# Patient Record
Sex: MALE | Race: ASIAN | NOT HISPANIC OR LATINO
[De-identification: names, ages, dates, MRNs, and addresses within clinical notes are randomized per-mention and may not be internally consistent; named-entity substitution may affect disease eponyms.]

---

## 2017-02-23 ENCOUNTER — APPOINTMENT (OUTPATIENT)
Dept: CARDIOLOGY | Facility: CLINIC | Age: 67
End: 2017-02-23

## 2017-03-31 ENCOUNTER — LABORATORY RESULT (OUTPATIENT)
Age: 67
End: 2017-03-31

## 2017-04-03 ENCOUNTER — APPOINTMENT (OUTPATIENT)
Dept: CARDIOLOGY | Facility: CLINIC | Age: 67
End: 2017-04-03

## 2017-04-03 VITALS
DIASTOLIC BLOOD PRESSURE: 82 MMHG | HEART RATE: 94 BPM | WEIGHT: 179 LBS | HEIGHT: 66 IN | RESPIRATION RATE: 14 BRPM | OXYGEN SATURATION: 96 % | SYSTOLIC BLOOD PRESSURE: 123 MMHG | BODY MASS INDEX: 28.77 KG/M2

## 2017-04-03 DIAGNOSIS — E11.9 TYPE 2 DIABETES MELLITUS W/OUT COMPLICATIONS: ICD-10-CM

## 2017-04-03 LAB
ANION GAP SERPL CALC-SCNC: 17 MMOL/L
BUN SERPL-MCNC: 19 MG/DL
CALCIUM SERPL-MCNC: 9.3 MG/DL
CHLORIDE SERPL-SCNC: 98 MMOL/L
CO2 SERPL-SCNC: 24 MMOL/L
CREAT SERPL-MCNC: 1.12 MG/DL
GLUCOSE SERPL-MCNC: 150 MG/DL
MAGNESIUM SERPL-MCNC: 1.9 MG/DL
NT-PROBNP SERPL-MCNC: 2343 PG/ML
POTASSIUM SERPL-SCNC: 4.4 MMOL/L
SODIUM SERPL-SCNC: 139 MMOL/L

## 2017-04-03 RX ORDER — ATORVASTATIN CALCIUM 20 MG/1
20 TABLET, FILM COATED ORAL DAILY
Qty: 30 | Refills: 0 | Status: ACTIVE | COMMUNITY
Start: 2017-04-03

## 2017-04-11 ENCOUNTER — CHART COPY (OUTPATIENT)
Age: 67
End: 2017-04-11

## 2017-05-08 ENCOUNTER — APPOINTMENT (OUTPATIENT)
Dept: CARDIOLOGY | Facility: CLINIC | Age: 67
End: 2017-05-08

## 2017-05-08 ENCOUNTER — NON-APPOINTMENT (OUTPATIENT)
Age: 67
End: 2017-05-08

## 2017-05-08 VITALS
OXYGEN SATURATION: 95 % | HEART RATE: 92 BPM | DIASTOLIC BLOOD PRESSURE: 76 MMHG | HEIGHT: 66 IN | WEIGHT: 176 LBS | SYSTOLIC BLOOD PRESSURE: 118 MMHG | BODY MASS INDEX: 28.28 KG/M2

## 2017-05-08 DIAGNOSIS — I48.91 UNSPECIFIED ATRIAL FIBRILLATION: ICD-10-CM

## 2017-07-10 ENCOUNTER — LABORATORY RESULT (OUTPATIENT)
Age: 67
End: 2017-07-10

## 2017-07-10 ENCOUNTER — NON-APPOINTMENT (OUTPATIENT)
Age: 67
End: 2017-07-10

## 2017-07-10 ENCOUNTER — RESULT REVIEW (OUTPATIENT)
Age: 67
End: 2017-07-10

## 2017-07-10 ENCOUNTER — APPOINTMENT (OUTPATIENT)
Dept: CARDIOLOGY | Facility: CLINIC | Age: 67
End: 2017-07-10

## 2017-07-10 VITALS
SYSTOLIC BLOOD PRESSURE: 122 MMHG | HEART RATE: 75 BPM | DIASTOLIC BLOOD PRESSURE: 81 MMHG | OXYGEN SATURATION: 98 % | BODY MASS INDEX: 28.89 KG/M2 | WEIGHT: 179 LBS | RESPIRATION RATE: 18 BRPM

## 2017-07-10 DIAGNOSIS — I50.23 ACUTE ON CHRONIC SYSTOLIC (CONGESTIVE) HEART FAILURE: ICD-10-CM

## 2017-07-10 LAB
ALBUMIN SERPL ELPH-MCNC: 4.1 G/DL
ALP BLD-CCNC: 90 U/L
ALT SERPL-CCNC: 13 U/L
ANION GAP SERPL CALC-SCNC: 20 MMOL/L
AST SERPL-CCNC: 23 U/L
BILIRUB SERPL-MCNC: 1.3 MG/DL
BUN SERPL-MCNC: 46 MG/DL
CALCIUM SERPL-MCNC: 9.6 MG/DL
CHLORIDE SERPL-SCNC: 98 MMOL/L
CO2 SERPL-SCNC: 22 MMOL/L
CREAT SERPL-MCNC: 1.48 MG/DL
GLUCOSE SERPL-MCNC: 82 MG/DL
MAGNESIUM SERPL-MCNC: 1.9 MG/DL
NT-PROBNP SERPL-MCNC: 3986 PG/ML
POTASSIUM SERPL-SCNC: 4.3 MMOL/L
PROT SERPL-MCNC: 8 G/DL
SODIUM SERPL-SCNC: 140 MMOL/L

## 2017-07-10 RX ORDER — DIGOXIN 125 UG/1
125 TABLET ORAL
Qty: 30 | Refills: 6 | Status: ACTIVE | COMMUNITY
Start: 2017-07-10 | End: 1900-01-01

## 2017-07-10 RX ORDER — METOLAZONE 2.5 MG/1
2.5 TABLET ORAL
Qty: 24 | Refills: 0 | Status: ACTIVE | COMMUNITY
Start: 2017-07-10 | End: 1900-01-01

## 2017-07-10 RX ORDER — TORSEMIDE 20 MG/1
20 TABLET ORAL TWICE DAILY
Qty: 240 | Refills: 3 | Status: ACTIVE | COMMUNITY
Start: 2017-07-10 | End: 1900-01-01

## 2017-07-11 ENCOUNTER — RESULT REVIEW (OUTPATIENT)
Age: 67
End: 2017-07-11

## 2017-07-11 LAB
BASOPHILS # BLD AUTO: 0.01 K/UL
BASOPHILS NFR BLD AUTO: 0.2 %
EOSINOPHIL # BLD AUTO: 0.17 K/UL
EOSINOPHIL NFR BLD AUTO: 2.9 %
HCT VFR BLD CALC: 35.7 %
HGB BLD-MCNC: 11.5 G/DL
IMM GRANULOCYTES NFR BLD AUTO: 0.3 %
LYMPHOCYTES # BLD AUTO: 0.73 K/UL
LYMPHOCYTES NFR BLD AUTO: 12.7 %
MAN DIFF?: NORMAL
MCHC RBC-ENTMCNC: 26.1 PG
MCHC RBC-ENTMCNC: 32.2 GM/DL
MCV RBC AUTO: 81.1 FL
MONOCYTES # BLD AUTO: 0.57 K/UL
MONOCYTES NFR BLD AUTO: 9.9 %
NEUTROPHILS # BLD AUTO: 4.27 K/UL
NEUTROPHILS NFR BLD AUTO: 74 %
PLATELET # BLD AUTO: 96 K/UL
RBC # BLD: 4.4 M/UL
RBC # FLD: 17.1 %
WBC # FLD AUTO: 5.77 K/UL

## 2017-08-10 ENCOUNTER — APPOINTMENT (OUTPATIENT)
Dept: ELECTROPHYSIOLOGY | Facility: CLINIC | Age: 67
End: 2017-08-10
Payer: MEDICARE

## 2017-08-10 ENCOUNTER — OUTPATIENT (OUTPATIENT)
Dept: OUTPATIENT SERVICES | Facility: HOSPITAL | Age: 67
LOS: 1 days | End: 2017-08-10

## 2017-08-10 ENCOUNTER — APPOINTMENT (OUTPATIENT)
Dept: CV DIAGNOSITCS | Facility: HOSPITAL | Age: 67
End: 2017-08-10
Payer: MEDICARE

## 2017-08-10 DIAGNOSIS — E11.9 TYPE 2 DIABETES MELLITUS WITHOUT COMPLICATIONS: ICD-10-CM

## 2017-08-10 PROCEDURE — 93283 PRGRMG EVAL IMPLANTABLE DFB: CPT

## 2017-08-10 PROCEDURE — 93306 TTE W/DOPPLER COMPLETE: CPT | Mod: 26

## 2017-10-05 ENCOUNTER — NON-APPOINTMENT (OUTPATIENT)
Age: 67
End: 2017-10-05

## 2017-10-05 ENCOUNTER — APPOINTMENT (OUTPATIENT)
Dept: ELECTROPHYSIOLOGY | Facility: CLINIC | Age: 67
End: 2017-10-05
Payer: MEDICARE

## 2017-10-05 VITALS
WEIGHT: 172 LBS | BODY MASS INDEX: 26.07 KG/M2 | SYSTOLIC BLOOD PRESSURE: 134 MMHG | DIASTOLIC BLOOD PRESSURE: 76 MMHG | HEIGHT: 68 IN | HEART RATE: 63 BPM

## 2017-10-05 DIAGNOSIS — Z45.02 ENCOUNTER FOR ADJUSTMENT AND MANAGEMENT OF AUTOMATIC IMPLANTABLE CARDIAC DEFIBRILLATOR: ICD-10-CM

## 2017-10-05 LAB
ALBUMIN SERPL ELPH-MCNC: 4.8 G/DL
ALP BLD-CCNC: 59 U/L
ALT SERPL-CCNC: 17 U/L
ANION GAP SERPL CALC-SCNC: 17 MMOL/L
AST SERPL-CCNC: 23 U/L
BASOPHILS # BLD AUTO: 0.02 K/UL
BASOPHILS NFR BLD AUTO: 0.3 %
BILIRUB SERPL-MCNC: 1.6 MG/DL
BUN SERPL-MCNC: 40 MG/DL
CALCIUM SERPL-MCNC: 9.8 MG/DL
CHLORIDE SERPL-SCNC: 95 MMOL/L
CO2 SERPL-SCNC: 27 MMOL/L
CREAT SERPL-MCNC: 1.63 MG/DL
EOSINOPHIL # BLD AUTO: 0.23 K/UL
EOSINOPHIL NFR BLD AUTO: 3.7 %
HCT VFR BLD CALC: 36.2 %
HGB BLD-MCNC: 11.8 G/DL
IMM GRANULOCYTES NFR BLD AUTO: 0.3 %
INR PPP: 2.2 RATIO
LYMPHOCYTES # BLD AUTO: 1.18 K/UL
LYMPHOCYTES NFR BLD AUTO: 19.1 %
MAN DIFF?: NORMAL
MCHC RBC-ENTMCNC: 27.7 PG
MCHC RBC-ENTMCNC: 32.6 GM/DL
MCV RBC AUTO: 85 FL
MONOCYTES # BLD AUTO: 0.49 K/UL
MONOCYTES NFR BLD AUTO: 7.9 %
NEUTROPHILS # BLD AUTO: 4.23 K/UL
NEUTROPHILS NFR BLD AUTO: 68.7 %
PLATELET # BLD AUTO: 113 K/UL
POTASSIUM SERPL-SCNC: 4.6 MMOL/L
PROT SERPL-MCNC: 8.9 G/DL
PT BLD: 25.3 SEC
RBC # BLD: 4.26 M/UL
RBC # FLD: 17.3 %
SODIUM SERPL-SCNC: 139 MMOL/L
WBC # FLD AUTO: 6.17 K/UL

## 2017-10-05 PROCEDURE — 93284 PRGRMG EVAL IMPLANTABLE DFB: CPT

## 2017-10-05 PROCEDURE — 93000 ELECTROCARDIOGRAM COMPLETE: CPT | Mod: 59

## 2017-10-05 PROCEDURE — 99205 OFFICE O/P NEW HI 60 MIN: CPT

## 2017-10-05 PROCEDURE — 99205 OFFICE O/P NEW HI 60 MIN: CPT | Mod: 25

## 2017-10-10 ENCOUNTER — INPATIENT (INPATIENT)
Facility: HOSPITAL | Age: 67
LOS: 0 days | Discharge: HOME | End: 2017-10-11
Attending: STUDENT IN AN ORGANIZED HEALTH CARE EDUCATION/TRAINING PROGRAM | Admitting: STUDENT IN AN ORGANIZED HEALTH CARE EDUCATION/TRAINING PROGRAM
Payer: MEDICARE

## 2017-10-10 VITALS
OXYGEN SATURATION: 97 % | TEMPERATURE: 98 F | DIASTOLIC BLOOD PRESSURE: 82 MMHG | RESPIRATION RATE: 17 BRPM | HEART RATE: 76 BPM | SYSTOLIC BLOOD PRESSURE: 141 MMHG

## 2017-10-10 DIAGNOSIS — Z95.810 PRESENCE OF AUTOMATIC (IMPLANTABLE) CARDIAC DEFIBRILLATOR: ICD-10-CM

## 2017-10-10 LAB
GLUCOSE BLDC GLUCOMTR-MCNC: 268 MG/DL — HIGH (ref 70–99)
INR BLD: 1.86 — HIGH (ref 0.88–1.17)
PROTHROM AB SERPL-ACNC: 21.1 SEC — HIGH (ref 9.8–13.1)

## 2017-10-10 PROCEDURE — 93010 ELECTROCARDIOGRAM REPORT: CPT | Mod: 76

## 2017-10-10 PROCEDURE — 71010: CPT | Mod: 26

## 2017-10-10 RX ORDER — INSULIN LISPRO 100/ML
6 VIAL (ML) SUBCUTANEOUS ONCE
Qty: 0 | Refills: 0 | Status: COMPLETED | OUTPATIENT
Start: 2017-10-10 | End: 2017-10-10

## 2017-10-10 RX ORDER — CARVEDILOL PHOSPHATE 80 MG/1
25 CAPSULE, EXTENDED RELEASE ORAL EVERY 12 HOURS
Qty: 0 | Refills: 0 | Status: DISCONTINUED | OUTPATIENT
Start: 2017-10-10 | End: 2017-10-11

## 2017-10-10 RX ORDER — CEFAZOLIN SODIUM 1 G
1000 VIAL (EA) INJECTION ONCE
Qty: 0 | Refills: 0 | Status: DISCONTINUED | OUTPATIENT
Start: 2017-10-10 | End: 2017-10-10

## 2017-10-10 RX ORDER — TOBRAMYCIN 0.3 %
1 DROPS OPHTHALMIC (EYE) EVERY 4 HOURS
Qty: 0 | Refills: 0 | Status: DISCONTINUED | OUTPATIENT
Start: 2017-10-10 | End: 2017-10-11

## 2017-10-10 RX ORDER — SODIUM CHLORIDE 9 MG/ML
3 INJECTION INTRAMUSCULAR; INTRAVENOUS; SUBCUTANEOUS EVERY 8 HOURS
Qty: 0 | Refills: 0 | Status: DISCONTINUED | OUTPATIENT
Start: 2017-10-10 | End: 2017-10-11

## 2017-10-10 RX ORDER — DEXTROSE 50 % IN WATER 50 %
25 SYRINGE (ML) INTRAVENOUS ONCE
Qty: 0 | Refills: 0 | Status: DISCONTINUED | OUTPATIENT
Start: 2017-10-10 | End: 2017-10-11

## 2017-10-10 RX ORDER — INSULIN LISPRO 100/ML
VIAL (ML) SUBCUTANEOUS
Qty: 0 | Refills: 0 | Status: DISCONTINUED | OUTPATIENT
Start: 2017-10-10 | End: 2017-10-11

## 2017-10-10 RX ORDER — SODIUM CHLORIDE 9 MG/ML
1000 INJECTION, SOLUTION INTRAVENOUS
Qty: 0 | Refills: 0 | Status: DISCONTINUED | OUTPATIENT
Start: 2017-10-10 | End: 2017-10-11

## 2017-10-10 RX ORDER — DIGOXIN 250 MCG
0.12 TABLET ORAL DAILY
Qty: 0 | Refills: 0 | Status: DISCONTINUED | OUTPATIENT
Start: 2017-10-10 | End: 2017-10-11

## 2017-10-10 RX ORDER — CEFAZOLIN SODIUM 1 G
1000 VIAL (EA) INJECTION ONCE
Qty: 0 | Refills: 0 | Status: COMPLETED | OUTPATIENT
Start: 2017-10-10 | End: 2017-10-10

## 2017-10-10 RX ORDER — LOSARTAN POTASSIUM 100 MG/1
75 TABLET, FILM COATED ORAL DAILY
Qty: 0 | Refills: 0 | Status: DISCONTINUED | OUTPATIENT
Start: 2017-10-10 | End: 2017-10-11

## 2017-10-10 RX ORDER — DEXTROSE 50 % IN WATER 50 %
1 SYRINGE (ML) INTRAVENOUS ONCE
Qty: 0 | Refills: 0 | Status: DISCONTINUED | OUTPATIENT
Start: 2017-10-10 | End: 2017-10-11

## 2017-10-10 RX ORDER — WARFARIN SODIUM 2.5 MG/1
7.5 TABLET ORAL ONCE
Qty: 0 | Refills: 0 | Status: COMPLETED | OUTPATIENT
Start: 2017-10-10 | End: 2017-10-10

## 2017-10-10 RX ORDER — ATORVASTATIN CALCIUM 80 MG/1
20 TABLET, FILM COATED ORAL AT BEDTIME
Qty: 0 | Refills: 0 | Status: DISCONTINUED | OUTPATIENT
Start: 2017-10-10 | End: 2017-10-11

## 2017-10-10 RX ORDER — GLUCAGON INJECTION, SOLUTION 0.5 MG/.1ML
1 INJECTION, SOLUTION SUBCUTANEOUS ONCE
Qty: 0 | Refills: 0 | Status: DISCONTINUED | OUTPATIENT
Start: 2017-10-10 | End: 2017-10-11

## 2017-10-10 RX ORDER — DEXTROSE 50 % IN WATER 50 %
12.5 SYRINGE (ML) INTRAVENOUS ONCE
Qty: 0 | Refills: 0 | Status: DISCONTINUED | OUTPATIENT
Start: 2017-10-10 | End: 2017-10-11

## 2017-10-10 RX ADMIN — Medication 1 DROP(S): at 14:05

## 2017-10-10 RX ADMIN — WARFARIN SODIUM 7.5 MILLIGRAM(S): 2.5 TABLET ORAL at 17:32

## 2017-10-10 RX ADMIN — Medication 1 DROP(S): at 14:13

## 2017-10-10 RX ADMIN — Medication 100 MILLIGRAM(S): at 17:32

## 2017-10-10 RX ADMIN — Medication 0.12 MILLIGRAM(S): at 17:33

## 2017-10-10 RX ADMIN — SODIUM CHLORIDE 3 MILLILITER(S): 9 INJECTION INTRAMUSCULAR; INTRAVENOUS; SUBCUTANEOUS at 22:13

## 2017-10-10 RX ADMIN — Medication 3: at 15:41

## 2017-10-10 RX ADMIN — ATORVASTATIN CALCIUM 20 MILLIGRAM(S): 80 TABLET, FILM COATED ORAL at 22:12

## 2017-10-10 RX ADMIN — SODIUM CHLORIDE 3 MILLILITER(S): 9 INJECTION INTRAMUSCULAR; INTRAVENOUS; SUBCUTANEOUS at 14:41

## 2017-10-10 RX ADMIN — Medication 1 DROP(S): at 17:36

## 2017-10-10 RX ADMIN — Medication 6 UNIT(S): at 23:05

## 2017-10-10 RX ADMIN — LOSARTAN POTASSIUM 75 MILLIGRAM(S): 100 TABLET, FILM COATED ORAL at 17:33

## 2017-10-10 RX ADMIN — Medication 60 MILLIGRAM(S): at 22:12

## 2017-10-10 RX ADMIN — Medication 1 DROP(S): at 22:12

## 2017-10-10 RX ADMIN — CARVEDILOL PHOSPHATE 25 MILLIGRAM(S): 80 CAPSULE, EXTENDED RELEASE ORAL at 17:33

## 2017-10-10 NOTE — PATIENT PROFILE ADULT. - --DESCRIBE SURGICAL SITE
covered by pressure dressing patient s/p elective defibrillator exchange, unable to visualize incision site 2/2 pressure dressing

## 2017-10-10 NOTE — H&P CARDIOLOGY - HISTORY OF PRESENT ILLNESS
67 y.o. male presents today for elective BiVICD generator change.  see hard copy of H&P from [employer] in patient's chart.  The patient denies any new complaints since the last time he was seen by Dr. Stanford.

## 2017-10-10 NOTE — CHART NOTE - NSCHARTNOTEFT_GEN_A_CORE
Patient s/p AICD generator change with lead revision & in IRS pt c/o bilateral eye irritation/pain;  On physical exam there is bilateral eye redness with profuse tearing; 0.5% tetracaine & 0.3% tobramycin ordered from pharmacy & opthamology consulted.  Patients PE & symptoms discussed with Dr Albarran from opthamology who agrees with current management & will see patient if symptoms persist after use of meds.

## 2017-10-10 NOTE — CHART NOTE - NSCHARTNOTEFT_GEN_A_CORE
NFN CARLO 67y Male s/p BiVICD  gen. change with lead revision.    Dressing is clear/dry/intact.   Site is without hematoma or bleeding. Pulses palpable    Vital Signs Last 24 Hrs  T(C): 37.3 (10-10-17 @ 17:30), Max: 37.3 (10-10-17 @ 17:30)  T(F): 99.2 (10-10-17 @ 17:30), Max: 99.2 (10-10-17 @ 17:30)  HR: 60 (10-10-17 @ 21:27) (60 - 76)  BP: 108/63 (10-10-17 @ 21:27) (108/63 - 141/82)  BP(mean): 101 (10-10-17 @ 07:14) (101 - 101)  RR: 18 (10-10-17 @ 21:27) (17 - 18)  SpO2: 100% (10-10-17 @ 21:27) (97% - 100%)    Chest Xray shows Left chest wall biventricular ICD is above.  No pneumothorax.  Minimal linear atelectasis versus scar in the left lower lung.    Patient denies pain, numbness, tingling, CP, SOB.   Patient will receive 1 dose of Ancef. Will continue to monitor.

## 2017-10-11 ENCOUNTER — TRANSCRIPTION ENCOUNTER (OUTPATIENT)
Age: 67
End: 2017-10-11

## 2017-10-11 VITALS
SYSTOLIC BLOOD PRESSURE: 106 MMHG | RESPIRATION RATE: 18 BRPM | HEART RATE: 61 BPM | OXYGEN SATURATION: 100 % | TEMPERATURE: 98 F | DIASTOLIC BLOOD PRESSURE: 55 MMHG

## 2017-10-11 LAB
BUN SERPL-MCNC: 50 MG/DL — HIGH (ref 7–23)
CALCIUM SERPL-MCNC: 8.5 MG/DL — SIGNIFICANT CHANGE UP (ref 8.4–10.5)
CHLORIDE SERPL-SCNC: 90 MMOL/L — LOW (ref 98–107)
CO2 SERPL-SCNC: 21 MMOL/L — LOW (ref 22–31)
CREAT SERPL-MCNC: 2.23 MG/DL — HIGH (ref 0.5–1.3)
GLUCOSE BLDC GLUCOMTR-MCNC: 408 MG/DL — HIGH (ref 70–99)
GLUCOSE SERPL-MCNC: 391 MG/DL — HIGH (ref 70–99)
HCT VFR BLD CALC: 29.9 % — LOW (ref 39–50)
HGB BLD-MCNC: 9.9 G/DL — LOW (ref 13–17)
INR BLD: 1.73 — HIGH (ref 0.88–1.17)
LG PLATELETS BLD QL AUTO: SLIGHT — SIGNIFICANT CHANGE UP
MAGNESIUM SERPL-MCNC: 1.7 MG/DL — SIGNIFICANT CHANGE UP (ref 1.6–2.6)
MCHC RBC-ENTMCNC: 27.8 PG — SIGNIFICANT CHANGE UP (ref 27–34)
MCHC RBC-ENTMCNC: 33.1 % — SIGNIFICANT CHANGE UP (ref 32–36)
MCV RBC AUTO: 84 FL — SIGNIFICANT CHANGE UP (ref 80–100)
NRBC # FLD: 0 — SIGNIFICANT CHANGE UP
PLATELET # BLD AUTO: 76 K/UL — LOW (ref 150–400)
PLATELET COUNT - ESTIMATE: SIGNIFICANT CHANGE UP
PMV BLD: 10.4 FL — SIGNIFICANT CHANGE UP (ref 7–13)
POTASSIUM SERPL-MCNC: 3.9 MMOL/L — SIGNIFICANT CHANGE UP (ref 3.5–5.3)
POTASSIUM SERPL-SCNC: 3.9 MMOL/L — SIGNIFICANT CHANGE UP (ref 3.5–5.3)
PROTHROM AB SERPL-ACNC: 19.6 SEC — HIGH (ref 9.8–13.1)
RBC # BLD: 3.56 M/UL — LOW (ref 4.2–5.8)
RBC # FLD: 16.6 % — HIGH (ref 10.3–14.5)
SODIUM SERPL-SCNC: 130 MMOL/L — LOW (ref 135–145)
WBC # BLD: 9.76 K/UL — SIGNIFICANT CHANGE UP (ref 3.8–10.5)
WBC # FLD AUTO: 9.76 K/UL — SIGNIFICANT CHANGE UP (ref 3.8–10.5)

## 2017-10-11 RX ORDER — TOBRAMYCIN 0.3 %
1 DROPS OPHTHALMIC (EYE)
Qty: 1 | Refills: 0 | OUTPATIENT
Start: 2017-10-11 | End: 2017-10-14

## 2017-10-11 RX ORDER — WARFARIN SODIUM 2.5 MG/1
10 TABLET ORAL ONCE
Qty: 0 | Refills: 0 | Status: DISCONTINUED | OUTPATIENT
Start: 2017-10-11 | End: 2017-10-11

## 2017-10-11 RX ORDER — WARFARIN SODIUM 2.5 MG/1
10 TABLET ORAL ONCE
Qty: 0 | Refills: 0 | Status: COMPLETED | OUTPATIENT
Start: 2017-10-11 | End: 2017-10-11

## 2017-10-11 RX ADMIN — SODIUM CHLORIDE 3 MILLILITER(S): 9 INJECTION INTRAMUSCULAR; INTRAVENOUS; SUBCUTANEOUS at 06:11

## 2017-10-11 RX ADMIN — LOSARTAN POTASSIUM 75 MILLIGRAM(S): 100 TABLET, FILM COATED ORAL at 06:10

## 2017-10-11 RX ADMIN — Medication 1 DROP(S): at 06:10

## 2017-10-11 RX ADMIN — WARFARIN SODIUM 10 MILLIGRAM(S): 2.5 TABLET ORAL at 11:29

## 2017-10-11 RX ADMIN — Medication 60 MILLIGRAM(S): at 06:10

## 2017-10-11 RX ADMIN — CARVEDILOL PHOSPHATE 25 MILLIGRAM(S): 80 CAPSULE, EXTENDED RELEASE ORAL at 06:10

## 2017-10-11 RX ADMIN — Medication 1 DROP(S): at 01:36

## 2017-10-11 RX ADMIN — Medication 0.12 MILLIGRAM(S): at 06:10

## 2017-10-11 RX ADMIN — Medication 4: at 09:17

## 2017-10-11 RX ADMIN — Medication 1 DROP(S): at 11:26

## 2017-10-11 NOTE — DISCHARGE NOTE ADULT - INSTRUCTIONS
Low salt, low fat, low cholesterol, low carb, diabetic diet.   Continue with 1.5 liter fluid restriction given your history of heart failure.

## 2017-10-11 NOTE — DISCHARGE NOTE ADULT - PLAN OF CARE
Prevent progression of disease. Ensure compliance with medications. Follow up with cardiologist within one week of discharge. Call for appointment. Return to ED for any concerning symptoms. Continue medications as prescribed. Low salt, low fat, low cholesterol diet. Maintain HR between 60bpm-100bpm. Continue Coumadin for target INR 2.0-3.0 and follow up with doctor who checks your INR. Follow up with cardiologist within one week of discharge. Call for appointment. Continue medications as instructed. If you are on Coumadin, make sure you follow up with physician who monitors your INR. Maintain adequate control of your blood pressure. Goal BP < 130/80. Continue low sodium diet. Follow up with PCP and/or cardiologist for ongoing medical management of your hypertension. Continue medications as prescribed. Low salt diet. Maintain adequate control of your cholesterol levels. Goal LDL < 70. Follow up with PCP for ongoing medical management. Continue medications as prescribed. Low cholesterol diet. Maintain adequate glycemic control. Monitor your sugars. Goal HgA1C < 7.0%. Low carb diet. Follow up with PCP and/or endocrinologist for ongoing medical management of your diabetes. Continue your medications as prescribed. Low carb diet. You had a successful generator change and lead revision. Follow up with Dr. Masterson. Follow up with Dr. Masterson for appointment on 10/16/17 at 9AM to assess the hematoma and follow up at the device clinic on 10/25/17 at 8:45AM. Call to confirm appointments. Return to ER for any issues. Prevent fluid overload. Maintain euvolemia. Ensure compliance with medications. Follow up with cardiologist within one week of discharge. Call for appointment. Return to ED for any concerning symptoms. Continue medications as prescribed. Low salt diet with fluid restriction.

## 2017-10-11 NOTE — DISCHARGE NOTE ADULT - MEDICATION SUMMARY - MEDICATIONS TO TAKE
I will START or STAY ON the medications listed below when I get home from the hospital:    losartan 50 mg oral tablet  -- 1.5 tab(s) by mouth once a day  -- Indication: For HTN (hypertension)    digoxin 125 mcg (0.125 mg) oral tablet  -- 1 tab(s) by mouth once a day  -- Indication: For CHF (congestive heart failure)    Coumadin  -- 7.5 milligram(s) by mouth once a day  -- Indication: For AF (atrial fibrillation)    metFORMIN 500 mg oral tablet, extended release  -- 2 tab(s) by mouth once a day  -- Indication: For DM (diabetes mellitus)    glipiZIDE 2.5 mg oral tablet, extended release  -- 1 tab(s) by mouth 2 times a day  -- Indication: For DM (diabetes mellitus)    Lipitor 20 mg oral tablet  -- 1 tab(s) by mouth once a day (at bedtime)  -- Indication: For HLD (hyperlipidemia)    Coreg 25 mg oral tablet  -- 1 tab(s) by mouth 2 times a day  -- Indication: For HTN (hypertension)    metOLazone 2.5 mg oral tablet  -- 1 tab(s) by mouth once a day on Mondays and Fridays  -- Indication: For CHF (congestive heart failure)    torsemide 20 mg oral tablet  -- 3 tab(s) by mouth 2 times a day  -- Indication: For CHF (congestive heart failure)    tobramycin 0.3% ophthalmic solution  -- 1 drop(s) to each affected eye every 4 hours  -- Indication: For Corneal abrasion

## 2017-10-11 NOTE — PROGRESS NOTE ADULT - SUBJECTIVE AND OBJECTIVE BOX
Patient is seen and examined. Denies chest pain, SOB, palpitations or dizziness.    PAST MEDICAL & SURGICAL HISTORY:  TIA (transient ischemic attack): without residual 2007  Diabetes mellitus: x 6 months without N/N/R  Afib: since 2007- on coumadin x 2 years  HTN (hypertension)  ETOH abuse  Hyperlipidemia  CVA (cerebral infarction)  Heart attack  Systolic dysfunction, left ventricle  AICD at end of battery life  CHB (complete heart block)  CHF (congestive heart failure)  CAD (coronary artery disease)  Mitral valve disease: with mitral valvuloplasty 2007  S/P CABG x 4: 2007, [facility]/ [facility]it  AICD (automatic cardioverter/defibrillator) present: 10/3/2007, BiV- Medtronic with RIATA lead      MEDICATIONS  (STANDING):  atorvastatin 20 milliGRAM(s) Oral at bedtime  carvedilol 25 milliGRAM(s) Oral every 12 hours  dextrose 5%. 1000 milliLiter(s) (50 mL/Hr) IV Continuous <Continuous>  dextrose 50% Injectable 12.5 Gram(s) IV Push once  dextrose 50% Injectable 25 Gram(s) IV Push once  dextrose 50% Injectable 25 Gram(s) IV Push once  digoxin     Tablet 0.125 milliGRAM(s) Oral daily  insulin lispro (HumaLOG) corrective regimen sliding scale   SubCutaneous three times a day before meals  losartan 75 milliGRAM(s) Oral daily  metolazone 2.5 milliGRAM(s) Oral <User Schedule>  sodium chloride 0.9% lock flush 3 milliLiter(s) IV Push every 8 hours  tobramycin 0.3% Solution 1 Drop(s) Both EYES every 4 hours  torsemide 60 milliGRAM(s) Oral two times a day  warfarin 10 milliGRAM(s) Oral once    MEDICATIONS  (PRN):  dextrose Gel 1 Dose(s) Oral once PRN Blood Glucose LESS THAN 70 milliGRAM(s)/deciliter  glucagon  Injectable 1 milliGRAM(s) IntraMuscular once PRN Glucose LESS THAN 70 milligrams/deciliter      Vital Signs Last 24 Hrs  T(C): 36.6 (11 Oct 2017 06:08), Max: 37.3 (10 Oct 2017 17:30)  T(F): 97.9 (11 Oct 2017 06:08), Max: 99.2 (10 Oct 2017 17:30)  HR: 62 (11 Oct 2017 06:08) (60 - 62)  BP: 114/60 (11 Oct 2017 06:08) (108/63 - 115/72)  BP(mean): --  RR: 18 (11 Oct 2017 06:08) (18 - 18)  SpO2: 100% (11 Oct 2017 06:08) (100% - 100%)    INTERPRETATION OF TELEMETRY: Atrial fibrillation with VR 60s.      LABS:                        9.9    9.76  )-----------( 76       ( 11 Oct 2017 07:30 )             29.9     10-11    130<L>  |  90<L>  |  50<H>  ----------------------------<  391<H>  3.9   |  21<L>  |  2.23<H>    Ca    8.5      11 Oct 2017 07:30  Mg     1.7     10-11          PT/INR - ( 11 Oct 2017 07:30 )   PT: 19.6 SEC;   INR: 1.73                  PHYSICAL EXAM:    GENERAL: In no apparent distress, well nourished, and hydrated.  HEAD:  Atraumatic, Normocephalic  HEART: Irregular rate and rhythm; paced rhythm;  No murmurs, rubs, or gallops.  PULMONARY: Clear to auscultation and perfusion.  No rales, wheezing, or rhonchi bilaterally.  ABDOMEN: Soft, Nontender, Nondistended; Bowel sounds present  EXTREMITIES:  2+ Peripheral Pulses, No clubbing, cyanosis, or edema  LYMPH: No lymphadenopathy noted  NEUROLOGICAL: Grossly nonfocal

## 2017-10-11 NOTE — DISCHARGE NOTE ADULT - SECONDARY DIAGNOSIS.
CAD (coronary artery disease) AF (atrial fibrillation) HTN (hypertension) HLD (hyperlipidemia) DM (diabetes mellitus) CHF (congestive heart failure)

## 2017-10-11 NOTE — PROGRESS NOTE ADULT - ASSESSMENT
67 year old male with history of CHF, atrial fibrillation o Coumadin, HLD, HTN, CHB s/p PPM, type II DM presented for upgrade to BIVICD.  S/P BIV ICD upgrade. Device site clean, dry and intact. No bleeding noted. Mod. hematoma noted at the device site. Discussed with Dr. Beckett. Pressure dressing reapplied and device teaching given to patient and he demonstrates understanding of the instructions. CXR shows no pneumothorax.   - May D/C home  - F/u appointment with Dr. beckett on 10/16/17 at 9am and device clinic on 10/25/17 at 8:45 am

## 2017-10-11 NOTE — DISCHARGE NOTE ADULT - HOSPITAL COURSE
66 y/o male with a PMHx of CAD S/P CABG, ischemic cardiomyopathy with severe LV dysfunction (EF 21%), CHB S/P PPM/ICD, atrial fibrillation on Coumadin, HTN, HLD, DM presents for elective BiV-ICD generator change as device is at [facility]. Pt underwent successful generator change on 10/10. Pt remained stable. Pt was given one dose of IV Ancef. Post procedure CXR revealed no evidence of pneumothorax. Pt had a hematoma at the site of the procedure afterwards. Pt seen by EP; pressure dressing reapplied and device teaching given to patient and he demonstrates understanding of the instructions. Pt to follow up with Dr. Masterson on 10/16. Instructions given. Discussed with Dr. Masterson on 10/11. Pt now medically stable for discharge home.

## 2017-10-11 NOTE — DISCHARGE NOTE ADULT - CARE PLAN
Principal Discharge DX:	AICD at end of battery life  Goal:	You had a successful generator change and lead revision. Follow up with Dr. Masterson.  Instructions for follow-up, activity and diet:	Follow up with Dr. Masterson for appointment on 10/16/17 at 9AM to assess the hematoma and follow up at the device clinic on 10/25/17 at 8:45AM. Call to confirm appointments. Return to ER for any issues.  Secondary Diagnosis:	CHF (congestive heart failure)  Goal:	Prevent fluid overload. Maintain euvolemia. Ensure compliance with medications.  Instructions for follow-up, activity and diet:	Follow up with cardiologist within one week of discharge. Call for appointment. Return to ED for any concerning symptoms. Continue medications as prescribed. Low salt diet with fluid restriction.  Secondary Diagnosis:	CAD (coronary artery disease)  Goal:	Prevent progression of disease. Ensure compliance with medications.  Instructions for follow-up, activity and diet:	Follow up with cardiologist within one week of discharge. Call for appointment. Return to ED for any concerning symptoms. Continue medications as prescribed. Low salt, low fat, low cholesterol diet.  Secondary Diagnosis:	AF (atrial fibrillation)  Goal:	Maintain HR between 60bpm-100bpm. Continue Coumadin for target INR 2.0-3.0 and follow up with doctor who checks your INR.  Instructions for follow-up, activity and diet:	Follow up with cardiologist within one week of discharge. Call for appointment. Continue medications as instructed. If you are on Coumadin, make sure you follow up with physician who monitors your INR.  Secondary Diagnosis:	HTN (hypertension)  Goal:	Maintain adequate control of your blood pressure. Goal BP < 130/80. Continue low sodium diet.  Instructions for follow-up, activity and diet:	Follow up with PCP and/or cardiologist for ongoing medical management of your hypertension. Continue medications as prescribed. Low salt diet.  Secondary Diagnosis:	HLD (hyperlipidemia)  Goal:	Maintain adequate control of your cholesterol levels. Goal LDL < 70.  Instructions for follow-up, activity and diet:	Follow up with PCP for ongoing medical management. Continue medications as prescribed. Low cholesterol diet.  Secondary Diagnosis:	DM (diabetes mellitus)  Goal:	Maintain adequate glycemic control. Monitor your sugars. Goal HgA1C < 7.0%. Low carb diet.  Instructions for follow-up, activity and diet:	Follow up with PCP and/or endocrinologist for ongoing medical management of your diabetes. Continue your medications as prescribed. Low carb diet.

## 2017-10-11 NOTE — DISCHARGE NOTE ADULT - PATIENT PORTAL LINK FT
“You can access the [employer] Patient Portal, offered by [facility], by registering with the following website: [email or web address]”

## 2017-10-11 NOTE — DISCHARGE NOTE ADULT - NS AS ACTIVITY OBS
Do not drive or operate machinery/Walking-Indoors allowed/Walking-Outdoors allowed/No Heavy lifting/straining/Showering allowed/Bathing allowed

## 2017-10-11 NOTE — DISCHARGE NOTE ADULT - ADDITIONAL INSTRUCTIONS
Follow up with Dr. Masterson for appointment on 10/16/17 at 9AM to assess the hematoma and follow up at the device clinic on 10/25/17 at 8:45AM. Call to confirm appointments. Return to ER for any issues.

## 2017-10-16 ENCOUNTER — APPOINTMENT (OUTPATIENT)
Dept: ELECTROPHYSIOLOGY | Facility: CLINIC | Age: 67
End: 2017-10-16
Payer: MEDICARE

## 2017-10-16 VITALS
OXYGEN SATURATION: 98 % | SYSTOLIC BLOOD PRESSURE: 114 MMHG | WEIGHT: 172 LBS | BODY MASS INDEX: 26.07 KG/M2 | DIASTOLIC BLOOD PRESSURE: 71 MMHG | RESPIRATION RATE: 18 BRPM | HEART RATE: 61 BPM | HEIGHT: 68 IN

## 2017-10-16 PROCEDURE — 93000 ELECTROCARDIOGRAM COMPLETE: CPT

## 2017-10-16 PROCEDURE — 99024 POSTOP FOLLOW-UP VISIT: CPT

## 2017-10-19 ENCOUNTER — NON-APPOINTMENT (OUTPATIENT)
Age: 67
End: 2017-10-19

## 2017-10-25 ENCOUNTER — APPOINTMENT (OUTPATIENT)
Dept: ELECTROPHYSIOLOGY | Facility: CLINIC | Age: 67
End: 2017-10-25
Payer: MEDICARE

## 2017-10-25 DIAGNOSIS — I50.20 UNSPECIFIED SYSTOLIC (CONGESTIVE) HEART FAILURE: ICD-10-CM

## 2017-10-25 PROCEDURE — 99024 POSTOP FOLLOW-UP VISIT: CPT

## 2017-11-02 ENCOUNTER — APPOINTMENT (OUTPATIENT)
Dept: ELECTROPHYSIOLOGY | Facility: CLINIC | Age: 67
End: 2017-11-02
Payer: MEDICARE

## 2017-11-02 PROCEDURE — 99024 POSTOP FOLLOW-UP VISIT: CPT

## 2017-11-09 ENCOUNTER — APPOINTMENT (OUTPATIENT)
Dept: ELECTROPHYSIOLOGY | Facility: CLINIC | Age: 67
End: 2017-11-09
Payer: MEDICARE

## 2017-11-09 VITALS
WEIGHT: 172 LBS | BODY MASS INDEX: 26.07 KG/M2 | SYSTOLIC BLOOD PRESSURE: 120 MMHG | HEART RATE: 73 BPM | HEIGHT: 68 IN | RESPIRATION RATE: 16 BRPM | OXYGEN SATURATION: 98 % | DIASTOLIC BLOOD PRESSURE: 71 MMHG

## 2017-11-09 PROCEDURE — 99024 POSTOP FOLLOW-UP VISIT: CPT

## 2017-11-30 ENCOUNTER — APPOINTMENT (OUTPATIENT)
Dept: ELECTROPHYSIOLOGY | Facility: CLINIC | Age: 67
End: 2017-11-30
Payer: MEDICARE

## 2017-11-30 VITALS
HEIGHT: 68 IN | OXYGEN SATURATION: 98 % | BODY MASS INDEX: 25.46 KG/M2 | WEIGHT: 168 LBS | DIASTOLIC BLOOD PRESSURE: 64 MMHG | SYSTOLIC BLOOD PRESSURE: 128 MMHG | HEART RATE: 65 BPM

## 2017-11-30 LAB
INR PPP: 1.67 RATIO
PT BLD: 19.1 SEC

## 2017-11-30 PROCEDURE — 99024 POSTOP FOLLOW-UP VISIT: CPT

## 2017-11-30 PROCEDURE — 93000 ELECTROCARDIOGRAM COMPLETE: CPT

## 2017-11-30 RX ORDER — CEFADROXIL 500 MG/1
500 CAPSULE ORAL TWICE DAILY
Qty: 14 | Refills: 0 | Status: DISCONTINUED | COMMUNITY
Start: 2017-10-16 | End: 2017-11-30

## 2017-12-06 ENCOUNTER — NON-APPOINTMENT (OUTPATIENT)
Age: 67
End: 2017-12-06

## 2017-12-28 ENCOUNTER — APPOINTMENT (OUTPATIENT)
Dept: CV DIAGNOSITCS | Facility: HOSPITAL | Age: 67
End: 2017-12-28
Payer: MEDICARE

## 2017-12-28 ENCOUNTER — OUTPATIENT (OUTPATIENT)
Dept: OUTPATIENT SERVICES | Facility: HOSPITAL | Age: 67
LOS: 1 days | End: 2017-12-28

## 2017-12-28 ENCOUNTER — APPOINTMENT (OUTPATIENT)
Dept: ELECTROPHYSIOLOGY | Facility: CLINIC | Age: 67
End: 2017-12-28
Payer: MEDICARE

## 2017-12-28 ENCOUNTER — NON-APPOINTMENT (OUTPATIENT)
Age: 67
End: 2017-12-28

## 2017-12-28 VITALS — DIASTOLIC BLOOD PRESSURE: 77 MMHG | SYSTOLIC BLOOD PRESSURE: 134 MMHG | HEART RATE: 66 BPM

## 2017-12-28 VITALS — WEIGHT: 168 LBS | OXYGEN SATURATION: 98 % | BODY MASS INDEX: 25.46 KG/M2 | HEIGHT: 68 IN | RESPIRATION RATE: 16 BRPM

## 2017-12-28 DIAGNOSIS — Z51.89 ENCOUNTER FOR OTHER SPECIFIED AFTERCARE: ICD-10-CM

## 2017-12-28 DIAGNOSIS — I50.22 CHRONIC SYSTOLIC (CONGESTIVE) HEART FAILURE: ICD-10-CM

## 2017-12-28 PROCEDURE — 93000 ELECTROCARDIOGRAM COMPLETE: CPT | Mod: 59

## 2017-12-28 PROCEDURE — 93284 PRGRMG EVAL IMPLANTABLE DFB: CPT

## 2017-12-28 PROCEDURE — 99215 OFFICE O/P EST HI 40 MIN: CPT | Mod: 24

## 2017-12-28 PROCEDURE — 93970 EXTREMITY STUDY: CPT | Mod: 26

## 2018-01-04 ENCOUNTER — APPOINTMENT (OUTPATIENT)
Dept: ELECTROPHYSIOLOGY | Facility: CLINIC | Age: 68
End: 2018-01-04

## 2018-02-05 ENCOUNTER — APPOINTMENT (OUTPATIENT)
Dept: ELECTROPHYSIOLOGY | Facility: CLINIC | Age: 68
End: 2018-02-05
Payer: MEDICARE

## 2018-02-05 VITALS
OXYGEN SATURATION: 98 % | DIASTOLIC BLOOD PRESSURE: 86 MMHG | HEART RATE: 74 BPM | BODY MASS INDEX: 25.46 KG/M2 | WEIGHT: 168 LBS | SYSTOLIC BLOOD PRESSURE: 138 MMHG | RESPIRATION RATE: 16 BRPM | HEIGHT: 68 IN

## 2018-02-05 DIAGNOSIS — Z95.810 PRESENCE OF AUTOMATIC (IMPLANTABLE) CARDIAC DEFIBRILLATOR: ICD-10-CM

## 2018-02-05 DIAGNOSIS — I50.22 CHRONIC SYSTOLIC (CONGESTIVE) HEART FAILURE: ICD-10-CM

## 2018-02-05 DIAGNOSIS — I44.2 ATRIOVENTRICULAR BLOCK, COMPLETE: ICD-10-CM

## 2018-02-05 PROCEDURE — 93000 ELECTROCARDIOGRAM COMPLETE: CPT

## 2018-02-05 PROCEDURE — 99215 OFFICE O/P EST HI 40 MIN: CPT

## 2018-02-05 RX ORDER — SPIRONOLACTONE 25 MG/1
25 TABLET ORAL DAILY
Qty: 30 | Refills: 2 | Status: ACTIVE | COMMUNITY
Start: 2018-02-05

## 2018-02-05 RX ORDER — GLIPIZIDE 10 MG/1
10 TABLET, FILM COATED, EXTENDED RELEASE ORAL TWICE DAILY
Refills: 0 | Status: ACTIVE | COMMUNITY
Start: 2017-04-03

## 2018-02-07 ENCOUNTER — NON-APPOINTMENT (OUTPATIENT)
Age: 68
End: 2018-02-07

## 2018-04-05 ENCOUNTER — EMERGENCY (EMERGENCY)
Facility: HOSPITAL | Age: 68
LOS: 1 days | Discharge: HOME | End: 2018-04-05
Attending: EMERGENCY MEDICINE | Admitting: EMERGENCY MEDICINE
Payer: MEDICARE

## 2018-04-05 VITALS
DIASTOLIC BLOOD PRESSURE: 64 MMHG | SYSTOLIC BLOOD PRESSURE: 110 MMHG | OXYGEN SATURATION: 100 % | HEART RATE: 62 BPM | RESPIRATION RATE: 16 BRPM

## 2018-04-05 VITALS
SYSTOLIC BLOOD PRESSURE: 122 MMHG | RESPIRATION RATE: 16 BRPM | TEMPERATURE: 98 F | DIASTOLIC BLOOD PRESSURE: 64 MMHG | OXYGEN SATURATION: 100 % | HEART RATE: 65 BPM

## 2018-04-05 LAB
ALBUMIN SERPL ELPH-MCNC: 4.2 G/DL — SIGNIFICANT CHANGE UP (ref 3.3–5)
ALP SERPL-CCNC: 61 U/L — SIGNIFICANT CHANGE UP (ref 40–120)
ALT FLD-CCNC: 10 U/L — SIGNIFICANT CHANGE UP (ref 4–41)
ANISOCYTOSIS BLD QL: SIGNIFICANT CHANGE UP
APTT BLD: 41.5 SEC — HIGH (ref 27.5–37.4)
AST SERPL-CCNC: 13 U/L — SIGNIFICANT CHANGE UP (ref 4–40)
BASOPHILS # BLD AUTO: 0.02 K/UL — SIGNIFICANT CHANGE UP (ref 0–0.2)
BASOPHILS NFR BLD AUTO: 0.3 % — SIGNIFICANT CHANGE UP (ref 0–2)
BASOPHILS NFR SPEC: 0 % — SIGNIFICANT CHANGE UP (ref 0–2)
BILIRUB SERPL-MCNC: 2.7 MG/DL — HIGH (ref 0.2–1.2)
BLASTS # FLD: 0 % — SIGNIFICANT CHANGE UP (ref 0–0)
BUN SERPL-MCNC: 40 MG/DL — HIGH (ref 7–23)
CALCIUM SERPL-MCNC: 9.3 MG/DL — SIGNIFICANT CHANGE UP (ref 8.4–10.5)
CHLORIDE SERPL-SCNC: 94 MMOL/L — LOW (ref 98–107)
CO2 SERPL-SCNC: 27 MMOL/L — SIGNIFICANT CHANGE UP (ref 22–31)
CREAT SERPL-MCNC: 1.72 MG/DL — HIGH (ref 0.5–1.3)
CRP SERPL-MCNC: 91 MG/L — HIGH
DACRYOCYTES BLD QL SMEAR: SLIGHT — SIGNIFICANT CHANGE UP
DIGOXIN SERPL-MCNC: 2.1 NG/ML — HIGH (ref 0.8–2)
ELLIPTOCYTES BLD QL SMEAR: SLIGHT — SIGNIFICANT CHANGE UP
EOSINOPHIL # BLD AUTO: 0.06 K/UL — SIGNIFICANT CHANGE UP (ref 0–0.5)
EOSINOPHIL NFR BLD AUTO: 0.9 % — SIGNIFICANT CHANGE UP (ref 0–6)
EOSINOPHIL NFR FLD: 0 % — SIGNIFICANT CHANGE UP (ref 0–6)
ERYTHROCYTE [SEDIMENTATION RATE] IN BLOOD: 64 MM/HR — HIGH (ref 1–15)
GIANT PLATELETS BLD QL SMEAR: PRESENT — SIGNIFICANT CHANGE UP
GLUCOSE SERPL-MCNC: 149 MG/DL — HIGH (ref 70–99)
HCT VFR BLD CALC: 33 % — LOW (ref 39–50)
HGB BLD-MCNC: 10.4 G/DL — LOW (ref 13–17)
HYPOCHROMIA BLD QL: SLIGHT — SIGNIFICANT CHANGE UP
IMM GRANULOCYTES # BLD AUTO: 0.03 # — SIGNIFICANT CHANGE UP
IMM GRANULOCYTES NFR BLD AUTO: 0.4 % — SIGNIFICANT CHANGE UP (ref 0–1.5)
INR BLD: 3.56 — HIGH (ref 0.88–1.17)
LYMPHOCYTES # BLD AUTO: 0.77 K/UL — LOW (ref 1–3.3)
LYMPHOCYTES # BLD AUTO: 11.5 % — LOW (ref 13–44)
LYMPHOCYTES NFR SPEC AUTO: 7.9 % — LOW (ref 13–44)
MCHC RBC-ENTMCNC: 25.9 PG — LOW (ref 27–34)
MCHC RBC-ENTMCNC: 31.5 % — LOW (ref 32–36)
MCV RBC AUTO: 82.3 FL — SIGNIFICANT CHANGE UP (ref 80–100)
METAMYELOCYTES # FLD: 0 % — SIGNIFICANT CHANGE UP (ref 0–1)
MICROCYTES BLD QL: SLIGHT — SIGNIFICANT CHANGE UP
MONOCYTES # BLD AUTO: 0.86 K/UL — SIGNIFICANT CHANGE UP (ref 0–0.9)
MONOCYTES NFR BLD AUTO: 12.8 % — SIGNIFICANT CHANGE UP (ref 2–14)
MONOCYTES NFR BLD: 7.9 % — SIGNIFICANT CHANGE UP (ref 2–9)
MYELOCYTES NFR BLD: 0 % — SIGNIFICANT CHANGE UP (ref 0–0)
NEUTROPHIL AB SER-ACNC: 82.5 % — HIGH (ref 43–77)
NEUTROPHILS # BLD AUTO: 4.97 K/UL — SIGNIFICANT CHANGE UP (ref 1.8–7.4)
NEUTROPHILS NFR BLD AUTO: 74.1 % — SIGNIFICANT CHANGE UP (ref 43–77)
NEUTS BAND # BLD: 0 % — SIGNIFICANT CHANGE UP (ref 0–6)
NRBC # FLD: 0 — SIGNIFICANT CHANGE UP
OTHER - HEMATOLOGY %: 0 — SIGNIFICANT CHANGE UP
OVALOCYTES BLD QL SMEAR: SIGNIFICANT CHANGE UP
PLATELET # BLD AUTO: 92 K/UL — LOW (ref 150–400)
PLATELET COUNT - ESTIMATE: SIGNIFICANT CHANGE UP
PMV BLD: 10.5 FL — SIGNIFICANT CHANGE UP (ref 7–13)
POIKILOCYTOSIS BLD QL AUTO: SIGNIFICANT CHANGE UP
POLYCHROMASIA BLD QL SMEAR: SLIGHT — SIGNIFICANT CHANGE UP
POTASSIUM SERPL-MCNC: 4.5 MMOL/L — SIGNIFICANT CHANGE UP (ref 3.5–5.3)
POTASSIUM SERPL-SCNC: 4.5 MMOL/L — SIGNIFICANT CHANGE UP (ref 3.5–5.3)
PROMYELOCYTES # FLD: 0 % — SIGNIFICANT CHANGE UP (ref 0–0)
PROT SERPL-MCNC: 7.8 G/DL — SIGNIFICANT CHANGE UP (ref 6–8.3)
PROTHROM AB SERPL-ACNC: 42 SEC — HIGH (ref 9.8–13.1)
RBC # BLD: 4.01 M/UL — LOW (ref 4.2–5.8)
RBC # FLD: 16.5 % — HIGH (ref 10.3–14.5)
SODIUM SERPL-SCNC: 135 MMOL/L — SIGNIFICANT CHANGE UP (ref 135–145)
URATE SERPL-MCNC: 11.9 MG/DL — HIGH (ref 3.4–8.8)
VARIANT LYMPHS # BLD: 1.7 % — SIGNIFICANT CHANGE UP
WBC # BLD: 6.71 K/UL — SIGNIFICANT CHANGE UP (ref 3.8–10.5)
WBC # FLD AUTO: 6.71 K/UL — SIGNIFICANT CHANGE UP (ref 3.8–10.5)

## 2018-04-05 PROCEDURE — 73564 X-RAY EXAM KNEE 4 OR MORE: CPT | Mod: 26,RT

## 2018-04-05 PROCEDURE — 93971 EXTREMITY STUDY: CPT | Mod: 26,LT

## 2018-04-05 PROCEDURE — 99285 EMERGENCY DEPT VISIT HI MDM: CPT | Mod: GC

## 2018-04-05 RX ORDER — TRAMADOL HYDROCHLORIDE 50 MG/1
1 TABLET ORAL
Qty: 12 | Refills: 0 | OUTPATIENT
Start: 2018-04-05 | End: 2018-04-10

## 2018-04-05 RX ORDER — OXYCODONE AND ACETAMINOPHEN 5; 325 MG/1; MG/1
2 TABLET ORAL ONCE
Qty: 0 | Refills: 0 | Status: DISCONTINUED | OUTPATIENT
Start: 2018-04-05 | End: 2018-04-05

## 2018-04-05 RX ORDER — OXYCODONE AND ACETAMINOPHEN 5; 325 MG/1; MG/1
1 TABLET ORAL ONCE
Qty: 0 | Refills: 0 | Status: DISCONTINUED | OUTPATIENT
Start: 2018-04-05 | End: 2018-04-05

## 2018-04-05 RX ADMIN — OXYCODONE AND ACETAMINOPHEN 1 TABLET(S): 5; 325 TABLET ORAL at 16:08

## 2018-04-05 RX ADMIN — OXYCODONE AND ACETAMINOPHEN 2 TABLET(S): 5; 325 TABLET ORAL at 11:38

## 2018-04-05 RX ADMIN — OXYCODONE AND ACETAMINOPHEN 1 TABLET(S): 5; 325 TABLET ORAL at 16:33

## 2018-04-05 RX ADMIN — OXYCODONE AND ACETAMINOPHEN 2 TABLET(S): 5; 325 TABLET ORAL at 12:00

## 2018-04-05 NOTE — ED PROVIDER NOTE - PHYSICAL EXAMINATION
ATTENDING PHYSICAL EXAM DR. VILLALPANDO ***GEN - NAD; well appearing; A+O x3 ***HEAD - NC/AT ***EYES/NOSE - PERRL, EOMI, mucous membranes moist, no discharge ***THROAT: Oral cavity and pharynx normal. No inflammation, swelling, exudate, or lesions.  ***NECK: Neck supple, non-tender without lymphadenopathy, no masses, no thyromegaly.   ***PULMONARY - CTA b/l, symmetric breath sounds. ***CARDIAC -s1s2, RRR, no M,G,R  ***ABDOMEN - +BS, ND, NT, soft, no guarding, no rebound, no masses   ***BACK - no CVA tenderness, Normal  spine ***EXTREMITIES - symmetric pulses, 2+ dp, capillary refill < 2 seconds, no clubbing, no cyanosis, Right knee effusion, able to passively range 45deg flex, +calf swelling, no crepitus, no skin changes +warmth ***SKIN - no rash or bruising   ***NEUROLOGIC - alert

## 2018-04-05 NOTE — CONSULT NOTE ADULT - ASSESSMENT
67 yo male with right knee pain, likely hemarthrosis  1. Pain management  2. Ace wrap, compressive dressing, ice and elevation  3. F/u with cardiologist to evaluate elevated INR  4. WBAT  5. Low suspicion for septic knee as patient has no joint line tenderness, no patella tenderness, afebrile and no elevated WBC.  Elevated ESR and CRP likely inflammatory and reactive to hemarthrosis  6. Return to ED immediately if patient develops fever, or pain not improved with compressive dressing, ice, elevation and normal INR  7. F/u in orthopedic clinic at [phone] in 1 week or Dr Nicholas office in 1 week at [phone] 69 yo male with right knee pain, likely hemarthrosis  1. Pain management  2. Ace wrap, compressive dressing, ice and elevation  3. F/u with cardiologist to evaluate elevated INR  4. WBAT  5. Low suspicion for septic knee as patient has no joint line tenderness, no patella tenderness, afebrile and no elevated WBC.  Elevated ESR and CRP likely inflammatory and reactive to hemarthrosis  6. Return to ED immediately if patient develops fever, or pain not improved with compressive dressing, ice, elevation and normal INR  7. F/u in orthopedic clinic at [phone] in 1 week or Dr Nicholas office in 1 week at [phone]  8. Discussed with Dr Santo, agrees with above

## 2018-04-05 NOTE — ED ADULT TRIAGE NOTE - CHIEF COMPLAINT QUOTE
Pt c/o b/l leg pain, pt states it is extremely difficult for him to ambulate since Monday, unable to bear weight on Rt leg, pt arrives to triage in wheelchair. Pt has pacemaker, on coumadin r/t heart bypass.

## 2018-04-05 NOTE — ED PROVIDER NOTE - MEDICAL DECISION MAKING DETAILS
ATTG NOTE DR. VILLALPANDO Right Knee pain - able to passively range with ease - r/o arthropathy, unlikely infectious given ESR, CRP, may require arthrocentesis (check INR), xray knee, US r/o DVT

## 2018-04-05 NOTE — CONSULT NOTE ADULT - SUBJECTIVE AND OBJECTIVE BOX
67 yo male presents to the ED with atraumatic right knee pain and difficulty ambulating.  Patient states that about 3 days ago, patient woke up with knee pain.  Denies any fevers or chills.  Denies any trauma, recent bug bites, or skin abrasions near the knee.  Patient denies any history of gout.  States pain is worse behind the knee and with extreme bending and flexing.  Denies pain elsewhee.  Denies any CP, SOB, N/V/D, HA or dizziness.              Xray Knee 4 Views, Right (04.05.18 @ 11:37) >  Sizable knee joint effusion. Focal mild hypertrophic bone formation along medial femoral condyle superior margin suggesting old MCL injury (Marybeth-Stieda lesion). Preserved joint spaces with smooth and intact articular surfaces. No   joint margin erosions or intra-articular or periarticular calcifications. Unremarkable quadriceps and patellar tendon shadows. Generalized osteopenia otherwise no discrete lytic or blastic lesions.     US Duplex Venous Lower Ext Ltd, Right (04.05.18 @ 13:04)No evidence of right lower extremity deep venous thrombosis. Large fluid collection anterior to the right knee, measuring 7.9 x 3.4 x   6.7 cm. 69 yo male presents to the ED with atraumatic right knee pain and difficulty ambulating.  Patient states that about 3 days ago, he woke up with knee pain.  Denies any fevers or chills.  Denies any trauma, recent bug bites, or skin abrasions near the knee.  Patient denies any history of gout.  States pain is worse behind the knee and with extreme bending and flexing.  Denies pain elsewhere, no anterior knee pain.  Denies any CP, SOB, N/V/D, HA or dizziness.                Radiology Images  Xray Knee 4 Views, Right (04.05.18 @ 11:37) >  Sizable knee joint effusion. Focal mild hypertrophic bone formation along medial femoral condyle superior margin suggesting old MCL injury (Marybeth-Stieda lesion). Preserved joint spaces with smooth and intact articular surfaces. No   joint margin erosions or intra-articular or periarticular calcifications. Unremarkable quadriceps and patellar tendon shadows. Generalized osteopenia otherwise no discrete lytic or blastic lesions.     US Duplex Venous Lower Ext Ltd, Right (04.05.18 @ 13:04)No evidence of right lower extremity deep venous thrombosis. Large fluid collection anterior to the right knee, measuring 7.9 x 3.4 x   6.7 cm.

## 2018-04-05 NOTE — ED PROVIDER NOTE - PSH
AICD (automatic cardioverter/defibrillator) present  10/3/2007, BiV- Medtronic with RIATA lead  Mitral valve disease  with mitral valvuloplasty 2007  S/P CABG x 4  2007, [facility]/ Ajit

## 2018-04-05 NOTE — ED ADULT NURSE NOTE - PMH
Afib  since 2007- on coumadin x 2 years  AICD at end of battery life    CAD (coronary artery disease)    CHB (complete heart block)    CHF (congestive heart failure)    CVA (cerebral infarction)    Diabetes mellitus  x 6 months without N/N/R  ETOH abuse    Heart attack    HTN (hypertension)    Hyperlipidemia    Systolic dysfunction, left ventricle    TIA (transient ischemic attack)  without residual 2007

## 2018-04-05 NOTE — ED PROVIDER NOTE - PROGRESS NOTE DETAILS
-initial impression: symptoms likely due to R knee hemarthrosis vs baker's cyst vs joint effusion. will r/o fx & dvt.  -initial plan: labs, EKG, xr knee -rle duplex -symptomatic treatment w/ analgesics, -reassess, -consider arthrocentesis if inr<2 eagle: paged ortho 3 times w/o reply back eagle: PT seen and reassessed.  Patient symptomatically improved.   AAOX3, NAD, VSS.  Discussed test results w/ patient. Patient verbalized understanding of hospital course and outpatient plans, has decisional making capacity.  Will f/u w/ pmd in the next few days; patient will call for an appointment. Will return to the ED if there is any worsening of symptoms.  Patient able to ambulate w/ crutches eagle: ortho rec outpt f/u

## 2018-04-05 NOTE — ED PROVIDER NOTE - OBJECTIVE STATEMENT
67yo M PMHx CAD s/p CABG in 2008 c/b systolic CHF (EF 21%), CVA (no residual deficits), DMII, HTN, HLD, afib on coumadin, complete heart block s/p AICD p/w R knee pain  & swelling for 3days, new onset. pain worse w/ walking & movement & alleviated by advil at home. RLE is more swollen than LLE. also c/o L foot pain for years, intermittent. ROS negative for: fever, chest pain, SOB, Nausea, vomiting, diarrhea, abdominal pain, dysuria, trauma, fall, hx dvt/pe, calf pain, hip pain, hx gout

## 2018-04-05 NOTE — ED ADULT NURSE NOTE - OBJECTIVE STATEMENT
Pt. A&Ox4, c/o b/l feet pain x few weeks. States he started to have right knee pain with swelling x 4 days and unable to put any pressure on it without severe pain. h/o TIA, afib on coumadin, CHB, AICD, CHF, HTN, ETOH abuse. MD at bedside for eval. VS done as charted, breathing well. Will continue to monitor.

## 2018-04-05 NOTE — CONSULT NOTE ADULT - MUSCULOSKELETAL COMMENTS
+pain behind knee, negative montiel sign, no tenderness on anterior knee joint or patella.  +Right knee ROM 0-90 with mild discomfort, +mild diffuse knee effusion +pain behind knee, negative montiel sign, no tenderness on anterior knee joint or patella.  +Right knee ROM 0-90 with mild discomfort at the extremes, +mild to moderate diffuse knee effusion

## 2018-04-23 ENCOUNTER — APPOINTMENT (OUTPATIENT)
Dept: ORTHOPEDIC SURGERY | Facility: CLINIC | Age: 68
End: 2018-04-23

## 2018-05-10 ENCOUNTER — APPOINTMENT (OUTPATIENT)
Dept: CHRONIC DISEASE MANAGEMENT | Facility: CLINIC | Age: 68
End: 2018-05-10

## 2018-07-01 ENCOUNTER — OUTPATIENT (OUTPATIENT)
Dept: OUTPATIENT SERVICES | Facility: HOSPITAL | Age: 68
LOS: 1 days | End: 2018-07-01

## 2018-07-12 ENCOUNTER — APPOINTMENT (OUTPATIENT)
Dept: ORTHOPEDIC SURGERY | Facility: CLINIC | Age: 68
End: 2018-07-12
Payer: MEDICARE

## 2018-07-12 VITALS — HEART RATE: 78 BPM | DIASTOLIC BLOOD PRESSURE: 75 MMHG | SYSTOLIC BLOOD PRESSURE: 133 MMHG

## 2018-07-12 VITALS — WEIGHT: 174 LBS | HEIGHT: 70 IN | BODY MASS INDEX: 24.91 KG/M2

## 2018-07-12 DIAGNOSIS — M25.561 PAIN IN RIGHT KNEE: ICD-10-CM

## 2018-07-12 PROCEDURE — 99203 OFFICE O/P NEW LOW 30 MIN: CPT

## 2018-07-13 PROBLEM — M25.561 RIGHT KNEE PAIN: Status: ACTIVE | Noted: 2018-07-13

## 2018-07-17 DIAGNOSIS — Z71.89 OTHER SPECIFIED COUNSELING: ICD-10-CM

## 2019-02-01 ENCOUNTER — OUTPATIENT (OUTPATIENT)
Dept: OUTPATIENT SERVICES | Facility: HOSPITAL | Age: 69
LOS: 1 days | End: 2019-02-01
Payer: MEDICARE

## 2019-02-16 ENCOUNTER — INPATIENT (INPATIENT)
Facility: HOSPITAL | Age: 69
LOS: 9 days | Discharge: HOME | End: 2019-02-26
Attending: INTERNAL MEDICINE | Admitting: INTERNAL MEDICINE
Payer: MEDICARE

## 2019-02-16 VITALS
DIASTOLIC BLOOD PRESSURE: 76 MMHG | RESPIRATION RATE: 22 BRPM | SYSTOLIC BLOOD PRESSURE: 129 MMHG | HEART RATE: 50 BPM | OXYGEN SATURATION: 100 % | TEMPERATURE: 97 F

## 2019-02-16 DIAGNOSIS — Z86.73 PERSONAL HISTORY OF TRANSIENT ISCHEMIC ATTACK (TIA), AND CEREBRAL INFARCTION WITHOUT RESIDUAL DEFICITS: ICD-10-CM

## 2019-02-16 DIAGNOSIS — Z29.9 ENCOUNTER FOR PROPHYLACTIC MEASURES, UNSPECIFIED: ICD-10-CM

## 2019-02-16 DIAGNOSIS — I50.23 ACUTE ON CHRONIC SYSTOLIC (CONGESTIVE) HEART FAILURE: ICD-10-CM

## 2019-02-16 DIAGNOSIS — I48.91 UNSPECIFIED ATRIAL FIBRILLATION: ICD-10-CM

## 2019-02-16 DIAGNOSIS — N18.3 CHRONIC KIDNEY DISEASE, STAGE 3 (MODERATE): ICD-10-CM

## 2019-02-16 DIAGNOSIS — I50.9 HEART FAILURE, UNSPECIFIED: ICD-10-CM

## 2019-02-16 DIAGNOSIS — I25.10 ATHEROSCLEROTIC HEART DISEASE OF NATIVE CORONARY ARTERY WITHOUT ANGINA PECTORIS: ICD-10-CM

## 2019-02-16 DIAGNOSIS — Z98.890 OTHER SPECIFIED POSTPROCEDURAL STATES: Chronic | ICD-10-CM

## 2019-02-16 DIAGNOSIS — Z95.1 PRESENCE OF AORTOCORONARY BYPASS GRAFT: Chronic | ICD-10-CM

## 2019-02-16 DIAGNOSIS — E11.9 TYPE 2 DIABETES MELLITUS WITHOUT COMPLICATIONS: ICD-10-CM

## 2019-02-16 DIAGNOSIS — R74.8 ABNORMAL LEVELS OF OTHER SERUM ENZYMES: ICD-10-CM

## 2019-02-16 DIAGNOSIS — N18.9 CHRONIC KIDNEY DISEASE, UNSPECIFIED: ICD-10-CM

## 2019-02-16 DIAGNOSIS — E87.1 HYPO-OSMOLALITY AND HYPONATREMIA: ICD-10-CM

## 2019-02-16 DIAGNOSIS — E78.5 HYPERLIPIDEMIA, UNSPECIFIED: ICD-10-CM

## 2019-02-16 DIAGNOSIS — I10 ESSENTIAL (PRIMARY) HYPERTENSION: ICD-10-CM

## 2019-02-16 LAB
ALBUMIN SERPL ELPH-MCNC: 3.2 G/DL — LOW (ref 3.3–5)
ALP SERPL-CCNC: 52 U/L — SIGNIFICANT CHANGE UP (ref 40–120)
ALT FLD-CCNC: 23 U/L — SIGNIFICANT CHANGE UP (ref 4–41)
ANION GAP SERPL CALC-SCNC: 21 MMO/L — HIGH (ref 7–14)
APPEARANCE UR: CLEAR — SIGNIFICANT CHANGE UP
APTT BLD: 47.6 SEC — HIGH (ref 27.5–36.3)
AST SERPL-CCNC: 44 U/L — HIGH (ref 4–40)
B PERT DNA SPEC QL NAA+PROBE: NOT DETECTED — SIGNIFICANT CHANGE UP
BASOPHILS # BLD AUTO: 0.03 K/UL — SIGNIFICANT CHANGE UP (ref 0–0.2)
BASOPHILS NFR BLD AUTO: 0.4 % — SIGNIFICANT CHANGE UP (ref 0–2)
BILIRUB SERPL-MCNC: 2.4 MG/DL — HIGH (ref 0.2–1.2)
BILIRUB UR-MCNC: NEGATIVE — SIGNIFICANT CHANGE UP
BLOOD UR QL VISUAL: NEGATIVE — SIGNIFICANT CHANGE UP
BUN SERPL-MCNC: 40 MG/DL — HIGH (ref 7–23)
C PNEUM DNA SPEC QL NAA+PROBE: NOT DETECTED — SIGNIFICANT CHANGE UP
CALCIUM SERPL-MCNC: 8.4 MG/DL — SIGNIFICANT CHANGE UP (ref 8.4–10.5)
CHLORIDE SERPL-SCNC: 86 MMOL/L — LOW (ref 98–107)
CO2 SERPL-SCNC: 25 MMOL/L — SIGNIFICANT CHANGE UP (ref 22–31)
COLOR SPEC: YELLOW — SIGNIFICANT CHANGE UP
CREAT SERPL-MCNC: 1.54 MG/DL — HIGH (ref 0.5–1.3)
DIGOXIN SERPL-MCNC: 0.9 NG/ML — SIGNIFICANT CHANGE UP (ref 0.8–2)
EOSINOPHIL # BLD AUTO: 0.04 K/UL — SIGNIFICANT CHANGE UP (ref 0–0.5)
EOSINOPHIL NFR BLD AUTO: 0.5 % — SIGNIFICANT CHANGE UP (ref 0–6)
FLUAV H1 2009 PAND RNA SPEC QL NAA+PROBE: NOT DETECTED — SIGNIFICANT CHANGE UP
FLUAV H1 RNA SPEC QL NAA+PROBE: NOT DETECTED — SIGNIFICANT CHANGE UP
FLUAV H3 RNA SPEC QL NAA+PROBE: NOT DETECTED — SIGNIFICANT CHANGE UP
FLUAV SUBTYP SPEC NAA+PROBE: NOT DETECTED — SIGNIFICANT CHANGE UP
FLUBV RNA SPEC QL NAA+PROBE: NOT DETECTED — SIGNIFICANT CHANGE UP
GLUCOSE BLDC GLUCOMTR-MCNC: 134 MG/DL — HIGH (ref 70–99)
GLUCOSE BLDC GLUCOMTR-MCNC: 151 MG/DL — HIGH (ref 70–99)
GLUCOSE BLDC GLUCOMTR-MCNC: 155 MG/DL — HIGH (ref 70–99)
GLUCOSE SERPL-MCNC: 143 MG/DL — HIGH (ref 70–99)
GLUCOSE UR-MCNC: NEGATIVE — SIGNIFICANT CHANGE UP
HADV DNA SPEC QL NAA+PROBE: NOT DETECTED — SIGNIFICANT CHANGE UP
HCOV PNL SPEC NAA+PROBE: SIGNIFICANT CHANGE UP
HCT VFR BLD CALC: 44.5 % — SIGNIFICANT CHANGE UP (ref 39–50)
HGB BLD-MCNC: 14 G/DL — SIGNIFICANT CHANGE UP (ref 13–17)
HMPV RNA SPEC QL NAA+PROBE: NOT DETECTED — SIGNIFICANT CHANGE UP
HPIV1 RNA SPEC QL NAA+PROBE: NOT DETECTED — SIGNIFICANT CHANGE UP
HPIV2 RNA SPEC QL NAA+PROBE: NOT DETECTED — SIGNIFICANT CHANGE UP
HPIV3 RNA SPEC QL NAA+PROBE: NOT DETECTED — SIGNIFICANT CHANGE UP
HPIV4 RNA SPEC QL NAA+PROBE: NOT DETECTED — SIGNIFICANT CHANGE UP
IMM GRANULOCYTES NFR BLD AUTO: 0.5 % — SIGNIFICANT CHANGE UP (ref 0–1.5)
INR BLD: 7.99 — CRITICAL HIGH (ref 0.88–1.17)
KETONES UR-MCNC: NEGATIVE — SIGNIFICANT CHANGE UP
LEUKOCYTE ESTERASE UR-ACNC: NEGATIVE — SIGNIFICANT CHANGE UP
LYMPHOCYTES # BLD AUTO: 0.95 K/UL — LOW (ref 1–3.3)
LYMPHOCYTES # BLD AUTO: 12.6 % — LOW (ref 13–44)
MCHC RBC-ENTMCNC: 25.6 PG — LOW (ref 27–34)
MCHC RBC-ENTMCNC: 31.5 % — LOW (ref 32–36)
MCV RBC AUTO: 81.4 FL — SIGNIFICANT CHANGE UP (ref 80–100)
MONOCYTES # BLD AUTO: 1.02 K/UL — HIGH (ref 0–0.9)
MONOCYTES NFR BLD AUTO: 13.6 % — SIGNIFICANT CHANGE UP (ref 2–14)
NEUTROPHILS # BLD AUTO: 5.43 K/UL — SIGNIFICANT CHANGE UP (ref 1.8–7.4)
NEUTROPHILS NFR BLD AUTO: 72.4 % — SIGNIFICANT CHANGE UP (ref 43–77)
NITRITE UR-MCNC: NEGATIVE — SIGNIFICANT CHANGE UP
NRBC # FLD: 0.02 K/UL — LOW (ref 25–125)
NT-PROBNP SERPL-SCNC: SIGNIFICANT CHANGE UP PG/ML
PH UR: 7 — SIGNIFICANT CHANGE UP (ref 5–8)
PLATELET # BLD AUTO: 158 K/UL — SIGNIFICANT CHANGE UP (ref 150–400)
PMV BLD: 9.6 FL — SIGNIFICANT CHANGE UP (ref 7–13)
POTASSIUM SERPL-MCNC: 3.6 MMOL/L — SIGNIFICANT CHANGE UP (ref 3.5–5.3)
POTASSIUM SERPL-SCNC: 3.6 MMOL/L — SIGNIFICANT CHANGE UP (ref 3.5–5.3)
PROT SERPL-MCNC: 6.3 G/DL — SIGNIFICANT CHANGE UP (ref 6–8.3)
PROT UR-MCNC: 10 — SIGNIFICANT CHANGE UP
PROTHROM AB SERPL-ACNC: 94.6 SEC — HIGH (ref 9.8–13.1)
RBC # BLD: 5.47 M/UL — SIGNIFICANT CHANGE UP (ref 4.2–5.8)
RBC # FLD: 19.5 % — HIGH (ref 10.3–14.5)
RSV RNA SPEC QL NAA+PROBE: NOT DETECTED — SIGNIFICANT CHANGE UP
RV+EV RNA SPEC QL NAA+PROBE: NOT DETECTED — SIGNIFICANT CHANGE UP
SODIUM SERPL-SCNC: 132 MMOL/L — LOW (ref 135–145)
SP GR SPEC: 1.01 — SIGNIFICANT CHANGE UP (ref 1–1.04)
TROPONIN T, HIGH SENSITIVITY: 82 NG/L — CRITICAL HIGH (ref ?–14)
TROPONIN T, HIGH SENSITIVITY: 88 NG/L — CRITICAL HIGH (ref ?–14)
UROBILINOGEN FLD QL: NORMAL — SIGNIFICANT CHANGE UP
WBC # BLD: 7.51 K/UL — SIGNIFICANT CHANGE UP (ref 3.8–10.5)
WBC # FLD AUTO: 7.51 K/UL — SIGNIFICANT CHANGE UP (ref 3.8–10.5)

## 2019-02-16 PROCEDURE — 93281 PM DEVICE PROGR EVAL MULTI: CPT | Mod: 26,GC

## 2019-02-16 PROCEDURE — 71046 X-RAY EXAM CHEST 2 VIEWS: CPT | Mod: 26

## 2019-02-16 RX ORDER — METFORMIN HYDROCHLORIDE 850 MG/1
2 TABLET ORAL
Qty: 0 | Refills: 0 | COMMUNITY

## 2019-02-16 RX ORDER — FUROSEMIDE 40 MG
80 TABLET ORAL EVERY 12 HOURS
Qty: 0 | Refills: 0 | Status: DISCONTINUED | OUTPATIENT
Start: 2019-02-16 | End: 2019-02-17

## 2019-02-16 RX ORDER — DEXTROSE 50 % IN WATER 50 %
25 SYRINGE (ML) INTRAVENOUS ONCE
Qty: 0 | Refills: 0 | Status: DISCONTINUED | OUTPATIENT
Start: 2019-02-16 | End: 2019-02-26

## 2019-02-16 RX ORDER — FUROSEMIDE 40 MG
40 TABLET ORAL ONCE
Qty: 0 | Refills: 0 | Status: COMPLETED | OUTPATIENT
Start: 2019-02-16 | End: 2019-02-16

## 2019-02-16 RX ORDER — DEXTROSE 50 % IN WATER 50 %
15 SYRINGE (ML) INTRAVENOUS ONCE
Qty: 0 | Refills: 0 | Status: DISCONTINUED | OUTPATIENT
Start: 2019-02-16 | End: 2019-02-26

## 2019-02-16 RX ORDER — DIGOXIN 250 MCG
0.12 TABLET ORAL DAILY
Qty: 0 | Refills: 0 | Status: DISCONTINUED | OUTPATIENT
Start: 2019-02-16 | End: 2019-02-26

## 2019-02-16 RX ORDER — INSULIN LISPRO 100/ML
VIAL (ML) SUBCUTANEOUS AT BEDTIME
Qty: 0 | Refills: 0 | Status: DISCONTINUED | OUTPATIENT
Start: 2019-02-16 | End: 2019-02-26

## 2019-02-16 RX ORDER — GLUCAGON INJECTION, SOLUTION 0.5 MG/.1ML
1 INJECTION, SOLUTION SUBCUTANEOUS ONCE
Qty: 0 | Refills: 0 | Status: DISCONTINUED | OUTPATIENT
Start: 2019-02-16 | End: 2019-02-26

## 2019-02-16 RX ORDER — SODIUM CHLORIDE 9 MG/ML
1000 INJECTION, SOLUTION INTRAVENOUS
Qty: 0 | Refills: 0 | Status: DISCONTINUED | OUTPATIENT
Start: 2019-02-16 | End: 2019-02-26

## 2019-02-16 RX ORDER — ATORVASTATIN CALCIUM 80 MG/1
20 TABLET, FILM COATED ORAL AT BEDTIME
Qty: 0 | Refills: 0 | Status: DISCONTINUED | OUTPATIENT
Start: 2019-02-16 | End: 2019-02-26

## 2019-02-16 RX ORDER — LOSARTAN POTASSIUM 100 MG/1
1.5 TABLET, FILM COATED ORAL
Qty: 0 | Refills: 0 | COMMUNITY

## 2019-02-16 RX ORDER — WARFARIN SODIUM 2.5 MG/1
7.5 TABLET ORAL
Qty: 0 | Refills: 0 | COMMUNITY

## 2019-02-16 RX ORDER — TAMSULOSIN HYDROCHLORIDE 0.4 MG/1
0.4 CAPSULE ORAL AT BEDTIME
Qty: 0 | Refills: 0 | Status: DISCONTINUED | OUTPATIENT
Start: 2019-02-16 | End: 2019-02-26

## 2019-02-16 RX ORDER — DEXTROSE 50 % IN WATER 50 %
12.5 SYRINGE (ML) INTRAVENOUS ONCE
Qty: 0 | Refills: 0 | Status: DISCONTINUED | OUTPATIENT
Start: 2019-02-16 | End: 2019-02-26

## 2019-02-16 RX ORDER — INSULIN LISPRO 100/ML
VIAL (ML) SUBCUTANEOUS
Qty: 0 | Refills: 0 | Status: DISCONTINUED | OUTPATIENT
Start: 2019-02-16 | End: 2019-02-26

## 2019-02-16 RX ORDER — ASPIRIN/CALCIUM CARB/MAGNESIUM 324 MG
162 TABLET ORAL ONCE
Qty: 0 | Refills: 0 | Status: COMPLETED | OUTPATIENT
Start: 2019-02-16 | End: 2019-02-16

## 2019-02-16 RX ADMIN — TAMSULOSIN HYDROCHLORIDE 0.4 MILLIGRAM(S): 0.4 CAPSULE ORAL at 14:08

## 2019-02-16 RX ADMIN — ATORVASTATIN CALCIUM 20 MILLIGRAM(S): 80 TABLET, FILM COATED ORAL at 21:45

## 2019-02-16 RX ADMIN — Medication 40 MILLIGRAM(S): at 11:02

## 2019-02-16 RX ADMIN — Medication 162 MILLIGRAM(S): at 12:42

## 2019-02-16 RX ADMIN — Medication 162 MILLIGRAM(S): at 13:09

## 2019-02-16 RX ADMIN — Medication 40 MILLIGRAM(S): at 13:00

## 2019-02-16 RX ADMIN — Medication 80 MILLIGRAM(S): at 18:23

## 2019-02-16 NOTE — PROCEDURE NOTE - ADDITIONAL PROCEDURE DETAILS
Optival indicative of elevated fluid levels beginning since november 2018  For the last 7 days, HRs have been averaging in the 90-100s, and overall V-pacing has decreased.  Given cough/tachypnea, suspect elevated pacing rate of 100s is due to ICD rate-responsiveness  Otherwise, normal functioning device.

## 2019-02-16 NOTE — ED PROVIDER NOTE - CLINICAL SUMMARY MEDICAL DECISION MAKING FREE TEXT BOX
Chest pain, SOB, COFFEY x 2 weeks progressively worsening, with pitting edema in b/l lower extremities, EKG paced with non-specific T wave changes cath cardiologist consulted no cath currently but will consult cardiology to monitor. Will diurese for CHF exacerbation. Pending Trop and labs.

## 2019-02-16 NOTE — H&P ADULT - RS GEN PE MLT RESP DETAILS PC
no rhonchi/rales/no chest wall tenderness/no intercostal retractions/clear to auscultation bilaterally/wheezes/airway patent

## 2019-02-16 NOTE — ED ADULT NURSE REASSESSMENT NOTE - NS ED NURSE REASSESS COMMENT FT1
Patient instructed to stand to urinate, 200ml. clear yellow urine. Straight catheter placed to measure residual = 75 ml. Patient instructed to stand to urinate, 150ml. clear yellow urine. Straight catheter placed to measure residual = 75 ml.   total output since Lasix = 375ml.  tele Pa made aware.

## 2019-02-16 NOTE — H&P ADULT - PSH
AICD (automatic cardioverter/defibrillator) present  [employer]  S/P CABG (coronary artery bypass graft)  4V CABG in 2007  S/P mitral valve repair  2007

## 2019-02-16 NOTE — CONSULT NOTE ADULT - PROBLEM SELECTOR RECOMMENDATION 9
baseline ~ 1.4-1.7  CKD likely DM/HTN/CHF  renal function stable  avoid nephrotoxic agents  UA has minimum proteinuria  monitor bmp  continue diuresing

## 2019-02-16 NOTE — CONSULT NOTE ADULT - SUBJECTIVE AND OBJECTIVE BOX
Patient seen and evaluated at bedside    Chief Complaint:    HPI:  67 y/o male with a PMHx of CAD S/P 4V CABG, ischemic cardiomyopathy with severe LV dysfunction (EF 21%) S/P Medtronic ICD placement, atrial fibrillation on Coumadin, MR S/P mitral valvuloplasty, CVA, TIA, HTN, HLD, DM and CKD presents to ED with shortness of breath for the last two to three weeks. Pt notices that he has been feeling shortness of breath, which is worsened on minimal exertion, but also occurs at rest. Pt also endorses three pillow orthopnea, which is associated with paroxysmal nocturnal dyspnea, dry cough, increased abdominal distention, weight gain and lower extremity edema. Pt reports that his cough has been persistent for weeks and is now causing him to have non-pleuritic, non-exertional and non-radiating substernal chest pain; the chest pain only occurs when he is coughing. Pt thinks he has gained about 7 pounds in the last two weeks. Pt also reports that he has not been urinating as well and has been having difficulty emptying his bladder. Pt denies fever, chills, recent travel, sick contacts, headache, dizziness, visual deficits, palpitations, abdominal pain, N/V/D/C, hematochezia, melena, dysuria, hematuria, LOC, syncope. Upon arrival to ED, EKG: V paced at 107 bpm. CE x1: Trop 88. Na: 132. BUN/Cr: 40/1.54. Glucose: 143. Bili: 2.4. AST: 44. ProBNP: 01422. Pt was given Lasix 80mg IVP and is now admitted to telemetry. (16 Feb 2019 14:14)    Cardiology evaluated patient bedside and confirmed history taken above; salient points being 2-3 weeks of symptoms worsened in the last few days, primarily abdominal distention and SOB. Chest pain occurring only with cough.      PMHx:   CKD (chronic kidney disease)  MR (mitral regurgitation)  AF (atrial fibrillation)  DM (diabetes mellitus)  HLD (hyperlipidemia)  TIA (transient ischemic attack)  Diabetes mellitus  Afib  HTN (hypertension)  ETOH abuse  Hyperlipidemia  CVA (cerebral infarction)  Heart attack  Systolic dysfunction, left ventricle  AICD at end of battery life  CHB (complete heart block)  CHF (congestive heart failure)  CAD (coronary artery disease)      PSHx:   S/P mitral valve repair  S/P CABG (coronary artery bypass graft)  Mitral valve disease  S/P CABG x 4  AICD (automatic cardioverter/defibrillator) present      Allergies:  No Known Allergies      Home Medications:  digoxin 125 mcg (0.125 mg) oral tablet: 1 tab(s) orally once a day (10 Oct 2017 09:48)  Lasix 40 mg oral tablet: 1 tab(s) orally once a day (at bedtime) (16 Feb 2019 13:49)  Lasix 80 mg oral tablet: 1 tab(s) orally once a day (16 Feb 2019 13:49)  Lipitor 20 mg oral tablet: 1 tab(s) orally once a day (at bedtime) (10 Oct 2017 07:20)  metFORMIN 1000 mg oral tablet, extended release: 1 tab(s) orally once a day (16 Feb 2019 13:49)  metFORMIN 500 mg oral tablet, extended release: 1 tab(s) orally once a day (at bedtime) (16 Feb 2019 13:49)  warfarin 7.5 mg oral tablet: 1 tab(s) orally once a day (16 Feb 2019 13:49)      Current Medications:   atorvastatin 20 milliGRAM(s) Oral at bedtime  dextrose 40% Gel 15 Gram(s) Oral once PRN  dextrose 5%. 1000 milliLiter(s) IV Continuous <Continuous>  dextrose 50% Injectable 12.5 Gram(s) IV Push once  dextrose 50% Injectable 25 Gram(s) IV Push once  dextrose 50% Injectable 25 Gram(s) IV Push once  digoxin     Tablet 0.125 milliGRAM(s) Oral daily  furosemide   Injectable 80 milliGRAM(s) IV Push every 12 hours  glucagon  Injectable 1 milliGRAM(s) IntraMuscular once PRN  insulin lispro (HumaLOG) corrective regimen sliding scale   SubCutaneous three times a day before meals  insulin lispro (HumaLOG) corrective regimen sliding scale   SubCutaneous at bedtime  tamsulosin 0.4 milliGRAM(s) Oral at bedtime      FAMILY HISTORY:  No pertinent family history in first degree relatives      Social History:  Smoking History: former 30 py smoking  Alcohol Use: former alcohol  Drug Use: denied    REVIEW OF SYSTEMS:  CONSTITUTIONAL: No weakness, fevers or chills  EYES/ENT: No visual changes;  No dysphagia  NECK: No pain or stiffness  RESPIRATORY: as above  CARDIOVASCULAR: as above  GASTROINTESTINAL: No abdominal or epigastric pain. No nausea, vomiting, or hematemesis; No diarrhea or constipation. No melena or hematochezia.  BACK: No back pain  GENITOURINARY: No dysuria, frequency or hematuria  NEUROLOGICAL: No numbness or weakness  SKIN: No itching, burning, rashes, or lesions   All other review of systems is negative unless indicated above.    Physical Exam:  T(F): 96.8 (02-16), Max: 97.3 (02-16)  HR: 106 (02-16) (50 - 118)  BP: 122/78 (02-16) (107/80 - 129/76)  RR: 18 (02-16)  SpO2: 100% (02-16)  GENERAL: Tachypneic, mildly diaphoretic  HEAD:  Atraumatic, Normocephalic  ENT: EOMI, PERRLA, conjunctiva and sclera clear, Neck supple, JVD elevated to earlobe  CHEST/LUNG: Notably Clear to auscultation bilaterally; No wheeze, equal breath sounds bilaterally   BACK: No spinal tenderness  HEART: Regular rate and rhythm; No murmurs, rubs, or gallops  ABDOMEN: Soft, Nontender, Distended; Bowel sounds present  EXTREMITIES:  No clubbing, cyanosis. 2+ pitting bilateral to thighs  PSYCH: Nl behavior, nl affect  NEUROLOGY: AAOx3, non-focal, cranial nerves intact  SKIN: Normal color, No rashes or lesions    Cardiovascular Diagnostic Testing:    ECG: Personally reviewed:  BiV paced with frequent PVCs (versus native wide QRS ventricular rate overtaking pacer rate)  Pacing at rate of 100  No signs of obvious ischemia, though difficult to interpret in setting of pacing    Echo: Personally reviewed:  CONCLUSIONS:  1. Mitral annular calcification, tethered mitral valve  leaflets. Mild mitral regurgitation.  2. Calcified trileaflet aortic valve with normal opening.  3. Moderate left ventricular enlargement.  4. Endocardial visualization enhanced with intravenous  injection of echo contrast (Definity). Severe global left  ventricular systolic dysfunction. No LV thrombus seen.  5. Right ventricular enlargement with decreased right  ventricular systolic function. Device wire is noted in the  right heart.  6. Normal tricuspid valve. Moderate tricuspid  regurgitation.  7. Estimated pulmonary artery systolic pressure equals 53  mm Hg, assuming right atrial pressure equals 10  mm Hg,  consistent with moderate pulmonary hypertension.  ------------------------------------------------------------------------  Confirmed on  8/10/2017 - 17:31:26 by Jose Alejandro Palacio M.D. RPVI    Stress Testing:  IMPRESSIONS:Abnormal Study  * Myocardial Perfusion SPECT results are abnormal.  * There are large, moderate to severe defects in inferior  and inferolateral walls that are fixed, suggestive of  infarct.  * Post-stress gated wall motion analysis was performed  (LVEF = 13 %;LVEDV = 230 ml.), revealing severe overall  hypokinesis due to segmental wall motion abnormalities.  ------------------------------------------------------------------------  Confirmed on  6/16/2016 - 16:14:19 by Vitaliy Sharma M.D.    CXR: Personally reviewed  Not officially read.  personal read: Minimal pulmonary edema, minimal left pleural effusion. Annuloplasty ring. ICD with Atrial lead, LV pacer lead and 2 ICD RV leads.    Labs: Personally reviewed                        14.0   7.51  )-----------( 158      ( 16 Feb 2019 10:30 )             44.5     02-16    132<L>  |  86<L>  |  40<H>  ----------------------------<  143<H>  3.6   |  25  |  1.54<H>    Ca    8.4      16 Feb 2019 10:30    TPro  6.3  /  Alb  3.2<L>  /  TBili  2.4<H>  /  DBili  x   /  AST  44<H>  /  ALT  23  /  AlkPhos  52  02-16    PT/INR - ( 16 Feb 2019 10:30 )   PT: 94.6 SEC;   INR: 7.99          PTT - ( 16 Feb 2019 10:30 )  PTT:47.6 SEC    Hs Trop 88 - > 82      Serum Pro-Brain Natriuretic Peptide: 24838 pg/mL (02-16 @ 10:30)

## 2019-02-16 NOTE — H&P ADULT - NEGATIVE NEUROLOGICAL SYMPTOMS
no syncope/no tremors/no vertigo/no headache/no confusion/no difficulty walking/no loss of consciousness/no transient paralysis/no weakness/no generalized seizures/no loss of sensation/no hemiparesis/no focal seizures/no paresthesias

## 2019-02-16 NOTE — H&P ADULT - PMH
AF (atrial fibrillation)    CAD (coronary artery disease)  S/P 4V CABG  CHB (complete heart block)    CHF (congestive heart failure)    CKD (chronic kidney disease)    CVA (cerebral infarction)    DM (diabetes mellitus)    ETOH abuse    HLD (hyperlipidemia)    HTN (hypertension)    MR (mitral regurgitation)    TIA (transient ischemic attack)

## 2019-02-16 NOTE — H&P ADULT - NEUROLOGICAL DETAILS
cranial nerves intact/responds to verbal commands/alert and oriented x 3/responds to pain/normal strength/sensation intact

## 2019-02-16 NOTE — H&P ADULT - NSHPSOCIALHISTORY_GEN_ALL_CORE
Pt is  and lives with his wife. He is retired. He is independent with use of a cane. He is a former 30 pack year smoker and former drinker.

## 2019-02-16 NOTE — H&P ADULT - PROBLEM SELECTOR PLAN 1
Pt appears hypervolemic on exam with rales, abdominal distention and lower extremity edema, with a BNP of 13922  Admit to telemetry for diuresis, trend enzymes and monitor EKGs  Start Lasix 80mg IVP BID  Continue Digoxin  Echocardiogram ordered  Renal US ordered to assess for hydro/obstructive uropathy  Bladder scan ordered; may need dooley if pt unable to void  Strict I&Os, monitor daily weights, 1500 cc fluid restriction, sodium restriction  Cards consult with Dr. Sorenson called  F/U MD note

## 2019-02-16 NOTE — H&P ADULT - PROBLEM SELECTOR PLAN 9
Prior CVA likely embolic in origin given atrial fibrillation  Continue Coumadin when INR back down to therapeutic level  Check INR daily; hold Coumadin until INR between 2.0-3.0

## 2019-02-16 NOTE — CONSULT NOTE ADULT - SUBJECTIVE AND OBJECTIVE BOX
[facility]   MD LUCA MCKEON MD ANGELA WONG, PA    TEL:  OFFICE: [phone]  DR TAYLOR CELL: [phone]  DR. SWARTZ CELL: [phone]  KATHERINE POWER CELL: [phone]      HPI:  67 y/o male with a PMHx of CAD S/P 4V CABG, ischemic cardiomyopathy with severe LV dysfunction (EF 21%) S/P Medtronic ICD placement, atrial fibrillation on Coumadin, MR S/P mitral valvuloplasty, CVA, TIA, HTN, HLD, DM and CKD presents to ED with shortness of breath for the last two to three weeks. worsen LE edema and increase abdominal distention, three pillow orthopnea, which is associated with paroxysmal nocturnal dyspnea, dry cough. + chest pain with cough    Denied ckd however review from sunrise showed ckd with baseline ~ 1.4-1.7, long standing hx of Dm/HTN. takes lasix 80 and 40 daily.       Allergies:  No Known Allergies      PAST MEDICAL & SURGICAL HISTORY:  CKD (chronic kidney disease)  MR (mitral regurgitation)  AF (atrial fibrillation)  DM (diabetes mellitus)  HLD (hyperlipidemia)  TIA (transient ischemic attack)  HTN (hypertension)  ETOH abuse  CVA (cerebral infarction)  CHB (complete heart block)  CHF (congestive heart failure)  CAD (coronary artery disease): S/P 4V CABG  S/P mitral valve repair:   S/P CABG (coronary artery bypass graft): 4V CABG in   AICD (automatic cardioverter/defibrillator) present: Medtronic      Home Medications Reviewed    Hospital Medications:   MEDICATIONS  (STANDING):  atorvastatin 20 milliGRAM(s) Oral at bedtime  dextrose 5%. 1000 milliLiter(s) (50 mL/Hr) IV Continuous <Continuous>  dextrose 50% Injectable 12.5 Gram(s) IV Push once  dextrose 50% Injectable 25 Gram(s) IV Push once  dextrose 50% Injectable 25 Gram(s) IV Push once  digoxin     Tablet 0.125 milliGRAM(s) Oral daily  furosemide   Injectable 80 milliGRAM(s) IV Push every 12 hours  insulin lispro (HumaLOG) corrective regimen sliding scale   SubCutaneous three times a day before meals  insulin lispro (HumaLOG) corrective regimen sliding scale   SubCutaneous at bedtime  tamsulosin 0.4 milliGRAM(s) Oral at bedtime      SOCIAL HISTORY:  Denies ETOh, Smoking,     FAMILY HISTORY:  No pertinent family history in first degree relatives      REVIEW OF SYSTEMS:  CONSTITUTIONAL: No weakness, fevers or chills  EYES/ENT: No visual changes;  No vertigo or throat pain   NECK: No pain or stiffness  RESPIRATORY: see HPI  CARDIOVASCULAR: see HPI  GASTROINTESTINAL: see HPI  GENITOURINARY: No dysuria, frequency, foamy urine, urinary urgency, incontinence or hematuria  NEUROLOGICAL: No numbness or weakness  SKIN: No itching, burning, rashes, or lesions   VASCULAR: + bilateral lower extremity edema.   All other review of systems is negative unless indicated above.    VITALS:  T(F): 96.8 (19 @ 12:00), Max: 97.3 (19 @ 09:38)  HR: 102 (19 @ 15:29)  BP: 118/74 (19 @ 15:29)  RR: 18 (19 @ 15:29)  SpO2: 99% (19 @ 15:29)  Wt(kg): --    Height (cm): 185.42 ( @ 14:14)  Weight (kg): 79.8 ( @ 14:14)  BMI (kg/m2): 23.2 ( @ 14:14)  BSA (m2): 2.04 ( @ 14:14)    PHYSICAL EXAM:  Constitutional: NAD  HEENT: anicteric sclera, oropharynx clear, MMM  Neck: No JVD  Respiratory: CTAB, no wheezes, rales or rhonchi  Cardiovascular: S1, S2, RRR  Gastrointestinal: BS+, soft, distended, NT  Extremities: 2-3+ peripheral edema b/l LE  Neurological: A/O x 3, no focal deficits  Psychiatric: Normal mood, normal affect  : No CVA tenderness. No dooley.   Skin: No rashes  Vascular Access:    LABS:      132<L>  |  86<L>  |  40<H>  ----------------------------<  143<H>  3.6   |  25  |  1.54<H>    Ca    8.4      2019 10:30    TPro  6.3  /  Alb  3.2<L>  /  TBili  2.4<H>  /  DBili      /  AST  44<H>  /  ALT  23  /  AlkPhos  52  02-16    Creatinine Trend: 1.54 <--                        14.0   7.51  )-----------( 158      ( 2019 10:30 )             44.5     Urine Studies:  Urinalysis Basic - ( 2019 14:32 )    Color: YELLOW / Appearance: CLEAR / S.012 / pH: 7.0  Gluc: NEGATIVE / Ketone: NEGATIVE  / Bili: NEGATIVE / Urobili: NORMAL   Blood: NEGATIVE / Protein: 10 / Nitrite: NEGATIVE   Leuk Esterase: NEGATIVE / RBC:  / WBC    Sq Epi:  / Non Sq Epi:  / Bacteria:           RADIOLOGY & ADDITIONAL STUDIES:

## 2019-02-16 NOTE — CONSULT NOTE ADULT - ASSESSMENT
69 y/o male with a PMHx of CAD S/P 4V CABG, ischemic cardiomyopathy with severe LV dysfunction (EF 21%) S/P Medtronic ICD placement, atrial fibrillation on Coumadin, MR S/P mitral valvuloplasty, CVA, TIA, HTN, HLD, DM and CKD presents to ED with shortness of breath

## 2019-02-16 NOTE — H&P ADULT - HISTORY OF PRESENT ILLNESS
69 y/o male with a PMHx of CAD S/P 4V CABG, ischemic cardiomyopathy with severe LV dysfunction (EF 21%) S/P Medtronic ICD placement, atrial fibrillation on Coumadin, MR S/P mitral valvuloplasty, CVA, TIA, HTN, HLD, DM and CKD presents to ED with shortness of breath for the last two to three weeks. Pt notices that he has been feeling shortness of breath, which is worsened on minimal exertion, but also occurs at rest. Pt also endorses three pillow orthopnea, which is associated with paroxysmal nocturnal dyspnea, dry cough, increased abdominal distention, weight gain and lower extremity edema. Pt reports that his cough has been persistent for weeks and is now causing him to have non-pleuritic, non-exertional and non-radiating substernal chest pain; the chest pain only occurs when he is coughing. Pt thinks he has gained about 7 pounds in the last two weeks. Pt also reports that he has not been urinating as well and has been having difficulty emptying his bladder. Pt denies fever, chills, recent travel, sick contacts, headache, dizziness, visual deficits, palpitations, abdominal pain, N/V/D/C, hematochezia, melena, dysuria, hematuria, LOC, syncope. Upon arrival to ED, EKG: V paced at 107 bpm. CE x1: Trop 88. Na: 132. BUN/Cr: 40/1.54. Glucose: 143. Bili: 2.4. AST: 44. ProBNP: 04256. Pt was given Lasix 80mg IVP and is now admitted to telemetry.

## 2019-02-16 NOTE — ED ADULT NURSE NOTE - OBJECTIVE STATEMENT
Patient arrives from home with c/o cough and difficulty breathing. tachycardia on the monitor. patient denies any chest pain, denies any fever. Family at his bedside.

## 2019-02-16 NOTE — H&P ADULT - PROBLEM SELECTOR PLAN 4
Monitor renal function and lytes while diuresing  Strict I&Os, daily weights  Renal US given urinary retention  Bladder scan ordered  Pt may need dooley if unable to void more after Lasix  Renal consult called  Avoid NSAIDs and nephrotoxic agents (no ACE/ARB at this time)

## 2019-02-16 NOTE — ED ADULT NURSE NOTE - NSIMPLEMENTINTERV_GEN_ALL_ED
Implemented All Fall with Harm Risk Interventions:  [place] to call system. Call bell, personal items and telephone within reach. Instruct patient to call for assistance. Room bathroom lighting operational. Non-slip footwear when patient is off stretcher. Physically safe environment: no spills, clutter or unnecessary equipment. Stretcher in lowest position, wheels locked, appropriate side rails in place. Provide visual cue, wrist band, yellow gown, etc. Monitor gait and stability. Monitor for mental status changes and reorient to person, place, and time. Review medications for side effects contributing to fall risk. Reinforce activity limits and safety measures with patient and family. Provide visual clues: red socks.

## 2019-02-16 NOTE — H&P ADULT - NSHPLABSRESULTS_GEN_ALL_CORE
EKG: V paced at 107 bpm  CE x1: Trop 88  Na: 132  BUN/Cr: 40/1.54  Glucose: 143  Bili: 2.4  AST: 44  ProBNP: 04685

## 2019-02-16 NOTE — ED PROVIDER NOTE - NS ED ROS FT
CONSTITUTIONAL: No fevers, no chills  Eyes: no visual changes  Ears: no ear drainage, no ear pain  Nose: no nasal congestion  Mouth/Throat: no sore throat  Cardiovascular: + Chest pain  Respiratory: + SOB  Gastrointestinal: No n/v/d, no abd pain  Genitourinary: no dysuria, no hematuria  SKIN: no rashes.  NEURO: no headache, +weakness

## 2019-02-16 NOTE — ED ADULT NURSE REASSESSMENT NOTE - NS ED NURSE REASSESS COMMENT FT1
Lasix given as ordered, patient has not coided as yet,, appears in no distress, will continue to monitor.

## 2019-02-16 NOTE — ED ADULT NURSE REASSESSMENT NOTE - NS ED NURSE REASSESS COMMENT FT1
patient awake and alert, family at his bedside. tachycardia on the monitor, denies an chest pain. Patient states he has been coughing and was feeling sob. IV placed , labs sent, will continue to monitor.

## 2019-02-16 NOTE — ED PROVIDER NOTE - OBJECTIVE STATEMENT
68 YOM p/w cough, chest pain, SOB started 2 weeks ago and has progressively worsened. Pt also states that his abdomen has become distended, pt also complaining of worsening swelling in b/l lower extremities. No fever, no chills. 68 YOM pmh of AICD, CAD, CHF, DM p/w cough, chest pain, SOB started 2 weeks ago and has progressively worsened. Pt states cough is non-productive. Chest pain is midsternal intermittent started today. Pt has noticed COFFEY and orthopnea when he lays down which also has worsened. Pt also states that his abdomen has become distended, pt also complaining of worsening swelling in b/l lower extremities. No fever, no chills.

## 2019-02-16 NOTE — H&P ADULT - PROBLEM SELECTOR PLAN 3
Pt currently pacing in low 100s  Continue Digoxin for rate control  Hold Coumadin given supratherapeutic INR  Daily INR dosing; resume Coumadin when INR therapeutic  CHADSVASC score 7

## 2019-02-16 NOTE — H&P ADULT - ASSESSMENT
69 y/o male with a PMHx of CAD S/P 4V CABG, ischemic cardiomyopathy with severe LV dysfunction (EF 21%) S/P Medtronic ICD placement, atrial fibrillation on Coumadin, MR S/P mitral valvuloplasty, CVA, TIA, HTN, HLD, DM and CKD presents to ED with shortness of breath, cough, abdominal distention and lower extremity edema in the setting of acute on chronic systolic heart failure.

## 2019-02-16 NOTE — ED PROVIDER NOTE - PROGRESS NOTE DETAILS
AJM: ECG presented to Dr. Mirza PCI consult  who notes not a STEMI, will not take to cath lab. recommends cards fellow consultation for admission. Spoke with Cards fellow will come see the patient in ED. AJM: trop elevated, asa ordered, cards fellow updated and will see pt

## 2019-02-16 NOTE — H&P ADULT - NEGATIVE GASTROINTESTINAL SYMPTOMS
no constipation/no abdominal pain/no change in bowel habits/no hematochezia/no nausea/no vomiting/no diarrhea/no flatulence/no melena

## 2019-02-16 NOTE — CONSULT NOTE ADULT - ASSESSMENT
69 y/o male with a PMHx of CAD S/P 4V CABG, ischemic cardiomyopathy with severe LV dysfunction (EF 21%) S/P Medtronic ICD placement, atrial fibrillation on Coumadin, MR S/P mitral valvuloplasty, CVA, TIA, HTN, HLD, DM and CKD presents to ED with shortness of breath for the last two to three weeks.     #CHF: BNP most elevated it has ever been. Edema and JVD, along with ascites and elevated INR, without significant pulmonary edema, are highly suspect for right sided heart failure and/or right sided strain causing hepatic congestion. Also may consider chronic constrictive cardiomyopathy given prior CABG, though unlikely.  Takes 80 AM / 40 PM PO lasix at home  -80 IV lasix BID  -I/O ; Weights  -Telemetry  -BID lytes (especially given patient is on digoxin)  -TTE with focus on RV  -Consider formal abd US to confirm Ascites given distention of abdomen.  -Trend LFTs    #Afib: Given decompensation, Ventricular rate overtaking pacer rate, therefore would not hold rate controlling agents.  -Okay to continue digoxin, although has CKD, Cr actually improving and V-rate is elevated  -Should patient have CHAYO or hyperkalemia would stop digoxin and switch to metoprolol (risk of worsening CHF with BB)  -Tele  -Hold coumadin given INR >8 and confer with pharmacy on when to restart. Consider DOAC when subtherapeutic. No need for reversal agents at this time.

## 2019-02-16 NOTE — H&P ADULT - NEGATIVE OPHTHALMOLOGIC SYMPTOMS
no pain L/no diplopia/no blurred vision L/no photophobia/no pain R/no loss of vision L/no blurred vision R/no loss of vision R

## 2019-02-16 NOTE — ED PROVIDER NOTE - ATTENDING CONTRIBUTION TO CARE
AJM: Patient seen with resident and agree with above note. 68 YOM pmh of AICD, CAD, CHF, DM p/w cough, chest pain, SOB started 2 weeks ago and has progressively worsened. Pt states cough is non-productive. Chest pain is midsternal intermittent started today. Pt has noticed COFFEY and orthopnea when he lays down which also has worsened. Pt also states that his abdomen has become distended, pt also complaining of worsening swelling in b/l lower extremities. No fever, no chills. Exam and POCUS c/w heart failure. will do acs and heart failure workup, lasix, cards consult. admit

## 2019-02-16 NOTE — H&P ADULT - PROBLEM SELECTOR PLAN 2
verbal instruction
Troponin level elevated likely in the setting of acute decompensated heart failure and CKD, EKG paced  Pt with atypical chest pain in the setting of cough, likely musculoskeletal  Cards consult with Dr. Sorenson called  Echocardiogram ordered  Trend cardiac enzymes

## 2019-02-17 DIAGNOSIS — E83.42 HYPOMAGNESEMIA: ICD-10-CM

## 2019-02-17 DIAGNOSIS — E83.51 HYPOCALCEMIA: ICD-10-CM

## 2019-02-17 LAB
ALBUMIN SERPL ELPH-MCNC: 2.9 G/DL — LOW (ref 3.3–5)
ALP SERPL-CCNC: 50 U/L — SIGNIFICANT CHANGE UP (ref 40–120)
ALT FLD-CCNC: 21 U/L — SIGNIFICANT CHANGE UP (ref 4–41)
ANION GAP SERPL CALC-SCNC: 20 MMO/L — HIGH (ref 7–14)
AST SERPL-CCNC: 36 U/L — SIGNIFICANT CHANGE UP (ref 4–40)
BILIRUB SERPL-MCNC: 2.4 MG/DL — HIGH (ref 0.2–1.2)
BUN SERPL-MCNC: 41 MG/DL — HIGH (ref 7–23)
CALCIUM SERPL-MCNC: 8.3 MG/DL — LOW (ref 8.4–10.5)
CHLORIDE SERPL-SCNC: 85 MMOL/L — LOW (ref 98–107)
CHOLEST SERPL-MCNC: 120 MG/DL — SIGNIFICANT CHANGE UP (ref 120–199)
CO2 SERPL-SCNC: 25 MMOL/L — SIGNIFICANT CHANGE UP (ref 22–31)
CREAT SERPL-MCNC: 1.55 MG/DL — HIGH (ref 0.5–1.3)
GLUCOSE BLDC GLUCOMTR-MCNC: 119 MG/DL — HIGH (ref 70–99)
GLUCOSE BLDC GLUCOMTR-MCNC: 129 MG/DL — HIGH (ref 70–99)
GLUCOSE BLDC GLUCOMTR-MCNC: 144 MG/DL — HIGH (ref 70–99)
GLUCOSE BLDC GLUCOMTR-MCNC: 192 MG/DL — HIGH (ref 70–99)
GLUCOSE SERPL-MCNC: 141 MG/DL — HIGH (ref 70–99)
HBA1C BLD-MCNC: 6.9 % — HIGH (ref 4–5.6)
HCT VFR BLD CALC: 43.6 % — SIGNIFICANT CHANGE UP (ref 39–50)
HCV AB S/CO SERPL IA: 0.1 S/CO — SIGNIFICANT CHANGE UP
HCV AB SERPL-IMP: SIGNIFICANT CHANGE UP
HDLC SERPL-MCNC: 27 MG/DL — LOW (ref 35–55)
HGB BLD-MCNC: 13.5 G/DL — SIGNIFICANT CHANGE UP (ref 13–17)
INR BLD: 6.97 — CRITICAL HIGH (ref 0.88–1.17)
LIPID PNL WITH DIRECT LDL SERPL: 89 MG/DL — SIGNIFICANT CHANGE UP
MAGNESIUM SERPL-MCNC: 1.5 MG/DL — LOW (ref 1.6–2.6)
MCHC RBC-ENTMCNC: 24.8 PG — LOW (ref 27–34)
MCHC RBC-ENTMCNC: 31 % — LOW (ref 32–36)
MCV RBC AUTO: 80.1 FL — SIGNIFICANT CHANGE UP (ref 80–100)
NRBC # FLD: 0.02 K/UL — LOW (ref 25–125)
NT-PROBNP SERPL-SCNC: 9194 PG/ML — SIGNIFICANT CHANGE UP
PHOSPHATE SERPL-MCNC: 3.9 MG/DL — SIGNIFICANT CHANGE UP (ref 2.5–4.5)
PLATELET # BLD AUTO: 153 K/UL — SIGNIFICANT CHANGE UP (ref 150–400)
PMV BLD: 10.4 FL — SIGNIFICANT CHANGE UP (ref 7–13)
POTASSIUM SERPL-MCNC: 3.6 MMOL/L — SIGNIFICANT CHANGE UP (ref 3.5–5.3)
POTASSIUM SERPL-SCNC: 3.6 MMOL/L — SIGNIFICANT CHANGE UP (ref 3.5–5.3)
PROT SERPL-MCNC: 5.5 G/DL — LOW (ref 6–8.3)
PROTHROM AB SERPL-ACNC: 82.2 SEC — HIGH (ref 9.8–13.1)
PTH-INTACT SERPL-MCNC: 294.1 PG/ML — HIGH (ref 15–65)
RBC # BLD: 5.44 M/UL — SIGNIFICANT CHANGE UP (ref 4.2–5.8)
RBC # FLD: 19.2 % — HIGH (ref 10.3–14.5)
SODIUM SERPL-SCNC: 130 MMOL/L — LOW (ref 135–145)
TRIGL SERPL-MCNC: 122 MG/DL — SIGNIFICANT CHANGE UP (ref 10–149)
TSH SERPL-MCNC: 1.21 UIU/ML — SIGNIFICANT CHANGE UP (ref 0.27–4.2)
WBC # BLD: 6.47 K/UL — SIGNIFICANT CHANGE UP (ref 3.8–10.5)
WBC # FLD AUTO: 6.47 K/UL — SIGNIFICANT CHANGE UP (ref 3.8–10.5)

## 2019-02-17 RX ORDER — BUMETANIDE 0.25 MG/ML
1 INJECTION INTRAMUSCULAR; INTRAVENOUS
Qty: 20 | Refills: 0 | Status: DISCONTINUED | OUTPATIENT
Start: 2019-02-17 | End: 2019-02-22

## 2019-02-17 RX ORDER — POTASSIUM CHLORIDE 20 MEQ
20 PACKET (EA) ORAL ONCE
Qty: 0 | Refills: 0 | Status: COMPLETED | OUTPATIENT
Start: 2019-02-17 | End: 2019-02-17

## 2019-02-17 RX ORDER — MAGNESIUM SULFATE 500 MG/ML
1 VIAL (ML) INJECTION ONCE
Qty: 0 | Refills: 0 | Status: COMPLETED | OUTPATIENT
Start: 2019-02-17 | End: 2019-02-17

## 2019-02-17 RX ORDER — FUROSEMIDE 40 MG
80 TABLET ORAL EVERY 8 HOURS
Qty: 0 | Refills: 0 | Status: DISCONTINUED | OUTPATIENT
Start: 2019-02-17 | End: 2019-02-17

## 2019-02-17 RX ORDER — BUMETANIDE 0.25 MG/ML
0.5 INJECTION INTRAMUSCULAR; INTRAVENOUS
Qty: 20 | Refills: 0 | Status: DISCONTINUED | OUTPATIENT
Start: 2019-02-17 | End: 2019-02-17

## 2019-02-17 RX ADMIN — Medication 0.12 MILLIGRAM(S): at 06:00

## 2019-02-17 RX ADMIN — BUMETANIDE 5 MG/HR: 0.25 INJECTION INTRAMUSCULAR; INTRAVENOUS at 23:52

## 2019-02-17 RX ADMIN — TAMSULOSIN HYDROCHLORIDE 0.4 MILLIGRAM(S): 0.4 CAPSULE ORAL at 19:30

## 2019-02-17 RX ADMIN — Medication 100 GRAM(S): at 09:31

## 2019-02-17 RX ADMIN — Medication 80 MILLIGRAM(S): at 13:02

## 2019-02-17 RX ADMIN — Medication 1: at 13:03

## 2019-02-17 RX ADMIN — Medication 20 MILLIEQUIVALENT(S): at 09:31

## 2019-02-17 RX ADMIN — BUMETANIDE 2.5 MG/HR: 0.25 INJECTION INTRAMUSCULAR; INTRAVENOUS at 15:28

## 2019-02-17 RX ADMIN — ATORVASTATIN CALCIUM 20 MILLIGRAM(S): 80 TABLET, FILM COATED ORAL at 19:30

## 2019-02-17 RX ADMIN — Medication 80 MILLIGRAM(S): at 06:00

## 2019-02-17 NOTE — PROGRESS NOTE ADULT - SUBJECTIVE AND OBJECTIVE BOX
Patient seen and examined at bedside. No chest pain/sob    VITALS:  T(F): 97.3 (02-17-19 @ 12:59), Max: 97.6 (02-16-19 @ 17:50)  HR: 99 (02-17-19 @ 12:59)  BP: 110/82 (02-17-19 @ 12:59)  RR: 18 (02-17-19 @ 12:59)  SpO2: 100% (02-17-19 @ 12:59)  Wt(kg): --    02-16 @ 07:01  -  02-17 @ 07:00  --------------------------------------------------------  IN: 437 mL / OUT: 675 mL / NET: -238 mL    02-17 @ 07:01  -  02-17 @ 14:35  --------------------------------------------------------  IN: 0 mL / OUT: 225 mL / NET: -225 mL          PHYSICAL EXAM:  Constitutional: NAD  Neck: No JVD  Respiratory: CTAB, no wheezes, rales or rhonchi  Cardiovascular: S1, S2, RRR  Gastrointestinal: BS+, soft, NT/ND  Extremities: (+) 2 peripheral edema    Hospital Medications:   MEDICATIONS  (STANDING):  atorvastatin 20 milliGRAM(s) Oral at bedtime  dextrose 5%. 1000 milliLiter(s) (50 mL/Hr) IV Continuous <Continuous>  dextrose 50% Injectable 12.5 Gram(s) IV Push once  dextrose 50% Injectable 25 Gram(s) IV Push once  dextrose 50% Injectable 25 Gram(s) IV Push once  digoxin     Tablet 0.125 milliGRAM(s) Oral daily  furosemide   Injectable 80 milliGRAM(s) IV Push every 8 hours  insulin lispro (HumaLOG) corrective regimen sliding scale   SubCutaneous three times a day before meals  insulin lispro (HumaLOG) corrective regimen sliding scale   SubCutaneous at bedtime  tamsulosin 0.4 milliGRAM(s) Oral at bedtime      LABS:  02-17    130<L>  |  85<L>  |  41<H>  ----------------------------<  141<H>  3.6   |  25  |  1.55<H>    Ca    8.3<L>      17 Feb 2019 05:40  Phos  3.9     02-17  Mg     1.5     02-17    TPro  5.5<L>  /  Alb  2.9<L>  /  TBili  2.4<H>  /  DBili      /  AST  36  /  ALT  21  /  AlkPhos  50  02-17    Creatinine Trend: 1.55 <--, 1.54 <--  Phosphorus Level, Serum: 3.9 mg/dL (02-17 @ 05:40)  Albumin, Serum: 2.9 g/dL (02-17 @ 05:40)                              13.5   6.47  )-----------( 153      ( 17 Feb 2019 05:40 )             43.6     Urine Studies:      Urinalysis - [02-16-19 @ 14:32]      Color YELLOW / Appearance CLEAR / SG 1.012 / pH 7.0      Gluc NEGATIVE / Ketone NEGATIVE  / Bili NEGATIVE / Urobili NORMAL       Blood NEGATIVE / Protein 10 / Leuk Est NEGATIVE / Nitrite NEGATIVE      RBC  / WBC  / Hyaline  / Gran  / Sq Epi  / Non Sq Epi  / Bacteria       HbA1c 6.9      [02-17-19 @ 05:40]  TSH 1.21      [02-17-19 @ 05:40]  Lipid: chol 120, , HDL 27, LDL 89      [02-17-19 @ 05:40]    HCV 0.10, Nonreactive Hepatitis C AB  S/CO Ratio                        Interpretation  < 1.0                                     Non-Reactive  1.0 - 4.9                           Weakly-Reactive  > 5.0                                 Reactive  Non-Reactive: Aperson with a non-reactive HCV antibody  result is considered uninfected.  No further action is  needed unless recent infection is suspected.  In these  cases, consider repeat testing later to detect  seroconversion..  Weakly-Reactive: HCV antibody test is abnormal, HCV RNA  Qualitative test will follow.  Reactive: HCV antibody test is abnormal, HCV RNA  Qualitative test will follow.  Note: HCV antibody testing is performed on the Abbott   system.      [02-16-19 @ 15:05]      RADIOLOGY & ADDITIONAL STUDIES: Patient seen and examined at bedside. No chest pain, but still with SOB    VITALS:  T(F): 97.3 (02-17-19 @ 12:59), Max: 97.6 (02-16-19 @ 17:50)  HR: 99 (02-17-19 @ 12:59)  BP: 110/82 (02-17-19 @ 12:59)  RR: 18 (02-17-19 @ 12:59)  SpO2: 100% (02-17-19 @ 12:59)  Wt(kg): --    02-16 @ 07:01  -  02-17 @ 07:00  --------------------------------------------------------  IN: 437 mL / OUT: 675 mL / NET: -238 mL    02-17 @ 07:01  -  02-17 @ 14:35  --------------------------------------------------------  IN: 0 mL / OUT: 225 mL / NET: -225 mL          PHYSICAL EXAM:  Constitutional: NAD  Neck: No JVD  Respiratory: CTAB, no wheezes, rales or rhonchi  Cardiovascular: S1, S2, RRR  Gastrointestinal: BS+, soft, NT/ND  Extremities: (+) 2 peripheral edema    Hospital Medications:   MEDICATIONS  (STANDING):  atorvastatin 20 milliGRAM(s) Oral at bedtime  dextrose 5%. 1000 milliLiter(s) (50 mL/Hr) IV Continuous <Continuous>  dextrose 50% Injectable 12.5 Gram(s) IV Push once  dextrose 50% Injectable 25 Gram(s) IV Push once  dextrose 50% Injectable 25 Gram(s) IV Push once  digoxin     Tablet 0.125 milliGRAM(s) Oral daily  furosemide   Injectable 80 milliGRAM(s) IV Push every 8 hours  insulin lispro (HumaLOG) corrective regimen sliding scale   SubCutaneous three times a day before meals  insulin lispro (HumaLOG) corrective regimen sliding scale   SubCutaneous at bedtime  tamsulosin 0.4 milliGRAM(s) Oral at bedtime      LABS:  02-17    130<L>  |  85<L>  |  41<H>  ----------------------------<  141<H>  3.6   |  25  |  1.55<H>    Ca    8.3<L>      17 Feb 2019 05:40  Phos  3.9     02-17  Mg     1.5     02-17    TPro  5.5<L>  /  Alb  2.9<L>  /  TBili  2.4<H>  /  DBili      /  AST  36  /  ALT  21  /  AlkPhos  50  02-17    Creatinine Trend: 1.55 <--, 1.54 <--  Phosphorus Level, Serum: 3.9 mg/dL (02-17 @ 05:40)  Albumin, Serum: 2.9 g/dL (02-17 @ 05:40)                              13.5   6.47  )-----------( 153      ( 17 Feb 2019 05:40 )             43.6     Urine Studies:      Urinalysis - [02-16-19 @ 14:32]      Color YELLOW / Appearance CLEAR / SG 1.012 / pH 7.0      Gluc NEGATIVE / Ketone NEGATIVE  / Bili NEGATIVE / Urobili NORMAL       Blood NEGATIVE / Protein 10 / Leuk Est NEGATIVE / Nitrite NEGATIVE      RBC  / WBC  / Hyaline  / Gran  / Sq Epi  / Non Sq Epi  / Bacteria       HbA1c 6.9      [02-17-19 @ 05:40]  TSH 1.21      [02-17-19 @ 05:40]  Lipid: chol 120, , HDL 27, LDL 89      [02-17-19 @ 05:40]    HCV 0.10, Nonreactive Hepatitis C AB  S/CO Ratio                        Interpretation  < 1.0                                     Non-Reactive  1.0 - 4.9                           Weakly-Reactive  > 5.0                                 Reactive  Non-Reactive: Aperson with a non-reactive HCV antibody  result is considered uninfected.  No further action is  needed unless recent infection is suspected.  In these  cases, consider repeat testing later to detect  seroconversion..  Weakly-Reactive: HCV antibody test is abnormal, HCV RNA  Qualitative test will follow.  Reactive: HCV antibody test is abnormal, HCV RNA  Qualitative test will follow.  Note: HCV antibody testing is performed on the Abbott   system.      [02-16-19 @ 15:05]      RADIOLOGY & ADDITIONAL STUDIES:

## 2019-02-17 NOTE — CONSULT NOTE ADULT - ASSESSMENT
EKG - Afib V paced   Echo - 6/16 - Severe LV dysfunction RV dysfunction     a/p     1) Acute on chronic systolic CHF exacerbation - not responding to lasix 80mg q8 , will switch to bumex drip 1mg/hr preceded by 5mg metolazone, if pt doesnt respond will consider swan marichuy in CCU for heart failure management, cont digoxin     2) Afib - INR > 6 hold coumadin     3) CKD - creatnine around baseline f/u renal

## 2019-02-17 NOTE — PROGRESS NOTE ADULT - PROBLEM SELECTOR PLAN 3
rate control + ac inr supratherapeutic - monitor for active signs of bleeding   risk and benefits of ac explained

## 2019-02-17 NOTE — PHYSICAL THERAPY INITIAL EVALUATION ADULT - PERTINENT HX OF CURRENT PROBLEM, REHAB EVAL
Patient is 68 year old male admitted with history of ischemic cardiomyopathy, severe LV dysfunction, presents with shortness of breath.

## 2019-02-17 NOTE — PROGRESS NOTE ADULT - PROBLEM SELECTOR PLAN 4
on lasix 80mg iv bid  monitor i/o daily weight  f/u cardio  ef 21% from echo 2017. on lasix 80mg iv q8  monitor i/o daily weight  f/u cardio  ef 21% from echo 2017.

## 2019-02-17 NOTE — CONSULT NOTE ADULT - SUBJECTIVE AND OBJECTIVE BOX
Phillip Sorenson MD  Interventional Cardiology / Advance Heart Failure and Cardiac Transplant Specialist  [place] Office : [phone] [place] [address] [place] [address]  Tel:   [place] Office : [address] [facility] [place] [address]  Tel: [phone]  Cell : [phone]    HISTORY OF PRESENT ILLNESS:  67 y/o male with a PMHx of CAD S/P 4V CABG, ischemic cardiomyopathy with severe LV dysfunction (EF 21%) S/P [employer] ICD placement, atrial fibrillation on Coumadin, MR S/P mitral valvuloplasty, CVA, TIA, HTN, HLD, DM and CKD presents to ED with shortness of breath for the last two to three weeks. Pt notices that he has been feeling shortness of breath, which is worsened on minimal exertion, but also occurs at rest. Pt also endorses three pillow orthopnea, which is associated with paroxysmal nocturnal dyspnea, dry cough, increased abdominal distention, weight gain and lower extremity edema. Pt reports that his cough has been persistent for weeks and is now causing him to have non-pleuritic, non-exertional and non-radiating substernal chest pain; the chest pain only occurs when he is coughing. Pt thinks he has gained about 7 pounds in the last two weeks. Pt also reports that he has not been urinating as well and has been having difficulty emptying his bladder. Pt denies fever, chills, recent travel, sick contacts, headache, dizziness, visual deficits, palpitations, abdominal pain, N/V/D/C, hematochezia, melena, dysuria, hematuria, LOC, syncope. Upon arrival to ED, EKG: V paced at 107 bpm. CE x1: Trop 88. Na: 132. BUN/Cr: 40/1.54. Glucose: 143. Bili: 2.4. AST: 44. ProBNP: 89695. Pt was given Lasix 80mg IVP and is now admitted to telemetry.  Pt has been putting out minimal urine out put on IV lasix, will start bumex drip    PAST MEDICAL & SURGICAL HISTORY:  CKD (chronic kidney disease)  MR (mitral regurgitation)  AF (atrial fibrillation)  DM (diabetes mellitus)  HLD (hyperlipidemia)  TIA (transient ischemic attack)  HTN (hypertension)  ETOH abuse  CVA (cerebral infarction)  CHB (complete heart block)  CHF (congestive heart failure)  CAD (coronary artery disease): S/P 4V CABG  S/P mitral valve repair: 2007  S/P CABG (coronary artery bypass graft): 4V CABG in 2007  AICD (automatic cardioverter/defibrillator) present: G-cluster    	    MEDICATIONS:  buMETAnide Infusion 0.5 mG/Hr IV Continuous <Continuous>  digoxin     Tablet 0.125 milliGRAM(s) Oral daily  tamsulosin 0.4 milliGRAM(s) Oral at bedtime  atorvastatin 20 milliGRAM(s) Oral at bedtime  dextrose 40% Gel 15 Gram(s) Oral once PRN  dextrose 50% Injectable 12.5 Gram(s) IV Push once  dextrose 50% Injectable 25 Gram(s) IV Push once  dextrose 50% Injectable 25 Gram(s) IV Push once  glucagon  Injectable 1 milliGRAM(s) IntraMuscular once PRN  insulin lispro (HumaLOG) corrective regimen sliding scale   SubCutaneous three times a day before meals  insulin lispro (HumaLOG) corrective regimen sliding scale   SubCutaneous at bedtime  dextrose 5%. 1000 milliLiter(s) IV Continuous <Continuous>      FAMILY HISTORY:  No pertinent family history in first degree relatives        Allergies    No Known Allergies    Intolerances    	      PHYSICAL EXAM:  T(C): 36.6 (02-17-19 @ 22:11), Max: 36.6 (02-17-19 @ 22:11)  HR: 104 (02-17-19 @ 22:11) (94 - 104)  BP: 104/71 (02-17-19 @ 22:11) (104/71 - 122/74)  RR: 16 (02-17-19 @ 22:11) (16 - 18)  SpO2: 100% (02-17-19 @ 22:11) (100% - 100%)  Wt(kg): --  I&O's Summary    16 Feb 2019 07:01  -  17 Feb 2019 07:00  --------------------------------------------------------  IN: 437 mL / OUT: 675 mL / NET: -238 mL    17 Feb 2019 07:01  -  17 Feb 2019 22:27  --------------------------------------------------------  IN: 232.5 mL / OUT: 875 mL / NET: -642.5 mL    	  HEENT:   + JVD  Cardiovascular: Normal S1 S2, + JVD, 2/6 holosystolic murmurs,  Respiratory: Lungs clear to auscultation	  Gastrointestinal:  Soft, Non-tender, + BS	  Extremities: 2+ edema    LABS:	 	    CARDIAC MARKERS:                                  13.5   6.47  )-----------( 153      ( 17 Feb 2019 05:40 )             43.6     02-17    130<L>  |  85<L>  |  41<H>  ----------------------------<  141<H>  3.6   |  25  |  1.55<H>    Ca    8.3<L>      17 Feb 2019 05:40  Phos  3.9     02-17  Mg     1.5     02-17    TPro  5.5<L>  /  Alb  2.9<L>  /  TBili  2.4<H>  /  DBili  x   /  AST  36  /  ALT  21  /  AlkPhos  50  02-17    proBNP: Serum Pro-Brain Natriuretic Peptide: 9194 pg/mL (02-17 @ 05:40)    Lipid Profile:   HgA1c: Hemoglobin A1C, Whole Blood: 6.9 % (02-17 @ 05:40)    TSH: Thyroid Stimulating Hormone, Serum: 1.21 uIU/mL (02-17 @ 05:40)      ASSESSMENT/PLAN:

## 2019-02-18 DIAGNOSIS — E87.6 HYPOKALEMIA: ICD-10-CM

## 2019-02-18 LAB
ANION GAP SERPL CALC-SCNC: 15 MMO/L — HIGH (ref 7–14)
ANION GAP SERPL CALC-SCNC: 15 MMO/L — HIGH (ref 7–14)
BUN SERPL-MCNC: 44 MG/DL — HIGH (ref 7–23)
BUN SERPL-MCNC: 45 MG/DL — HIGH (ref 7–23)
CALCIUM SERPL-MCNC: 8.4 MG/DL — SIGNIFICANT CHANGE UP (ref 8.4–10.5)
CALCIUM SERPL-MCNC: 8.5 MG/DL — SIGNIFICANT CHANGE UP (ref 8.4–10.5)
CHLORIDE SERPL-SCNC: 87 MMOL/L — LOW (ref 98–107)
CHLORIDE SERPL-SCNC: 88 MMOL/L — LOW (ref 98–107)
CO2 SERPL-SCNC: 26 MMOL/L — SIGNIFICANT CHANGE UP (ref 22–31)
CO2 SERPL-SCNC: 27 MMOL/L — SIGNIFICANT CHANGE UP (ref 22–31)
CREAT SERPL-MCNC: 1.38 MG/DL — HIGH (ref 0.5–1.3)
CREAT SERPL-MCNC: 1.39 MG/DL — HIGH (ref 0.5–1.3)
GLUCOSE BLDC GLUCOMTR-MCNC: 131 MG/DL — HIGH (ref 70–99)
GLUCOSE BLDC GLUCOMTR-MCNC: 140 MG/DL — HIGH (ref 70–99)
GLUCOSE BLDC GLUCOMTR-MCNC: 190 MG/DL — HIGH (ref 70–99)
GLUCOSE BLDC GLUCOMTR-MCNC: 219 MG/DL — HIGH (ref 70–99)
GLUCOSE SERPL-MCNC: 181 MG/DL — HIGH (ref 70–99)
GLUCOSE SERPL-MCNC: 198 MG/DL — HIGH (ref 70–99)
HCT VFR BLD CALC: 38.9 % — LOW (ref 39–50)
HGB BLD-MCNC: 12.5 G/DL — LOW (ref 13–17)
INR BLD: 3.95 — HIGH (ref 0.88–1.17)
MAGNESIUM SERPL-MCNC: 1.6 MG/DL — SIGNIFICANT CHANGE UP (ref 1.6–2.6)
MAGNESIUM SERPL-MCNC: 1.8 MG/DL — SIGNIFICANT CHANGE UP (ref 1.6–2.6)
MCHC RBC-ENTMCNC: 25.2 PG — LOW (ref 27–34)
MCHC RBC-ENTMCNC: 32.1 % — SIGNIFICANT CHANGE UP (ref 32–36)
MCV RBC AUTO: 78.4 FL — LOW (ref 80–100)
NRBC # FLD: 0 K/UL — LOW (ref 25–125)
PLATELET # BLD AUTO: 139 K/UL — LOW (ref 150–400)
PMV BLD: 10.6 FL — SIGNIFICANT CHANGE UP (ref 7–13)
POTASSIUM SERPL-MCNC: 2.8 MMOL/L — CRITICAL LOW (ref 3.5–5.3)
POTASSIUM SERPL-MCNC: 3.6 MMOL/L — SIGNIFICANT CHANGE UP (ref 3.5–5.3)
POTASSIUM SERPL-SCNC: 2.8 MMOL/L — CRITICAL LOW (ref 3.5–5.3)
POTASSIUM SERPL-SCNC: 3.6 MMOL/L — SIGNIFICANT CHANGE UP (ref 3.5–5.3)
PROTHROM AB SERPL-ACNC: 47 SEC — HIGH (ref 9.8–13.1)
RBC # BLD: 4.96 M/UL — SIGNIFICANT CHANGE UP (ref 4.2–5.8)
RBC # FLD: 18.9 % — HIGH (ref 10.3–14.5)
SODIUM SERPL-SCNC: 129 MMOL/L — LOW (ref 135–145)
SODIUM SERPL-SCNC: 129 MMOL/L — LOW (ref 135–145)
WBC # BLD: 6.32 K/UL — SIGNIFICANT CHANGE UP (ref 3.8–10.5)
WBC # FLD AUTO: 6.32 K/UL — SIGNIFICANT CHANGE UP (ref 3.8–10.5)

## 2019-02-18 PROCEDURE — 76770 US EXAM ABDO BACK WALL COMP: CPT | Mod: 26

## 2019-02-18 RX ORDER — POTASSIUM CHLORIDE 20 MEQ
40 PACKET (EA) ORAL ONCE
Qty: 0 | Refills: 0 | Status: COMPLETED | OUTPATIENT
Start: 2019-02-18 | End: 2019-02-18

## 2019-02-18 RX ORDER — POTASSIUM CHLORIDE 20 MEQ
40 PACKET (EA) ORAL EVERY 4 HOURS
Qty: 0 | Refills: 0 | Status: COMPLETED | OUTPATIENT
Start: 2019-02-18 | End: 2019-02-18

## 2019-02-18 RX ORDER — POTASSIUM CHLORIDE 20 MEQ
10 PACKET (EA) ORAL
Qty: 0 | Refills: 0 | Status: COMPLETED | OUTPATIENT
Start: 2019-02-18 | End: 2019-02-18

## 2019-02-18 RX ORDER — MAGNESIUM SULFATE 500 MG/ML
1 VIAL (ML) INJECTION ONCE
Qty: 0 | Refills: 0 | Status: COMPLETED | OUTPATIENT
Start: 2019-02-18 | End: 2019-02-18

## 2019-02-18 RX ORDER — MAGNESIUM OXIDE 400 MG ORAL TABLET 241.3 MG
400 TABLET ORAL
Qty: 0 | Refills: 0 | Status: COMPLETED | OUTPATIENT
Start: 2019-02-18 | End: 2019-02-20

## 2019-02-18 RX ORDER — POTASSIUM CHLORIDE 20 MEQ
40 PACKET (EA) ORAL EVERY 4 HOURS
Qty: 0 | Refills: 0 | Status: DISCONTINUED | OUTPATIENT
Start: 2019-02-18 | End: 2019-02-18

## 2019-02-18 RX ADMIN — Medication 2: at 17:46

## 2019-02-18 RX ADMIN — Medication 100 MILLIEQUIVALENT(S): at 09:30

## 2019-02-18 RX ADMIN — BUMETANIDE 5 MG/HR: 0.25 INJECTION INTRAMUSCULAR; INTRAVENOUS at 21:05

## 2019-02-18 RX ADMIN — BUMETANIDE 5 MG/HR: 0.25 INJECTION INTRAMUSCULAR; INTRAVENOUS at 05:09

## 2019-02-18 RX ADMIN — ATORVASTATIN CALCIUM 20 MILLIGRAM(S): 80 TABLET, FILM COATED ORAL at 21:05

## 2019-02-18 RX ADMIN — Medication 100 MILLIEQUIVALENT(S): at 10:21

## 2019-02-18 RX ADMIN — Medication 40 MILLIEQUIVALENT(S): at 08:08

## 2019-02-18 RX ADMIN — Medication 1: at 12:32

## 2019-02-18 RX ADMIN — Medication 40 MILLIEQUIVALENT(S): at 21:34

## 2019-02-18 RX ADMIN — TAMSULOSIN HYDROCHLORIDE 0.4 MILLIGRAM(S): 0.4 CAPSULE ORAL at 21:05

## 2019-02-18 RX ADMIN — Medication 100 GRAM(S): at 11:21

## 2019-02-18 RX ADMIN — Medication 0.12 MILLIGRAM(S): at 05:09

## 2019-02-18 RX ADMIN — Medication 100 MILLIEQUIVALENT(S): at 08:09

## 2019-02-18 RX ADMIN — Medication 40 MILLIEQUIVALENT(S): at 11:21

## 2019-02-18 NOTE — PROGRESS NOTE ADULT - SUBJECTIVE AND OBJECTIVE BOX
Phillip Sorenson MD  Interventional Cardiology / Advance Heart Failure and Cardiac Transplant Specialist  [place] Office : [phone] [place] [address] [place] [address]  Tel:   [place] Office : [address] [facility] [place] [address]  Tel: [phone]  Cell : [phone]    Subjective : Pt lying in bed comfortable, not in distress, denies any chest pain  SOB improving  	  MEDICATIONS:  buMETAnide Infusion 1 mG/Hr IV Continuous <Continuous>  digoxin     Tablet 0.125 milliGRAM(s) Oral daily  metolazone 5 milliGRAM(s) Oral daily  tamsulosin 0.4 milliGRAM(s) Oral at bedtime  atorvastatin 20 milliGRAM(s) Oral at bedtime  dextrose 40% Gel 15 Gram(s) Oral once PRN  dextrose 50% Injectable 12.5 Gram(s) IV Push once  dextrose 50% Injectable 25 Gram(s) IV Push once  dextrose 50% Injectable 25 Gram(s) IV Push once  glucagon  Injectable 1 milliGRAM(s) IntraMuscular once PRN  insulin lispro (HumaLOG) corrective regimen sliding scale   SubCutaneous three times a day before meals  insulin lispro (HumaLOG) corrective regimen sliding scale   SubCutaneous at bedtime    dextrose 5%. 1000 milliLiter(s) IV Continuous <Continuous>      PHYSICAL EXAM:  T(C): 36.4 (02-18-19 @ 13:40), Max: 36.6 (02-17-19 @ 22:11)  HR: 93 (02-18-19 @ 13:40) (93 - 104)  BP: 118/79 (02-18-19 @ 13:40) (104/71 - 118/79)  RR: 16 (02-18-19 @ 13:40) (16 - 16)  SpO2: 100% (02-18-19 @ 13:40) (98% - 100%)  Wt(kg): --  I&O's Summary    17 Feb 2019 07:01  -  18 Feb 2019 07:00  --------------------------------------------------------  IN: 372.5 mL / OUT: 2675 mL / NET: -2302.5 mL    18 Feb 2019 07:01  -  18 Feb 2019 15:29  --------------------------------------------------------  IN: 555 mL / OUT: 900 mL / NET: -345 mL          	  HEENT:   Normal oral mucosa, PERRL, EOMI	  Cardiovascular: Normal S1 S2, + JVD, No murmurs, No edema  Respiratory: Lungs clear to auscultation	  Gastrointestinal:  Soft, Non-tender, + BS	  Extremities: 2+ edema                                    12.5   6.32  )-----------( 139      ( 18 Feb 2019 05:40 )             38.9     02-18    129<L>  |  87<L>  |  44<H>  ----------------------------<  181<H>  2.8<LL>   |  27  |  1.38<H>    Ca    8.4      18 Feb 2019 05:40  Phos  3.9     02-17  Mg     1.6     02-18    TPro  5.5<L>  /  Alb  2.9<L>  /  TBili  2.4<H>  /  DBili  x   /  AST  36  /  ALT  21  /  AlkPhos  50  02-17    proBNP:   Lipid Profile:   HgA1c:   TSH:

## 2019-02-18 NOTE — PROGRESS NOTE ADULT - PROBLEM SELECTOR PLAN 5
on lasix 80mg iv q8  monitor i/o daily weight  f/u cardio  ef 21% from echo 2017. o bumex drip  monitor i/o daily weight  f/u cardio  ef 21% from echo 2017.

## 2019-02-18 NOTE — PROGRESS NOTE ADULT - PROBLEM SELECTOR PLAN 4
likely sec to hypervolemia. Stable  continue lasix  free water restriction <1L/day  monitor. likely sec to hypervolemia.   continue lasix  free water restriction <1L/day  monitor. likely sec to hypervolemia.   continue diuretics  free water restriction <1L/day  monitor.

## 2019-02-18 NOTE — DIETITIAN INITIAL EVALUATION ADULT. - PROBLEM SELECTOR PLAN 10
Pt already (supra)therapeutic on Coumadin  No further DVT ppx needed  No need for Vitamin K given no signs/symptoms of bleeding

## 2019-02-18 NOTE — PROGRESS NOTE ADULT - SUBJECTIVE AND OBJECTIVE BOX
[facility]   MD LUCA MCKEON MD RUORU WONG, PA    TEL:  OFFICE: [phone]  DR TAYLOR CELL: [phone]  [place] [facility] CELL: [phone]  DR. SWARTZ CELL: [phone]    -- INITIAL RENAL CONSULT NOTE  --------------------------------------------------------------------------------  HPI:        PAST HISTORY  --------------------------------------------------------------------------------  PAST MEDICAL & SURGICAL HISTORY:  CKD (chronic kidney disease)  MR (mitral regurgitation)  AF (atrial fibrillation)  DM (diabetes mellitus)  HLD (hyperlipidemia)  TIA (transient ischemic attack)  HTN (hypertension)  ETOH abuse  CVA (cerebral infarction)  CHB (complete heart block)  CHF (congestive heart failure)  CAD (coronary artery disease): S/P 4V CABG  S/P mitral valve repair: 2007  S/P CABG (coronary artery bypass graft): 4V CABG in 2007  AICD (automatic cardioverter/defibrillator) present: Medtronic    FAMILY HISTORY:  No pertinent family history in first degree relatives    PAST SOCIAL HISTORY:    ALLERGIES & MEDICATIONS  --------------------------------------------------------------------------------  Allergies    No Known Allergies    Intolerances      Standing Inpatient Medications  atorvastatin 20 milliGRAM(s) Oral at bedtime  buMETAnide Infusion 1 mG/Hr IV Continuous <Continuous>  dextrose 5%. 1000 milliLiter(s) IV Continuous <Continuous>  dextrose 50% Injectable 12.5 Gram(s) IV Push once  dextrose 50% Injectable 25 Gram(s) IV Push once  dextrose 50% Injectable 25 Gram(s) IV Push once  digoxin     Tablet 0.125 milliGRAM(s) Oral daily  insulin lispro (HumaLOG) corrective regimen sliding scale   SubCutaneous three times a day before meals  insulin lispro (HumaLOG) corrective regimen sliding scale   SubCutaneous at bedtime  metolazone 5 milliGRAM(s) Oral daily  tamsulosin 0.4 milliGRAM(s) Oral at bedtime    PRN Inpatient Medications  dextrose 40% Gel 15 Gram(s) Oral once PRN  glucagon  Injectable 1 milliGRAM(s) IntraMuscular once PRN      REVIEW OF SYSTEMS  --------------------------------------------------------------------------------  Gen: No fevers/chills  Skin: No rashes  Head/Eyes/Ears: Normal hearing,  Normal vision   Respiratory: No dyspnea, cough  CV: No chest pain  GI: No abdominal pain, diarrhea, constipation, nausea, vomiting  : No dysuria, hematuria  MSK: No  edema  Heme: No easy bruising or bleeding  Psych: No significant depression    All other systems were reviewed and are negative, except as noted.    VITALS/PHYSICAL EXAM  --------------------------------------------------------------------------------  T(C): 36.6 (02-18-19 @ 05:09), Max: 36.6 (02-17-19 @ 22:11)  HR: 96 (02-18-19 @ 05:09) (96 - 104)  BP: 118/73 (02-18-19 @ 05:09) (104/71 - 118/73)  RR: 16 (02-18-19 @ 05:09) (16 - 16)  SpO2: 98% (02-18-19 @ 05:09) (98% - 100%)  Wt(kg): --  Height (cm): 185.42 (02-16-19 @ 14:14)  Weight (kg): 79.8 (02-16-19 @ 14:14)  BMI (kg/m2): 23.2 (02-16-19 @ 14:14)  BSA (m2): 2.04 (02-16-19 @ 14:14)      02-17-19 @ 07:01  -  02-18-19 @ 07:00  --------------------------------------------------------  IN: 372.5 mL / OUT: 2675 mL / NET: -2302.5 mL    02-18-19 @ 07:01  -  02-18-19 @ 13:23  --------------------------------------------------------  IN: 545 mL / OUT: 300 mL / NET: 245 mL      Physical Exam:  	Gen: NAD  	HEENT: MMM  	Pulm: CTA B/L  	CV: S1S2  	Abd: Soft, +BS   	Ext: No LE edema B/L  	Neuro: Awake  	Skin: Warm and dry  	Vascular access:    LABS/STUDIES  --------------------------------------------------------------------------------              12.5   6.32  >-----------<  139      [02-18-19 @ 05:40]              38.9     129  |  87  |  44  ----------------------------<  181      [02-18-19 @ 05:40]  2.8   |  27  |  1.38        Ca     8.4     [02-18-19 @ 05:40]      Mg     1.6     [02-18-19 @ 05:40]      Phos  3.9     [02-17-19 @ 05:40]    TPro  5.5  /  Alb  2.9  /  TBili  2.4  /  DBili  x   /  AST  36  /  ALT  21  /  AlkPhos  50  [02-17-19 @ 05:40]    PT/INR: PT 47.0 , INR 3.95       [02-18-19 @ 05:40]      Creatinine Trend:  SCr 1.38 [02-18 @ 05:40]  SCr 1.55 [02-17 @ 05:40]  SCr 1.54 [02-16 @ 10:30]    Urinalysis - [02-16-19 @ 14:32]      Color YELLOW / Appearance CLEAR / SG 1.012 / pH 7.0      Gluc NEGATIVE / Ketone NEGATIVE  / Bili NEGATIVE / Urobili NORMAL       Blood NEGATIVE / Protein 10 / Leuk Est NEGATIVE / Nitrite NEGATIVE      RBC  / WBC  / Hyaline  / Gran  / Sq Epi  / Non Sq Epi  / Bacteria       .1 (Ca --)      [02-17-19 @ 15:00]   --  HbA1c 6.9      [02-17-19 @ 05:40]  TSH 1.21      [02-17-19 @ 05:40]  Lipid: chol 120, , HDL 27, LDL 89      [02-17-19 @ 05:40]    HCV 0.10, Nonreactive Hepatitis C AB  S/CO Ratio                        Interpretation  < 1.0                                     Non-Reactive  1.0 - 4.9                           Weakly-Reactive  > 5.0                                 Reactive  Non-Reactive: Aperson with a non-reactive HCV antibody  result is considered uninfected.  No further action is  needed unless recent infection is suspected.  In these  cases, consider repeat testing later to detect  seroconversion..  Weakly-Reactive: HCV antibody test is abnormal, HCV RNA  Qualitative test will follow.  Reactive: HCV antibody test is abnormal, HCV RNA  Qualitative test will follow.  Note: HCV antibody testing is performed on the [employer] system.      [02-16-19 @ 15:05] [facility] [place] [facility]   MD LUCA MCKEON MD RUORU WONG, PA    TEL:  OFFICE: [phone]  DR TAYLOR CELL: [phone]  [place] [facility] CELL: [phone]  DR. SWARTZ CELL: [phone]     RENAL Follow up NOTE  --------------------------------------------------------------------------------  Pt seen and examined      PAST HISTORY  --------------------------------------------------------------------------------  PAST MEDICAL & SURGICAL HISTORY:  CKD (chronic kidney disease)  MR (mitral regurgitation)  AF (atrial fibrillation)  DM (diabetes mellitus)  HLD (hyperlipidemia)  TIA (transient ischemic attack)  HTN (hypertension)  ETOH abuse  CVA (cerebral infarction)  CHB (complete heart block)  CHF (congestive heart failure)  CAD (coronary artery disease): S/P 4V CABG  S/P mitral valve repair: 2007  S/P CABG (coronary artery bypass graft): 4V CABG in 2007  AICD (automatic cardioverter/defibrillator) present: Medtronic    FAMILY HISTORY:  No pertinent family history in first degree relatives    PAST SOCIAL HISTORY:    ALLERGIES & MEDICATIONS  --------------------------------------------------------------------------------  Allergies    No Known Allergies    Intolerances      Standing Inpatient Medications  atorvastatin 20 milliGRAM(s) Oral at bedtime  buMETAnide Infusion 1 mG/Hr IV Continuous <Continuous>  dextrose 5%. 1000 milliLiter(s) IV Continuous <Continuous>  dextrose 50% Injectable 12.5 Gram(s) IV Push once  dextrose 50% Injectable 25 Gram(s) IV Push once  dextrose 50% Injectable 25 Gram(s) IV Push once  digoxin     Tablet 0.125 milliGRAM(s) Oral daily  insulin lispro (HumaLOG) corrective regimen sliding scale   SubCutaneous three times a day before meals  insulin lispro (HumaLOG) corrective regimen sliding scale   SubCutaneous at bedtime  metolazone 5 milliGRAM(s) Oral daily  tamsulosin 0.4 milliGRAM(s) Oral at bedtime    PRN Inpatient Medications  dextrose 40% Gel 15 Gram(s) Oral once PRN  glucagon  Injectable 1 milliGRAM(s) IntraMuscular once PRN      REVIEW OF SYSTEMS  --------------------------------------------------------------------------------  Gen: No fevers/chills  Skin: No rashes  Head/Eyes/Ears: Normal hearing,  Normal vision   Respiratory: + dyspnea, cough  CV: No chest pain  GI: No abdominal pain, diarrhea, constipation, nausea, vomiting  : No dysuria, hematuria  MSK: +  edema  Heme: No easy bruising or bleeding  Psych: No significant depression    All other systems were reviewed and are negative, except as noted.    VITALS/PHYSICAL EXAM  --------------------------------------------------------------------------------  T(C): 36.6 (02-18-19 @ 05:09), Max: 36.6 (02-17-19 @ 22:11)  HR: 96 (02-18-19 @ 05:09) (96 - 104)  BP: 118/73 (02-18-19 @ 05:09) (104/71 - 118/73)  RR: 16 (02-18-19 @ 05:09) (16 - 16)  SpO2: 98% (02-18-19 @ 05:09) (98% - 100%)  Wt(kg): --  Height (cm): 185.42 (02-16-19 @ 14:14)  Weight (kg): 79.8 (02-16-19 @ 14:14)  BMI (kg/m2): 23.2 (02-16-19 @ 14:14)  BSA (m2): 2.04 (02-16-19 @ 14:14)      02-17-19 @ 07:01  -  02-18-19 @ 07:00  --------------------------------------------------------  IN: 372.5 mL / OUT: 2675 mL / NET: -2302.5 mL    02-18-19 @ 07:01  -  02-18-19 @ 13:23  --------------------------------------------------------  IN: 545 mL / OUT: 300 mL / NET: 245 mL      Physical Exam:  	Gen: NAD  	HEENT: MMM  	Pulm: CTA B/L  	CV: S1S2  	Abd: Soft, +BS   	Ext: ++ LE edema B/L  	Neuro: Awake  	Skin: Warm and dry  	    LABS/STUDIES  --------------------------------------------------------------------------------              12.5   6.32  >-----------<  139      [02-18-19 @ 05:40]              38.9     129  |  87  |  44  ----------------------------<  181      [02-18-19 @ 05:40]  2.8   |  27  |  1.38        Ca     8.4     [02-18-19 @ 05:40]      Mg     1.6     [02-18-19 @ 05:40]      Phos  3.9     [02-17-19 @ 05:40]    TPro  5.5  /  Alb  2.9  /  TBili  2.4  /  DBili  x   /  AST  36  /  ALT  21  /  AlkPhos  50  [02-17-19 @ 05:40]    PT/INR: PT 47.0 , INR 3.95       [02-18-19 @ 05:40]      Creatinine Trend:  SCr 1.38 [02-18 @ 05:40]  SCr 1.55 [02-17 @ 05:40]  SCr 1.54 [02-16 @ 10:30]    Urinalysis - [02-16-19 @ 14:32]      Color YELLOW / Appearance CLEAR / SG 1.012 / pH 7.0      Gluc NEGATIVE / Ketone NEGATIVE  / Bili NEGATIVE / Urobili NORMAL       Blood NEGATIVE / Protein 10 / Leuk Est NEGATIVE / Nitrite NEGATIVE      RBC  / WBC  / Hyaline  / Gran  / Sq Epi  / Non Sq Epi  / Bacteria       .1 (Ca --)      [02-17-19 @ 15:00]   --  HbA1c 6.9      [02-17-19 @ 05:40]  TSH 1.21      [02-17-19 @ 05:40]  Lipid: chol 120, , HDL 27, LDL 89      [02-17-19 @ 05:40]    HCV 0.10, Nonreactive Hepatitis C AB  S/CO Ratio                        Interpretation  < 1.0                                     Non-Reactive  1.0 - 4.9                           Weakly-Reactive  > 5.0                                 Reactive  Non-Reactive: Aperson with a non-reactive HCV antibody  result is considered uninfected.  No further action is  needed unless recent infection is suspected.  In these  cases, consider repeat testing later to detect  seroconversion..  Weakly-Reactive: HCV antibody test is abnormal, HCV RNA  Qualitative test will follow.  Reactive: HCV antibody test is abnormal, HCV RNA  Qualitative test will follow.  Note: HCV antibody testing is performed on the [employer] system.      [02-16-19 @ 15:05] [facility]   MD LUCA MCKEON MD RUORU WONG, PA    TEL:  OFFICE: [phone]  DR TAYLOR CELL: [phone]  [place] [facility] CELL: [phone]  DR. SWARTZ CELL: [phone]     RENAL Follow up NOTE  --------------------------------------------------------------------------------  Pt seen and examined  SOB improving      PAST HISTORY  --------------------------------------------------------------------------------  PAST MEDICAL & SURGICAL HISTORY:  CKD (chronic kidney disease)  MR (mitral regurgitation)  AF (atrial fibrillation)  DM (diabetes mellitus)  HLD (hyperlipidemia)  TIA (transient ischemic attack)  HTN (hypertension)  ETOH abuse  CVA (cerebral infarction)  CHB (complete heart block)  CHF (congestive heart failure)  CAD (coronary artery disease): S/P 4V CABG  S/P mitral valve repair: 2007  S/P CABG (coronary artery bypass graft): 4V CABG in 2007  AICD (automatic cardioverter/defibrillator) present: Medtronic    FAMILY HISTORY:  No pertinent family history in first degree relatives    PAST SOCIAL HISTORY:    ALLERGIES & MEDICATIONS  --------------------------------------------------------------------------------  Allergies    No Known Allergies    Intolerances      Standing Inpatient Medications  atorvastatin 20 milliGRAM(s) Oral at bedtime  buMETAnide Infusion 1 mG/Hr IV Continuous <Continuous>  dextrose 5%. 1000 milliLiter(s) IV Continuous <Continuous>  dextrose 50% Injectable 12.5 Gram(s) IV Push once  dextrose 50% Injectable 25 Gram(s) IV Push once  dextrose 50% Injectable 25 Gram(s) IV Push once  digoxin     Tablet 0.125 milliGRAM(s) Oral daily  insulin lispro (HumaLOG) corrective regimen sliding scale   SubCutaneous three times a day before meals  insulin lispro (HumaLOG) corrective regimen sliding scale   SubCutaneous at bedtime  metolazone 5 milliGRAM(s) Oral daily  tamsulosin 0.4 milliGRAM(s) Oral at bedtime    PRN Inpatient Medications  dextrose 40% Gel 15 Gram(s) Oral once PRN  glucagon  Injectable 1 milliGRAM(s) IntraMuscular once PRN      REVIEW OF SYSTEMS  --------------------------------------------------------------------------------  Gen: No fevers/chills  Skin: No rashes  Head/Eyes/Ears: Normal hearing,  Normal vision   Respiratory: + dyspnea, cough  CV: No chest pain  GI: No abdominal pain, diarrhea, constipation, nausea, vomiting  : No dysuria, hematuria  MSK: +  edema  Heme: No easy bruising or bleeding  Psych: No significant depression    All other systems were reviewed and are negative, except as noted.    VITALS/PHYSICAL EXAM  --------------------------------------------------------------------------------  T(C): 36.6 (02-18-19 @ 05:09), Max: 36.6 (02-17-19 @ 22:11)  HR: 96 (02-18-19 @ 05:09) (96 - 104)  BP: 118/73 (02-18-19 @ 05:09) (104/71 - 118/73)  RR: 16 (02-18-19 @ 05:09) (16 - 16)  SpO2: 98% (02-18-19 @ 05:09) (98% - 100%)  Wt(kg): --  Height (cm): 185.42 (02-16-19 @ 14:14)  Weight (kg): 79.8 (02-16-19 @ 14:14)  BMI (kg/m2): 23.2 (02-16-19 @ 14:14)  BSA (m2): 2.04 (02-16-19 @ 14:14)      02-17-19 @ 07:01  -  02-18-19 @ 07:00  --------------------------------------------------------  IN: 372.5 mL / OUT: 2675 mL / NET: -2302.5 mL    02-18-19 @ 07:01  -  02-18-19 @ 13:23  --------------------------------------------------------  IN: 545 mL / OUT: 300 mL / NET: 245 mL      Physical Exam:  	Gen: NAD  	HEENT: MMM  	Pulm: CTA B/L  	CV: S1S2  	Abd: Soft, +BS   	Ext: ++ LE edema B/L  	Neuro: Awake  	Skin: Warm and dry  	    LABS/STUDIES  --------------------------------------------------------------------------------              12.5   6.32  >-----------<  139      [02-18-19 @ 05:40]              38.9     129  |  87  |  44  ----------------------------<  181      [02-18-19 @ 05:40]  2.8   |  27  |  1.38        Ca     8.4     [02-18-19 @ 05:40]      Mg     1.6     [02-18-19 @ 05:40]      Phos  3.9     [02-17-19 @ 05:40]    TPro  5.5  /  Alb  2.9  /  TBili  2.4  /  DBili  x   /  AST  36  /  ALT  21  /  AlkPhos  50  [02-17-19 @ 05:40]    PT/INR: PT 47.0 , INR 3.95       [02-18-19 @ 05:40]      Creatinine Trend:  SCr 1.38 [02-18 @ 05:40]  SCr 1.55 [02-17 @ 05:40]  SCr 1.54 [02-16 @ 10:30]    Urinalysis - [02-16-19 @ 14:32]      Color YELLOW / Appearance CLEAR / SG 1.012 / pH 7.0      Gluc NEGATIVE / Ketone NEGATIVE  / Bili NEGATIVE / Urobili NORMAL       Blood NEGATIVE / Protein 10 / Leuk Est NEGATIVE / Nitrite NEGATIVE      RBC  / WBC  / Hyaline  / Gran  / Sq Epi  / Non Sq Epi  / Bacteria       .1 (Ca --)      [02-17-19 @ 15:00]   --  HbA1c 6.9      [02-17-19 @ 05:40]  TSH 1.21      [02-17-19 @ 05:40]  Lipid: chol 120, , HDL 27, LDL 89      [02-17-19 @ 05:40]    HCV 0.10, Nonreactive Hepatitis C AB  S/CO Ratio                        Interpretation  < 1.0                                     Non-Reactive  1.0 - 4.9                           Weakly-Reactive  > 5.0                                 Reactive  Non-Reactive: Aperson with a non-reactive HCV antibody  result is considered uninfected.  No further action is  needed unless recent infection is suspected.  In these  cases, consider repeat testing later to detect  seroconversion..  Weakly-Reactive: HCV antibody test is abnormal, HCV RNA  Qualitative test will follow.  Reactive: HCV antibody test is abnormal, HCV RNA  Qualitative test will follow.  Note: HCV antibody testing is performed on the [employer] system.      [02-16-19 @ 15:05]

## 2019-02-18 NOTE — DIETITIAN INITIAL EVALUATION ADULT. - OTHER INFO
67 y/o male with a PMHx of CAD S/P 4V CABG, ischemic cardiomyopathy with severe LV dysfunction (EF 21%) S/P Medtronic ICD placement, atrial fibrillation on Coumadin, MR S/P mitral valvuloplasty, CVA, TIA, HTN, HLD, DM and CKD presents to ED with shortness of breath, cough, abdominal distention and lower extremity edema in the setting of acute on chronic systolic heart failure. Pt reports decreased appetite and po intakes. Denies chewing, swallowing difficulties or any nausea, vomiting, diarrhea, constipation. Pt likely weight loss however, unable to report on weight lost prior to admission. Currently remains with 1+ edema to L/R Leg. Provided extensive verbal education re: therapeutic diet, including consistent carbohydrate, fluid restricted and heart healthy food intake. Pt with PMHx of CAD S/P 4V CABG, ischemic cardiomyopathy with severe LV dysfunction (EF 21%) S/P Medtronic ICD placement, atrial fibrillation on Coumadin, MR S/P mitral valvuloplasty, CVA, TIA, HTN, HLD, DM and CKD presents to ED with shortness of breath, cough, abdominal distention and lower extremity edema in the setting of acute on chronic systolic heart failure. Pt reports decreased appetite and po intakes. Denies chewing, swallowing difficulties or any nausea, vomiting, diarrhea, constipation. Reports to losing wt, however, unable to quantify at present. Currently remains with 1+ edema to L/R Leg. Provided extensive verbal education re: therapeutic diet, including consistent carbohydrate, fluid restricted and heart healthy food intake.

## 2019-02-18 NOTE — DIETITIAN INITIAL EVALUATION ADULT. - NS AS NUTRI INTERV MEALS SNACK
General/healthful diet/1. Recommend change diet to Low Na, CSTCHO, 1500 mL fluid restriction.  2. Glucerna Shake 2x daily.

## 2019-02-18 NOTE — DIETITIAN INITIAL EVALUATION ADULT. - DIET TYPE
caffeine free/consistent carbohydrate (no snacks)/regular/1500ml/DASH/TLC (sodium and cholesterol restricted diet)

## 2019-02-18 NOTE — PROGRESS NOTE ADULT - PROBLEM SELECTOR PLAN 2
sec to diuretics   repleted by primary team  give kCL 40mEQ x1 dose also  check serum K in PM sec to diuretics   repleted by primary team  give kCL 40mEQ x2 dose also  check serum K in PM

## 2019-02-18 NOTE — PROGRESS NOTE ADULT - SUBJECTIVE AND OBJECTIVE BOX
SUBJECTIVE / OVERNIGHT EVENTS: pt still shortness of breath , but says his shortness of breath is better than yesterday    MEDICATIONS  (STANDING):  atorvastatin 20 milliGRAM(s) Oral at bedtime  buMETAnide Infusion 1 mG/Hr (5 mL/Hr) IV Continuous <Continuous>  dextrose 5%. 1000 milliLiter(s) (50 mL/Hr) IV Continuous <Continuous>  dextrose 50% Injectable 12.5 Gram(s) IV Push once  dextrose 50% Injectable 25 Gram(s) IV Push once  dextrose 50% Injectable 25 Gram(s) IV Push once  digoxin     Tablet 0.125 milliGRAM(s) Oral daily  insulin lispro (HumaLOG) corrective regimen sliding scale   SubCutaneous three times a day before meals  insulin lispro (HumaLOG) corrective regimen sliding scale   SubCutaneous at bedtime  metolazone 5 milliGRAM(s) Oral daily  tamsulosin 0.4 milliGRAM(s) Oral at bedtime    MEDICATIONS  (PRN):  dextrose 40% Gel 15 Gram(s) Oral once PRN Blood Glucose LESS THAN 70 milliGRAM(s)/deciliter  glucagon  Injectable 1 milliGRAM(s) IntraMuscular once PRN Glucose LESS THAN 70 milligrams/deciliter    Vital Signs Last 24 Hrs  T(C): 36.4 (2019 13:40), Max: 36.6 (2019 22:11)  T(F): 97.5 (2019 13:40), Max: 97.9 (2019 22:11)  HR: 93 (2019 13:40) (93 - 104)  BP: 118/79 (2019 13:40) (104/71 - 118/79)  BP(mean): --  RR: 16 (2019 13:40) (16 - 16)  SpO2: 100% (2019 13:40) (98% - 100%)    Constitutional: No fever, fatigue  Skin: No rash.  Eyes: No recent vision problems or eye pain.  ENT: No congestion, ear pain, or sore throat.  Cardiovascular: No chest pain or palpation.  Respiratory: No cough, shortness of breath, congestion, or wheezing.  Gastrointestinal: No abdominal pain, nausea, vomiting, or diarrhea.  Genitourinary: No dysuria.  Musculoskeletal: No joint swelling.  Neurologic: No headache.    PHYSICAL EXAM:  GENERAL: NAD  EYES: EOMI, PERRLA  NECK: Supple, No JVD  CHEST/LUNG: dec breath sounds at bases   HEART:  S1 , S2 +  ABDOMEN: soft , bs+  EXTREMITIES:  trace edema+  NEUROLOGY:alert awake oriented     LABS:      129<L>  |  87<L>  |  44<H>  ----------------------------<  181<H>  2.8<LL>   |  27  |  1.38<H>    Ca    8.4      2019 05:40  Phos  3.9       Mg     1.6         TPro  5.5<L>  /  Alb  2.9<L>  /  TBili  2.4<H>  /  DBili      /  AST  36  /  ALT  21  /  AlkPhos  50      Creatinine Trend: 1.38 <--, 1.55 <--, 1.54 <--                        12.5   6.32  )-----------( 139      ( 2019 05:40 )             38.9     Urine Studies:  Urinalysis Basic - ( 2019 14:32 )    Color: YELLOW / Appearance: CLEAR / S.012 / pH: 7.0  Gluc: NEGATIVE / Ketone: NEGATIVE  / Bili: NEGATIVE / Urobili: NORMAL   Blood: NEGATIVE / Protein: 10 / Nitrite: NEGATIVE   Leuk Esterase: NEGATIVE / RBC:  / WBC    Sq Epi:  / Non Sq Epi:  / Bacteria:               LIVER FUNCTIONS - ( 2019 05:40 )  Alb: 2.9 g/dL / Pro: 5.5 g/dL / ALK PHOS: 50 u/L / ALT: 21 u/L / AST: 36 u/L / GGT: x           PT/INR - ( 2019 05:40 )   PT: 47.0 SEC;   INR: 3.95

## 2019-02-18 NOTE — DIETITIAN INITIAL EVALUATION ADULT. - PROBLEM SELECTOR PLAN 2
Troponin level elevated likely in the setting of acute decompensated heart failure and CKD, EKG paced  Pt with atypical chest pain in the setting of cough, likely musculoskeletal  Cards consult with Dr. Sorenson called  Echocardiogram ordered  Trend cardiac enzymes

## 2019-02-18 NOTE — DIETITIAN INITIAL EVALUATION ADULT. - PHYSICAL APPEARANCE
Subcutaneous FAT LOSS:  [ ] Orbital fat pads region, [SEVERE] Buccal fat region, [ ]Triceps region,  [ ]Ribs region  MUSCLE WASTING: [MODERATE- SEVERE]Temples region, [ ]Clavicle region, [ ]Shoulder region, [ ]Scapula region, Interosseous region,  [ ]thigh region, [ ]Calf region.

## 2019-02-18 NOTE — PROGRESS NOTE ADULT - PROBLEM SELECTOR PLAN 3
rate control + ac inr supratherapeutic - monitor for active signs of bleeding   risk and benefits of ac explained replete and f/u  likely from diuresis

## 2019-02-18 NOTE — PROGRESS NOTE ADULT - PROBLEM SELECTOR PLAN 2
cards f/u   ischemic w/u as per cards rate control + ac inr supratherapeutic - monitor for active signs of bleeding   risk and benefits of ac explained

## 2019-02-18 NOTE — PROGRESS NOTE ADULT - PROBLEM SELECTOR PLAN 6
check PTH, vit D 25. PO4 level is normal  monitor daily phos and calcium level. check PTH, vit D 25.   PO4 level is normal  monitor daily phos and calcium level.

## 2019-02-18 NOTE — CHART NOTE - NSCHARTNOTEFT_GEN_A_CORE
NUTRITION SERVICES     Upon Nutritional Assessment by the Registered Dietitian your patient was determined to meet criteria/ has evidence of the following diagnosis/diagnoses:  [ ] Mild Protein Calorie Malnutrition   [ ] Moderate Protein Calorie Malnutrition   [ X] Severe Protein Calorie Malnutrition   [ ] Unspecified Protein Calorie Malnutrition   [ ] Underweight / BMI <19  [ ] Morbid Obesity / BMI >40    Findings as based on:  •  Comprehensive nutritional assessment and consultation    Please refer to Initial Dietitian Evaluation via documents section of [facility] for further recommendations.    Javier Burks RD,CD/N,CDE

## 2019-02-19 LAB
24R-OH-CALCIDIOL SERPL-MCNC: 12.6 NG/ML — LOW (ref 30–80)
ANION GAP SERPL CALC-SCNC: 17 MMO/L — HIGH (ref 7–14)
ANION GAP SERPL CALC-SCNC: 17 MMO/L — HIGH (ref 7–14)
ANION GAP SERPL CALC-SCNC: 18 MMO/L — HIGH (ref 7–14)
BUN SERPL-MCNC: 40 MG/DL — HIGH (ref 7–23)
BUN SERPL-MCNC: 40 MG/DL — HIGH (ref 7–23)
BUN SERPL-MCNC: 45 MG/DL — HIGH (ref 7–23)
CALCIUM SERPL-MCNC: 8.6 MG/DL — SIGNIFICANT CHANGE UP (ref 8.4–10.5)
CALCIUM SERPL-MCNC: 8.6 MG/DL — SIGNIFICANT CHANGE UP (ref 8.4–10.5)
CALCIUM SERPL-MCNC: 8.7 MG/DL — SIGNIFICANT CHANGE UP (ref 8.4–10.5)
CHLORIDE SERPL-SCNC: 85 MMOL/L — LOW (ref 98–107)
CHLORIDE SERPL-SCNC: 86 MMOL/L — LOW (ref 98–107)
CHLORIDE SERPL-SCNC: 86 MMOL/L — LOW (ref 98–107)
CO2 SERPL-SCNC: 26 MMOL/L — SIGNIFICANT CHANGE UP (ref 22–31)
CO2 SERPL-SCNC: 28 MMOL/L — SIGNIFICANT CHANGE UP (ref 22–31)
CO2 SERPL-SCNC: 28 MMOL/L — SIGNIFICANT CHANGE UP (ref 22–31)
CREAT SERPL-MCNC: 1.36 MG/DL — HIGH (ref 0.5–1.3)
CREAT SERPL-MCNC: 1.36 MG/DL — HIGH (ref 0.5–1.3)
CREAT SERPL-MCNC: 1.42 MG/DL — HIGH (ref 0.5–1.3)
GLUCOSE BLDC GLUCOMTR-MCNC: 134 MG/DL — HIGH (ref 70–99)
GLUCOSE BLDC GLUCOMTR-MCNC: 135 MG/DL — HIGH (ref 70–99)
GLUCOSE BLDC GLUCOMTR-MCNC: 163 MG/DL — HIGH (ref 70–99)
GLUCOSE BLDC GLUCOMTR-MCNC: 236 MG/DL — HIGH (ref 70–99)
GLUCOSE SERPL-MCNC: 116 MG/DL — HIGH (ref 70–99)
GLUCOSE SERPL-MCNC: 116 MG/DL — HIGH (ref 70–99)
GLUCOSE SERPL-MCNC: 162 MG/DL — HIGH (ref 70–99)
HCT VFR BLD CALC: 40.7 % — SIGNIFICANT CHANGE UP (ref 39–50)
HGB BLD-MCNC: 12.7 G/DL — LOW (ref 13–17)
INR BLD: 2.16 — HIGH (ref 0.88–1.17)
MAGNESIUM SERPL-MCNC: 1.6 MG/DL — SIGNIFICANT CHANGE UP (ref 1.6–2.6)
MAGNESIUM SERPL-MCNC: 1.9 MG/DL — SIGNIFICANT CHANGE UP (ref 1.6–2.6)
MCHC RBC-ENTMCNC: 25.1 PG — LOW (ref 27–34)
MCHC RBC-ENTMCNC: 31.2 % — LOW (ref 32–36)
MCV RBC AUTO: 80.4 FL — SIGNIFICANT CHANGE UP (ref 80–100)
NRBC # FLD: 0 K/UL — LOW (ref 25–125)
PLATELET # BLD AUTO: 137 K/UL — LOW (ref 150–400)
PMV BLD: 10 FL — SIGNIFICANT CHANGE UP (ref 7–13)
POTASSIUM SERPL-MCNC: 2.8 MMOL/L — CRITICAL LOW (ref 3.5–5.3)
POTASSIUM SERPL-MCNC: 2.8 MMOL/L — CRITICAL LOW (ref 3.5–5.3)
POTASSIUM SERPL-MCNC: 4.1 MMOL/L — SIGNIFICANT CHANGE UP (ref 3.5–5.3)
POTASSIUM SERPL-SCNC: 2.8 MMOL/L — CRITICAL LOW (ref 3.5–5.3)
POTASSIUM SERPL-SCNC: 2.8 MMOL/L — CRITICAL LOW (ref 3.5–5.3)
POTASSIUM SERPL-SCNC: 4.1 MMOL/L — SIGNIFICANT CHANGE UP (ref 3.5–5.3)
PROTHROM AB SERPL-ACNC: 24.6 SEC — HIGH (ref 9.8–13.1)
RBC # BLD: 5.06 M/UL — SIGNIFICANT CHANGE UP (ref 4.2–5.8)
RBC # FLD: 19 % — HIGH (ref 10.3–14.5)
SODIUM SERPL-SCNC: 129 MMOL/L — LOW (ref 135–145)
SODIUM SERPL-SCNC: 131 MMOL/L — LOW (ref 135–145)
SODIUM SERPL-SCNC: 131 MMOL/L — LOW (ref 135–145)
WBC # BLD: 6.49 K/UL — SIGNIFICANT CHANGE UP (ref 3.8–10.5)
WBC # FLD AUTO: 6.49 K/UL — SIGNIFICANT CHANGE UP (ref 3.8–10.5)

## 2019-02-19 RX ORDER — MAGNESIUM SULFATE 500 MG/ML
1 VIAL (ML) INJECTION ONCE
Qty: 0 | Refills: 0 | Status: COMPLETED | OUTPATIENT
Start: 2019-02-19 | End: 2019-02-19

## 2019-02-19 RX ORDER — POTASSIUM CHLORIDE 20 MEQ
40 PACKET (EA) ORAL EVERY 4 HOURS
Qty: 0 | Refills: 0 | Status: COMPLETED | OUTPATIENT
Start: 2019-02-19 | End: 2019-02-19

## 2019-02-19 RX ORDER — POTASSIUM CHLORIDE 20 MEQ
10 PACKET (EA) ORAL
Qty: 0 | Refills: 0 | Status: COMPLETED | OUTPATIENT
Start: 2019-02-19 | End: 2019-02-19

## 2019-02-19 RX ORDER — SIMETHICONE 80 MG/1
80 TABLET, CHEWABLE ORAL
Qty: 0 | Refills: 0 | Status: DISCONTINUED | OUTPATIENT
Start: 2019-02-19 | End: 2019-02-26

## 2019-02-19 RX ORDER — WARFARIN SODIUM 2.5 MG/1
2.5 TABLET ORAL ONCE
Qty: 0 | Refills: 0 | Status: COMPLETED | OUTPATIENT
Start: 2019-02-19 | End: 2019-02-19

## 2019-02-19 RX ORDER — POTASSIUM CHLORIDE 20 MEQ
40 PACKET (EA) ORAL EVERY 4 HOURS
Qty: 0 | Refills: 0 | Status: DISCONTINUED | OUTPATIENT
Start: 2019-02-19 | End: 2019-02-19

## 2019-02-19 RX ADMIN — TAMSULOSIN HYDROCHLORIDE 0.4 MILLIGRAM(S): 0.4 CAPSULE ORAL at 21:27

## 2019-02-19 RX ADMIN — WARFARIN SODIUM 2.5 MILLIGRAM(S): 2.5 TABLET ORAL at 17:43

## 2019-02-19 RX ADMIN — MAGNESIUM OXIDE 400 MG ORAL TABLET 400 MILLIGRAM(S): 241.3 TABLET ORAL at 17:43

## 2019-02-19 RX ADMIN — Medication 100 MILLIEQUIVALENT(S): at 08:52

## 2019-02-19 RX ADMIN — Medication 100 MILLIEQUIVALENT(S): at 09:47

## 2019-02-19 RX ADMIN — MAGNESIUM OXIDE 400 MG ORAL TABLET 400 MILLIGRAM(S): 241.3 TABLET ORAL at 08:53

## 2019-02-19 RX ADMIN — Medication 100 MILLIEQUIVALENT(S): at 11:10

## 2019-02-19 RX ADMIN — Medication 0.12 MILLIGRAM(S): at 04:39

## 2019-02-19 RX ADMIN — SIMETHICONE 80 MILLIGRAM(S): 80 TABLET, CHEWABLE ORAL at 17:56

## 2019-02-19 RX ADMIN — Medication 2: at 13:00

## 2019-02-19 RX ADMIN — Medication 100 GRAM(S): at 12:40

## 2019-02-19 RX ADMIN — Medication 40 MILLIEQUIVALENT(S): at 08:53

## 2019-02-19 RX ADMIN — Medication 40 MILLIEQUIVALENT(S): at 13:00

## 2019-02-19 RX ADMIN — BUMETANIDE 5 MG/HR: 0.25 INJECTION INTRAMUSCULAR; INTRAVENOUS at 21:27

## 2019-02-19 RX ADMIN — ATORVASTATIN CALCIUM 20 MILLIGRAM(S): 80 TABLET, FILM COATED ORAL at 21:27

## 2019-02-19 RX ADMIN — Medication 1: at 17:43

## 2019-02-19 NOTE — PROGRESS NOTE ADULT - SUBJECTIVE AND OBJECTIVE BOX
Phillip Sorenson MD  Interventional Cardiology / Advance Heart Failure and Cardiac Transplant Specialist  [place] Office : [address] [place] [address]  Tel:   [place] Office : [address] [facility] [address] [address]  Tel: [phone]  Cell : [phone]    Subjective : Pt lying in bed comfortable, not in distress, denies any chest pain or SOB  	  MEDICATIONS:  buMETAnide Infusion 1 mG/Hr IV Continuous <Continuous>  digoxin     Tablet 0.125 milliGRAM(s) Oral daily  metolazone 5 milliGRAM(s) Oral daily  tamsulosin 0.4 milliGRAM(s) Oral at bedtime  simethicone 80 milliGRAM(s) Chew two times a day PRN  atorvastatin 20 milliGRAM(s) Oral at bedtime  dextrose 40% Gel 15 Gram(s) Oral once PRN  dextrose 50% Injectable 12.5 Gram(s) IV Push once  dextrose 50% Injectable 25 Gram(s) IV Push once  dextrose 50% Injectable 25 Gram(s) IV Push once  glucagon  Injectable 1 milliGRAM(s) IntraMuscular once PRN  insulin lispro (HumaLOG) corrective regimen sliding scale   SubCutaneous three times a day before meals  insulin lispro (HumaLOG) corrective regimen sliding scale   SubCutaneous at bedtime  dextrose 5%. 1000 milliLiter(s) IV Continuous <Continuous>  magnesium oxide 400 milliGRAM(s) Oral two times a day with meals      PHYSICAL EXAM:  T(C): 36.3 (02-19-19 @ 21:02), Max: 36.6 (02-18-19 @ 21:36)  HR: 88 (02-19-19 @ 21:02) (80 - 102)  BP: 106/50 (02-19-19 @ 21:02) (94/66 - 110/71)  RR: 18 (02-19-19 @ 21:02) (16 - 18)  SpO2: 100% (02-19-19 @ 21:02) (97% - 100%)  Wt(kg): --  I&O's Summary    18 Feb 2019 07:01  -  19 Feb 2019 07:00  --------------------------------------------------------  IN: 935 mL / OUT: 2800 mL / NET: -1865 mL    19 Feb 2019 07:01  -  19 Feb 2019 21:05  --------------------------------------------------------  IN: 860 mL / OUT: 875 mL / NET: -15 mL        	  HEENT:   Normal oral mucosa, PERRL, EOMI	  Cardiovascular: Normal S1 S2, + JVD, No murmurs,  Respiratory: Lungs clear to auscultation	  Gastrointestinal:  Soft, Non-tender, + BS	  Extremities: 2+ edema                                    12.7   6.49  )-----------( 137      ( 19 Feb 2019 07:23 )             40.7     02-19    129<L>  |  85<L>  |  45<H>  ----------------------------<  162<H>  4.1   |  26  |  1.42<H>    Ca    8.7      19 Feb 2019 16:41  Mg     1.9     02-19      proBNP:   Lipid Profile:   HgA1c:   TSH:

## 2019-02-19 NOTE — PROGRESS NOTE ADULT - SUBJECTIVE AND OBJECTIVE BOX
SUBJECTIVE / OVERNIGHT EVENTS: pt still shortness of breath , but says his shortness of breath is better than yesterday    MEDICATIONS  (STANDING):  atorvastatin 20 milliGRAM(s) Oral at bedtime  buMETAnide Infusion 1 mG/Hr (5 mL/Hr) IV Continuous <Continuous>  dextrose 5%. 1000 milliLiter(s) (50 mL/Hr) IV Continuous <Continuous>  dextrose 50% Injectable 12.5 Gram(s) IV Push once  dextrose 50% Injectable 25 Gram(s) IV Push once  dextrose 50% Injectable 25 Gram(s) IV Push once  digoxin     Tablet 0.125 milliGRAM(s) Oral daily  insulin lispro (HumaLOG) corrective regimen sliding scale   SubCutaneous three times a day before meals  insulin lispro (HumaLOG) corrective regimen sliding scale   SubCutaneous at bedtime  magnesium oxide 400 milliGRAM(s) Oral two times a day with meals  metolazone 5 milliGRAM(s) Oral daily  tamsulosin 0.4 milliGRAM(s) Oral at bedtime    MEDICATIONS  (PRN):  dextrose 40% Gel 15 Gram(s) Oral once PRN Blood Glucose LESS THAN 70 milliGRAM(s)/deciliter  glucagon  Injectable 1 milliGRAM(s) IntraMuscular once PRN Glucose LESS THAN 70 milligrams/deciliter  simethicone 80 milliGRAM(s) Chew two times a day PRN Gas    Vital Signs Last 24 Hrs  T(C): 36.3 (2019 13:34), Max: 36.6 (2019 21:36)  T(F): 97.3 (2019 13:34), Max: 97.9 (2019 21:36)  HR: 80 (2019 13:34) (80 - 102)  BP: 94/66 (2019 13:34) (94/66 - 110/71)  BP(mean): --  RR: 17 (2019 13:34) (16 - 17)  SpO2: 100% (2019 13:34) (97% - 100%)    Constitutional: No fever, fatigue  Skin: No rash.  Eyes: No recent vision problems or eye pain.  ENT: No congestion, ear pain, or sore throat.  Cardiovascular: No chest pain or palpation.  Respiratory: No cough, shortness of breath, congestion, or wheezing.  Gastrointestinal: No abdominal pain, nausea, vomiting, or diarrhea.  Genitourinary: No dysuria.  Musculoskeletal: No joint swelling.  Neurologic: No headache.    PHYSICAL EXAM:  GENERAL: NAD  EYES: EOMI, PERRLA  NECK: Supple, No JVD  CHEST/LUNG: dec breath sounds at bases   HEART:  S1 , S2 +  ABDOMEN: soft , bs+  EXTREMITIES:  trace edema+  NEUROLOGY:alert awake oriented     LABS:      129<L>  |  85<L>  |  45<H>  ----------------------------<  162<H>  4.1   |  26  |  1.42<H>    Ca    8.7      2019 16:41  Mg     1.9           Creatinine Trend: 1.42 <--, 1.36 <--, 1.39 <--, 1.38 <--, 1.55 <--, 1.54 <--                        12.7   6.49  )-----------( 137      ( 2019 07:23 )             40.7     Urine Studies:  Urinalysis Basic - ( 2019 14:32 )    Color: YELLOW / Appearance: CLEAR / S.012 / pH: 7.0  Gluc: NEGATIVE / Ketone: NEGATIVE  / Bili: NEGATIVE / Urobili: NORMAL   Blood: NEGATIVE / Protein: 10 / Nitrite: NEGATIVE   Leuk Esterase: NEGATIVE / RBC:  / WBC    Sq Epi:  / Non Sq Epi:  / Bacteria:                 PT/INR - ( 2019 09:55 )   PT: 24.6 SEC;   INR: 2.16                    LIVER FUNCTIONS - ( 2019 05:40 )  Alb: 2.9 g/dL / Pro: 5.5 g/dL / ALK PHOS: 50 u/L / ALT: 21 u/L / AST: 36 u/L / GGT: x           PT/INR - ( 2019 05:40 )   PT: 47.0 SEC;   INR: 3.95

## 2019-02-19 NOTE — PROGRESS NOTE ADULT - SUBJECTIVE AND OBJECTIVE BOX
[facility] [place] [facility]   MD LUCA MCKEON MD RUORU WONG, PA    TEL:  OFFICE: [phone]  DR TAYLOR CELL: [phone]  [place] POWER CELL: [phone]  DR. SWARTZ CELL: [phone]    RENAL FOLLOW UP NOTE  --------------------------------------------------------------------------------  HPI:      Pt seen and examined at bedside.       PAST HISTORY  --------------------------------------------------------------------------------  No significant changes to PMH, PSH, FHx, SHx, unless otherwise noted    ALLERGIES & MEDICATIONS  --------------------------------------------------------------------------------  Allergies    No Known Allergies    Intolerances      Standing Inpatient Medications  atorvastatin 20 milliGRAM(s) Oral at bedtime  buMETAnide Infusion 1 mG/Hr IV Continuous <Continuous>  dextrose 5%. 1000 milliLiter(s) IV Continuous <Continuous>  dextrose 50% Injectable 12.5 Gram(s) IV Push once  dextrose 50% Injectable 25 Gram(s) IV Push once  dextrose 50% Injectable 25 Gram(s) IV Push once  digoxin     Tablet 0.125 milliGRAM(s) Oral daily  insulin lispro (HumaLOG) corrective regimen sliding scale   SubCutaneous three times a day before meals  insulin lispro (HumaLOG) corrective regimen sliding scale   SubCutaneous at bedtime  magnesium oxide 400 milliGRAM(s) Oral two times a day with meals  metolazone 5 milliGRAM(s) Oral daily  tamsulosin 0.4 milliGRAM(s) Oral at bedtime    PRN Inpatient Medications  dextrose 40% Gel 15 Gram(s) Oral once PRN  glucagon  Injectable 1 milliGRAM(s) IntraMuscular once PRN  simethicone 80 milliGRAM(s) Chew two times a day PRN      REVIEW OF SYSTEMS  --------------------------------------------------------------------------------  General: no fever  CVS: no chest pain  RESP: no cough    VITALS/PHYSICAL EXAM  --------------------------------------------------------------------------------  T(C): 36.3 (02-19-19 @ 13:34), Max: 36.6 (02-18-19 @ 21:36)  HR: 80 (02-19-19 @ 13:34) (80 - 102)  BP: 94/66 (02-19-19 @ 13:34) (94/66 - 110/71)  RR: 17 (02-19-19 @ 13:34) (16 - 17)  SpO2: 100% (02-19-19 @ 13:34) (97% - 100%)  Wt(kg): --        02-18-19 @ 07:01  -  02-19-19 @ 07:00  --------------------------------------------------------  IN: 935 mL / OUT: 2800 mL / NET: -1865 mL    02-19-19 @ 07:01  -  02-19-19 @ 17:52  --------------------------------------------------------  IN: 850 mL / OUT: 675 mL / NET: 175 mL      Physical Exam:  	Gen: NAD  	HEENT: MMM  	Pulm: CTA B/L  	CV: S1S2  	Abd: Soft, +BS  	Ext: + LE edema B/L                      Neuro: Awake   	Skin: Warm and Dry   	    LABS/STUDIES  --------------------------------------------------------------------------------              12.7   6.49  >-----------<  137      [02-19-19 @ 07:23]              40.7     129  |  85  |  45  ----------------------------<  162      [02-19-19 @ 16:41]  4.1   |  26  |  1.42        Ca     8.7     [02-19-19 @ 16:41]      Mg     1.9     [02-19-19 @ 16:41]      PT/INR: PT 24.6 , INR 2.16       [02-19-19 @ 09:55]      Creatinine Trend:  SCr 1.42 [02-19 @ 16:41]  SCr 1.36 [02-19 @ 07:23]  SCr 1.39 [02-18 @ 17:26]  SCr 1.38 [02-18 @ 05:40]  SCr 1.55 [02-17 @ 05:40]    Urinalysis - [02-16-19 @ 14:32]      Color YELLOW / Appearance CLEAR / SG 1.012 / pH 7.0      Gluc NEGATIVE / Ketone NEGATIVE  / Bili NEGATIVE / Urobili NORMAL       Blood NEGATIVE / Protein 10 / Leuk Est NEGATIVE / Nitrite NEGATIVE      RBC  / WBC  / Hyaline  / Gran  / Sq Epi  / Non Sq Epi  / Bacteria       .1 (Ca --)      [02-17-19 @ 15:00]   --  Vitamin D (25OH) 12.6      [02-17-19 @ 15:00]  HbA1c 6.9      [02-17-19 @ 05:40]  TSH 1.21      [02-17-19 @ 05:40]  Lipid: chol 120, , HDL 27, LDL 89      [02-17-19 @ 05:40]    HCV 0.10, Nonreactive Hepatitis C AB  S/CO Ratio                        Interpretation  < 1.0                                     Non-Reactive  1.0 - 4.9                           Weakly-Reactive  > 5.0                                 Reactive  Non-Reactive: Aperson with a non-reactive HCV antibody  result is considered uninfected.  No further action is  needed unless recent infection is suspected.  In these  cases, consider repeat testing later to detect  seroconversion..  Weakly-Reactive: HCV antibody test is abnormal, HCV RNA  Qualitative test will follow.  Reactive: HCV antibody test is abnormal, HCV RNA  Qualitative test will follow.  Note: HCV antibody testing is performed on the Abbott   system.      [02-16-19 @ 15:05]

## 2019-02-20 LAB
ANION GAP SERPL CALC-SCNC: 16 MMO/L — HIGH (ref 7–14)
BUN SERPL-MCNC: 43 MG/DL — HIGH (ref 7–23)
CALCIUM SERPL-MCNC: 8.4 MG/DL — SIGNIFICANT CHANGE UP (ref 8.4–10.5)
CHLORIDE SERPL-SCNC: 86 MMOL/L — LOW (ref 98–107)
CO2 SERPL-SCNC: 27 MMOL/L — SIGNIFICANT CHANGE UP (ref 22–31)
CREAT SERPL-MCNC: 1.31 MG/DL — HIGH (ref 0.5–1.3)
GLUCOSE BLDC GLUCOMTR-MCNC: 140 MG/DL — HIGH (ref 70–99)
GLUCOSE BLDC GLUCOMTR-MCNC: 143 MG/DL — HIGH (ref 70–99)
GLUCOSE BLDC GLUCOMTR-MCNC: 147 MG/DL — HIGH (ref 70–99)
GLUCOSE BLDC GLUCOMTR-MCNC: 192 MG/DL — HIGH (ref 70–99)
GLUCOSE SERPL-MCNC: 171 MG/DL — HIGH (ref 70–99)
HCT VFR BLD CALC: 38.6 % — LOW (ref 39–50)
HGB BLD-MCNC: 12.5 G/DL — LOW (ref 13–17)
INR BLD: 1.88 — HIGH (ref 0.88–1.17)
MAGNESIUM SERPL-MCNC: 1.6 MG/DL — SIGNIFICANT CHANGE UP (ref 1.6–2.6)
MAGNESIUM SERPL-MCNC: 1.8 MG/DL — SIGNIFICANT CHANGE UP (ref 1.6–2.6)
MCHC RBC-ENTMCNC: 25.8 PG — LOW (ref 27–34)
MCHC RBC-ENTMCNC: 32.4 % — SIGNIFICANT CHANGE UP (ref 32–36)
MCV RBC AUTO: 79.6 FL — LOW (ref 80–100)
NRBC # FLD: 0 K/UL — LOW (ref 25–125)
PLATELET # BLD AUTO: 125 K/UL — LOW (ref 150–400)
PMV BLD: 9.9 FL — SIGNIFICANT CHANGE UP (ref 7–13)
POTASSIUM SERPL-MCNC: 3 MMOL/L — LOW (ref 3.5–5.3)
POTASSIUM SERPL-MCNC: 3.6 MMOL/L — SIGNIFICANT CHANGE UP (ref 3.5–5.3)
POTASSIUM SERPL-SCNC: 3 MMOL/L — LOW (ref 3.5–5.3)
POTASSIUM SERPL-SCNC: 3.6 MMOL/L — SIGNIFICANT CHANGE UP (ref 3.5–5.3)
PROTHROM AB SERPL-ACNC: 21.8 SEC — HIGH (ref 9.8–13.1)
RBC # BLD: 4.85 M/UL — SIGNIFICANT CHANGE UP (ref 4.2–5.8)
RBC # FLD: 18.7 % — HIGH (ref 10.3–14.5)
SODIUM SERPL-SCNC: 129 MMOL/L — LOW (ref 135–145)
WBC # BLD: 6.47 K/UL — SIGNIFICANT CHANGE UP (ref 3.8–10.5)
WBC # FLD AUTO: 6.47 K/UL — SIGNIFICANT CHANGE UP (ref 3.8–10.5)

## 2019-02-20 PROCEDURE — 93306 TTE W/DOPPLER COMPLETE: CPT | Mod: 26

## 2019-02-20 RX ORDER — POTASSIUM CHLORIDE 20 MEQ
40 PACKET (EA) ORAL ONCE
Qty: 0 | Refills: 0 | Status: COMPLETED | OUTPATIENT
Start: 2019-02-20 | End: 2019-02-20

## 2019-02-20 RX ORDER — WARFARIN SODIUM 2.5 MG/1
2.5 TABLET ORAL ONCE
Qty: 0 | Refills: 0 | Status: COMPLETED | OUTPATIENT
Start: 2019-02-20 | End: 2019-02-20

## 2019-02-20 RX ORDER — POTASSIUM CHLORIDE 20 MEQ
40 PACKET (EA) ORAL ONCE
Qty: 0 | Refills: 0 | Status: DISCONTINUED | OUTPATIENT
Start: 2019-02-20 | End: 2019-02-20

## 2019-02-20 RX ADMIN — Medication 0.12 MILLIGRAM(S): at 04:33

## 2019-02-20 RX ADMIN — WARFARIN SODIUM 2.5 MILLIGRAM(S): 2.5 TABLET ORAL at 16:51

## 2019-02-20 RX ADMIN — MAGNESIUM OXIDE 400 MG ORAL TABLET 400 MILLIGRAM(S): 241.3 TABLET ORAL at 16:52

## 2019-02-20 RX ADMIN — MAGNESIUM OXIDE 400 MG ORAL TABLET 400 MILLIGRAM(S): 241.3 TABLET ORAL at 09:20

## 2019-02-20 RX ADMIN — ATORVASTATIN CALCIUM 20 MILLIGRAM(S): 80 TABLET, FILM COATED ORAL at 21:10

## 2019-02-20 RX ADMIN — Medication 40 MILLIEQUIVALENT(S): at 16:49

## 2019-02-20 RX ADMIN — SIMETHICONE 80 MILLIGRAM(S): 80 TABLET, CHEWABLE ORAL at 16:51

## 2019-02-20 RX ADMIN — Medication 1: at 17:37

## 2019-02-20 RX ADMIN — TAMSULOSIN HYDROCHLORIDE 0.4 MILLIGRAM(S): 0.4 CAPSULE ORAL at 21:10

## 2019-02-20 RX ADMIN — Medication 40 MILLIEQUIVALENT(S): at 11:50

## 2019-02-20 NOTE — PROGRESS NOTE ADULT - SUBJECTIVE AND OBJECTIVE BOX
SUBJECTIVE / OVERNIGHT EVENTS: pt still shortness of breath , but says his shortness of breath is better than yesterday    MEDICATIONS  (STANDING):  atorvastatin 20 milliGRAM(s) Oral at bedtime  buMETAnide Infusion 1 mG/Hr (5 mL/Hr) IV Continuous <Continuous>  dextrose 5%. 1000 milliLiter(s) (50 mL/Hr) IV Continuous <Continuous>  dextrose 50% Injectable 12.5 Gram(s) IV Push once  dextrose 50% Injectable 25 Gram(s) IV Push once  dextrose 50% Injectable 25 Gram(s) IV Push once  digoxin     Tablet 0.125 milliGRAM(s) Oral daily  insulin lispro (HumaLOG) corrective regimen sliding scale   SubCutaneous three times a day before meals  insulin lispro (HumaLOG) corrective regimen sliding scale   SubCutaneous at bedtime  metolazone 5 milliGRAM(s) Oral daily  tamsulosin 0.4 milliGRAM(s) Oral at bedtime    MEDICATIONS  (PRN):  dextrose 40% Gel 15 Gram(s) Oral once PRN Blood Glucose LESS THAN 70 milliGRAM(s)/deciliter  glucagon  Injectable 1 milliGRAM(s) IntraMuscular once PRN Glucose LESS THAN 70 milligrams/deciliter  simethicone 80 milliGRAM(s) Chew two times a day PRN Gas    Vital Signs Last 24 Hrs  T(C): 36.9 (2019 12:24), Max: 36.9 (2019 12:24)  T(F): 98.4 (2019 12:24), Max: 98.4 (2019 12:24)  HR: 88 (2019 12:24) (86 - 88)  BP: 100/69 (2019 12:24) (100/64 - 106/50)  BP(mean): --  RR: 18 (2019 12:24) (17 - 18)  SpO2: 100% (2019 12:24) (100% - 100%)    Constitutional: No fever, fatigue  Skin: No rash.  Eyes: No recent vision problems or eye pain.  ENT: No congestion, ear pain, or sore throat.  Cardiovascular: No chest pain or palpation.  Respiratory: No cough, shortness of breath, congestion, or wheezing.  Gastrointestinal: No abdominal pain, nausea, vomiting, or diarrhea.  Genitourinary: No dysuria.  Musculoskeletal: No joint swelling.  Neurologic: No headache.    PHYSICAL EXAM:  GENERAL: NAD  EYES: EOMI, PERRLA  NECK: Supple, No JVD  CHEST/LUNG: dec breath sounds at bases   HEART:  S1 , S2 +  ABDOMEN: soft , bs+  EXTREMITIES:  trace edema+  NEUROLOGY:alert awake oriented     LABS:      129<L>  |  86<L>  |  43<H>  ----------------------------<  171<H>  3.0<L>   |  27  |  1.31<H>    Ca    8.4      2019 07:32  Mg     1.6           Creatinine Trend: 1.31 <--, 1.42 <--, 1.36 <--, 1.39 <--, 1.38 <--, 1.55 <--, 1.54 <--                        12.5   6.47  )-----------( 125      ( 2019 07:00 )             38.6     Urine Studies:  Urinalysis Basic - ( 2019 14:32 )    Color: YELLOW / Appearance: CLEAR / S.012 / pH: 7.0  Gluc: NEGATIVE / Ketone: NEGATIVE  / Bili: NEGATIVE / Urobili: NORMAL   Blood: NEGATIVE / Protein: 10 / Nitrite: NEGATIVE   Leuk Esterase: NEGATIVE / RBC:  / WBC    Sq Epi:  / Non Sq Epi:  / Bacteria:                 PT/INR - ( 2019 07:00 )   PT: 21.8 SEC;   INR: 1.88                LIVER FUNCTIONS - ( 2019 05:40 )  Alb: 2.9 g/dL / Pro: 5.5 g/dL / ALK PHOS: 50 u/L / ALT: 21 u/L / AST: 36 u/L / GGT: x           PT/INR - ( 2019 05:40 )   PT: 47.0 SEC;   INR: 3.95

## 2019-02-20 NOTE — PROGRESS NOTE ADULT - SUBJECTIVE AND OBJECTIVE BOX
[facility] [place] [facility]   MD LUCA MCKEON MD RUORU WONG, PA    TEL:  OFFICE: [phone]  DR TAYLOR CELL: [phone]  [place] POWER CELL: [phone]  DR. SWARTZ CELL: [phone]    RENAL FOLLOW UP NOTE  --------------------------------------------------------------------------------  HPI:      Pt seen and examined at bedside.   Barbara SOB, chest pain     PAST HISTORY  --------------------------------------------------------------------------------  No significant changes to PMH, PSH, FHx, SHx, unless otherwise noted    ALLERGIES & MEDICATIONS  --------------------------------------------------------------------------------  Allergies    No Known Allergies    Intolerances      Standing Inpatient Medications  atorvastatin 20 milliGRAM(s) Oral at bedtime  buMETAnide Infusion 1 mG/Hr IV Continuous <Continuous>  dextrose 5%. 1000 milliLiter(s) IV Continuous <Continuous>  dextrose 50% Injectable 12.5 Gram(s) IV Push once  dextrose 50% Injectable 25 Gram(s) IV Push once  dextrose 50% Injectable 25 Gram(s) IV Push once  digoxin     Tablet 0.125 milliGRAM(s) Oral daily  insulin lispro (HumaLOG) corrective regimen sliding scale   SubCutaneous three times a day before meals  insulin lispro (HumaLOG) corrective regimen sliding scale   SubCutaneous at bedtime  magnesium oxide 400 milliGRAM(s) Oral two times a day with meals  metolazone 5 milliGRAM(s) Oral daily  tamsulosin 0.4 milliGRAM(s) Oral at bedtime  warfarin 2.5 milliGRAM(s) Oral once    PRN Inpatient Medications  dextrose 40% Gel 15 Gram(s) Oral once PRN  glucagon  Injectable 1 milliGRAM(s) IntraMuscular once PRN  simethicone 80 milliGRAM(s) Chew two times a day PRN      REVIEW OF SYSTEMS  --------------------------------------------------------------------------------  General: no fever  CVS: no chest pain  RESP: no sob, no cough  ABD: no abdominal pain      VITALS/PHYSICAL EXAM  --------------------------------------------------------------------------------  T(C): 36.9 (02-20-19 @ 12:24), Max: 36.9 (02-20-19 @ 12:24)  HR: 88 (02-20-19 @ 12:24) (86 - 88)  BP: 100/69 (02-20-19 @ 12:24) (100/64 - 106/50)  RR: 18 (02-20-19 @ 12:24) (17 - 18)  SpO2: 100% (02-20-19 @ 12:24) (100% - 100%)  Wt(kg): --        02-19-19 @ 07:01  -  02-20-19 @ 07:00  --------------------------------------------------------  IN: 1195 mL / OUT: 2425 mL / NET: -1230 mL    02-20-19 @ 07:01  -  02-20-19 @ 16:07  --------------------------------------------------------  IN: 120 mL / OUT: 800 mL / NET: -680 mL      Physical Exam:  	Gen: NAD  	HEENT: MMM  	Pulm: CTA B/L  	CV: S1S2  	Abd: Soft, +BS  	Ext: + LE edema B/L                      Neuro: Awake   	Skin: Warm and Dry       LABS/STUDIES  --------------------------------------------------------------------------------              12.5   6.47  >-----------<  125      [02-20-19 @ 07:00]              38.6     129  |  86  |  43  ----------------------------<  171      [02-20-19 @ 07:32]  3.0   |  27  |  1.31        Ca     8.4     [02-20-19 @ 07:32]      Mg     1.6     [02-20-19 @ 07:32]      PT/INR: PT 21.8 , INR 1.88       [02-20-19 @ 07:00]      Creatinine Trend:  SCr 1.31 [02-20 @ 07:32]  SCr 1.42 [02-19 @ 16:41]  SCr 1.36 [02-19 @ 07:23]  SCr 1.39 [02-18 @ 17:26]  SCr 1.38 [02-18 @ 05:40]    Urinalysis - [02-16-19 @ 14:32]      Color YELLOW / Appearance CLEAR / SG 1.012 / pH 7.0      Gluc NEGATIVE / Ketone NEGATIVE  / Bili NEGATIVE / Urobili NORMAL       Blood NEGATIVE / Protein 10 / Leuk Est NEGATIVE / Nitrite NEGATIVE      RBC  / WBC  / Hyaline  / Gran  / Sq Epi  / Non Sq Epi  / Bacteria       .1 (Ca --)      [02-17-19 @ 15:00]   --  Vitamin D (25OH) 12.6      [02-17-19 @ 15:00]  HbA1c 6.9      [02-17-19 @ 05:40]  TSH 1.21      [02-17-19 @ 05:40]  Lipid: chol 120, , HDL 27, LDL 89      [02-17-19 @ 05:40]    HCV 0.10, Nonreactive Hepatitis C AB  S/CO Ratio                        Interpretation  < 1.0                                     Non-Reactive  1.0 - 4.9                           Weakly-Reactive  > 5.0                                 Reactive  Non-Reactive: Aperson with a non-reactive HCV antibody  result is considered uninfected.  No further action is  needed unless recent infection is suspected.  In these  cases, consider repeat testing later to detect  seroconversion..  Weakly-Reactive: HCV antibody test is abnormal, HCV RNA  Qualitative test will follow.  Reactive: HCV antibody test is abnormal, HCV RNA  Qualitative test will follow.  Note: HCV antibody testing is performed on the Abbott   system.      [02-16-19 @ 15:05]

## 2019-02-20 NOTE — PROGRESS NOTE ADULT - SUBJECTIVE AND OBJECTIVE BOX
Phillip Sorenson MD  Interventional Cardiology / Advance Heart Failure and Cardiac Transplant Specialist  [place] Office : [address] [place] [address]  Tel:   [place] Office : [address] [facility] [address] [address]  Tel: [phone]  Cell : [phone]    Subjective : Pt lying in bed comfortable, not in distress, denies any chest pain or SOB  	  MEDICATIONS:  buMETAnide Infusion 1 mG/Hr IV Continuous <Continuous>  digoxin     Tablet 0.125 milliGRAM(s) Oral daily  metolazone 5 milliGRAM(s) Oral daily  tamsulosin 0.4 milliGRAM(s) Oral at bedtime  warfarin 2.5 milliGRAM(s) Oral once  simethicone 80 milliGRAM(s) Chew two times a day PRN  atorvastatin 20 milliGRAM(s) Oral at bedtime  dextrose 40% Gel 15 Gram(s) Oral once PRN  dextrose 50% Injectable 12.5 Gram(s) IV Push once  dextrose 50% Injectable 25 Gram(s) IV Push once  dextrose 50% Injectable 25 Gram(s) IV Push once  glucagon  Injectable 1 milliGRAM(s) IntraMuscular once PRN  insulin lispro (HumaLOG) corrective regimen sliding scale   SubCutaneous three times a day before meals  insulin lispro (HumaLOG) corrective regimen sliding scale   SubCutaneous at bedtime  dextrose 5%. 1000 milliLiter(s) IV Continuous <Continuous>  magnesium oxide 400 milliGRAM(s) Oral two times a day with meals      PHYSICAL EXAM:  T(C): 36.9 (02-20-19 @ 12:24), Max: 36.9 (02-20-19 @ 12:24)  HR: 88 (02-20-19 @ 12:24) (80 - 88)  BP: 100/69 (02-20-19 @ 12:24) (94/66 - 106/50)  RR: 18 (02-20-19 @ 12:24) (17 - 18)  SpO2: 100% (02-20-19 @ 12:24) (100% - 100%)  Wt(kg): --  I&O's Summary    19 Feb 2019 07:01  -  20 Feb 2019 07:00  --------------------------------------------------------  IN: 1195 mL / OUT: 2425 mL / NET: -1230 mL    20 Feb 2019 07:01  -  20 Feb 2019 13:15  --------------------------------------------------------  IN: 120 mL / OUT: 800 mL / NET: -680 mL          	  HEENT:   Normal oral mucosa, PERRL, EOMI	  Cardiovascular: Normal S1 S2, No JVD, No murmurs, No edema  Respiratory: Lungs clear to auscultation	  Gastrointestinal:  Soft, Non-tender, + BS	  Extremities: 2+ edema                                    12.5   6.47  )-----------( 125      ( 20 Feb 2019 07:00 )             38.6     02-20    129<L>  |  86<L>  |  43<H>  ----------------------------<  171<H>  3.0<L>   |  27  |  1.31<H>    Ca    8.4      20 Feb 2019 07:32  Mg     1.6     02-20      proBNP:   Lipid Profile:   HgA1c:   TSH:

## 2019-02-21 LAB
ANION GAP SERPL CALC-SCNC: 17 MMO/L — HIGH (ref 7–14)
ANION GAP SERPL CALC-SCNC: 18 MMO/L — HIGH (ref 7–14)
APTT BLD: 30.6 SEC — SIGNIFICANT CHANGE UP (ref 27.5–36.3)
BUN SERPL-MCNC: 44 MG/DL — HIGH (ref 7–23)
BUN SERPL-MCNC: 44 MG/DL — HIGH (ref 7–23)
CALCIUM SERPL-MCNC: 8.2 MG/DL — LOW (ref 8.4–10.5)
CALCIUM SERPL-MCNC: 8.5 MG/DL — SIGNIFICANT CHANGE UP (ref 8.4–10.5)
CHLORIDE SERPL-SCNC: 85 MMOL/L — LOW (ref 98–107)
CHLORIDE SERPL-SCNC: 86 MMOL/L — LOW (ref 98–107)
CO2 SERPL-SCNC: 23 MMOL/L — SIGNIFICANT CHANGE UP (ref 22–31)
CO2 SERPL-SCNC: 26 MMOL/L — SIGNIFICANT CHANGE UP (ref 22–31)
CREAT SERPL-MCNC: 1.29 MG/DL — SIGNIFICANT CHANGE UP (ref 0.5–1.3)
CREAT SERPL-MCNC: 1.31 MG/DL — HIGH (ref 0.5–1.3)
GLUCOSE BLDC GLUCOMTR-MCNC: 119 MG/DL — HIGH (ref 70–99)
GLUCOSE BLDC GLUCOMTR-MCNC: 177 MG/DL — HIGH (ref 70–99)
GLUCOSE BLDC GLUCOMTR-MCNC: 180 MG/DL — HIGH (ref 70–99)
GLUCOSE SERPL-MCNC: 130 MG/DL — HIGH (ref 70–99)
GLUCOSE SERPL-MCNC: 194 MG/DL — HIGH (ref 70–99)
HCT VFR BLD CALC: 42.5 % — SIGNIFICANT CHANGE UP (ref 39–50)
HGB BLD-MCNC: 13 G/DL — SIGNIFICANT CHANGE UP (ref 13–17)
INR BLD: 1.61 — HIGH (ref 0.88–1.17)
MAGNESIUM SERPL-MCNC: 1.6 MG/DL — SIGNIFICANT CHANGE UP (ref 1.6–2.6)
MAGNESIUM SERPL-MCNC: 2 MG/DL — SIGNIFICANT CHANGE UP (ref 1.6–2.6)
MCHC RBC-ENTMCNC: 25 PG — LOW (ref 27–34)
MCHC RBC-ENTMCNC: 30.6 % — LOW (ref 32–36)
MCV RBC AUTO: 81.9 FL — SIGNIFICANT CHANGE UP (ref 80–100)
NRBC # FLD: 0 K/UL — LOW (ref 25–125)
PLATELET # BLD AUTO: 108 K/UL — LOW (ref 150–400)
PMV BLD: 9.8 FL — SIGNIFICANT CHANGE UP (ref 7–13)
POTASSIUM SERPL-MCNC: 3 MMOL/L — LOW (ref 3.5–5.3)
POTASSIUM SERPL-MCNC: 4.2 MMOL/L — SIGNIFICANT CHANGE UP (ref 3.5–5.3)
POTASSIUM SERPL-SCNC: 3 MMOL/L — LOW (ref 3.5–5.3)
POTASSIUM SERPL-SCNC: 4.2 MMOL/L — SIGNIFICANT CHANGE UP (ref 3.5–5.3)
PROTHROM AB SERPL-ACNC: 18.1 SEC — HIGH (ref 9.8–13.1)
RBC # BLD: 5.19 M/UL — SIGNIFICANT CHANGE UP (ref 4.2–5.8)
RBC # FLD: 18.6 % — HIGH (ref 10.3–14.5)
SODIUM SERPL-SCNC: 125 MMOL/L — LOW (ref 135–145)
SODIUM SERPL-SCNC: 130 MMOL/L — LOW (ref 135–145)
WBC # BLD: 4.94 K/UL — SIGNIFICANT CHANGE UP (ref 3.8–10.5)
WBC # FLD AUTO: 4.94 K/UL — SIGNIFICANT CHANGE UP (ref 3.8–10.5)

## 2019-02-21 PROCEDURE — 71045 X-RAY EXAM CHEST 1 VIEW: CPT | Mod: 26,77

## 2019-02-21 PROCEDURE — 71045 X-RAY EXAM CHEST 1 VIEW: CPT | Mod: 26

## 2019-02-21 RX ORDER — POTASSIUM CHLORIDE 20 MEQ
40 PACKET (EA) ORAL EVERY 4 HOURS
Qty: 0 | Refills: 0 | Status: COMPLETED | OUTPATIENT
Start: 2019-02-21 | End: 2019-02-21

## 2019-02-21 RX ORDER — MAGNESIUM SULFATE 500 MG/ML
2 VIAL (ML) INJECTION ONCE
Qty: 0 | Refills: 0 | Status: COMPLETED | OUTPATIENT
Start: 2019-02-21 | End: 2019-02-21

## 2019-02-21 RX ORDER — WARFARIN SODIUM 2.5 MG/1
5 TABLET ORAL ONCE
Qty: 0 | Refills: 0 | Status: COMPLETED | OUTPATIENT
Start: 2019-02-21 | End: 2019-02-21

## 2019-02-21 RX ORDER — POTASSIUM CHLORIDE 20 MEQ
40 PACKET (EA) ORAL ONCE
Qty: 0 | Refills: 0 | Status: COMPLETED | OUTPATIENT
Start: 2019-02-21 | End: 2019-02-21

## 2019-02-21 RX ADMIN — Medication 0.12 MILLIGRAM(S): at 05:03

## 2019-02-21 RX ADMIN — BUMETANIDE 5 MG/HR: 0.25 INJECTION INTRAMUSCULAR; INTRAVENOUS at 19:22

## 2019-02-21 RX ADMIN — Medication 40 MILLIEQUIVALENT(S): at 16:36

## 2019-02-21 RX ADMIN — TAMSULOSIN HYDROCHLORIDE 0.4 MILLIGRAM(S): 0.4 CAPSULE ORAL at 20:54

## 2019-02-21 RX ADMIN — Medication 40 MILLIEQUIVALENT(S): at 09:40

## 2019-02-21 RX ADMIN — WARFARIN SODIUM 5 MILLIGRAM(S): 2.5 TABLET ORAL at 17:04

## 2019-02-21 RX ADMIN — Medication 1: at 18:03

## 2019-02-21 RX ADMIN — Medication 50 GRAM(S): at 09:40

## 2019-02-21 RX ADMIN — Medication 1: at 12:59

## 2019-02-21 RX ADMIN — ATORVASTATIN CALCIUM 20 MILLIGRAM(S): 80 TABLET, FILM COATED ORAL at 20:53

## 2019-02-21 RX ADMIN — Medication 40 MILLIEQUIVALENT(S): at 12:55

## 2019-02-21 NOTE — PROGRESS NOTE ADULT - SUBJECTIVE AND OBJECTIVE BOX
[facility]   MD LUCA MCKEON MD RUORU WONG, PA    TEL:  OFFICE: [phone]  DR TAYLOR CELL: [phone]  [place] [facility] CELL: [phone]  DR. SWARTZ CELL: [phone]    RENAL FOLLOW UP NOTE  --------------------------------------------------------------------------------  HPI:      Pt seen and examined at bedside.   Barbara SOB, chest pain     PAST HISTORY  --------------------------------------------------------------------------------  No significant changes to PMH, PSH, FHx, SHx, unless otherwise noted    ALLERGIES & MEDICATIONS  --------------------------------------------------------------------------------  Allergies    No Known Allergies    Intolerances      Standing Inpatient Medications  atorvastatin 20 milliGRAM(s) Oral at bedtime  buMETAnide Infusion 1 mG/Hr IV Continuous <Continuous>  dextrose 5%. 1000 milliLiter(s) IV Continuous <Continuous>  dextrose 50% Injectable 12.5 Gram(s) IV Push once  dextrose 50% Injectable 25 Gram(s) IV Push once  dextrose 50% Injectable 25 Gram(s) IV Push once  digoxin     Tablet 0.125 milliGRAM(s) Oral daily  insulin lispro (HumaLOG) corrective regimen sliding scale   SubCutaneous three times a day before meals  insulin lispro (HumaLOG) corrective regimen sliding scale   SubCutaneous at bedtime  metolazone 5 milliGRAM(s) Oral daily  potassium chloride   Powder 40 milliEquivalent(s) Oral every 4 hours  tamsulosin 0.4 milliGRAM(s) Oral at bedtime    PRN Inpatient Medications  dextrose 40% Gel 15 Gram(s) Oral once PRN  glucagon  Injectable 1 milliGRAM(s) IntraMuscular once PRN  simethicone 80 milliGRAM(s) Chew two times a day PRN      REVIEW OF SYSTEMS  --------------------------------------------------------------------------------  General: no fever  CVS: no chest pain  RESP: no sob, no cough       VITALS/PHYSICAL EXAM  --------------------------------------------------------------------------------  T(C): 36.8 (02-21-19 @ 05:01), Max: 36.9 (02-20-19 @ 12:24)  HR: 93 (02-21-19 @ 05:01) (71 - 93)  BP: 105/68 (02-21-19 @ 05:01) (97/65 - 105/68)  RR: 18 (02-21-19 @ 05:01) (18 - 18)  SpO2: 100% (02-21-19 @ 05:01) (100% - 100%)  Wt(kg): --        02-20-19 @ 07:01  -  02-21-19 @ 07:00  --------------------------------------------------------  IN: 905 mL / OUT: 2400 mL / NET: -1495 mL    02-21-19 @ 07:01  -  02-21-19 @ 12:19  --------------------------------------------------------  IN: 0 mL / OUT: 350 mL / NET: -350 mL      Physical Exam:  	Gen: NAD  	HEENT: MMM  	Pulm: CTA B/L  	CV: S1S2  	Abd: Soft, +BS  	Ext: ++ LE edema B/L                      Neuro: Awake   	Skin: Warm and Dry   	Gu no dooley    LABS/STUDIES  --------------------------------------------------------------------------------              13.0   4.94  >-----------<  108      [02-21-19 @ 07:20]              42.5     130  |  86  |  44  ----------------------------<  130      [02-21-19 @ 07:20]  3.0   |  26  |  1.29        Ca     8.5     [02-21-19 @ 07:20]      Mg     1.6     [02-21-19 @ 07:20]      PT/INR: PT 18.1 , INR 1.61       [02-21-19 @ 07:20]  PTT: 30.6       [02-21-19 @ 07:20]      Creatinine Trend:  SCr 1.29 [02-21 @ 07:20]  SCr 1.31 [02-20 @ 07:32]  SCr 1.42 [02-19 @ 16:41]  SCr 1.36 [02-19 @ 07:23]  SCr 1.39 [02-18 @ 17:26]    Urinalysis - [02-16-19 @ 14:32]      Color YELLOW / Appearance CLEAR / SG 1.012 / pH 7.0      Gluc NEGATIVE / Ketone NEGATIVE  / Bili NEGATIVE / Urobili NORMAL       Blood NEGATIVE / Protein 10 / Leuk Est NEGATIVE / Nitrite NEGATIVE      RBC  / WBC  / Hyaline  / Gran  / Sq Epi  / Non Sq Epi  / Bacteria       .1 (Ca --)      [02-17-19 @ 15:00]   --  Vitamin D (25OH) 12.6      [02-17-19 @ 15:00]  HbA1c 6.9      [02-17-19 @ 05:40]  TSH 1.21      [02-17-19 @ 05:40]  Lipid: chol 120, , HDL 27, LDL 89      [02-17-19 @ 05:40]    HCV 0.10, Nonreactive Hepatitis C AB  S/CO Ratio                        Interpretation  < 1.0                                     Non-Reactive  1.0 - 4.9                           Weakly-Reactive  > 5.0                                 Reactive  Non-Reactive: Aperson with a non-reactive HCV antibody  result is considered uninfected.  No further action is  needed unless recent infection is suspected.  In these  cases, consider repeat testing later to detect  seroconversion..  Weakly-Reactive: HCV antibody test is abnormal, HCV RNA  Qualitative test will follow.  Reactive: HCV antibody test is abnormal, HCV RNA  Qualitative test will follow.  Note: HCV antibody testing is performed on the [employer] system.      [02-16-19 @ 15:05]

## 2019-02-21 NOTE — PROGRESS NOTE ADULT - SUBJECTIVE AND OBJECTIVE BOX
Phillip Sorenson MD  Interventional Cardiology / Advance Heart Failure and Cardiac Transplant Specialist  [place] Office : [address] [place] [address]  Tel:   [place] Office : [address] [facility] [address] [address]  Tel: [phone]  Cell : [phone]    Subjective : Pt lying in bed comfortable, not in distress, denies any chest pain or SOB  	  MEDICATIONS:  buMETAnide Infusion 1 mG/Hr IV Continuous <Continuous>  digoxin     Tablet 0.125 milliGRAM(s) Oral daily  metolazone 5 milliGRAM(s) Oral daily  tamsulosin 0.4 milliGRAM(s) Oral at bedtime          simethicone 80 milliGRAM(s) Chew two times a day PRN    atorvastatin 20 milliGRAM(s) Oral at bedtime  dextrose 40% Gel 15 Gram(s) Oral once PRN  dextrose 50% Injectable 12.5 Gram(s) IV Push once  dextrose 50% Injectable 25 Gram(s) IV Push once  dextrose 50% Injectable 25 Gram(s) IV Push once  glucagon  Injectable 1 milliGRAM(s) IntraMuscular once PRN  insulin lispro (HumaLOG) corrective regimen sliding scale   SubCutaneous three times a day before meals  insulin lispro (HumaLOG) corrective regimen sliding scale   SubCutaneous at bedtime    dextrose 5%. 1000 milliLiter(s) IV Continuous <Continuous>      PHYSICAL EXAM:  T(C): 36.4 (02-21-19 @ 20:37), Max: 36.8 (02-21-19 @ 05:01)  HR: 87 (02-21-19 @ 20:37) (87 - 93)  BP: 102/63 (02-21-19 @ 20:37) (102/63 - 105/68)  RR: 18 (02-21-19 @ 20:37) (18 - 18)  SpO2: 100% (02-21-19 @ 20:37) (100% - 100%)  Wt(kg): --  I&O's Summary    20 Feb 2019 07:01  -  21 Feb 2019 07:00  --------------------------------------------------------  IN: 905 mL / OUT: 2400 mL / NET: -1495 mL    21 Feb 2019 07:01  -  21 Feb 2019 21:23  --------------------------------------------------------  IN: 240 mL / OUT: 1050 mL / NET: -810 mL          	  HEENT:   Normal oral mucosa, PERRL, EOMI	  Cardiovascular: Normal S1 S2, No JVD, No murmurs, No edema  Respiratory: Lungs clear to auscultation	  Gastrointestinal:  Soft, Non-tender, + BS	  Extremities: 1+ edema                                    13.0   4.94  )-----------( 108      ( 21 Feb 2019 07:20 )             42.5     02-21    125<L>  |  85<L>  |  44<H>  ----------------------------<  194<H>  4.2   |  23  |  1.31<H>    Ca    8.2<L>      21 Feb 2019 18:41  Mg     2.0     02-21      proBNP:   Lipid Profile:   HgA1c:   TSH:

## 2019-02-21 NOTE — PROGRESS NOTE ADULT - PROBLEM SELECTOR PLAN 2
sec to diuretics   Repleted KCL 80mEQ by primary team  Recommend one more dose of KCL 40mEQ today  Monitor serum K  Liberize Potassium in diet

## 2019-02-21 NOTE — CHART NOTE - NSCHARTNOTEFT_GEN_A_CORE
INR 1.61 this AM, discussed with Dr. Sorenson, will dose 5mg tonight, no need for bridge with heparin at this time. Will continue to monitor INR.

## 2019-02-22 DIAGNOSIS — Z71.89 OTHER SPECIFIED COUNSELING: ICD-10-CM

## 2019-02-22 LAB
ANION GAP SERPL CALC-SCNC: 15 MMO/L — HIGH (ref 7–14)
ANION GAP SERPL CALC-SCNC: 15 MMO/L — HIGH (ref 7–14)
BUN SERPL-MCNC: 44 MG/DL — HIGH (ref 7–23)
BUN SERPL-MCNC: 46 MG/DL — HIGH (ref 7–23)
CALCIUM SERPL-MCNC: 8.7 MG/DL — SIGNIFICANT CHANGE UP (ref 8.4–10.5)
CALCIUM SERPL-MCNC: 9 MG/DL — SIGNIFICANT CHANGE UP (ref 8.4–10.5)
CHLORIDE SERPL-SCNC: 83 MMOL/L — LOW (ref 98–107)
CHLORIDE SERPL-SCNC: 83 MMOL/L — LOW (ref 98–107)
CO2 SERPL-SCNC: 32 MMOL/L — HIGH (ref 22–31)
CO2 SERPL-SCNC: 33 MMOL/L — HIGH (ref 22–31)
CREAT SERPL-MCNC: 1.29 MG/DL — SIGNIFICANT CHANGE UP (ref 0.5–1.3)
CREAT SERPL-MCNC: 1.42 MG/DL — HIGH (ref 0.5–1.3)
GLUCOSE BLDC GLUCOMTR-MCNC: 107 MG/DL — HIGH (ref 70–99)
GLUCOSE BLDC GLUCOMTR-MCNC: 140 MG/DL — HIGH (ref 70–99)
GLUCOSE BLDC GLUCOMTR-MCNC: 141 MG/DL — HIGH (ref 70–99)
GLUCOSE BLDC GLUCOMTR-MCNC: 149 MG/DL — HIGH (ref 70–99)
GLUCOSE BLDC GLUCOMTR-MCNC: 183 MG/DL — HIGH (ref 70–99)
GLUCOSE SERPL-MCNC: 130 MG/DL — HIGH (ref 70–99)
GLUCOSE SERPL-MCNC: 135 MG/DL — HIGH (ref 70–99)
HCT VFR BLD CALC: 40.7 % — SIGNIFICANT CHANGE UP (ref 39–50)
HGB BLD-MCNC: 12.8 G/DL — LOW (ref 13–17)
INR BLD: 1.47 — HIGH (ref 0.88–1.17)
MAGNESIUM SERPL-MCNC: 1.8 MG/DL — SIGNIFICANT CHANGE UP (ref 1.6–2.6)
MAGNESIUM SERPL-MCNC: 1.9 MG/DL — SIGNIFICANT CHANGE UP (ref 1.6–2.6)
MCHC RBC-ENTMCNC: 25 PG — LOW (ref 27–34)
MCHC RBC-ENTMCNC: 31.4 % — LOW (ref 32–36)
MCV RBC AUTO: 79.6 FL — LOW (ref 80–100)
NRBC # FLD: 0 K/UL — LOW (ref 25–125)
NT-PROBNP SERPL-SCNC: 5204 PG/ML — SIGNIFICANT CHANGE UP
PLATELET # BLD AUTO: 100 K/UL — LOW (ref 150–400)
PMV BLD: 10 FL — SIGNIFICANT CHANGE UP (ref 7–13)
POTASSIUM SERPL-MCNC: 2.9 MMOL/L — CRITICAL LOW (ref 3.5–5.3)
POTASSIUM SERPL-MCNC: 3.4 MMOL/L — LOW (ref 3.5–5.3)
POTASSIUM SERPL-SCNC: 2.9 MMOL/L — CRITICAL LOW (ref 3.5–5.3)
POTASSIUM SERPL-SCNC: 3.4 MMOL/L — LOW (ref 3.5–5.3)
PROTHROM AB SERPL-ACNC: 17 SEC — HIGH (ref 9.8–13.1)
RBC # BLD: 5.11 M/UL — SIGNIFICANT CHANGE UP (ref 4.2–5.8)
RBC # FLD: 18.4 % — HIGH (ref 10.3–14.5)
SODIUM SERPL-SCNC: 130 MMOL/L — LOW (ref 135–145)
SODIUM SERPL-SCNC: 131 MMOL/L — LOW (ref 135–145)
WBC # BLD: 5.28 K/UL — SIGNIFICANT CHANGE UP (ref 3.8–10.5)
WBC # FLD AUTO: 5.28 K/UL — SIGNIFICANT CHANGE UP (ref 3.8–10.5)

## 2019-02-22 RX ORDER — POTASSIUM CHLORIDE 20 MEQ
40 PACKET (EA) ORAL EVERY 4 HOURS
Qty: 0 | Refills: 0 | Status: COMPLETED | OUTPATIENT
Start: 2019-02-22 | End: 2019-02-22

## 2019-02-22 RX ORDER — WARFARIN SODIUM 2.5 MG/1
5 TABLET ORAL ONCE
Qty: 0 | Refills: 0 | Status: COMPLETED | OUTPATIENT
Start: 2019-02-22 | End: 2019-02-22

## 2019-02-22 RX ORDER — SPIRONOLACTONE 25 MG/1
25 TABLET, FILM COATED ORAL DAILY
Qty: 0 | Refills: 0 | Status: DISCONTINUED | OUTPATIENT
Start: 2019-02-22 | End: 2019-02-26

## 2019-02-22 RX ORDER — ENOXAPARIN SODIUM 100 MG/ML
80 INJECTION SUBCUTANEOUS
Qty: 0 | Refills: 0 | Status: DISCONTINUED | OUTPATIENT
Start: 2019-02-22 | End: 2019-02-26

## 2019-02-22 RX ORDER — BUMETANIDE 0.25 MG/ML
2 INJECTION INTRAMUSCULAR; INTRAVENOUS
Qty: 0 | Refills: 0 | Status: DISCONTINUED | OUTPATIENT
Start: 2019-02-22 | End: 2019-02-26

## 2019-02-22 RX ADMIN — ATORVASTATIN CALCIUM 20 MILLIGRAM(S): 80 TABLET, FILM COATED ORAL at 21:34

## 2019-02-22 RX ADMIN — TAMSULOSIN HYDROCHLORIDE 0.4 MILLIGRAM(S): 0.4 CAPSULE ORAL at 21:34

## 2019-02-22 RX ADMIN — Medication 40 MILLIEQUIVALENT(S): at 17:49

## 2019-02-22 RX ADMIN — ENOXAPARIN SODIUM 80 MILLIGRAM(S): 100 INJECTION SUBCUTANEOUS at 13:42

## 2019-02-22 RX ADMIN — Medication 40 MILLIEQUIVALENT(S): at 09:42

## 2019-02-22 RX ADMIN — Medication 0.12 MILLIGRAM(S): at 05:17

## 2019-02-22 RX ADMIN — WARFARIN SODIUM 5 MILLIGRAM(S): 2.5 TABLET ORAL at 17:48

## 2019-02-22 RX ADMIN — BUMETANIDE 2 MILLIGRAM(S): 0.25 INJECTION INTRAMUSCULAR; INTRAVENOUS at 17:48

## 2019-02-22 RX ADMIN — Medication 40 MILLIEQUIVALENT(S): at 13:02

## 2019-02-22 NOTE — PROGRESS NOTE ADULT - PROBLEM SELECTOR PLAN 2
sec to diuretics   Repleted KCL 120mEq today by primary team  check serum K in PM  Monitor serum K  Liberize Potassium in diet- pt educated

## 2019-02-22 NOTE — PROVIDER CONTACT NOTE (CRITICAL VALUE NOTIFICATION) - ACTION/TREATMENT ORDERED:
awaiting orders.
Oral K to be ordered by PA. Will cont to monitor.
Supplemented with PO and IV potassium, repeat blood draw at 16:00

## 2019-02-22 NOTE — PROGRESS NOTE ADULT - SUBJECTIVE AND OBJECTIVE BOX
SUBJECTIVE / OVERNIGHT EVENTS: pt still shortness of breath , but says his shortness of breath is better than yesterday    MEDICATIONS  (STANDING):  atorvastatin 20 milliGRAM(s) Oral at bedtime  buMETAnide 2 milliGRAM(s) Oral two times a day  dextrose 5%. 1000 milliLiter(s) (50 mL/Hr) IV Continuous <Continuous>  dextrose 50% Injectable 12.5 Gram(s) IV Push once  dextrose 50% Injectable 25 Gram(s) IV Push once  dextrose 50% Injectable 25 Gram(s) IV Push once  digoxin     Tablet 0.125 milliGRAM(s) Oral daily  enoxaparin Injectable 80 milliGRAM(s) SubCutaneous two times a day  insulin lispro (HumaLOG) corrective regimen sliding scale   SubCutaneous three times a day before meals  insulin lispro (HumaLOG) corrective regimen sliding scale   SubCutaneous at bedtime  spironolactone 25 milliGRAM(s) Oral daily  tamsulosin 0.4 milliGRAM(s) Oral at bedtime    MEDICATIONS  (PRN):  dextrose 40% Gel 15 Gram(s) Oral once PRN Blood Glucose LESS THAN 70 milliGRAM(s)/deciliter  glucagon  Injectable 1 milliGRAM(s) IntraMuscular once PRN Glucose LESS THAN 70 milligrams/deciliter  simethicone 80 milliGRAM(s) Chew two times a day PRN Gas    Vital Signs Last 24 Hrs  T(C): 36.3 (2019 21:31), Max: 36.7 (2019 05:16)  T(F): 97.3 (2019 21:31), Max: 98 (2019 05:16)  HR: 60 (2019 21:31) (60 - 80)  BP: 90/53 (2019 21:31) (90/53 - 106/64)  BP(mean): --  RR: 17 (2019 21:31) (16 - 18)  SpO2: 100% (2019 21:31) (96% - 100%)    Constitutional: No fever, fatigue  Skin: No rash.  Eyes: No recent vision problems or eye pain.  ENT: No congestion, ear pain, or sore throat.  Cardiovascular: No chest pain or palpation.  Respiratory: No cough, shortness of breath, congestion, or wheezing.  Gastrointestinal: No abdominal pain, nausea, vomiting, or diarrhea.  Genitourinary: No dysuria.  Musculoskeletal: No joint swelling.  Neurologic: No headache.    PHYSICAL EXAM:  GENERAL: NAD  EYES: EOMI, PERRLA  NECK: Supple, No JVD  CHEST/LUNG: dec breath sounds at bases   HEART:  S1 , S2 +  ABDOMEN: soft , bs+  EXTREMITIES:  trace edema+  NEUROLOGY:alert awake oriented     LABS:      130<L>  |  83<L>  |  46<H>  ----------------------------<  135<H>  3.4<L>   |  32<H>  |  1.42<H>    Ca    9.0      2019 18:28  Mg     1.8           Creatinine Trend: 1.42 <--, 1.29 <--, 1.31 <--, 1.29 <--, 1.31 <--, 1.42 <--, 1.36 <--, 1.39 <--, 1.38 <--, 1.55 <--, 1.54 <--                        12.8   5.28  )-----------( 100      ( 2019 06:50 )             40.7     Urine Studies:  Urinalysis Basic - ( 2019 14:32 )    Color: YELLOW / Appearance: CLEAR / S.012 / pH: 7.0  Gluc: NEGATIVE / Ketone: NEGATIVE  / Bili: NEGATIVE / Urobili: NORMAL   Blood: NEGATIVE / Protein: 10 / Nitrite: NEGATIVE   Leuk Esterase: NEGATIVE / RBC:  / WBC    Sq Epi:  / Non Sq Epi:  / Bacteria:                 PT/INR - ( 2019 06:50 )   PT: 17.0 SEC;   INR: 1.47          PTT - ( 2019 07:20 )  PTT:30.6 SEC        PT/INR - ( 2019 07:20 )   PT: 18.1 SEC;   INR: 1.61          PTT - ( 2019 07:20 )  PTT:30.6 SEC

## 2019-02-22 NOTE — PROGRESS NOTE ADULT - SUBJECTIVE AND OBJECTIVE BOX
[facility]   MD LUCA MCKEON MD RUORU WONG, PA    TEL:  OFFICE: [phone]  DR TAYLOR CELL: [phone]  [place] CELL: [phone]  DR. SWARTZ CELL: [phone]    RENAL FOLLOW UP NOTE  --------------------------------------------------------------------------------  HPI:      Pt seen and examined at bedside.   Barbara SOB, chest pain     PAST HISTORY  --------------------------------------------------------------------------------  No significant changes to PMH, PSH, FHx, SHx, unless otherwise noted    ALLERGIES & MEDICATIONS  --------------------------------------------------------------------------------  Allergies    No Known Allergies    Intolerances      Standing Inpatient Medications  atorvastatin 20 milliGRAM(s) Oral at bedtime  buMETAnide 2 milliGRAM(s) Oral two times a day  dextrose 5%. 1000 milliLiter(s) IV Continuous <Continuous>  dextrose 50% Injectable 12.5 Gram(s) IV Push once  dextrose 50% Injectable 25 Gram(s) IV Push once  dextrose 50% Injectable 25 Gram(s) IV Push once  digoxin     Tablet 0.125 milliGRAM(s) Oral daily  enoxaparin Injectable 80 milliGRAM(s) SubCutaneous two times a day  insulin lispro (HumaLOG) corrective regimen sliding scale   SubCutaneous three times a day before meals  insulin lispro (HumaLOG) corrective regimen sliding scale   SubCutaneous at bedtime  potassium chloride   Powder 40 milliEquivalent(s) Oral every 4 hours  spironolactone 25 milliGRAM(s) Oral daily  tamsulosin 0.4 milliGRAM(s) Oral at bedtime  warfarin 5 milliGRAM(s) Oral once    PRN Inpatient Medications  dextrose 40% Gel 15 Gram(s) Oral once PRN  glucagon  Injectable 1 milliGRAM(s) IntraMuscular once PRN  simethicone 80 milliGRAM(s) Chew two times a day PRN      REVIEW OF SYSTEMS  --------------------------------------------------------------------------------  General: no fever  CVS: no chest pain  RESP: no sob, no cough    VITALS/PHYSICAL EXAM  --------------------------------------------------------------------------------  T(C): 36.6 (02-22-19 @ 11:37), Max: 36.7 (02-22-19 @ 05:16)  HR: 78 (02-22-19 @ 11:37) (78 - 87)  BP: 91/52 (02-22-19 @ 11:37) (91/52 - 105/78)  RR: 18 (02-22-19 @ 11:37) (18 - 18)  SpO2: 96% (02-22-19 @ 11:37) (96% - 100%)  Wt(kg): --        02-21-19 @ 07:01  -  02-22-19 @ 07:00  --------------------------------------------------------  IN: 375 mL / OUT: 1650 mL / NET: -1275 mL    02-22-19 @ 07:01  -  02-22-19 @ 17:23  --------------------------------------------------------  IN: 240 mL / OUT: 1200 mL / NET: -960 mL      Physical Exam:  	Gen: NAD  	HEENT: MMM  	Pulm: CTA B/L  	CV: S1S2  	Abd: Soft, +BS  	Ext: + LE edema B/L                      Neuro: Awake   	Skin: Warm and Dry   	    LABS/STUDIES  --------------------------------------------------------------------------------              12.8   5.28  >-----------<  100      [02-22-19 @ 06:50]              40.7     131  |  83  |  44  ----------------------------<  130      [02-22-19 @ 06:50]  2.9   |  33  |  1.29        Ca     8.7     [02-22-19 @ 06:50]      Mg     1.9     [02-22-19 @ 06:50]      PT/INR: PT 17.0 , INR 1.47       [02-22-19 @ 06:50]  PTT: 30.6       [02-21-19 @ 07:20]      Creatinine Trend:  SCr 1.29 [02-22 @ 06:50]  SCr 1.31 [02-21 @ 18:41]  SCr 1.29 [02-21 @ 07:20]  SCr 1.31 [02-20 @ 07:32]  SCr 1.42 [02-19 @ 16:41]    Urinalysis - [02-16-19 @ 14:32]      Color YELLOW / Appearance CLEAR / SG 1.012 / pH 7.0      Gluc NEGATIVE / Ketone NEGATIVE  / Bili NEGATIVE / Urobili NORMAL       Blood NEGATIVE / Protein 10 / Leuk Est NEGATIVE / Nitrite NEGATIVE      RBC  / WBC  / Hyaline  / Gran  / Sq Epi  / Non Sq Epi  / Bacteria       .1 (Ca --)      [02-17-19 @ 15:00]   --  Vitamin D (25OH) 12.6      [02-17-19 @ 15:00]  HbA1c 6.9      [02-17-19 @ 05:40]  TSH 1.21      [02-17-19 @ 05:40]  Lipid: chol 120, , HDL 27, LDL 89      [02-17-19 @ 05:40]    HCV 0.10, Nonreactive Hepatitis C AB  S/CO Ratio                        Interpretation  < 1.0                                     Non-Reactive  1.0 - 4.9                           Weakly-Reactive  > 5.0                                 Reactive  Non-Reactive: Aperson with a non-reactive HCV antibody  result is considered uninfected.  No further action is  needed unless recent infection is suspected.  In these  cases, consider repeat testing later to detect  seroconversion..  Weakly-Reactive: HCV antibody test is abnormal, HCV RNA  Qualitative test will follow.  Reactive: HCV antibody test is abnormal, HCV RNA  Qualitative test will follow.  Note: HCV antibody testing is performed on the [employer] system.      [02-16-19 @ 15:05]

## 2019-02-23 LAB
ANION GAP SERPL CALC-SCNC: 16 MMO/L — HIGH (ref 7–14)
ANION GAP SERPL CALC-SCNC: 17 MMO/L — HIGH (ref 7–14)
BUN SERPL-MCNC: 48 MG/DL — HIGH (ref 7–23)
BUN SERPL-MCNC: 55 MG/DL — HIGH (ref 7–23)
CALCIUM SERPL-MCNC: 8.5 MG/DL — SIGNIFICANT CHANGE UP (ref 8.4–10.5)
CALCIUM SERPL-MCNC: 8.6 MG/DL — SIGNIFICANT CHANGE UP (ref 8.4–10.5)
CHLORIDE SERPL-SCNC: 83 MMOL/L — LOW (ref 98–107)
CHLORIDE SERPL-SCNC: 84 MMOL/L — LOW (ref 98–107)
CO2 SERPL-SCNC: 27 MMOL/L — SIGNIFICANT CHANGE UP (ref 22–31)
CO2 SERPL-SCNC: 31 MMOL/L — SIGNIFICANT CHANGE UP (ref 22–31)
CREAT SERPL-MCNC: 1.37 MG/DL — HIGH (ref 0.5–1.3)
CREAT SERPL-MCNC: 1.44 MG/DL — HIGH (ref 0.5–1.3)
GLUCOSE BLDC GLUCOMTR-MCNC: 120 MG/DL — HIGH (ref 70–99)
GLUCOSE BLDC GLUCOMTR-MCNC: 144 MG/DL — HIGH (ref 70–99)
GLUCOSE BLDC GLUCOMTR-MCNC: 178 MG/DL — HIGH (ref 70–99)
GLUCOSE BLDC GLUCOMTR-MCNC: 214 MG/DL — HIGH (ref 70–99)
GLUCOSE SERPL-MCNC: 126 MG/DL — HIGH (ref 70–99)
GLUCOSE SERPL-MCNC: 153 MG/DL — HIGH (ref 70–99)
HCT VFR BLD CALC: 39.4 % — SIGNIFICANT CHANGE UP (ref 39–50)
HGB BLD-MCNC: 12.5 G/DL — LOW (ref 13–17)
INR BLD: 1.53 — HIGH (ref 0.88–1.17)
MAGNESIUM SERPL-MCNC: 1.6 MG/DL — SIGNIFICANT CHANGE UP (ref 1.6–2.6)
MAGNESIUM SERPL-MCNC: 1.7 MG/DL — SIGNIFICANT CHANGE UP (ref 1.6–2.6)
MCHC RBC-ENTMCNC: 25.4 PG — LOW (ref 27–34)
MCHC RBC-ENTMCNC: 31.7 % — LOW (ref 32–36)
MCV RBC AUTO: 79.9 FL — LOW (ref 80–100)
NRBC # FLD: 0 K/UL — LOW (ref 25–125)
PLATELET # BLD AUTO: 118 K/UL — LOW (ref 150–400)
PMV BLD: 10.1 FL — SIGNIFICANT CHANGE UP (ref 7–13)
POTASSIUM SERPL-MCNC: 3.3 MMOL/L — LOW (ref 3.5–5.3)
POTASSIUM SERPL-MCNC: 4.1 MMOL/L — SIGNIFICANT CHANGE UP (ref 3.5–5.3)
POTASSIUM SERPL-SCNC: 3.3 MMOL/L — LOW (ref 3.5–5.3)
POTASSIUM SERPL-SCNC: 4.1 MMOL/L — SIGNIFICANT CHANGE UP (ref 3.5–5.3)
PROTHROM AB SERPL-ACNC: 17.2 SEC — HIGH (ref 9.8–13.1)
RBC # BLD: 4.93 M/UL — SIGNIFICANT CHANGE UP (ref 4.2–5.8)
RBC # FLD: 18.1 % — HIGH (ref 10.3–14.5)
SODIUM SERPL-SCNC: 128 MMOL/L — LOW (ref 135–145)
SODIUM SERPL-SCNC: 130 MMOL/L — LOW (ref 135–145)
WBC # BLD: 5.72 K/UL — SIGNIFICANT CHANGE UP (ref 3.8–10.5)
WBC # FLD AUTO: 5.72 K/UL — SIGNIFICANT CHANGE UP (ref 3.8–10.5)

## 2019-02-23 RX ORDER — SIMETHICONE 80 MG/1
80 TABLET, CHEWABLE ORAL ONCE
Qty: 0 | Refills: 0 | Status: COMPLETED | OUTPATIENT
Start: 2019-02-23 | End: 2019-02-23

## 2019-02-23 RX ORDER — POTASSIUM CHLORIDE 20 MEQ
40 PACKET (EA) ORAL ONCE
Qty: 0 | Refills: 0 | Status: COMPLETED | OUTPATIENT
Start: 2019-02-23 | End: 2019-02-23

## 2019-02-23 RX ORDER — WARFARIN SODIUM 2.5 MG/1
5 TABLET ORAL ONCE
Qty: 0 | Refills: 0 | Status: COMPLETED | OUTPATIENT
Start: 2019-02-23 | End: 2019-02-23

## 2019-02-23 RX ORDER — POTASSIUM CHLORIDE 20 MEQ
20 PACKET (EA) ORAL ONCE
Qty: 0 | Refills: 0 | Status: COMPLETED | OUTPATIENT
Start: 2019-02-23 | End: 2019-02-23

## 2019-02-23 RX ADMIN — SPIRONOLACTONE 25 MILLIGRAM(S): 25 TABLET, FILM COATED ORAL at 05:27

## 2019-02-23 RX ADMIN — TAMSULOSIN HYDROCHLORIDE 0.4 MILLIGRAM(S): 0.4 CAPSULE ORAL at 22:09

## 2019-02-23 RX ADMIN — BUMETANIDE 2 MILLIGRAM(S): 0.25 INJECTION INTRAMUSCULAR; INTRAVENOUS at 05:27

## 2019-02-23 RX ADMIN — Medication 0.12 MILLIGRAM(S): at 05:27

## 2019-02-23 RX ADMIN — ENOXAPARIN SODIUM 80 MILLIGRAM(S): 100 INJECTION SUBCUTANEOUS at 22:09

## 2019-02-23 RX ADMIN — ATORVASTATIN CALCIUM 20 MILLIGRAM(S): 80 TABLET, FILM COATED ORAL at 22:09

## 2019-02-23 RX ADMIN — ENOXAPARIN SODIUM 80 MILLIGRAM(S): 100 INJECTION SUBCUTANEOUS at 00:48

## 2019-02-23 RX ADMIN — Medication 2: at 17:43

## 2019-02-23 RX ADMIN — SIMETHICONE 80 MILLIGRAM(S): 80 TABLET, CHEWABLE ORAL at 19:55

## 2019-02-23 RX ADMIN — Medication 40 MILLIEQUIVALENT(S): at 11:59

## 2019-02-23 RX ADMIN — Medication 20 MILLIEQUIVALENT(S): at 13:39

## 2019-02-23 RX ADMIN — ENOXAPARIN SODIUM 80 MILLIGRAM(S): 100 INJECTION SUBCUTANEOUS at 11:59

## 2019-02-23 RX ADMIN — WARFARIN SODIUM 5 MILLIGRAM(S): 2.5 TABLET ORAL at 17:43

## 2019-02-23 NOTE — PROGRESS NOTE ADULT - SUBJECTIVE AND OBJECTIVE BOX
Phillip Sorenson MD  Interventional Cardiology  [place] Office : [address] [place] [address]  Tel:   [place] Office : [address] [facility] [place] [address]  Tel: [phone]  Cell : [phone]    Subjective : Pt lying in bed comfortable, not in distress, denies any chest pain or SOB  	  MEDICATIONS:  buMETAnide 2 milliGRAM(s) Oral two times a day  digoxin     Tablet 0.125 milliGRAM(s) Oral daily  enoxaparin Injectable 80 milliGRAM(s) SubCutaneous two times a day  spironolactone 25 milliGRAM(s) Oral daily  tamsulosin 0.4 milliGRAM(s) Oral at bedtime          simethicone 80 milliGRAM(s) Chew two times a day PRN    atorvastatin 20 milliGRAM(s) Oral at bedtime  dextrose 40% Gel 15 Gram(s) Oral once PRN  dextrose 50% Injectable 12.5 Gram(s) IV Push once  dextrose 50% Injectable 25 Gram(s) IV Push once  dextrose 50% Injectable 25 Gram(s) IV Push once  glucagon  Injectable 1 milliGRAM(s) IntraMuscular once PRN  insulin lispro (HumaLOG) corrective regimen sliding scale   SubCutaneous three times a day before meals  insulin lispro (HumaLOG) corrective regimen sliding scale   SubCutaneous at bedtime    dextrose 5%. 1000 milliLiter(s) IV Continuous <Continuous>      PHYSICAL EXAM:  T(C): 36.4 (02-23-19 @ 12:24), Max: 36.4 (02-23-19 @ 12:24)  HR: 73 (02-23-19 @ 17:39) (60 - 83)  BP: 97/66 (02-23-19 @ 17:39) (90/53 - 110/68)  RR: 18 (02-23-19 @ 17:39) (16 - 18)  SpO2: 99% (02-23-19 @ 17:39) (97% - 100%)  Wt(kg): --  I&O's Summary    22 Feb 2019 07:01  -  23 Feb 2019 07:00  --------------------------------------------------------  IN: 710 mL / OUT: 3275 mL / NET: -2565 mL    23 Feb 2019 07:01  -  23 Feb 2019 17:55  --------------------------------------------------------  IN: 240 mL / OUT: 1000 mL / NET: -760 mL          Appearance: Normal	  HEENT:   Normal oral mucosa, PERRL, EOMI	  Cardiovascular: Normal S1 S2, No JVD, systolic murmur +  Respiratory: b/l fine basal crackles   Gastrointestinal:  Soft, Non-tender, + BS	  Extremities: No clubbing, cyanosis , 1+ edema b/l                                     12.5   5.72  )-----------( 118      ( 23 Feb 2019 06:50 )             39.4     02-23    130<L>  |  83<L>  |  48<H>  ----------------------------<  126<H>  3.3<L>   |  31  |  1.37<H>    Ca    8.6      23 Feb 2019 06:50  Mg     1.7     02-23      proBNP:   Lipid Profile:   HgA1c:   TSH:

## 2019-02-23 NOTE — PROGRESS NOTE ADULT - SUBJECTIVE AND OBJECTIVE BOX
LISSETH MATOS  68y  Patient is a 68y old  Male who presents with a chief complaint of Shortness of breath (22 Feb 2019 17:23)    HPI:  67 y/o male with a PMHx of CAD S/P 4V CABG, ischemic cardiomyopathy with severe LV dysfunction (EF 21%) S/P [employer] ICD placement, atrial fibrillation on Coumadin, MR S/P mitral valvuloplasty, CVA, TIA, HTN, HLD, DM and CKD presents to ED with shortness of breath for the last two to three weeks. He was started on diuretics and has lost > 10 lbs. He feels better today.    HEALTH ISSUES - PROBLEM Dx:  Hypokalemia: Hypokalemia  Hypomagnesemia: Hypomagnesemia  Hypocalcemia: Hypocalcemia  Chronic kidney disease-mineral and bone disorder: Chronic kidney disease-mineral and bone disorder  CHF exacerbation: CHF exacerbation  Hyponatremia: Hyponatremia  Stage 3 chronic kidney disease: Stage 3 chronic kidney disease  Need for prophylactic measure: Need for prophylactic measure  History of CVA (cerebrovascular accident): History of CVA (cerebrovascular accident)  DM (diabetes mellitus): DM (diabetes mellitus)  HLD (hyperlipidemia): HLD (hyperlipidemia)  HTN (hypertension): HTN (hypertension)  CAD (coronary artery disease): CAD (coronary artery disease)  CKD (chronic kidney disease): CKD (chronic kidney disease)  AF (atrial fibrillation): AF (atrial fibrillation)  Troponin level elevated: Troponin level elevated  Acute on chronic systolic (congestive) heart failure: Acute on chronic systolic (congestive) heart failure        MEDICATIONS  (STANDING):  atorvastatin 20 milliGRAM(s) Oral at bedtime  buMETAnide 2 milliGRAM(s) Oral two times a day  dextrose 5%. 1000 milliLiter(s) (50 mL/Hr) IV Continuous <Continuous>  dextrose 50% Injectable 12.5 Gram(s) IV Push once  dextrose 50% Injectable 25 Gram(s) IV Push once  dextrose 50% Injectable 25 Gram(s) IV Push once  digoxin     Tablet 0.125 milliGRAM(s) Oral daily  enoxaparin Injectable 80 milliGRAM(s) SubCutaneous two times a day  insulin lispro (HumaLOG) corrective regimen sliding scale   SubCutaneous three times a day before meals  insulin lispro (HumaLOG) corrective regimen sliding scale   SubCutaneous at bedtime  spironolactone 25 milliGRAM(s) Oral daily  tamsulosin 0.4 milliGRAM(s) Oral at bedtime  warfarin 5 milliGRAM(s) Oral once    MEDICATIONS  (PRN):  dextrose 40% Gel 15 Gram(s) Oral once PRN Blood Glucose LESS THAN 70 milliGRAM(s)/deciliter  glucagon  Injectable 1 milliGRAM(s) IntraMuscular once PRN Glucose LESS THAN 70 milligrams/deciliter  simethicone 80 milliGRAM(s) Chew two times a day PRN Gas    Vital Signs Last 24 Hrs  T(C): 36.4 (23 Feb 2019 12:24), Max: 36.4 (23 Feb 2019 12:24)  T(F): 97.6 (23 Feb 2019 12:24), Max: 97.6 (23 Feb 2019 12:24)  HR: 60 (23 Feb 2019 12:24) (60 - 83)  BP: 104/55 (23 Feb 2019 12:24) (90/53 - 110/68)  BP(mean): --  RR: 18 (23 Feb 2019 12:24) (16 - 18)  SpO2: 97% (23 Feb 2019 12:24) (97% - 100%)  Daily     Daily     PHYSICAL EXAM:  Constitutional:   appears comfortable and not distressed. Not diaphoretic.    Neck:  The thyroid is normal. Trachea is midline.     Breasts: Normal examination.    Respiratory: The lungs are clear to auscultation. No dullness and expansion is normal.    Cardiovascular: S1 and S2 are normal. No mummurs, rubs or gallops are present.    Gastrointestinal: The abdomen is soft. No tenderness is present. No masses are present. Bowel sounds are normal.    Genitourinary: The bladder is not distended. No CVA tenderness is present.    Extremities: 2+ edema is noted. No deformities are present.    Neurological: Cognition is normal. Tone, power and sensation are normal. Gait is steady.    Skin: No lesions are seen  or palpated.    Lymph Nodes: No lymphadenopathy is present.    Psychiatric: Mood is appropriate. No hallucinations or flight of ideas are noted.                              12.5   5.72  )-----------( 118      ( 23 Feb 2019 06:50 )             39.4     02-23    130<L>  |  83<L>  |  48<H>  ----------------------------<  126<H>  3.3<L>   |  31  |  1.37<H>    Ca    8.6      23 Feb 2019 06:50  Mg     1.7     02-23

## 2019-02-23 NOTE — PROGRESS NOTE ADULT - SUBJECTIVE AND OBJECTIVE BOX
SUBJECTIVE / OVERNIGHT EVENTS: pt still shortness of breath , but says his shortness of breath is better than yesterday    MEDICATIONS  (STANDING):  atorvastatin 20 milliGRAM(s) Oral at bedtime  buMETAnide 2 milliGRAM(s) Oral two times a day  dextrose 5%. 1000 milliLiter(s) (50 mL/Hr) IV Continuous <Continuous>  dextrose 50% Injectable 12.5 Gram(s) IV Push once  dextrose 50% Injectable 25 Gram(s) IV Push once  dextrose 50% Injectable 25 Gram(s) IV Push once  digoxin     Tablet 0.125 milliGRAM(s) Oral daily  enoxaparin Injectable 80 milliGRAM(s) SubCutaneous two times a day  insulin lispro (HumaLOG) corrective regimen sliding scale   SubCutaneous three times a day before meals  insulin lispro (HumaLOG) corrective regimen sliding scale   SubCutaneous at bedtime  spironolactone 25 milliGRAM(s) Oral daily  tamsulosin 0.4 milliGRAM(s) Oral at bedtime    MEDICATIONS  (PRN):  dextrose 40% Gel 15 Gram(s) Oral once PRN Blood Glucose LESS THAN 70 milliGRAM(s)/deciliter  glucagon  Injectable 1 milliGRAM(s) IntraMuscular once PRN Glucose LESS THAN 70 milligrams/deciliter  simethicone 80 milliGRAM(s) Chew two times a day PRN Gas    Vital Signs Last 24 Hrs  T(C): 37.1 (23 Feb 2019 22:08), Max: 37.1 (23 Feb 2019 22:08)  T(F): 98.7 (23 Feb 2019 22:08), Max: 98.7 (23 Feb 2019 22:08)  HR: 63 (23 Feb 2019 22:08) (60 - 83)  BP: 98/68 (23 Feb 2019 22:08) (97/66 - 110/68)  BP(mean): --  RR: 18 (23 Feb 2019 17:39) (18 - 18)  SpO2: 100% (23 Feb 2019 22:08) (97% - 100%)    Constitutional: No fever, fatigue  Skin: No rash.  Eyes: No recent vision problems or eye pain.  ENT: No congestion, ear pain, or sore throat.  Cardiovascular: No chest pain or palpation.  Respiratory: No cough, shortness of breath, congestion, or wheezing.  Gastrointestinal: No abdominal pain, nausea, vomiting, or diarrhea.  Genitourinary: No dysuria.  Musculoskeletal: No joint swelling.  Neurologic: No headache.    PHYSICAL EXAM:  GENERAL: NAD  EYES: EOMI, PERRLA  NECK: Supple, No JVD  CHEST/LUNG: dec breath sounds at bases   HEART:  S1 , S2 +  ABDOMEN: soft , bs+  EXTREMITIES:  trace edema+  NEUROLOGY:alert awake oriented     LABS:  02-23    128<L>  |  84<L>  |  55<H>  ----------------------------<  153<H>  4.1   |  27  |  1.44<H>    Ca    8.5      23 Feb 2019 22:50  Mg     1.6     02-23      Creatinine Trend: 1.44 <--, 1.37 <--, 1.42 <--, 1.29 <--, 1.31 <--, 1.29 <--, 1.31 <--, 1.42 <--, 1.36 <--, 1.39 <--, 1.38 <--, 1.55 <--                        12.5   5.72  )-----------( 118      ( 23 Feb 2019 06:50 )             39.4     Urine Studies:              PT/INR - ( 23 Feb 2019 06:50 )   PT: 17.2 SEC;   INR: 1.53                  PT/INR - ( 22 Feb 2019 06:50 )   PT: 17.0 SEC;   INR: 1.47          PTT - ( 21 Feb 2019 07:20 )  PTT:30.6 SEC        PT/INR - ( 21 Feb 2019 07:20 )   PT: 18.1 SEC;   INR: 1.61          PTT - ( 21 Feb 2019 07:20 )  PTT:30.6 SEC

## 2019-02-24 LAB
ANION GAP SERPL CALC-SCNC: 13 MMO/L — SIGNIFICANT CHANGE UP (ref 7–14)
BUN SERPL-MCNC: 52 MG/DL — HIGH (ref 7–23)
CALCIUM SERPL-MCNC: 8.7 MG/DL — SIGNIFICANT CHANGE UP (ref 8.4–10.5)
CHLORIDE SERPL-SCNC: 83 MMOL/L — LOW (ref 98–107)
CO2 SERPL-SCNC: 32 MMOL/L — HIGH (ref 22–31)
CREAT SERPL-MCNC: 1.37 MG/DL — HIGH (ref 0.5–1.3)
GLUCOSE BLDC GLUCOMTR-MCNC: 130 MG/DL — HIGH (ref 70–99)
GLUCOSE BLDC GLUCOMTR-MCNC: 130 MG/DL — HIGH (ref 70–99)
GLUCOSE BLDC GLUCOMTR-MCNC: 166 MG/DL — HIGH (ref 70–99)
GLUCOSE BLDC GLUCOMTR-MCNC: 176 MG/DL — HIGH (ref 70–99)
GLUCOSE SERPL-MCNC: 140 MG/DL — HIGH (ref 70–99)
HCT VFR BLD CALC: 39.5 % — SIGNIFICANT CHANGE UP (ref 39–50)
HGB BLD-MCNC: 12.5 G/DL — LOW (ref 13–17)
INR BLD: 1.73 — HIGH (ref 0.88–1.17)
MAGNESIUM SERPL-MCNC: 2.1 MG/DL — SIGNIFICANT CHANGE UP (ref 1.6–2.6)
MCHC RBC-ENTMCNC: 25.6 PG — LOW (ref 27–34)
MCHC RBC-ENTMCNC: 31.6 % — LOW (ref 32–36)
MCV RBC AUTO: 80.8 FL — SIGNIFICANT CHANGE UP (ref 80–100)
NRBC # FLD: 0 K/UL — LOW (ref 25–125)
OSMOLALITY SERPL: 282 MOSMO/KG — SIGNIFICANT CHANGE UP (ref 275–295)
PLATELET # BLD AUTO: 106 K/UL — LOW (ref 150–400)
PMV BLD: 10.1 FL — SIGNIFICANT CHANGE UP (ref 7–13)
POTASSIUM SERPL-MCNC: 3.2 MMOL/L — LOW (ref 3.5–5.3)
POTASSIUM SERPL-SCNC: 3.2 MMOL/L — LOW (ref 3.5–5.3)
PROTHROM AB SERPL-ACNC: 20 SEC — HIGH (ref 9.8–13.1)
RBC # BLD: 4.89 M/UL — SIGNIFICANT CHANGE UP (ref 4.2–5.8)
RBC # FLD: 17.4 % — HIGH (ref 10.3–14.5)
SODIUM SERPL-SCNC: 128 MMOL/L — LOW (ref 135–145)
WBC # BLD: 5.24 K/UL — SIGNIFICANT CHANGE UP (ref 3.8–10.5)
WBC # FLD AUTO: 5.24 K/UL — SIGNIFICANT CHANGE UP (ref 3.8–10.5)

## 2019-02-24 RX ORDER — MAGNESIUM SULFATE 500 MG/ML
1 VIAL (ML) INJECTION ONCE
Qty: 0 | Refills: 0 | Status: COMPLETED | OUTPATIENT
Start: 2019-02-24 | End: 2019-02-24

## 2019-02-24 RX ORDER — WARFARIN SODIUM 2.5 MG/1
6.5 TABLET ORAL ONCE
Qty: 0 | Refills: 0 | Status: COMPLETED | OUTPATIENT
Start: 2019-02-24 | End: 2019-02-24

## 2019-02-24 RX ORDER — POTASSIUM CHLORIDE 20 MEQ
40 PACKET (EA) ORAL EVERY 4 HOURS
Qty: 0 | Refills: 0 | Status: COMPLETED | OUTPATIENT
Start: 2019-02-24 | End: 2019-02-24

## 2019-02-24 RX ORDER — LANOLIN ALCOHOL/MO/W.PET/CERES
3 CREAM (GRAM) TOPICAL AT BEDTIME
Qty: 0 | Refills: 0 | Status: DISCONTINUED | OUTPATIENT
Start: 2019-02-24 | End: 2019-02-26

## 2019-02-24 RX ADMIN — Medication 3 MILLIGRAM(S): at 03:22

## 2019-02-24 RX ADMIN — TAMSULOSIN HYDROCHLORIDE 0.4 MILLIGRAM(S): 0.4 CAPSULE ORAL at 21:25

## 2019-02-24 RX ADMIN — SPIRONOLACTONE 25 MILLIGRAM(S): 25 TABLET, FILM COATED ORAL at 05:20

## 2019-02-24 RX ADMIN — ATORVASTATIN CALCIUM 20 MILLIGRAM(S): 80 TABLET, FILM COATED ORAL at 21:25

## 2019-02-24 RX ADMIN — WARFARIN SODIUM 6.5 MILLIGRAM(S): 2.5 TABLET ORAL at 17:26

## 2019-02-24 RX ADMIN — Medication 100 GRAM(S): at 03:18

## 2019-02-24 RX ADMIN — Medication 1: at 13:12

## 2019-02-24 RX ADMIN — Medication 40 MILLIEQUIVALENT(S): at 17:26

## 2019-02-24 RX ADMIN — Medication 0.12 MILLIGRAM(S): at 05:20

## 2019-02-24 RX ADMIN — Medication 40 MILLIEQUIVALENT(S): at 12:23

## 2019-02-24 RX ADMIN — Medication 1: at 17:26

## 2019-02-24 RX ADMIN — BUMETANIDE 2 MILLIGRAM(S): 0.25 INJECTION INTRAMUSCULAR; INTRAVENOUS at 05:20

## 2019-02-24 RX ADMIN — ENOXAPARIN SODIUM 80 MILLIGRAM(S): 100 INJECTION SUBCUTANEOUS at 21:25

## 2019-02-24 RX ADMIN — ENOXAPARIN SODIUM 80 MILLIGRAM(S): 100 INJECTION SUBCUTANEOUS at 12:23

## 2019-02-24 NOTE — PROGRESS NOTE ADULT - SUBJECTIVE AND OBJECTIVE BOX
LISSETH MATOS  68y  Patient is a 68y old  Male who presents with a chief complaint of Shortness of breath (23 Feb 2019 16:07)    HPI:  This is a patient with dilated Cardiomyopathy. He has no new complaints and is diuresing well. He has lost > 10 lbs.  No fevers or chills.    HEALTH ISSUES - PROBLEM Dx:  Hypokalemia: Hypokalemia  Hypomagnesemia: Hypomagnesemia  Hypocalcemia: Hypocalcemia  Chronic kidney disease-mineral and bone disorder: Chronic kidney disease-mineral and bone disorder  CHF exacerbation: CHF exacerbation  Hyponatremia: Hyponatremia  Stage 3 chronic kidney disease: Stage 3 chronic kidney disease  Need for prophylactic measure: Need for prophylactic measure  History of CVA (cerebrovascular accident): History of CVA (cerebrovascular accident)  DM (diabetes mellitus): DM (diabetes mellitus)  HLD (hyperlipidemia): HLD (hyperlipidemia)  HTN (hypertension): HTN (hypertension)  CAD (coronary artery disease): CAD (coronary artery disease)  CKD (chronic kidney disease): CKD (chronic kidney disease)  AF (atrial fibrillation): AF (atrial fibrillation)  Troponin level elevated: Troponin level elevated  Acute on chronic systolic (congestive) heart failure: Acute on chronic systolic (congestive) heart failure        MEDICATIONS  (STANDING):  atorvastatin 20 milliGRAM(s) Oral at bedtime  buMETAnide 2 milliGRAM(s) Oral two times a day  dextrose 5%. 1000 milliLiter(s) (50 mL/Hr) IV Continuous <Continuous>  dextrose 50% Injectable 12.5 Gram(s) IV Push once  dextrose 50% Injectable 25 Gram(s) IV Push once  dextrose 50% Injectable 25 Gram(s) IV Push once  digoxin     Tablet 0.125 milliGRAM(s) Oral daily  enoxaparin Injectable 80 milliGRAM(s) SubCutaneous two times a day  insulin lispro (HumaLOG) corrective regimen sliding scale   SubCutaneous three times a day before meals  insulin lispro (HumaLOG) corrective regimen sliding scale   SubCutaneous at bedtime  melatonin 3 milliGRAM(s) Oral at bedtime  potassium chloride    Tablet ER 40 milliEquivalent(s) Oral every 4 hours  spironolactone 25 milliGRAM(s) Oral daily  tamsulosin 0.4 milliGRAM(s) Oral at bedtime  warfarin 6.5 milliGRAM(s) Oral once    MEDICATIONS  (PRN):  dextrose 40% Gel 15 Gram(s) Oral once PRN Blood Glucose LESS THAN 70 milliGRAM(s)/deciliter  glucagon  Injectable 1 milliGRAM(s) IntraMuscular once PRN Glucose LESS THAN 70 milligrams/deciliter  simethicone 80 milliGRAM(s) Chew two times a day PRN Gas    Vital Signs Last 24 Hrs  T(C): 36.3 (24 Feb 2019 11:38), Max: 37.1 (23 Feb 2019 22:08)  T(F): 97.3 (24 Feb 2019 11:38), Max: 98.7 (23 Feb 2019 22:08)  HR: 80 (24 Feb 2019 11:38) (60 - 80)  BP: 98/65 (24 Feb 2019 11:38) (97/66 - 115/71)  BP(mean): --  RR: 18 (24 Feb 2019 11:38) (18 - 18)  SpO2: 96% (24 Feb 2019 11:38) (96% - 100%)  Daily     Daily     PHYSICAL EXAM:  Constitutional:  He appears comfortable and not distressed. Not diaphoretic.    Neck:  The thyroid is normal. Trachea is midline.     Respiratory: The lungs are clear to auscultation. No dullness and expansion is normal.    Cardiovascular: S1 and S2 are normal. No mummurs, rubs or gallops are present.    Gastrointestinal: The abdomen is soft. No tenderness is present. No masses are present. Bowel sounds are normal.    Genitourinary: The bladder is not distended. No CVA tenderness is present.    Extremities: 1+ edema is noted. No deformities are present.    Neurological: Cognition is normal. Tone, power and sensation are normal. Gait is steady.    Skin: No leasions are seen  or palpated.                            12.5   5.24  )-----------( 106      ( 24 Feb 2019 07:18 )             39.5     02-24    128<L>  |  83<L>  |  52<H>  ----------------------------<  140<H>  3.2<L>   |  32<H>  |  1.37<H>    Ca    8.7      24 Feb 2019 07:18  Mg     2.1     02-24

## 2019-02-24 NOTE — PROGRESS NOTE ADULT - SUBJECTIVE AND OBJECTIVE BOX
SUBJECTIVE / OVERNIGHT EVENTS: pt still shortness of breath , but says his shortness of breath is better than yesterday    MEDICATIONS  (STANDING):  atorvastatin 20 milliGRAM(s) Oral at bedtime  buMETAnide 2 milliGRAM(s) Oral two times a day  dextrose 5%. 1000 milliLiter(s) (50 mL/Hr) IV Continuous <Continuous>  dextrose 50% Injectable 12.5 Gram(s) IV Push once  dextrose 50% Injectable 25 Gram(s) IV Push once  dextrose 50% Injectable 25 Gram(s) IV Push once  digoxin     Tablet 0.125 milliGRAM(s) Oral daily  enoxaparin Injectable 80 milliGRAM(s) SubCutaneous two times a day  insulin lispro (HumaLOG) corrective regimen sliding scale   SubCutaneous three times a day before meals  insulin lispro (HumaLOG) corrective regimen sliding scale   SubCutaneous at bedtime  melatonin 3 milliGRAM(s) Oral at bedtime  potassium chloride    Tablet ER 40 milliEquivalent(s) Oral every 4 hours  spironolactone 25 milliGRAM(s) Oral daily  tamsulosin 0.4 milliGRAM(s) Oral at bedtime  warfarin 6.5 milliGRAM(s) Oral once    MEDICATIONS  (PRN):  dextrose 40% Gel 15 Gram(s) Oral once PRN Blood Glucose LESS THAN 70 milliGRAM(s)/deciliter  glucagon  Injectable 1 milliGRAM(s) IntraMuscular once PRN Glucose LESS THAN 70 milligrams/deciliter  simethicone 80 milliGRAM(s) Chew two times a day PRN Gas    Vital Signs Last 24 Hrs  T(C): 36.3 (24 Feb 2019 11:38), Max: 37.1 (23 Feb 2019 22:08)  T(F): 97.3 (24 Feb 2019 11:38), Max: 98.7 (23 Feb 2019 22:08)  HR: 80 (24 Feb 2019 11:38) (63 - 80)  BP: 98/65 (24 Feb 2019 11:38) (97/66 - 115/71)  BP(mean): --  RR: 18 (24 Feb 2019 11:38) (18 - 18)  SpO2: 96% (24 Feb 2019 11:38) (96% - 100%)    Constitutional: No fever, fatigue  Skin: No rash.  Eyes: No recent vision problems or eye pain.  ENT: No congestion, ear pain, or sore throat.  Cardiovascular: No chest pain or palpation.  Respiratory: No cough, shortness of breath, congestion, or wheezing.  Gastrointestinal: No abdominal pain, nausea, vomiting, or diarrhea.  Genitourinary: No dysuria.  Musculoskeletal: No joint swelling.  Neurologic: No headache.    PHYSICAL EXAM:  GENERAL: NAD  EYES: EOMI, PERRLA  NECK: Supple, No JVD  CHEST/LUNG: dec breath sounds at bases   HEART:  S1 , S2 +  ABDOMEN: soft , bs+  EXTREMITIES:  trace edema+  NEUROLOGY:alert awake oriented     LABS:  02-24    128<L>  |  83<L>  |  52<H>  ----------------------------<  140<H>  3.2<L>   |  32<H>  |  1.37<H>    Ca    8.7      24 Feb 2019 07:18  Mg     2.1     02-24      Creatinine Trend: 1.37 <--, 1.44 <--, 1.37 <--, 1.42 <--, 1.29 <--, 1.31 <--, 1.29 <--, 1.31 <--, 1.42 <--, 1.36 <--, 1.39 <--, 1.38 <--                        12.5   5.24  )-----------( 106      ( 24 Feb 2019 07:18 )             39.5     Urine Studies:              PT/INR - ( 24 Feb 2019 07:18 )   PT: 20.0 SEC;   INR: 1.73                        PT/INR - ( 23 Feb 2019 06:50 )   PT: 17.2 SEC;   INR: 1.53                  PT/INR - ( 22 Feb 2019 06:50 )   PT: 17.0 SEC;   INR: 1.47          PTT - ( 21 Feb 2019 07:20 )  PTT:30.6 SEC        PT/INR - ( 21 Feb 2019 07:20 )   PT: 18.1 SEC;   INR: 1.61          PTT - ( 21 Feb 2019 07:20 )  PTT:30.6 SEC

## 2019-02-24 NOTE — PROGRESS NOTE ADULT - SUBJECTIVE AND OBJECTIVE BOX
Phillip Sorenson MD  Interventional Cardiology  [place] Office : [address] [place] [address]  Tel:   [place] Office : [address] [facility] [place] [address]  Tel: [phone]  Cell : [phone]    Subjective : Pt lying in bed comfortable, not in distress, denies any chest pain or SOB  	  MEDICATIONS:  buMETAnide 2 milliGRAM(s) Oral two times a day  digoxin     Tablet 0.125 milliGRAM(s) Oral daily  enoxaparin Injectable 80 milliGRAM(s) SubCutaneous two times a day  spironolactone 25 milliGRAM(s) Oral daily  tamsulosin 0.4 milliGRAM(s) Oral at bedtime  warfarin 6.5 milliGRAM(s) Oral once        melatonin 3 milliGRAM(s) Oral at bedtime    simethicone 80 milliGRAM(s) Chew two times a day PRN    atorvastatin 20 milliGRAM(s) Oral at bedtime  dextrose 40% Gel 15 Gram(s) Oral once PRN  dextrose 50% Injectable 12.5 Gram(s) IV Push once  dextrose 50% Injectable 25 Gram(s) IV Push once  dextrose 50% Injectable 25 Gram(s) IV Push once  glucagon  Injectable 1 milliGRAM(s) IntraMuscular once PRN  insulin lispro (HumaLOG) corrective regimen sliding scale   SubCutaneous three times a day before meals  insulin lispro (HumaLOG) corrective regimen sliding scale   SubCutaneous at bedtime    dextrose 5%. 1000 milliLiter(s) IV Continuous <Continuous>  potassium chloride    Tablet ER 40 milliEquivalent(s) Oral every 4 hours      PHYSICAL EXAM:  T(C): 36.3 (02-24-19 @ 11:38), Max: 37.1 (02-23-19 @ 22:08)  HR: 80 (02-24-19 @ 11:38) (63 - 80)  BP: 98/65 (02-24-19 @ 11:38) (97/66 - 115/71)  RR: 18 (02-24-19 @ 11:38) (18 - 18)  SpO2: 96% (02-24-19 @ 11:38) (96% - 100%)  Wt(kg): --  I&O's Summary    23 Feb 2019 07:01  -  24 Feb 2019 07:00  --------------------------------------------------------  IN: 480 mL / OUT: 1300 mL / NET: -820 mL    24 Feb 2019 07:01  -  24 Feb 2019 17:19  --------------------------------------------------------  IN: 240 mL / OUT: 1300 mL / NET: -1060 mL          Appearance: Normal	  HEENT:   Normal oral mucosa, PERRL, EOMI	  Cardiovascular: Normal S1 S2, No JVD, systolic murmur +  Respiratory: b/l fine basal crackles   Gastrointestinal:  Soft, Non-tender, + BS	  Extremities: No clubbing, cyanosis , 1+ edema b/l                                   12.5   5.24  )-----------( 106      ( 24 Feb 2019 07:18 )             39.5     02-24    128<L>  |  83<L>  |  52<H>  ----------------------------<  140<H>  3.2<L>   |  32<H>  |  1.37<H>    Ca    8.7      24 Feb 2019 07:18  Mg     2.1     02-24      proBNP:   Lipid Profile:   HgA1c:   TSH:

## 2019-02-25 LAB
ANION GAP SERPL CALC-SCNC: 18 MMO/L — HIGH (ref 7–14)
BUN SERPL-MCNC: 49 MG/DL — HIGH (ref 7–23)
CALCIUM SERPL-MCNC: 8.6 MG/DL — SIGNIFICANT CHANGE UP (ref 8.4–10.5)
CHLORIDE SERPL-SCNC: 82 MMOL/L — LOW (ref 98–107)
CO2 SERPL-SCNC: 28 MMOL/L — SIGNIFICANT CHANGE UP (ref 22–31)
CREAT SERPL-MCNC: 1.29 MG/DL — SIGNIFICANT CHANGE UP (ref 0.5–1.3)
GLUCOSE BLDC GLUCOMTR-MCNC: 108 MG/DL — HIGH (ref 70–99)
GLUCOSE BLDC GLUCOMTR-MCNC: 135 MG/DL — HIGH (ref 70–99)
GLUCOSE BLDC GLUCOMTR-MCNC: 137 MG/DL — HIGH (ref 70–99)
GLUCOSE BLDC GLUCOMTR-MCNC: 156 MG/DL — HIGH (ref 70–99)
GLUCOSE BLDC GLUCOMTR-MCNC: 224 MG/DL — HIGH (ref 70–99)
GLUCOSE SERPL-MCNC: 128 MG/DL — HIGH (ref 70–99)
HCT VFR BLD CALC: 40.6 % — SIGNIFICANT CHANGE UP (ref 39–50)
HGB BLD-MCNC: 12.9 G/DL — LOW (ref 13–17)
INR BLD: 1.8 — HIGH (ref 0.88–1.17)
MAGNESIUM SERPL-MCNC: 1.9 MG/DL — SIGNIFICANT CHANGE UP (ref 1.6–2.6)
MCHC RBC-ENTMCNC: 25.1 PG — LOW (ref 27–34)
MCHC RBC-ENTMCNC: 31.8 % — LOW (ref 32–36)
MCV RBC AUTO: 79.1 FL — LOW (ref 80–100)
NRBC # FLD: 0 K/UL — LOW (ref 25–125)
PLATELET # BLD AUTO: 104 K/UL — LOW (ref 150–400)
PMV BLD: 9.6 FL — SIGNIFICANT CHANGE UP (ref 7–13)
POTASSIUM SERPL-MCNC: 3.9 MMOL/L — SIGNIFICANT CHANGE UP (ref 3.5–5.3)
POTASSIUM SERPL-SCNC: 3.9 MMOL/L — SIGNIFICANT CHANGE UP (ref 3.5–5.3)
PROTHROM AB SERPL-ACNC: 20.4 SEC — HIGH (ref 9.8–13.1)
RBC # BLD: 5.13 M/UL — SIGNIFICANT CHANGE UP (ref 4.2–5.8)
RBC # FLD: 18.1 % — HIGH (ref 10.3–14.5)
SODIUM SERPL-SCNC: 128 MMOL/L — LOW (ref 135–145)
WBC # BLD: 5.57 K/UL — SIGNIFICANT CHANGE UP (ref 3.8–10.5)
WBC # FLD AUTO: 5.57 K/UL — SIGNIFICANT CHANGE UP (ref 3.8–10.5)

## 2019-02-25 PROCEDURE — 71046 X-RAY EXAM CHEST 2 VIEWS: CPT | Mod: 26

## 2019-02-25 RX ORDER — WARFARIN SODIUM 2.5 MG/1
6 TABLET ORAL ONCE
Qty: 0 | Refills: 0 | Status: COMPLETED | OUTPATIENT
Start: 2019-02-25 | End: 2019-02-25

## 2019-02-25 RX ADMIN — TAMSULOSIN HYDROCHLORIDE 0.4 MILLIGRAM(S): 0.4 CAPSULE ORAL at 21:12

## 2019-02-25 RX ADMIN — Medication 2: at 13:28

## 2019-02-25 RX ADMIN — Medication 3 MILLIGRAM(S): at 21:12

## 2019-02-25 RX ADMIN — BUMETANIDE 2 MILLIGRAM(S): 0.25 INJECTION INTRAMUSCULAR; INTRAVENOUS at 18:21

## 2019-02-25 RX ADMIN — Medication 1: at 18:21

## 2019-02-25 RX ADMIN — ENOXAPARIN SODIUM 80 MILLIGRAM(S): 100 INJECTION SUBCUTANEOUS at 13:28

## 2019-02-25 RX ADMIN — ATORVASTATIN CALCIUM 20 MILLIGRAM(S): 80 TABLET, FILM COATED ORAL at 21:12

## 2019-02-25 RX ADMIN — ENOXAPARIN SODIUM 80 MILLIGRAM(S): 100 INJECTION SUBCUTANEOUS at 21:12

## 2019-02-25 RX ADMIN — WARFARIN SODIUM 6 MILLIGRAM(S): 2.5 TABLET ORAL at 18:21

## 2019-02-25 RX ADMIN — Medication 0.12 MILLIGRAM(S): at 05:42

## 2019-02-25 NOTE — PROGRESS NOTE ADULT - PROBLEM SELECTOR PLAN 9
Prior CVA likely embolic in origin given atrial fibrillation

## 2019-02-25 NOTE — PROGRESS NOTE ADULT - SUBJECTIVE AND OBJECTIVE BOX
Phillip Sorenson MD  Interventional Cardiology / [facility] Heart Failure and Cardiac Transplant Specialist  [place] Office : [address] [place] [address]  Tel:   [place] Office : [address] [facility] [place] [address]  Tel: [phone]  Cell : [phone]    Subjective : Pt lying in bed comfortable, not in distress, denies any chest pain or SOB  	  MEDICATIONS:  buMETAnide 2 milliGRAM(s) Oral two times a day  digoxin     Tablet 0.125 milliGRAM(s) Oral daily  enoxaparin Injectable 80 milliGRAM(s) SubCutaneous two times a day  spironolactone 25 milliGRAM(s) Oral daily  tamsulosin 0.4 milliGRAM(s) Oral at bedtime  warfarin 6 milliGRAM(s) Oral once  melatonin 3 milliGRAM(s) Oral at bedtime  simethicone 80 milliGRAM(s) Chew two times a day PRN  atorvastatin 20 milliGRAM(s) Oral at bedtime  dextrose 40% Gel 15 Gram(s) Oral once PRN  dextrose 50% Injectable 12.5 Gram(s) IV Push once  dextrose 50% Injectable 25 Gram(s) IV Push once  dextrose 50% Injectable 25 Gram(s) IV Push once  glucagon  Injectable 1 milliGRAM(s) IntraMuscular once PRN  insulin lispro (HumaLOG) corrective regimen sliding scale   SubCutaneous three times a day before meals  insulin lispro (HumaLOG) corrective regimen sliding scale   SubCutaneous at bedtime  dextrose 5%. 1000 milliLiter(s) IV Continuous <Continuous>      PHYSICAL EXAM:  T(C): 36.7 (02-25-19 @ 12:12), Max: 36.7 (02-25-19 @ 12:12)  HR: 65 (02-25-19 @ 12:12) (65 - 69)  BP: 97/63 (02-25-19 @ 12:12) (97/62 - 102/57)  RR: 18 (02-25-19 @ 12:12) (18 - 18)  SpO2: 97% (02-25-19 @ 12:12) (97% - 100%)  Wt(kg): --  I&O's Summary    24 Feb 2019 07:01  -  25 Feb 2019 07:00  --------------------------------------------------------  IN: 240 mL / OUT: 1900 mL / NET: -1660 mL    25 Feb 2019 07:01  -  25 Feb 2019 17:34  --------------------------------------------------------  IN: 480 mL / OUT: 1050 mL / NET: -570 mL          	  HEENT:   Normal oral mucosa, PERRL, EOMI	  Cardiovascular: Normal S1 S2, No JVD, No murmurs, No edema  Respiratory: Lungs clear to auscultation	  Gastrointestinal:  Soft, Non-tender, + BS	  Extremities: 1+ edema                                    12.9   5.57  )-----------( 104      ( 25 Feb 2019 05:34 )             40.6     02-25    128<L>  |  82<L>  |  49<H>  ----------------------------<  128<H>  3.9   |  28  |  1.29    Ca    8.6      25 Feb 2019 05:34  Mg     1.9     02-25      proBNP:   Lipid Profile:   HgA1c:   TSH:

## 2019-02-25 NOTE — PROGRESS NOTE ADULT - SUBJECTIVE AND OBJECTIVE BOX
[facility] NE[facility]OLOG[facility]   MD LUCA MCKEON MD ANGELA WONG, PA    TEL:  OFFICE: [phone]  DR TAYLOR CELL: [phone]  DR. SWARTZ CELL: [phone]  KATHERINE POWER CELL: [phone]        Patient is a 68y old  Male who presents with a chief complaint of Shortness of breath (24 Feb 2019 15:30)      Patient seen and examined at bedside. No chest pain/sob    VITALS:  T(F): 98 (02-25-19 @ 12:12), Max: 98 (02-25-19 @ 12:12)  HR: 65 (02-25-19 @ 12:12)  BP: 97/63 (02-25-19 @ 12:12)  RR: 18 (02-25-19 @ 12:12)  SpO2: 97% (02-25-19 @ 12:12)  Wt(kg): --    02-24 @ 07:01  -  02-25 @ 07:00  --------------------------------------------------------  IN: 240 mL / OUT: 1900 mL / NET: -1660 mL    02-25 @ 07:01  -  02-25 @ 16:37  --------------------------------------------------------  IN: 480 mL / OUT: 1050 mL / NET: -570 mL          PHYSICAL EXAM:  Constitutional: NAD  Neck: No JVD  Respiratory: CTAB, no wheezes, rales or rhonchi  Cardiovascular: S1, S2, RRR  Gastrointest2+ peripheral edema    Hospital Medications:   MEDICATIONS  (STANDING):  atorvastatin 20 milliGRAM(s) Oral at bedtime  buMETAnide 2 milliGRAM(s) Oral two times a day  dextrose 5%. 1000 milliLiter(s) (50 mL/Hr) IV Continuous <Continuous>  dextrose 50% Injectable 12.5 Gram(s) IV Push once  dextrose 50% Injectable 25 Gram(s) IV Push once  dextrose 50% Injectable 25 Gram(s) IV Push once  digoxin     Tablet 0.125 milliGRAM(s) Oral daily  enoxaparin Injectable 80 milliGRAM(s) SubCutaneous two times a day  insulin lispro (HumaLOG) corrective regimen sliding scale   SubCutaneous three times a day before meals  insulin lispro (HumaLOG) corrective regimen sliding scale   SubCutaneous at bedtime  melatonin 3 milliGRAM(s) Oral at bedtime  spironolactone 25 milliGRAM(s) Oral daily  tamsulosin 0.4 milliGRAM(s) Oral at bedtime  warfarin 6 milliGRAM(s) Oral once      LABS:  02-25    128<L>  |  82<L>  |  49<H>  ----------------------------<  128<H>  3.9   |  28  |  1.29    Ca    8.6      25 Feb 2019 05:34  Mg     1.9     02-25      Creatinine Trend: 1.29 <--, 1.37 <--, 1.44 <--, 1.37 <--, 1.42 <--, 1.29 <--, 1.31 <--, 1.29 <--, 1.31 <--, 1.42 <--, 1.36 <--, 1.39 <--                                12.9   5.57  )-----------( 104      ( 25 Feb 2019 05:34 )             40.6     Urine Studies:  Urinalysis - [02-16-19 @ 14:32]      Color YELLOW / Appearance CLEAR / SG 1.012 / pH 7.0      Gluc NEGATIVE / Ketone NEGATIVE  / Bili NEGATIVE / Urobili NORMAL       Blood NEGATIVE / Protein 10 / Leuk Est NEGATIVE / Nitrite NEGATIVE      RBC  / WBC  / Hyaline  / Gran  / Sq Epi  / Non Sq Epi  / Bacteria       .1 (Ca --)      [02-17-19 @ 15:00]   --  Vitamin D (25OH) 12.6      [02-17-19 @ 15:00]  HbA1c 6.9      [02-17-19 @ 05:40]  TSH 1.21      [02-17-19 @ 05:40]  Lipid: chol 120, , HDL 27, LDL 89      [02-17-19 @ 05:40]    HCV 0.10, Nonreactive Hepatitis C AB  S/CO Ratio                        Interpretation  < 1.0                                     Non-Reactive  1.0 - 4.9                           Weakly-Reactive  > 5.0                                 Reactive  Non-Reactive: [place] with a non-reactive HCV antibody  result is considered uninfected.  No further action is  needed unless recent infection is suspected.  In these  cases, consider repeat testing later to detect  seroconversion..  Weakly-Reactive: HCV antibody test is abnormal, HCV RNA  Qualitative test will follow.  Reactive: HCV antibody test is abnormal, HCV RNA  Qualitative test will follow.  Note: HCV antibody testing is performed on the [employer]   system.      [02-16-19 @ 15:05]      RADIOLOGY & ADDITIONAL STUDIES:

## 2019-02-25 NOTE — PROGRESS NOTE ADULT - PROBLEM SELECTOR PROBLEM 9
History of CVA (cerebrovascular accident)

## 2019-02-25 NOTE — PROGRESS NOTE ADULT - SUBJECTIVE AND OBJECTIVE BOX
SUBJECTIVE / OVERNIGHT EVENTS: pt still shortness of breath , but says his shortness of breath is better than yesterday    MEDICATIONS  (STANDING):  atorvastatin 20 milliGRAM(s) Oral at bedtime  buMETAnide 2 milliGRAM(s) Oral two times a day  dextrose 5%. 1000 milliLiter(s) (50 mL/Hr) IV Continuous <Continuous>  dextrose 50% Injectable 12.5 Gram(s) IV Push once  dextrose 50% Injectable 25 Gram(s) IV Push once  dextrose 50% Injectable 25 Gram(s) IV Push once  digoxin     Tablet 0.125 milliGRAM(s) Oral daily  enoxaparin Injectable 80 milliGRAM(s) SubCutaneous two times a day  insulin lispro (HumaLOG) corrective regimen sliding scale   SubCutaneous three times a day before meals  insulin lispro (HumaLOG) corrective regimen sliding scale   SubCutaneous at bedtime  melatonin 3 milliGRAM(s) Oral at bedtime  spironolactone 25 milliGRAM(s) Oral daily  tamsulosin 0.4 milliGRAM(s) Oral at bedtime    MEDICATIONS  (PRN):  dextrose 40% Gel 15 Gram(s) Oral once PRN Blood Glucose LESS THAN 70 milliGRAM(s)/deciliter  glucagon  Injectable 1 milliGRAM(s) IntraMuscular once PRN Glucose LESS THAN 70 milligrams/deciliter  simethicone 80 milliGRAM(s) Chew two times a day PRN Gas    Vital Signs Last 24 Hrs  T(C): 36.7 (25 Feb 2019 12:12), Max: 36.7 (25 Feb 2019 12:12)  T(F): 98 (25 Feb 2019 12:12), Max: 98 (25 Feb 2019 12:12)  HR: 65 (25 Feb 2019 12:12) (65 - 69)  BP: 100/61 (25 Feb 2019 18:27) (97/62 - 102/57)  BP(mean): --  RR: 18 (25 Feb 2019 12:12) (18 - 18)  SpO2: 97% (25 Feb 2019 12:12) (97% - 100%)    Constitutional: No fever, fatigue  Skin: No rash.  Eyes: No recent vision problems or eye pain.  ENT: No congestion, ear pain, or sore throat.  Cardiovascular: No chest pain or palpation.  Respiratory: No cough, shortness of breath, congestion, or wheezing.  Gastrointestinal: No abdominal pain, nausea, vomiting, or diarrhea.  Genitourinary: No dysuria.  Musculoskeletal: No joint swelling.  Neurologic: No headache.    PHYSICAL EXAM:  GENERAL: NAD  EYES: EOMI, PERRLA  NECK: Supple, No JVD  CHEST/LUNG: dec breath sounds at bases   HEART:  S1 , S2 +  ABDOMEN: soft , bs+  EXTREMITIES:  trace edema+  NEUROLOGY:alert awake oriented        LABS:  02-25    128<L>  |  82<L>  |  49<H>  ----------------------------<  128<H>  3.9   |  28  |  1.29    Ca    8.6      25 Feb 2019 05:34  Mg     1.9     02-25      Creatinine Trend: 1.29 <--, 1.37 <--, 1.44 <--, 1.37 <--, 1.42 <--, 1.29 <--, 1.31 <--, 1.29 <--, 1.31 <--, 1.42 <--, 1.36 <--                        12.9   5.57  )-----------( 104      ( 25 Feb 2019 05:34 )             40.6     Urine Studies:              PT/INR - ( 25 Feb 2019 05:34 )   PT: 20.4 SEC;   INR: 1.80

## 2019-02-26 ENCOUNTER — TRANSCRIPTION ENCOUNTER (OUTPATIENT)
Age: 69
End: 2019-02-26

## 2019-02-26 VITALS — HEART RATE: 60 BPM | SYSTOLIC BLOOD PRESSURE: 97 MMHG | DIASTOLIC BLOOD PRESSURE: 67 MMHG

## 2019-02-26 LAB
ANION GAP SERPL CALC-SCNC: 16 MMO/L — HIGH (ref 7–14)
BUN SERPL-MCNC: 41 MG/DL — HIGH (ref 7–23)
CALCIUM SERPL-MCNC: 8.6 MG/DL — SIGNIFICANT CHANGE UP (ref 8.4–10.5)
CHLORIDE SERPL-SCNC: 84 MMOL/L — LOW (ref 98–107)
CO2 SERPL-SCNC: 30 MMOL/L — SIGNIFICANT CHANGE UP (ref 22–31)
CREAT SERPL-MCNC: 1.22 MG/DL — SIGNIFICANT CHANGE UP (ref 0.5–1.3)
GLUCOSE BLDC GLUCOMTR-MCNC: 142 MG/DL — HIGH (ref 70–99)
GLUCOSE BLDC GLUCOMTR-MCNC: 150 MG/DL — HIGH (ref 70–99)
GLUCOSE SERPL-MCNC: 124 MG/DL — HIGH (ref 70–99)
HCT VFR BLD CALC: 40.9 % — SIGNIFICANT CHANGE UP (ref 39–50)
HGB BLD-MCNC: 12.8 G/DL — LOW (ref 13–17)
INR BLD: 1.9 — HIGH (ref 0.88–1.17)
MAGNESIUM SERPL-MCNC: 1.7 MG/DL — SIGNIFICANT CHANGE UP (ref 1.6–2.6)
MCHC RBC-ENTMCNC: 24.7 PG — LOW (ref 27–34)
MCHC RBC-ENTMCNC: 31.3 % — LOW (ref 32–36)
MCV RBC AUTO: 79 FL — LOW (ref 80–100)
NRBC # FLD: 0 K/UL — LOW (ref 25–125)
PLATELET # BLD AUTO: 109 K/UL — LOW (ref 150–400)
PMV BLD: 10.3 FL — SIGNIFICANT CHANGE UP (ref 7–13)
POTASSIUM SERPL-MCNC: 3.1 MMOL/L — LOW (ref 3.5–5.3)
POTASSIUM SERPL-SCNC: 3.1 MMOL/L — LOW (ref 3.5–5.3)
PROTHROM AB SERPL-ACNC: 22.1 SEC — HIGH (ref 9.8–13.1)
RBC # BLD: 5.18 M/UL — SIGNIFICANT CHANGE UP (ref 4.2–5.8)
RBC # FLD: 18.1 % — HIGH (ref 10.3–14.5)
SODIUM SERPL-SCNC: 130 MMOL/L — LOW (ref 135–145)
WBC # BLD: 4.83 K/UL — SIGNIFICANT CHANGE UP (ref 3.8–10.5)
WBC # FLD AUTO: 4.83 K/UL — SIGNIFICANT CHANGE UP (ref 3.8–10.5)

## 2019-02-26 RX ORDER — POTASSIUM CHLORIDE 20 MEQ
40 PACKET (EA) ORAL EVERY 4 HOURS
Qty: 0 | Refills: 0 | Status: COMPLETED | OUTPATIENT
Start: 2019-02-26 | End: 2019-02-26

## 2019-02-26 RX ORDER — SPIRONOLACTONE 25 MG/1
2 TABLET, FILM COATED ORAL
Qty: 60 | Refills: 0 | OUTPATIENT
Start: 2019-02-26 | End: 2019-03-27

## 2019-02-26 RX ORDER — WARFARIN SODIUM 2.5 MG/1
1 TABLET ORAL
Qty: 30 | Refills: 0 | OUTPATIENT
Start: 2019-02-26 | End: 2019-03-27

## 2019-02-26 RX ORDER — TAMSULOSIN HYDROCHLORIDE 0.4 MG/1
1 CAPSULE ORAL
Qty: 30 | Refills: 0
Start: 2019-02-26 | End: 2019-03-27

## 2019-02-26 RX ORDER — ERGOCALCIFEROL 1.25 MG/1
50000 CAPSULE ORAL
Qty: 0 | Refills: 0 | Status: DISCONTINUED | OUTPATIENT
Start: 2019-02-26 | End: 2019-02-26

## 2019-02-26 RX ORDER — SPIRONOLACTONE 25 MG/1
50 TABLET, FILM COATED ORAL DAILY
Qty: 0 | Refills: 0 | Status: DISCONTINUED | OUTPATIENT
Start: 2019-02-26 | End: 2019-02-26

## 2019-02-26 RX ORDER — FUROSEMIDE 40 MG
1 TABLET ORAL
Qty: 0 | Refills: 0 | COMMUNITY

## 2019-02-26 RX ORDER — BUMETANIDE 0.25 MG/ML
1 INJECTION INTRAMUSCULAR; INTRAVENOUS
Qty: 60 | Refills: 0 | OUTPATIENT
Start: 2019-02-26 | End: 2019-03-27

## 2019-02-26 RX ORDER — TAMSULOSIN HYDROCHLORIDE 0.4 MG/1
1 CAPSULE ORAL
Qty: 0 | Refills: 0 | DISCHARGE
Start: 2019-02-26 | End: 2019-03-27

## 2019-02-26 RX ORDER — WARFARIN SODIUM 2.5 MG/1
7.5 TABLET ORAL ONCE
Qty: 0 | Refills: 0 | Status: COMPLETED | OUTPATIENT
Start: 2019-02-26 | End: 2019-02-26

## 2019-02-26 RX ORDER — WARFARIN SODIUM 2.5 MG/1
1 TABLET ORAL
Qty: 0 | Refills: 0 | COMMUNITY

## 2019-02-26 RX ORDER — WARFARIN SODIUM 2.5 MG/1
5 TABLET ORAL ONCE
Qty: 0 | Refills: 0 | Status: DISCONTINUED | OUTPATIENT
Start: 2019-02-26 | End: 2019-02-26

## 2019-02-26 RX ADMIN — Medication 0.12 MILLIGRAM(S): at 05:58

## 2019-02-26 RX ADMIN — WARFARIN SODIUM 7.5 MILLIGRAM(S): 2.5 TABLET ORAL at 17:06

## 2019-02-26 RX ADMIN — Medication 40 MILLIEQUIVALENT(S): at 12:33

## 2019-02-26 RX ADMIN — ENOXAPARIN SODIUM 80 MILLIGRAM(S): 100 INJECTION SUBCUTANEOUS at 12:33

## 2019-02-26 RX ADMIN — Medication 40 MILLIEQUIVALENT(S): at 17:06

## 2019-02-26 NOTE — PROGRESS NOTE ADULT - PROBLEM SELECTOR PROBLEM 1
Stage 3 chronic kidney disease
Stage 3 chronic kidney disease
Acute on chronic systolic (congestive) heart failure
Stage 3 chronic kidney disease
Acute on chronic systolic (congestive) heart failure

## 2019-02-26 NOTE — DISCHARGE NOTE ADULT - PATIENT PORTAL LINK FT
You can access the [facility] Patient Portal, offered by [facility], by registering with the following website: [email or web address]

## 2019-02-26 NOTE — DISCHARGE NOTE ADULT - SECONDARY DIAGNOSIS.
Hypokalemia AF (atrial fibrillation) Chronic kidney disease-mineral and bone disorder DM (diabetes mellitus) HTN (hypertension)

## 2019-02-26 NOTE — PROGRESS NOTE ADULT - PROBLEM SELECTOR PLAN 7
Continue Lipitor
Likely sec. to low albumin  Corrected calcium 8.8
Continue Lipitor
Likely sec. to low albumin  Corrected calcium 8.8
Supplemented.   Monitor

## 2019-02-26 NOTE — DISCHARGE NOTE ADULT - MEDICATION SUMMARY - MEDICATIONS TO STOP TAKING
I will STOP taking the medications listed below when I get home from the hospital:    Lasix 80 mg oral tablet  -- 1 tab(s) by mouth once a day    Lasix 40 mg oral tablet  -- 1 tab(s) by mouth once a day (at bedtime)

## 2019-02-26 NOTE — DISCHARGE NOTE ADULT - MEDICATION SUMMARY - MEDICATIONS TO TAKE
I will START or STAY ON the medications listed below when I get home from the hospital:    spironolactone 25 mg oral tablet  -- 2 tab(s) by mouth once a day  -- Indication: For HTN (hypertension)    tamsulosin 0.4 mg oral capsule  -- 1 cap(s) by mouth once a day (at bedtime)  -- Indication: For BPH    digoxin 125 mcg (0.125 mg) oral tablet  -- 1 tab(s) by mouth once a day  -- Indication: For ANTIARRHYTMIC    warfarin 7.5 mg oral tablet  -- 1 tab(s) by mouth once a day (at bedtime)   -- Indication: For ANTICOAGULATION    metFORMIN 1000 mg oral tablet, extended release  -- 1 tab(s) by mouth once a day  -- Indication: For DIABETES    metFORMIN 500 mg oral tablet, extended release  -- 1 tab(s) by mouth once a day (at bedtime)  -- Indication: For DIABETES    Lipitor 20 mg oral tablet  -- 1 tab(s) by mouth once a day (at bedtime)  -- Indication: For HLD (hyperlipidemia)    bumetanide 2 mg oral tablet  -- 1 tab(s) by mouth 2 times a day  -- Indication: For DIURETIC

## 2019-02-26 NOTE — PROGRESS NOTE ADULT - PROBLEM SELECTOR PROBLEM 8
DM (diabetes mellitus)
Hypomagnesemia
DM (diabetes mellitus)
Hypomagnesemia

## 2019-02-26 NOTE — PROGRESS NOTE ADULT - SUBJECTIVE AND OBJECTIVE BOX
[facility]   MD LUCA MCKEON MD ANGELA WONG, PA    TEL:  OFFICE: [phone]  DR TAYLOR CELL: [phone]  DR. SWARTZ CELL: [phone]  KATHERINE POWER CELL: [phone]        Patient is a 68y old  Male who presents with a chief complaint of Shortness of breath (26 Feb 2019 14:03)      Patient seen and examined at bedside. No chest pain/sob    VITALS:  T(F): 98.2 (02-26-19 @ 11:45), Max: 98.2 (02-25-19 @ 20:37)  HR: 82 (02-26-19 @ 11:45)  BP: 102/55 (02-26-19 @ 11:45)  RR: 18 (02-26-19 @ 11:45)  SpO2: 96% (02-26-19 @ 11:45)  Wt(kg): --    02-25 @ 07:01  -  02-26 @ 07:00  --------------------------------------------------------  IN: 830 mL / OUT: 1875 mL / NET: -1045 mL    02-26 @ 07:01  -  02-26 @ 16:30  --------------------------------------------------------  IN: 0 mL / OUT: 900 mL / NET: -900 mL          PHYSICAL EXAM:  Constitutional: NAD  Neck: No JVD  Respiratory: CTAB, no wheezes, rales or rhonchi  Cardiovascular: S1, S2, RRR  Gastrointestinal: BS+, soft, NT/ND  Extremities: + LE edema L>R    Hospital Medications:   MEDICATIONS  (STANDING):  atorvastatin 20 milliGRAM(s) Oral at bedtime  buMETAnide 2 milliGRAM(s) Oral two times a day  dextrose 5%. 1000 milliLiter(s) (50 mL/Hr) IV Continuous <Continuous>  dextrose 50% Injectable 12.5 Gram(s) IV Push once  dextrose 50% Injectable 25 Gram(s) IV Push once  dextrose 50% Injectable 25 Gram(s) IV Push once  digoxin     Tablet 0.125 milliGRAM(s) Oral daily  enoxaparin Injectable 80 milliGRAM(s) SubCutaneous two times a day  insulin lispro (HumaLOG) corrective regimen sliding scale   SubCutaneous three times a day before meals  insulin lispro (HumaLOG) corrective regimen sliding scale   SubCutaneous at bedtime  melatonin 3 milliGRAM(s) Oral at bedtime  potassium chloride    Tablet ER 40 milliEquivalent(s) Oral every 4 hours  spironolactone 50 milliGRAM(s) Oral daily  tamsulosin 0.4 milliGRAM(s) Oral at bedtime  warfarin 7.5 milliGRAM(s) Oral once      LABS:  02-26    130<L>  |  84<L>  |  41<H>  ----------------------------<  124<H>  3.1<L>   |  30  |  1.22    Ca    8.6      26 Feb 2019 05:49  Mg     1.7     02-26      Creatinine Trend: 1.22 <--, 1.29 <--, 1.37 <--, 1.44 <--, 1.37 <--, 1.42 <--, 1.29 <--, 1.31 <--, 1.29 <--, 1.31 <--, 1.42 <--                                12.8   4.83  )-----------( 109      ( 26 Feb 2019 05:49 )             40.9     Urine Studies:  Urinalysis - [02-16-19 @ 14:32]      Color YELLOW / Appearance CLEAR / SG 1.012 / pH 7.0      Gluc NEGATIVE / Ketone NEGATIVE  / Bili NEGATIVE / Urobili NORMAL       Blood NEGATIVE / Protein 10 / Leuk Est NEGATIVE / Nitrite NEGATIVE      RBC  / WBC  / Hyaline  / Gran  / Sq Epi  / Non Sq Epi  / Bacteria       .1 (Ca --)      [02-17-19 @ 15:00]   --  Vitamin D (25OH) 12.6      [02-17-19 @ 15:00]  HbA1c 6.9      [02-17-19 @ 05:40]  TSH 1.21      [02-17-19 @ 05:40]  Lipid: chol 120, , HDL 27, LDL 89      [02-17-19 @ 05:40]    HCV 0.10, Nonreactive Hepatitis C AB  S/CO Ratio                        Interpretation  < 1.0                                     Non-Reactive  1.0 - 4.9                           Weakly-Reactive  > 5.0                                 Reactive  Non-Reactive: Aperson with a non-reactive HCV antibody  result is considered uninfected.  No further action is  needed unless recent infection is suspected.  In these  cases, consider repeat testing later to detect  seroconversion..  Weakly-Reactive: HCV antibody test is abnormal, HCV RNA  Qualitative test will follow.  Reactive: HCV antibody test is abnormal, HCV RNA  Qualitative test will follow.  Note: HCV antibody testing is performed on the [employer]   system.      [02-16-19 @ 15:05]      RADIOLOGY & ADDITIONAL STUDIES:

## 2019-02-26 NOTE — PROGRESS NOTE ADULT - PROBLEM SELECTOR PLAN 6
vit d 12, .   will start vit d 50000 x8 dose  PO4 level is normal  monitor daily phos and calcium level.

## 2019-02-26 NOTE — PROVIDER CONTACT NOTE (OTHER) - ACTION/TREATMENT ORDERED:
Notified tele pa. No new orders at this time. Will continue to monitor.
pt urinated 225ml, post void bladder scan had 234ml. will continue to monitor output.
Notified tele pa about concern. Will continue to monitor.
Hold 6pm Bumex dose, recheck in 30 mins and reevaluate

## 2019-02-26 NOTE — PROGRESS NOTE ADULT - REASON FOR ADMISSION

## 2019-02-26 NOTE — PROGRESS NOTE ADULT - ASSESSMENT
1.	CHAYO, stbale.  2.	CKD.  3.	CHF decompensation.  4.	Hypokalemia due to diuretics.  5.	Hyponatremia.  6.	Metabolic Alkalosis of diuresis.    PLAN:  1.	Continue Bumex and Aldactone.  2.	Supplement K and Mg.  3.	Follow up BMP.
69 y/o male with a PMHx of CAD S/P 4V CABG, ischemic cardiomyopathy with severe LV dysfunction (EF 21%) S/P Medtronic ICD placement, atrial fibrillation on Coumadin, MR S/P mitral valvuloplasty, CVA, TIA, HTN, HLD, DM and CKD presents to ED with shortness of breath
69 y/o male with a PMHx of CAD S/P 4V CABG, ischemic cardiomyopathy with severe LV dysfunction (EF 21%) S/P Medtronic ICD placement, atrial fibrillation on Coumadin, MR S/P mitral valvuloplasty, CVA, TIA, HTN, HLD, DM and CKD presents to ED with shortness of breath
EKG - Afib V paced   Echo - 6/16 - Severe LV dysfunction RV dysfunction     a/p     1) Acute on chronic systolic CHF exacerbation -  bumex 2 mg po teice daily with aldactone, BP lowish hold BB and ACE cont dig    2) Afib - INR 1.9 dose coumadin     3) CKD - creatnine around baseline f/u renal     4) Hyponatremia - likely sec to hypervolemia, f/u renal
EKG - Afib V paced   Echo - 6/16 - Severe LV dysfunction RV dysfunction     a/p     1) Acute on chronic systolic CHF exacerbation - diuresing well now, switch to bumex po 2mg q12 start aldactone    2) Afib - INR 1.47  dose  5mg coumadin     3) CKD - creatnine around baseline f/u renal     4) Hyponatremia - likely sec to hypervolemia, f/u renal
1.	Dilated Cardiomyopathy, on Bumex and Aldactone.  2.	CHAYO, improved.  3.	CKD.  4.	Hypokalemia, being diuresed.  5.	Metabolic Alkalosis.    PLAN:   1.	Consider changing to PO \Bumex.  2.	Supplement K and Mg.  3.	Follow up BMP.
67 y/o male with a PMHx of CAD S/P 4V CABG, ischemic cardiomyopathy with severe LV dysfunction (EF 21%) S/P Medtronic ICD placement, atrial fibrillation on Coumadin, MR S/P mitral valvuloplasty, CVA, TIA, HTN, HLD, DM and CKD presents to ED with shortness of breath, cough, abdominal distention and lower extremity edema in the setting of acute on chronic systolic heart failure.
69 y/o male with a PMHx of CAD S/P 4V CABG, ischemic cardiomyopathy with severe LV dysfunction (EF 21%) S/P Medtronic ICD placement, atrial fibrillation on Coumadin, MR S/P mitral valvuloplasty, CVA, TIA, HTN, HLD, DM and CKD presents to ED with shortness of breath
69 y/o male with a PMHx of CAD S/P 4V CABG, ischemic cardiomyopathy with severe LV dysfunction (EF 21%) S/P Medtronic ICD placement, atrial fibrillation on Coumadin, MR S/P mitral valvuloplasty, CVA, TIA, HTN, HLD, DM and CKD presents to ED with shortness of breath, cough, abdominal distention and lower extremity edema in the setting of acute on chronic systolic heart failure.
EKG - Afib V paced   Echo - 6/16 - Severe LV dysfunction RV dysfunction     a/p     1) Acute on chronic systolic CHF exacerbation -  bumex 2 mg po daily with aldactone     2) Afib - INR 1.7 dose  5mg coumadin     3) CKD - creatnine around baseline f/u renal     4) Hyponatremia - likely sec to hypervolemia, f/u renal
EKG - Afib V paced   Echo - 6/16 - Severe LV dysfunction RV dysfunction     a/p     1) Acute on chronic systolic CHF exacerbation -  bumex 2 mg po twice daily with aldactone increase to 50mg daily, BP lowish hold BB and ACE cont dig, replace K    2) Afib - INR 1.9 dose coumadin     3) CKD - creatnine around baseline f/u renal     4) Hyponatremia - likely sec to hypervolemia, f/u renal
EKG - Afib V paced   Echo - 6/16 - Severe LV dysfunction RV dysfunction     a/p     1) Acute on chronic systolic CHF exacerbation - diuresing well now, cont bumex drip and metolazone, replace K and Mg , repeat K and mg levels q12     2) Afib - INR 1.6 dose  5mg coumadin     3) CKD - creatnine around baseline f/u renal     4) Hyponatremia - likely sec to hypervolemia, f/u renal
EKG - Afib V paced   Echo - 6/16 - Severe LV dysfunction RV dysfunction     a/p     1) Acute on chronic systolic CHF exacerbation - diuresing well now, cont bumex drip and metolazone, replace K and Mg , repeat K and mg levels q12     2) Afib - INR 1.88 dose  coumadin     3) CKD - creatnine around baseline f/u renal
EKG - Afib V paced   Echo - 6/16 - Severe LV dysfunction RV dysfunction     a/p     1) Acute on chronic systolic CHF exacerbation - diuresing well now, cont bumex drip and metolazone, replace K and Mg , repeat K and mg levels q12 spoke to SUKHDEEP Christine    2) Afib - INR 2.16 dose  coumadin     3) CKD - creatnine around baseline f/u renal
EKG - Afib V paced   Echo - 6/16 - Severe LV dysfunction RV dysfunction     a/p     1) Acute on chronic systolic CHF exacerbation - diuresing well now, cont bumex drip and metolazone, replace K and Mg , repeat K and mg levels q12 spoke to SUKHDEEP Christine    2) Afib - INR > 3 hold coumadin     3) CKD - creatnine around baseline f/u renal
EKG - Afib V paced   Echo - 6/16 - Severe LV dysfunction RV dysfunction     a/p     1) Acute on chronic systolic CHF exacerbation - switched to bumex 2 mg po daily with aldactone     2) Afib - INR 1.5 dose  5mg coumadin     3) CKD - creatnine around baseline f/u renal     4) Hyponatremia - likely sec to hypervolemia, f/u renal
69 y/o male with a PMHx of CAD S/P 4V CABG, ischemic cardiomyopathy with severe LV dysfunction (EF 21%) S/P Medtronic ICD placement, atrial fibrillation on Coumadin, MR S/P mitral valvuloplasty, CVA, TIA, HTN, HLD, DM and CKD presents to ED with shortness of breath, cough, abdominal distention and lower extremity edema in the setting of acute on chronic systolic heart failure.

## 2019-02-26 NOTE — DISCHARGE NOTE ADULT - ADDITIONAL INSTRUCTIONS
Follow up with PCP within 1-2 weeks of discharge.   Follow up with nephrology within 1-2 weeks of discharge.   Follow up with cardiology within 1-2 weeks of discharge.

## 2019-02-26 NOTE — DISCHARGE NOTE ADULT - CARE PROVIDER_API CALL
Stephen Roldan)  Internal Medicine  [address] [place] [address]  Phone: [phone]  Fax: [phone]  Follow Up Time:     Eduardo Fuller)  Internal Medicine; Nephrology  [address] [place] [address]  Phone: [phone]  Fax: [phone]  Follow Up Time:     Phillip Sorenson)  Cardiovascular Disease; Internal Medicine; Nuclear Cardiology  [address] [place] [address]  Phone: [phone]  Fax: [phone]  Follow Up Time:

## 2019-02-26 NOTE — PROGRESS NOTE ADULT - PROBLEM SELECTOR PROBLEM 7
HLD (hyperlipidemia)
Hypocalcemia
HLD (hyperlipidemia)
Hypocalcemia
Hypomagnesemia

## 2019-02-26 NOTE — DISCHARGE NOTE ADULT - PLAN OF CARE
Acute on chronic systolic (congestive) heart failure - diuresis as per cards with Bumex.  Transthoracic Echocardiogram: Mitral annular calcification. Tethered mitral valve leaflets with normal opening. Mild mitral regurgitation. Calcified trileaflet aortic valve with normal opening. Peak transaortic valve gradient equals 2 mm Hg. Minimal aortic regurgitation.  Moderate left ventricular enlargement.  Severe global left ventricular systolic dysfunction  Right ventricular enlargement with decreased right ventricular systolic function. A device wire is noted in the right heart. TVs' = 4 cm/sec. Estimated right ventricular systolic pressure equals 24 mm Hg, assuming right atrial pressure equals 10 mm Hg, consistent with normal pulmonary pressures. Normal tricuspid valve.  Moderate-severe tricuspid regurgitation.  Oxygen saturation ambulating is 96%. Medications adjusted. To relieve and prevent worsening symptoms associated with congestive heart failure, to improve quality of life, and to treat underlying conditions such as coronary heart disease, high blood pressure, or diabetes, and to maintain euvolemia. You were admitted for shortness of breath in the context of acute on chronic heart failure. You were diuresed with Bumex. Transthoracic Echocardiogram: Mitral annular calcification. Tethered mitral valve leaflets with normal opening. Mild mitral regurgitation. Calcified trileaflet aortic valve with normal opening. Peak transaortic valve gradient equals 2 mm Hg. Minimal aortic regurgitation.  Moderate left ventricular enlargement.  Severe global left ventricular systolic dysfunction  Right ventricular enlargement with decreased right ventricular systolic function. A device wire is noted in the right heart. TVs' = 4 cm/sec. Estimated right ventricular systolic pressure equals 24 mm Hg, assuming right atrial pressure equals 10 mm Hg, consistent with normal pulmonary pressures. Normal tricuspid valve.  Moderate-severe tricuspid regurgitation.  Oxygen saturation ambulating was 96% and medications were adjusted.   Follow up with cardiology within 1-2 weeks of discharge.   Low salt diet, fluid restriction to 1500 ml daily, monitor your fluid intake and weight daily, exercise as tolerated 30 minutes daily, and follow up with your physician within 7 days. Repeat potassium levels in 3 days. You were noted to have low potassium levels. You were given potassium supplementation and you must get these levels rechecked in 3 days by your PCP. To restore or maintain a normal heart rate and rhythm, to prevent blood clots, and decrease the risks of stroke CVA/TIA. Please take your medications as prescribed.  Continue to take your blood thinner Warfarin as prescribed and follow with your physician to monitor your levels.  Low fat diet, reduce caffeine intake, and exercise at least 30 minutes daily. To prevent shortness of breath, fluid overload, and electrolytes imbalance, and to slow down worsening kidney disease. Continue blood pressure, cholesterol and diabetic medications. Goal of hemoglobin A1C (HgbA1C) < 7%.  Avoid nephrotoxic drugs such as nonsteroidal anti-inflammatory agents (NSAIDs).   Please follow up with your nephrologist to monitor your kidney function, continue with low protein and potassium diet. Please check your fingersticks every morning or if you are not feeling well.  If your fingerstick is >300 mg/dl x 3 or more readings, please contact your PCP or endocrinologist. If your fingerstick is low, <70 mg/dl and/or you have symptoms of very low blood sugar, FIRST drink 1/2 cup of apple juice, (or take 4 glucose tabs/tube of glucose gel) and recheck fingerstick in 15 minutes.  Repeat these steps until blood sugar is above 100 mg/dl, IF NECESSARY.   What to expect at follow appointment:  Please bring a log of your fingersticks and/or your glucometer to your appointment.  Your blood sugar tracking will help your diabetes team determine the best plan. Monitor finger sticks pre-meal and bedtime, low salt, fat and carbohydrate diet, minimize glucose intake.  Exercise daily for at least 30 minutes and weight loss.  Follow up with primary care physician and endocrinologist for routine Hemoglobin A1C checks and management.  Follow up with your ophthalmologist for routine yearly vision exams. To maintain a normal blood pressure to prevent heart attack, stroke and renal failure. Low sodium and fat diet, continue anti-hypertensive medications, and follow up with primary care physician.

## 2019-02-26 NOTE — PROGRESS NOTE ADULT - PROBLEM SELECTOR PROBLEM 6
HTN (hypertension)
Chronic kidney disease-mineral and bone disorder
HTN (hypertension)
HTN (hypertension)
Chronic kidney disease-mineral and bone disorder
HTN (hypertension)
Hypocalcemia

## 2019-02-26 NOTE — DISCHARGE NOTE ADULT - CARE PLAN
Principal Discharge DX:	Acute on chronic systolic (congestive) heart failure  Assessment and plan of treatment:	Acute on chronic systolic (congestive) heart failure - diuresis as per cards with Bumex.  Transthoracic Echocardiogram: Mitral annular calcification. Tethered mitral valve leaflets with normal opening. Mild mitral regurgitation. Calcified trileaflet aortic valve with normal opening. Peak transaortic valve gradient equals 2 mm Hg. Minimal aortic regurgitation.  Moderate left ventricular enlargement.  Severe global left ventricular systolic dysfunction  Right ventricular enlargement with decreased right ventricular systolic function. A device wire is noted in the right heart. TVs' = 4 cm/sec. Estimated right ventricular systolic pressure equals 24 mm Hg, assuming right atrial pressure equals 10 mm Hg, consistent with normal pulmonary pressures. Normal tricuspid valve.  Moderate-severe tricuspid regurgitation.  Oxygen saturation ambulating is 96%. Medications adjusted. Principal Discharge DX:	Acute on chronic systolic (congestive) heart failure  Goal:	To relieve and prevent worsening symptoms associated with congestive heart failure, to improve quality of life, and to treat underlying conditions such as coronary heart disease, high blood pressure, or diabetes, and to maintain euvolemia.  Assessment and plan of treatment:	You were admitted for shortness of breath in the context of acute on chronic heart failure. You were diuresed with Bumex. Transthoracic Echocardiogram: Mitral annular calcification. Tethered mitral valve leaflets with normal opening. Mild mitral regurgitation. Calcified trileaflet aortic valve with normal opening. Peak transaortic valve gradient equals 2 mm Hg. Minimal aortic regurgitation.  Moderate left ventricular enlargement.  Severe global left ventricular systolic dysfunction  Right ventricular enlargement with decreased right ventricular systolic function. A device wire is noted in the right heart. TVs' = 4 cm/sec. Estimated right ventricular systolic pressure equals 24 mm Hg, assuming right atrial pressure equals 10 mm Hg, consistent with normal pulmonary pressures. Normal tricuspid valve.  Moderate-severe tricuspid regurgitation.  Oxygen saturation ambulating was 96% and medications were adjusted.   Follow up with cardiology within 1-2 weeks of discharge.   Low salt diet, fluid restriction to 1500 ml daily, monitor your fluid intake and weight daily, exercise as tolerated 30 minutes daily, and follow up with your physician within 7 days.  Secondary Diagnosis:	Hypokalemia  Goal:	Repeat potassium levels in 3 days.  Assessment and plan of treatment:	You were noted to have low potassium levels. You were given potassium supplementation and you must get these levels rechecked in 3 days by your PCP.  Secondary Diagnosis:	AF (atrial fibrillation)  Goal:	To restore or maintain a normal heart rate and rhythm, to prevent blood clots, and decrease the risks of stroke CVA/TIA.  Assessment and plan of treatment:	Please take your medications as prescribed.  Continue to take your blood thinner Warfarin as prescribed and follow with your physician to monitor your levels.  Low fat diet, reduce caffeine intake, and exercise at least 30 minutes daily.  Secondary Diagnosis:	Chronic kidney disease-mineral and bone disorder  Goal:	To prevent shortness of breath, fluid overload, and electrolytes imbalance, and to slow down worsening kidney disease.  Assessment and plan of treatment:	Continue blood pressure, cholesterol and diabetic medications. Goal of hemoglobin A1C (HgbA1C) < 7%.  Avoid nephrotoxic drugs such as nonsteroidal anti-inflammatory agents (NSAIDs).   Please follow up with your nephrologist to monitor your kidney function, continue with low protein and potassium diet.  Secondary Diagnosis:	DM (diabetes mellitus)  Goal:	Please check your fingersticks every morning or if you are not feeling well.  If your fingerstick is >300 mg/dl x 3 or more readings, please contact your PCP or endocrinologist. If your fingerstick is low, <70 mg/dl and/or you have symptoms of very low blood sugar, FIRST drink 1/2 cup of apple juice, (or take 4 glucose tabs/tube of glucose gel) and recheck fingerstick in 15 minutes.  Repeat these steps until blood sugar is above 100 mg/dl, IF NECESSARY.   What to expect at follow appointment:  Please bring a log of your fingersticks and/or your glucometer to your appointment.  Your blood sugar tracking will help your diabetes team determine the best plan.  Assessment and plan of treatment:	Monitor finger sticks pre-meal and bedtime, low salt, fat and carbohydrate diet, minimize glucose intake.  Exercise daily for at least 30 minutes and weight loss.  Follow up with primary care physician and endocrinologist for routine Hemoglobin A1C checks and management.  Follow up with your ophthalmologist for routine yearly vision exams.  Secondary Diagnosis:	HTN (hypertension)  Goal:	To maintain a normal blood pressure to prevent heart attack, stroke and renal failure.  Assessment and plan of treatment:	Low sodium and fat diet, continue anti-hypertensive medications, and follow up with primary care physician.

## 2019-02-26 NOTE — DISCHARGE NOTE ADULT - PROVIDER TOKENS
PROVIDER:[TOKEN:[[id number]]],PROVIDER:[TOKEN:[[id number]]],PROVIDER:[TOKEN:[[id number]]]

## 2019-02-26 NOTE — DISCHARGE NOTE ADULT - HOSPITAL COURSE
69 y/o male with a PMHx of CAD S/P 4V CABG, ischemic cardiomyopathy with severe LV dysfunction (EF 21%) S/P [employer]onic ICD placement, atrial fibrillation on Coumadin, MR S/P mitral valvuloplasty, CVA, TIA, HTN, HLD, DM and CKD who presented to the ED with shortness of breath, cough, abdominal distention and lower extremity edema in the setting of acute on chronic systolic heart failure.     Problem/Plan - 1:  ·  Problem: Acute on chronic systolic (congestive) heart failure.  Plan: diuresis as per cards - from: Transthoracic Echocardiogram (02.20.19 @ 18:09) >  Mitral annular calcification. Tethered mitral valve leaflets with normal opening. Mild mitral regurgitation. Calcified trileaflet aortic valve with normal opening. Peak transaortic valve gradient equals 2 mm Hg. Minimal  aortic regurgitation.  Moderate left ventricular enlargement.  Severe global left ventricular systolic dysfunction  Right ventricular enlargement with decreased right ventricular systolic function. A device wire is noted in the right heart. TVs' = 4 cm/sec. Estimated right ventricular systolic pressure equals 24 mm Hg, assuming right atrial pressure equals 10 mm Hg, consistent with normal pulmonary pressures. Normal tricuspid valve.  Moderate-severe tricuspid regurgitation.  pt still congested , will obtain cxr, oxygen sat on ambulation.      Problem/Plan - 2:  ·  Problem: AF (atrial fibrillation).  Plan: rate control + ac inr subtherapeutic.      Problem/Plan - 3:  ·  Problem: Hypokalemia.  Plan: replete and f/u  likely from diuresis.      Problem/Plan - 4:  ·  Problem: Hyponatremia.  Plan: check serum osm, fluid restriction.      Problem/Plan - 5:  ·  Problem: CKD (chronic kidney disease).  Plan: renal f/u  hyponatremia- monitor closely , check serum osm.      Problem/Plan - 6:  Problem: HTN (hypertension). Plan: cont current htn med regimen.     Problem/Plan - 7:  ·  Problem: HLD (hyperlipidemia).  Plan: Continue Lipitor.      Problem/Plan - 8:  ·  Problem: DM (diabetes mellitus).  Plan: iss.      Problem/Plan - 9:  ·  Problem: History of CVA (cerebrovascular accident).  Plan: Prior CVA likely embolic in origin given atrial fibrillation. 67 y/o male with a PMHx of CAD S/P 4V CABG, ischemic cardiomyopathy with severe LV dysfunction (EF 21%) S/P Medtronic ICD placement, atrial fibrillation on Coumadin, MR S/P mitral valvuloplasty, CVA, TIA, HTN, HLD, DM and CKD who presented to the ED with shortness of breath, cough, abdominal distention and lower extremity edema in the setting of acute on chronic systolic heart failure.    1. Acute on chronic systolic (congestive) heart failure - diuresis as per cards with Bumex.  Transthoracic Echocardiogram: Mitral annular calcification. Tethered mitral valve leaflets with normal opening. Mild mitral regurgitation. Calcified trileaflet aortic valve with normal opening. Peak transaortic valve gradient equals 2 mm Hg. Minimal aortic regurgitation.  Moderate left ventricular enlargement.  Severe global left ventricular systolic dysfunction  Right ventricular enlargement with decreased right ventricular systolic function. A device wire is noted in the right heart. TVs' = 4 cm/sec. Estimated right ventricular systolic pressure equals 24 mm Hg, assuming right atrial pressure equals 10 mm Hg, consistent with normal pulmonary pressures. Normal tricuspid valve.  Moderate-severe tricuspid regurgitation.  Oxygen saturation ambulating is 96%. Medications adjusted.   2. AFIB - Rate controlled, will send on Coumadin 7.5mg dose. INR today 1.90.   3. HypoK - oral supplementation, likely from diuresis, increased aldactone today.   4. HypoNa - Serum osmolality within normal limits.   5. CKD - stable.   6. HTN - continue medications   7. HLD - Continue Lipitor.   8. DM2 - on sliding scale   9. CVA - Prior CVA likely embolic in origin given atrial fibrillation.     Patient seen and evaluated, no acute somatic complaints. Medically cleared/stable for discharge as per Dr. Roldan with close outpatient follow up within 1-2 weeks of discharge. Patient understands and agrees with plan of care.

## 2019-02-26 NOTE — PROGRESS NOTE ADULT - PROBLEM SELECTOR PLAN 1
baseline ~ 1.4-1.7  CKD likely DM/HTN/CHF  renal function at baseline  avoid nephrotoxic agents  UA has minimum proteinuria  monitor bmp
baseline ~ 1.4-1.7  CKD likely DM/HTN/CHF  renal function at baseline  avoid nephrotoxic agents  UA has minimum proteinuria  monitor bmp  continue diuresing.
diuresis as per cards , echo
diuresis as per cards , echo  pt still congested , will obtain cxr, oxygen sat on ambulation
diuresis as per cards - from: Transthoracic Echocardiogram (02.20.19 @ 18:09) >  Mitral annular calcification. Tethered mitral valve leaflets with normal opening. Mild mitral regurgitation. Calcified trileaflet aortic valve with normal opening. Peak transaortic valve gradient equals 2 mm Hg. Minimal  aortic regurgitation.  Moderate left ventricular enlargement.  Severe global left ventricular systolic dysfunction  Right ventricular enlargement with decreased right ventricular systolic function. A device wire is noted in the right heart. TVs' = 4 cm/sec. Estimated right ventricular systolic pressure equals 24 mm Hg, assuming right atrial pressure equals 10 mm Hg, consistent with normal pulmonary pressures. Normal tricuspid valve.  Moderate-severe tricuspid regurgitation.  pt still congested , will obtain cxr, oxygen sat on ambulation
diuresis as per cards , echo

## 2019-02-26 NOTE — PROVIDER CONTACT NOTE (OTHER) - ASSESSMENT
Asymptomatic.
pt stomach soft, no complaints of discomfort at this time.
Patient is currently asymptomatic, denies dizziness
Pt is still A&Ox4. Answers questions correct when asked.

## 2019-02-26 NOTE — PROGRESS NOTE ADULT - SUBJECTIVE AND OBJECTIVE BOX
Phillip Sorenson MD  Interventional Cardiology / Advance Heart Failure and Cardiac Transplant Specialist  [place] Office : [phone] [place] [address] [facility] [address]  Tel:   [place] Office : [address] [facility] [place] [address]  Tel: [phone]  Cell : [phone]    Subjective : Pt lying in bed comfortable, not in distress, denies any chest pain, SOB improving  MEDICATIONS:  buMETAnide 2 milliGRAM(s) Oral two times a day  digoxin     Tablet 0.125 milliGRAM(s) Oral daily  enoxaparin Injectable 80 milliGRAM(s) SubCutaneous two times a day  spironolactone 50 milliGRAM(s) Oral daily  tamsulosin 0.4 milliGRAM(s) Oral at bedtime  warfarin 7.5 milliGRAM(s) Oral once        melatonin 3 milliGRAM(s) Oral at bedtime    simethicone 80 milliGRAM(s) Chew two times a day PRN    atorvastatin 20 milliGRAM(s) Oral at bedtime  dextrose 40% Gel 15 Gram(s) Oral once PRN  dextrose 50% Injectable 12.5 Gram(s) IV Push once  dextrose 50% Injectable 25 Gram(s) IV Push once  dextrose 50% Injectable 25 Gram(s) IV Push once  glucagon  Injectable 1 milliGRAM(s) IntraMuscular once PRN  insulin lispro (HumaLOG) corrective regimen sliding scale   SubCutaneous three times a day before meals  insulin lispro (HumaLOG) corrective regimen sliding scale   SubCutaneous at bedtime    dextrose 5%. 1000 milliLiter(s) IV Continuous <Continuous>  potassium chloride    Tablet ER 40 milliEquivalent(s) Oral every 4 hours      PHYSICAL EXAM:  T(C): 36.8 (02-26-19 @ 11:45), Max: 36.8 (02-25-19 @ 20:37)  HR: 82 (02-26-19 @ 11:45) (64 - 82)  BP: 102/55 (02-26-19 @ 11:45) (98/54 - 102/55)  RR: 18 (02-26-19 @ 11:45) (18 - 18)  SpO2: 96% (02-26-19 @ 11:45) (96% - 99%)  Wt(kg): --  I&O's Summary    25 Feb 2019 07:01  -  26 Feb 2019 07:00  --------------------------------------------------------  IN: 830 mL / OUT: 1875 mL / NET: -1045 mL    26 Feb 2019 07:01  -  26 Feb 2019 12:39  --------------------------------------------------------  IN: 0 mL / OUT: 900 mL / NET: -900 mL            HEENT:   Normal oral mucosa, PERRL, EOMI	  Cardiovascular: Normal S1 S2, No JVD, No murmurs, No edema  Respiratory: Lungs clear to auscultation	  Gastrointestinal:  Soft, Non-tender, + BS	  Extremities: 1+ edema                                    12.8   4.83  )-----------( 109      ( 26 Feb 2019 05:49 )             40.9     02-26    130<L>  |  84<L>  |  41<H>  ----------------------------<  124<H>  3.1<L>   |  30  |  1.22    Ca    8.6      26 Feb 2019 05:49  Mg     1.7     02-26      proBNP:   Lipid Profile:   HgA1c:   TSH:

## 2019-03-14 ENCOUNTER — INPATIENT (INPATIENT)
Facility: HOSPITAL | Age: 69
LOS: 8 days | Discharge: HOME | End: 2019-03-23
Attending: INTERNAL MEDICINE | Admitting: INTERNAL MEDICINE
Payer: MEDICARE

## 2019-03-14 VITALS
HEART RATE: 60 BPM | TEMPERATURE: 98 F | RESPIRATION RATE: 16 BRPM | OXYGEN SATURATION: 100 % | SYSTOLIC BLOOD PRESSURE: 120 MMHG | DIASTOLIC BLOOD PRESSURE: 73 MMHG

## 2019-03-14 DIAGNOSIS — I50.9 HEART FAILURE, UNSPECIFIED: ICD-10-CM

## 2019-03-14 DIAGNOSIS — Z95.1 PRESENCE OF AORTOCORONARY BYPASS GRAFT: Chronic | ICD-10-CM

## 2019-03-14 DIAGNOSIS — Z98.890 OTHER SPECIFIED POSTPROCEDURAL STATES: Chronic | ICD-10-CM

## 2019-03-14 LAB
ALBUMIN SERPL ELPH-MCNC: 3.5 G/DL — SIGNIFICANT CHANGE UP (ref 3.3–5)
ALP SERPL-CCNC: 67 U/L — SIGNIFICANT CHANGE UP (ref 40–120)
ALT FLD-CCNC: 25 U/L — SIGNIFICANT CHANGE UP (ref 4–41)
ANION GAP SERPL CALC-SCNC: 12 MMO/L — SIGNIFICANT CHANGE UP (ref 7–14)
APTT BLD: 32 SEC — SIGNIFICANT CHANGE UP (ref 27.5–36.3)
AST SERPL-CCNC: 33 U/L — SIGNIFICANT CHANGE UP (ref 4–40)
BASOPHILS # BLD AUTO: 0.04 K/UL — SIGNIFICANT CHANGE UP (ref 0–0.2)
BASOPHILS NFR BLD AUTO: 0.6 % — SIGNIFICANT CHANGE UP (ref 0–2)
BILIRUB SERPL-MCNC: 1 MG/DL — SIGNIFICANT CHANGE UP (ref 0.2–1.2)
BUN SERPL-MCNC: 29 MG/DL — HIGH (ref 7–23)
CALCIUM SERPL-MCNC: 9 MG/DL — SIGNIFICANT CHANGE UP (ref 8.4–10.5)
CHLORIDE SERPL-SCNC: 90 MMOL/L — LOW (ref 98–107)
CO2 SERPL-SCNC: 25 MMOL/L — SIGNIFICANT CHANGE UP (ref 22–31)
CREAT SERPL-MCNC: 1.23 MG/DL — SIGNIFICANT CHANGE UP (ref 0.5–1.3)
EOSINOPHIL # BLD AUTO: 0.06 K/UL — SIGNIFICANT CHANGE UP (ref 0–0.5)
EOSINOPHIL NFR BLD AUTO: 0.9 % — SIGNIFICANT CHANGE UP (ref 0–6)
GLUCOSE SERPL-MCNC: 111 MG/DL — HIGH (ref 70–99)
HCT VFR BLD CALC: 44.1 % — SIGNIFICANT CHANGE UP (ref 39–50)
HGB BLD-MCNC: 13.4 G/DL — SIGNIFICANT CHANGE UP (ref 13–17)
IMM GRANULOCYTES NFR BLD AUTO: 0.6 % — SIGNIFICANT CHANGE UP (ref 0–1.5)
INR BLD: 1.51 — HIGH (ref 0.88–1.17)
LYMPHOCYTES # BLD AUTO: 0.8 K/UL — LOW (ref 1–3.3)
LYMPHOCYTES # BLD AUTO: 11.9 % — LOW (ref 13–44)
MCHC RBC-ENTMCNC: 24.3 PG — LOW (ref 27–34)
MCHC RBC-ENTMCNC: 30.4 % — LOW (ref 32–36)
MCV RBC AUTO: 80 FL — SIGNIFICANT CHANGE UP (ref 80–100)
MONOCYTES # BLD AUTO: 0.79 K/UL — SIGNIFICANT CHANGE UP (ref 0–0.9)
MONOCYTES NFR BLD AUTO: 11.7 % — SIGNIFICANT CHANGE UP (ref 2–14)
NEUTROPHILS # BLD AUTO: 5 K/UL — SIGNIFICANT CHANGE UP (ref 1.8–7.4)
NEUTROPHILS NFR BLD AUTO: 74.3 % — SIGNIFICANT CHANGE UP (ref 43–77)
NRBC # FLD: 0 K/UL — LOW (ref 25–125)
NT-PROBNP SERPL-SCNC: 5242 PG/ML — SIGNIFICANT CHANGE UP
PLATELET # BLD AUTO: 142 K/UL — LOW (ref 150–400)
PMV BLD: 9.5 FL — SIGNIFICANT CHANGE UP (ref 7–13)
POTASSIUM SERPL-MCNC: 4.6 MMOL/L — SIGNIFICANT CHANGE UP (ref 3.5–5.3)
POTASSIUM SERPL-SCNC: 4.6 MMOL/L — SIGNIFICANT CHANGE UP (ref 3.5–5.3)
PROT SERPL-MCNC: 6.6 G/DL — SIGNIFICANT CHANGE UP (ref 6–8.3)
PROTHROM AB SERPL-ACNC: 17 SEC — HIGH (ref 9.8–13.1)
RBC # BLD: 5.51 M/UL — SIGNIFICANT CHANGE UP (ref 4.2–5.8)
RBC # FLD: 17.4 % — HIGH (ref 10.3–14.5)
SODIUM SERPL-SCNC: 127 MMOL/L — LOW (ref 135–145)
TROPONIN T, HIGH SENSITIVITY: 69 NG/L — CRITICAL HIGH (ref ?–14)
WBC # BLD: 6.73 K/UL — SIGNIFICANT CHANGE UP (ref 3.8–10.5)
WBC # FLD AUTO: 6.73 K/UL — SIGNIFICANT CHANGE UP (ref 3.8–10.5)

## 2019-03-14 PROCEDURE — 71046 X-RAY EXAM CHEST 2 VIEWS: CPT | Mod: 26

## 2019-03-14 RX ORDER — FUROSEMIDE 40 MG
60 TABLET ORAL DAILY
Qty: 0 | Refills: 0 | Status: DISCONTINUED | OUTPATIENT
Start: 2019-03-14 | End: 2019-03-15

## 2019-03-14 RX ORDER — ASPIRIN/CALCIUM CARB/MAGNESIUM 324 MG
162 TABLET ORAL ONCE
Qty: 0 | Refills: 0 | Status: COMPLETED | OUTPATIENT
Start: 2019-03-14 | End: 2019-03-14

## 2019-03-14 RX ADMIN — Medication 162 MILLIGRAM(S): at 23:49

## 2019-03-14 RX ADMIN — Medication 60 MILLIGRAM(S): at 21:58

## 2019-03-14 NOTE — ED ADULT NURSE NOTE - OBJECTIVE STATEMENT
alert oriented no distress  3+ donita LE edema noted   abd is softly distended   not tender to touch    no SOB at rest

## 2019-03-14 NOTE — ED ADULT TRIAGE NOTE - CHIEF COMPLAINT QUOTE
Pt c/o bilateral lower extremity edema x 2 days w dyspnea on exertion, pt also c/o chronic dyspnea on exertion x 2 months, denies any sob at present time. Denies cp.

## 2019-03-14 NOTE — ED PROVIDER NOTE - ATTENDING CONTRIBUTION TO CARE
I, Lyle Adan MD, personally saw the patient with the resident, and completed the key components of the history and physical exam. I then discussed the management plan with the resident.    pt w/ significant Hx CHF now w/ worsening LE edema.  Likely admit for IV diuresis

## 2019-03-14 NOTE — ED PROVIDER NOTE - CLINICAL SUMMARY MEDICAL DECISION MAKING FREE TEXT BOX
Worsening lower extremity edema + weight gain x 1 week. Reports diet and medication compliance. No other symptoms. Chronic SOB/COFFEY that is unchanged. No chest pain. Pt well appearing, NAD, vitals wnl. Will obtain labs, give Lasix, and likely admit for CHF exacerbation.

## 2019-03-14 NOTE — ED PROVIDER NOTE - NS ED ROS FT
GENERAL: No fever or chills  EYES: no change in vision  HEENT: no trouble swallowing or speaking  CARDIAC: no chest pain  PULMONARY: +chronic SOB/COFFEY/dry cough without recent worsening  GI: no abdominal pain, no nausea, no vomiting, no diarrhea or constipation  : No changes in urination  SKIN: +LE and abdominal edema. no rashes  NEURO: no headache or neuro sxs   MSK: No joint pain     ~Dimas Del Cid PGY1

## 2019-03-14 NOTE — ED PROVIDER NOTE - PHYSICAL EXAMINATION
Gen: AAOx3, non-toxic  Head: NCAT  HEENT: EOMI, oral mucosa moist, normal conjunctiva  Lung: CTAB, no respiratory distress, no wheezes/rhonchi/rales B/L, speaking in full sentences  CV: RRR, no murmurs, rubs or gallops  Abd: soft, NTND, no guarding, no CVA tenderness  MSK: no visible deformities  Neuro: No focal sensory or motor deficits  Skin: Warm, well perfused, no rash  Psych: normal affect.     ~Dimas Del Cid PGY1 Gen: AAOx3, non-toxic  Head: NCAT  HEENT: EOMI, oral mucosa moist, normal conjunctiva  Lung: CTAB, no respiratory distress, no wheezes/rhonchi/rales B/L, speaking in full sentences  CV: RRR, no murmurs, rubs or gallops  Abd: soft, NTND, no guarding, no CVA tenderness  MSK: no visible deformities  Neuro: No focal sensory or motor deficits  Skin: Warm, well perfused, no rash  Psych: normal affect.     ~Dimas Del Cid PGY1    ***GEN - NAD; well appearing; A+O x3   ***PULMONARY - occasional rhonchi, no signs resp distress.***CARDIAC -s1s2, RRR, no M,G,R  ***ABDOMEN - ND, NT, soft, no guarding, no rebound, no william's   ***SKIN - no rash or bruising   ***NEUROLOGIC - alert and oriented, follows commands, sensation nl, motor nl, ***PSYCH - insight and judgment nl, memory nl, affect nl, thought nl

## 2019-03-14 NOTE — ED PROVIDER NOTE - PSH
AICD (automatic cardioverter/defibrillator) present  [employer]  S/P CABG (coronary artery bypass graft)  4V CABG in 2007  S/P mitral valve repair  2007

## 2019-03-14 NOTE — ED PROVIDER NOTE - OBJECTIVE STATEMENT
68M hx of HTN, HLD, DM, CVA, CKD, CAD S/P 4V CABG, ischemic cardiomyopathy with severe LV dysfunction (EF 21%) S/P Medtronic ICD placement, atrial fibrillation on Coumadin, and MR S/P mitral valvuloplasty presents to the ER with worsening lower extremity edema x 1 week. Associated with 7+ lb. weight gain over the same time period. Pt endorses chronic SOB/COFFEY which he states has been stable. Denies any chest pain, worsening SOB/COFFEY, fever, chills, abdominal pain, N/V/D, numbness, weakness. Was sent to the ER today by his PCP out of concern for CHF exacerbation. Also of note, pt was recently admitted for CHF exacerbation in mid February. Discharged on bumetanide and spironolactone, endorses strict med compliance and denies any change in diet. 68M hx of HTN, HLD, DM, CVA, CKD, CAD S/P 4V CABG, ischemic cardiomyopathy with severe LV dysfunction (EF 21%) S/P Medtronic ICD placement, atrial fibrillation on Coumadin, and MR S/P mitral valvuloplasty presents to the ER with worsening lower extremity edema x 1 week. Associated with 7+ lb. weight gain over the same time period. Pt endorses chronic SOB/COFFEY which he states has been stable. Denies any chest pain, worsening SOB/COFFEY, fever, chills, abdominal pain, N/V/D, numbness, weakness. Was sent to the ER today by his PCP out of concern for CHF exacerbation. Also of note, pt was recently admitted for CHF exacerbation in mid February. Discharged on bumetanide and spironolactone, endorses strict med compliance and denies any change in diet.    Antionette: 70 y/o M w/ hx as noted pw LE edema, orthopnea x 3 days.  Increased extremity swelling, eval at PMD and sent to ED.  No CP, fever, AP, d/c, n/v.  + chronic cough.

## 2019-03-15 DIAGNOSIS — Z29.9 ENCOUNTER FOR PROPHYLACTIC MEASURES, UNSPECIFIED: ICD-10-CM

## 2019-03-15 DIAGNOSIS — E78.5 HYPERLIPIDEMIA, UNSPECIFIED: ICD-10-CM

## 2019-03-15 DIAGNOSIS — I50.23 ACUTE ON CHRONIC SYSTOLIC (CONGESTIVE) HEART FAILURE: ICD-10-CM

## 2019-03-15 DIAGNOSIS — R74.8 ABNORMAL LEVELS OF OTHER SERUM ENZYMES: ICD-10-CM

## 2019-03-15 DIAGNOSIS — I48.91 UNSPECIFIED ATRIAL FIBRILLATION: ICD-10-CM

## 2019-03-15 DIAGNOSIS — E11.9 TYPE 2 DIABETES MELLITUS WITHOUT COMPLICATIONS: ICD-10-CM

## 2019-03-15 DIAGNOSIS — I10 ESSENTIAL (PRIMARY) HYPERTENSION: ICD-10-CM

## 2019-03-15 PROBLEM — I34.0 NONRHEUMATIC MITRAL (VALVE) INSUFFICIENCY: Chronic | Status: ACTIVE | Noted: 2019-02-16

## 2019-03-15 PROBLEM — N18.9 CHRONIC KIDNEY DISEASE, UNSPECIFIED: Chronic | Status: ACTIVE | Noted: 2019-02-16

## 2019-03-15 LAB
ANION GAP SERPL CALC-SCNC: 14 MMO/L — SIGNIFICANT CHANGE UP (ref 7–14)
APPEARANCE UR: CLEAR — SIGNIFICANT CHANGE UP
APTT BLD: 32 SEC — SIGNIFICANT CHANGE UP (ref 27.5–36.3)
BILIRUB UR-MCNC: NEGATIVE — SIGNIFICANT CHANGE UP
BLOOD UR QL VISUAL: NEGATIVE — SIGNIFICANT CHANGE UP
BUN SERPL-MCNC: 28 MG/DL — HIGH (ref 7–23)
CALCIUM SERPL-MCNC: 8.2 MG/DL — LOW (ref 8.4–10.5)
CHLORIDE SERPL-SCNC: 91 MMOL/L — LOW (ref 98–107)
CHOLEST SERPL-MCNC: 126 MG/DL — SIGNIFICANT CHANGE UP (ref 120–199)
CK MB BLD-MCNC: 6.13 NG/ML — SIGNIFICANT CHANGE UP (ref 1–6.6)
CK SERPL-CCNC: 85 U/L — SIGNIFICANT CHANGE UP (ref 30–200)
CO2 SERPL-SCNC: 24 MMOL/L — SIGNIFICANT CHANGE UP (ref 22–31)
COLOR SPEC: SIGNIFICANT CHANGE UP
CREAT SERPL-MCNC: 1.16 MG/DL — SIGNIFICANT CHANGE UP (ref 0.5–1.3)
GLUCOSE BLDC GLUCOMTR-MCNC: 109 MG/DL — HIGH (ref 70–99)
GLUCOSE BLDC GLUCOMTR-MCNC: 155 MG/DL — HIGH (ref 70–99)
GLUCOSE BLDC GLUCOMTR-MCNC: 164 MG/DL — HIGH (ref 70–99)
GLUCOSE BLDC GLUCOMTR-MCNC: 91 MG/DL — SIGNIFICANT CHANGE UP (ref 70–99)
GLUCOSE BLDC GLUCOMTR-MCNC: 95 MG/DL — SIGNIFICANT CHANGE UP (ref 70–99)
GLUCOSE SERPL-MCNC: 107 MG/DL — HIGH (ref 70–99)
GLUCOSE UR-MCNC: NEGATIVE — SIGNIFICANT CHANGE UP
HBA1C BLD-MCNC: 6.8 % — HIGH (ref 4–5.6)
HCT VFR BLD CALC: 38.7 % — LOW (ref 39–50)
HDLC SERPL-MCNC: 38 MG/DL — SIGNIFICANT CHANGE UP (ref 35–55)
HGB BLD-MCNC: 11.9 G/DL — LOW (ref 13–17)
INR BLD: 1.66 — HIGH (ref 0.88–1.17)
KETONES UR-MCNC: NEGATIVE — SIGNIFICANT CHANGE UP
LEUKOCYTE ESTERASE UR-ACNC: NEGATIVE — SIGNIFICANT CHANGE UP
LIPID PNL WITH DIRECT LDL SERPL: 85 MG/DL — SIGNIFICANT CHANGE UP
MAGNESIUM SERPL-MCNC: 1.5 MG/DL — LOW (ref 1.6–2.6)
MCHC RBC-ENTMCNC: 24 PG — LOW (ref 27–34)
MCHC RBC-ENTMCNC: 30.7 % — LOW (ref 32–36)
MCV RBC AUTO: 78 FL — LOW (ref 80–100)
NITRITE UR-MCNC: NEGATIVE — SIGNIFICANT CHANGE UP
NRBC # FLD: 0 K/UL — LOW (ref 25–125)
PH UR: 7.5 — SIGNIFICANT CHANGE UP (ref 5–8)
PHOSPHATE SERPL-MCNC: 3.4 MG/DL — SIGNIFICANT CHANGE UP (ref 2.5–4.5)
PLATELET # BLD AUTO: 125 K/UL — LOW (ref 150–400)
PMV BLD: 9.7 FL — SIGNIFICANT CHANGE UP (ref 7–13)
POTASSIUM SERPL-MCNC: 4.4 MMOL/L — SIGNIFICANT CHANGE UP (ref 3.5–5.3)
POTASSIUM SERPL-SCNC: 4.4 MMOL/L — SIGNIFICANT CHANGE UP (ref 3.5–5.3)
PROT UR-MCNC: NEGATIVE — SIGNIFICANT CHANGE UP
PROTHROM AB SERPL-ACNC: 19.2 SEC — HIGH (ref 9.8–13.1)
RBC # BLD: 4.96 M/UL — SIGNIFICANT CHANGE UP (ref 4.2–5.8)
RBC # FLD: 17.2 % — HIGH (ref 10.3–14.5)
SODIUM SERPL-SCNC: 129 MMOL/L — LOW (ref 135–145)
SP GR SPEC: 1.01 — SIGNIFICANT CHANGE UP (ref 1–1.04)
TRIGL SERPL-MCNC: 87 MG/DL — SIGNIFICANT CHANGE UP (ref 10–149)
TROPONIN T, HIGH SENSITIVITY: 65 NG/L — CRITICAL HIGH (ref ?–14)
TROPONIN T, HIGH SENSITIVITY: 71 NG/L — CRITICAL HIGH (ref ?–14)
TSH SERPL-MCNC: 2.6 UIU/ML — SIGNIFICANT CHANGE UP (ref 0.27–4.2)
UROBILINOGEN FLD QL: NORMAL — SIGNIFICANT CHANGE UP
WBC # BLD: 6.01 K/UL — SIGNIFICANT CHANGE UP (ref 3.8–10.5)
WBC # FLD AUTO: 6.01 K/UL — SIGNIFICANT CHANGE UP (ref 3.8–10.5)

## 2019-03-15 RX ORDER — DEXTROSE 50 % IN WATER 50 %
15 SYRINGE (ML) INTRAVENOUS ONCE
Qty: 0 | Refills: 0 | Status: DISCONTINUED | OUTPATIENT
Start: 2019-03-15 | End: 2019-03-23

## 2019-03-15 RX ORDER — WARFARIN SODIUM 2.5 MG/1
5 TABLET ORAL ONCE
Qty: 0 | Refills: 0 | Status: COMPLETED | OUTPATIENT
Start: 2019-03-15 | End: 2019-03-15

## 2019-03-15 RX ORDER — DIGOXIN 250 MCG
0.12 TABLET ORAL DAILY
Qty: 0 | Refills: 0 | Status: DISCONTINUED | OUTPATIENT
Start: 2019-03-15 | End: 2019-03-23

## 2019-03-15 RX ORDER — SODIUM CHLORIDE 9 MG/ML
1000 INJECTION, SOLUTION INTRAVENOUS
Qty: 0 | Refills: 0 | Status: DISCONTINUED | OUTPATIENT
Start: 2019-03-15 | End: 2019-03-23

## 2019-03-15 RX ORDER — INSULIN LISPRO 100/ML
VIAL (ML) SUBCUTANEOUS
Qty: 0 | Refills: 0 | Status: DISCONTINUED | OUTPATIENT
Start: 2019-03-15 | End: 2019-03-23

## 2019-03-15 RX ORDER — DEXTROSE 50 % IN WATER 50 %
25 SYRINGE (ML) INTRAVENOUS ONCE
Qty: 0 | Refills: 0 | Status: DISCONTINUED | OUTPATIENT
Start: 2019-03-15 | End: 2019-03-23

## 2019-03-15 RX ORDER — TAMSULOSIN HYDROCHLORIDE 0.4 MG/1
0.4 CAPSULE ORAL AT BEDTIME
Qty: 0 | Refills: 0 | Status: DISCONTINUED | OUTPATIENT
Start: 2019-03-15 | End: 2019-03-23

## 2019-03-15 RX ORDER — MAGNESIUM OXIDE 400 MG ORAL TABLET 241.3 MG
400 TABLET ORAL
Qty: 0 | Refills: 0 | Status: COMPLETED | OUTPATIENT
Start: 2019-03-15 | End: 2019-03-17

## 2019-03-15 RX ORDER — ATORVASTATIN CALCIUM 80 MG/1
20 TABLET, FILM COATED ORAL AT BEDTIME
Qty: 0 | Refills: 0 | Status: DISCONTINUED | OUTPATIENT
Start: 2019-03-15 | End: 2019-03-23

## 2019-03-15 RX ORDER — GLUCAGON INJECTION, SOLUTION 0.5 MG/.1ML
1 INJECTION, SOLUTION SUBCUTANEOUS ONCE
Qty: 0 | Refills: 0 | Status: DISCONTINUED | OUTPATIENT
Start: 2019-03-15 | End: 2019-03-23

## 2019-03-15 RX ORDER — BUMETANIDE 0.25 MG/ML
0.5 INJECTION INTRAMUSCULAR; INTRAVENOUS
Qty: 20 | Refills: 0 | Status: DISCONTINUED | OUTPATIENT
Start: 2019-03-15 | End: 2019-03-20

## 2019-03-15 RX ORDER — SPIRONOLACTONE 25 MG/1
50 TABLET, FILM COATED ORAL DAILY
Qty: 0 | Refills: 0 | Status: DISCONTINUED | OUTPATIENT
Start: 2019-03-15 | End: 2019-03-23

## 2019-03-15 RX ORDER — BUMETANIDE 0.25 MG/ML
2 INJECTION INTRAMUSCULAR; INTRAVENOUS
Qty: 0 | Refills: 0 | Status: DISCONTINUED | OUTPATIENT
Start: 2019-03-15 | End: 2019-03-15

## 2019-03-15 RX ORDER — INSULIN LISPRO 100/ML
VIAL (ML) SUBCUTANEOUS AT BEDTIME
Qty: 0 | Refills: 0 | Status: DISCONTINUED | OUTPATIENT
Start: 2019-03-15 | End: 2019-03-23

## 2019-03-15 RX ORDER — DEXTROSE 50 % IN WATER 50 %
12.5 SYRINGE (ML) INTRAVENOUS ONCE
Qty: 0 | Refills: 0 | Status: DISCONTINUED | OUTPATIENT
Start: 2019-03-15 | End: 2019-03-23

## 2019-03-15 RX ADMIN — SPIRONOLACTONE 50 MILLIGRAM(S): 25 TABLET, FILM COATED ORAL at 05:39

## 2019-03-15 RX ADMIN — BUMETANIDE 2 MILLIGRAM(S): 0.25 INJECTION INTRAMUSCULAR; INTRAVENOUS at 05:39

## 2019-03-15 RX ADMIN — TAMSULOSIN HYDROCHLORIDE 0.4 MILLIGRAM(S): 0.4 CAPSULE ORAL at 22:28

## 2019-03-15 RX ADMIN — MAGNESIUM OXIDE 400 MG ORAL TABLET 400 MILLIGRAM(S): 241.3 TABLET ORAL at 22:28

## 2019-03-15 RX ADMIN — WARFARIN SODIUM 5 MILLIGRAM(S): 2.5 TABLET ORAL at 20:16

## 2019-03-15 RX ADMIN — BUMETANIDE 2.5 MG/HR: 0.25 INJECTION INTRAMUSCULAR; INTRAVENOUS at 14:53

## 2019-03-15 RX ADMIN — Medication 0.12 MILLIGRAM(S): at 05:39

## 2019-03-15 RX ADMIN — ATORVASTATIN CALCIUM 20 MILLIGRAM(S): 80 TABLET, FILM COATED ORAL at 22:28

## 2019-03-15 RX ADMIN — MAGNESIUM OXIDE 400 MG ORAL TABLET 400 MILLIGRAM(S): 241.3 TABLET ORAL at 16:00

## 2019-03-15 RX ADMIN — Medication 1: at 13:37

## 2019-03-15 NOTE — H&P ADULT - NEGATIVE MUSCULOSKELETAL SYMPTOMS
no joint swelling/no myalgia/no stiffness/no arthralgia/no arthritis/no muscle cramps/no muscle weakness/no neck pain

## 2019-03-15 NOTE — CONSULT NOTE ADULT - ASSESSMENT
EKG - Afib V paced   Echo - 2/19 - Severe LV dysfunction RV dysfunction mod to sev TR    a/p     1) Acute on chronic systolic CHF exacerbation -  start bumex drip 0.5mg/hr, add metolazone, cont aldactone, s/p ICD    2) Afib - INR 1.6 dose coumadin     3) CKD - creatnine around baseline f/u renal     4) Hyponatremia - likely sec to hypervolemia, f/u renal

## 2019-03-15 NOTE — H&P ADULT - NEGATIVE NEUROLOGICAL SYMPTOMS
no weakness/no paresthesias/no tremors/no vertigo/no transient paralysis/no loss of sensation/no headache/no syncope/no difficulty walking/no generalized seizures/no focal seizures/no loss of consciousness/no hemiparesis/no confusion

## 2019-03-15 NOTE — H&P ADULT - NSICDXPASTSURGICALHX_GEN_ALL_CORE_FT
PAST SURGICAL HISTORY:  AICD (automatic cardioverter/defibrillator) present [employer]    S/P CABG (coronary artery bypass graft) 4V CABG in 2007    S/P mitral valve repair 2007

## 2019-03-15 NOTE — H&P ADULT - NSICDXPROBLEM_GEN_ALL_CORE_FT
PROBLEM DIAGNOSES  Problem: Acute on chronic systolic heart failure  Assessment and Plan: Monitor on telemetry  HgbA1C, TSH, lipid profile, CBC, CMP in am   Low sodium diet, daily weights, monitor I's and O's, fluid restrictions 1000cc/day  Check BNP in 48 hours   Started on Bumex 2mg IV BID as patient with no response to Lasix from prior admission   Will need to call Dr. Sorenson in am for Cardiology consult     Problem: Elevated troponin  Assessment and Plan: HsT on admission was 69 with repeat being 71 and EKG with no dynamic ischemic changes. Patient denied any chest pain. Likely type II MI from underline demand and CHF  Will monitor on telemetry for now   Will check 1 more set of cardiac enzyme to trend troponin level   Will need to call Dr. Sorenson in am for cardiology consult     Problem: AF (atrial fibrillation)  Assessment and Plan: [id number] Score of 4 and patient on Coumadin for anticoagulation. INR noted to be 1.51.   Will continue with Digoxin for rate control   Digoxin level ordered with am labs     Problem: Essential hypertension  Assessment and Plan: Continue with antihypertensive medications   DASH diet recommended     Problem: DM (diabetes mellitus)  Assessment and Plan: On insulin sliding scale(humalog)  FS TID at bedtime, HgbA1C in am     Problem: HLD (hyperlipidemia)  Assessment and Plan: Continue with Lipitor in am   Lipid profile in am, low cholesterol diet recommended     Problem: Need for prophylactic measure  Assessment and Plan: Already theraupetic as patient is on Coumadin for Hx of atrial fibrillation PROBLEM DIAGNOSES  Problem: Acute on chronic systolic heart failure  Assessment and Plan: Monitor on telemetry  HgbA1C, TSH, lipid profile, CBC, CMP in am   Low sodium diet, daily weights, monitor I's and O's, fluid restrictions 1000cc/day  Check BNP in 48 hours   Started on Bumex 2mg IV BID as patient with no response to Lasix from prior admission   Will need to call Dr. Sorenson in am for Cardiology consult     Problem: Elevated troponin  Assessment and Plan: HsT on admission was 69 with repeat being 71 and EKG with no dynamic ischemic changes. Patient denied any chest pain. Likely type II MI from underline demand and CHF  Will monitor on telemetry for now   Will check 1 more set of cardiac enzyme to trend troponin level   Will need to call Dr. Sorenson in am for cardiology consult     Problem: AF (atrial fibrillation)  Assessment and Plan: [id number] Score of 4 and patient on Coumadin for anticoagulation. INR noted to be 1.51. Will check INR in am and dose Coumadin accordingly   Will continue with Digoxin for rate control   Digoxin level ordered with am labs     Problem: Essential hypertension  Assessment and Plan: Continue with antihypertensive medications   DASH diet recommended     Problem: DM (diabetes mellitus)  Assessment and Plan: On insulin sliding scale(humalog)  FS TID at bedtime, HgbA1C in am     Problem: HLD (hyperlipidemia)  Assessment and Plan: Continue with Lipitor in am   Lipid profile in am, low cholesterol diet recommended     Problem: Need for prophylactic measure  Assessment and Plan: Already theraupetic as patient is on Coumadin for Hx of atrial fibrillation

## 2019-03-15 NOTE — CONSULT NOTE ADULT - ASSESSMENT
68 y/o M with PMH of MR s/p mitral valvuloplasty, Afib (on Coumadin), HLD, HTN, DM, CVA, CAD (s/p 4V CABG), CHF (with EF of 15-20% s/p AICD) presented with the complaint of worsening LE edema, weight gain and abdominal distention. Got admitted with CHF, found to have hyponatremia    Assessment and Plan:      Hyponatremia:  Likely sec to fluid overload sec to CHF  Free water restriction 1L/day  Continue diuretics  Monitor Na level daily    Hypomagnesemia:  likely sec to diuresis  Supplement oral MgO 400mg TID  Monitor Mg level    CHF:  Getting diuresis  Monitor K, Mg level  Follow up cardiology

## 2019-03-15 NOTE — H&P ADULT - NSHPSOCIALHISTORY_GEN_ALL_CORE
Lives with sister, retired   Quit smoking and drinking 15 years ago and used to drink weekend and smoke a pack a day

## 2019-03-15 NOTE — H&P ADULT - ASSESSMENT
70 y/o M with PMH of MR s/p mitral valvuloplasty,  Afib(on Coumadin), HLD, HTN, DM, CVA, CAD(s/p 4V CABG), CHF(with EF of 15-20% s/p AICD) presented with the complaint of worsening LE edema and weight gain. R/o CHF exacerbation    +Acute on chronic systolic CHF-on IV Bumex 2mg BID

## 2019-03-15 NOTE — CONSULT NOTE ADULT - SUBJECTIVE AND OBJECTIVE BOX
Dr. Fuller  Office [phone]  Cell [phone]  Rachel TARANGO  Cell [phone]    HPI:  68 y/o M with PMH of MR s/p mitral valvuloplasty, Afib (on Coumadin), HLD, HTN, DM, CVA, CAD (s/p 4V CABG), CHF (with EF of 15-20% s/p AICD) presented with the complaint of worsening LE edema, weight gain and abdominal distention. Got admitted with CHF, found to have hyponatremia      Allergies:  No Known Allergies      PAST MEDICAL & SURGICAL HISTORY:  CKD (chronic kidney disease)  MR (mitral regurgitation)  AF (atrial fibrillation)  DM (diabetes mellitus)  HLD (hyperlipidemia)  TIA (transient ischemic attack)  HTN (hypertension)  ETOH abuse  CVA (cerebral infarction)  CHB (complete heart block)  CHF (congestive heart failure)  CAD (coronary artery disease): S/P 4V CABG  S/P mitral valve repair:   S/P CABG (coronary artery bypass graft): 4V CABG in   AICD (automatic cardioverter/defibrillator) present: [employer]      Home Medications Reviewed    Hospital Medications:   MEDICATIONS  (STANDING):  atorvastatin 20 milliGRAM(s) Oral at bedtime  buMETAnide Infusion 0.5 mG/Hr (2.5 mL/Hr) IV Continuous <Continuous>  dextrose 5%. 1000 milliLiter(s) (50 mL/Hr) IV Continuous <Continuous>  dextrose 50% Injectable 12.5 Gram(s) IV Push once  dextrose 50% Injectable 25 Gram(s) IV Push once  dextrose 50% Injectable 25 Gram(s) IV Push once  digoxin     Tablet 0.125 milliGRAM(s) Oral daily  insulin lispro (HumaLOG) corrective regimen sliding scale   SubCutaneous three times a day before meals  insulin lispro (HumaLOG) corrective regimen sliding scale   SubCutaneous at bedtime  magnesium oxide 400 milliGRAM(s) Oral three times a day with meals  spironolactone 50 milliGRAM(s) Oral daily  tamsulosin 0.4 milliGRAM(s) Oral at bedtime  warfarin 5 milliGRAM(s) Oral once      SOCIAL HISTORY:  Denies ETOh, Smoking,     FAMILY HISTORY:  No pertinent family history in first degree relatives      REVIEW OF SYSTEMS:  CONSTITUTIONAL: No weakness, fevers or chills  EYES/ENT: No visual changes;  No vertigo or throat pain   NECK: No pain or stiffness  RESPIRATORY: No cough, wheezing, hemoptysis; No shortness of breath  CARDIOVASCULAR: No chest pain or palpitations.  GASTROINTESTINAL: No abdominal or epigastric pain. No nausea, vomiting, or hematemesis; No diarrhea or constipation. No melena or hematochezia.  GENITOURINARY: No dysuria, frequency, foamy urine, urinary urgency, incontinence or hematuria  NEUROLOGICAL: No numbness or weakness  SKIN: No itching, burning, rashes, or lesions   VASCULAR: has bilateral lower extremity edema.   All other review of systems is negative unless indicated above.    VITALS:  T(F): 97.2 (03-15-19 @ 06:42), Max: 97.4 (19 @ 18:57)  HR: 68 (03-15-19 @ 14:20)  BP: 116/69 (03-15-19 @ 14:20)  RR: 17 (03-15-19 @ 14:20)  SpO2: 100% (03-15-19 @ 14:20)  Wt(kg): --    Height (cm): 185.4 (03-15 @ 04:01)  Weight (kg): 78.018 (03-15 @ 04:01)  BMI (kg/m2): 22.7 (03-15 @ 04:01)  BSA (m2): 2.02 (03-15 @ 04:01)    PHYSICAL EXAM:  Constitutional: NAD  HEENT: anicteric sclera, oropharynx clear, MMM  Neck: No JVD  Respiratory: CTAB, no wheezes, rales or rhonchi  Cardiovascular: S1, S2, RRR  Gastrointestinal: BS+, soft, NT, distended  Extremities: No cyanosis or clubbing. 3+ peripheral edema  Neurological: A/O x 3, no focal deficits  Psychiatric: Normal mood, normal affect  : No CVA tenderness. No dooley.   Skin: No rashes       LABS:  03-15    129<L>  |  91<L>  |  28<H>  ----------------------------<  107<H>  4.4   |  24  |  1.16    Ca    8.2<L>      15 Mar 2019 05:15  Phos  3.4     03-15  Mg     1.5     03-15    TPro  6.6  /  Alb  3.5  /  TBili  1.0  /  DBili      /  AST  33  /  ALT  25  /  AlkPhos  67  03-14    Creatinine Trend: 1.16 <--, 1.23 <--                        11.9   6.01  )-----------( 125      ( 15 Mar 2019 05:15 )             38.7     Urine Studies:  Urinalysis Basic - ( 14 Mar 2019 23:30 )    Color: LIGHT YELLOW / Appearance: CLEAR / S.010 / pH: 7.5  Gluc: NEGATIVE / Ketone: NEGATIVE  / Bili: NEGATIVE / Urobili: NORMAL   Blood: NEGATIVE / Protein: NEGATIVE / Nitrite: NEGATIVE   Leuk Esterase: NEGATIVE / RBC:  / WBC    Sq Epi:  / Non Sq Epi:  / Bacteria:           RADIOLOGY & ADDITIONAL STUDIES:

## 2019-03-15 NOTE — H&P ADULT - NSHPLABSRESULTS_GEN_ALL_CORE
13.4   6.73  )-----------( 142      ( 14 Mar 2019 21:43 )             44.1     03-14    127<L>  |  90<L>  |  29<H>  ----------------------------<  111<H>  4.6   |  25  |  1.23    Ca    9.0      14 Mar 2019 21:43    TPro  6.6  /  Alb  3.5  /  TBili  1.0  /  DBili  x   /  AST  33  /  ALT  25  /  AlkPhos  67  03-14    Prelim CXR: No urgent findings    EKG: V-paced rhythm at a rate of 64 with QTc of 468

## 2019-03-15 NOTE — CONSULT NOTE ADULT - SUBJECTIVE AND OBJECTIVE BOX
Phillip Sorenson MD  Interventional Cardiology / Advance Heart Failure and Cardiac Transplant Specialist  [place] Office : [address] [place] [address] [place] [address]  Tel:   [place] Office : [address] [facility] [place] [address]  Tel: [phone]  Cell : [phone]    HISTORY OF PRESENT ILLNESS:  68 y/o M with PMH of MR s/p mitral valvuloplasty, Afib(on Coumadin), HLD, HTN, DM, CVA, CAD(s/p 4V CABG), CHF(with EF of 15-20% s/p AICD) presented with the complaint of worsening LE edema, weight gain and abdominal distention. As per the patient he was discharged from the hospital few weeks ago for acute CHF and was on Bumex gtt. Patient stated that when he was discharged he still had SOB and LE swelling. Patient stated that he is compliant with his heart medication and does not eat food high in salt. Patient stated that for the past few days he has noticed worsening of his chronic LE swelling with clear fluid draining out. Patient also endorsed of abdominal distention and COFFEY which is chronic for him. Patient stated that when he was discharged his weight was 161 and today his weight was 172. Patient stated that he sleeps with 3 pillow at night which is chronic for him and denied any orthopnea or PND. Patient also endorsed of dry cough for quite some time and stated that he urinates about 4-5 times a night. Patient denied any CP, fevers, chills, N/V/D/C, abdominal pain, dysuria, melena, hematochezia, recent travel, sick contact, pleuritic or positional chest pain.     On ED admission EKG revealed V-paced rhythm at a rate of 64 with QTc of 468, CE x 1: Trop: 69, CE x 2: Trop: 71, Plt: 142, Na: 127, BUN/Cr: 29/1.23, Gluc: 111, BNP: 5242, UA: Negative. Prelim CXR: No urgent findings. In the ED patient received IV Lasix 60mg x 1.       PAST MEDICAL & SURGICAL HISTORY:  CKD (chronic kidney disease)  MR (mitral regurgitation)  AF (atrial fibrillation)  DM (diabetes mellitus)  HLD (hyperlipidemia)  TIA (transient ischemic attack)  HTN (hypertension)  ETOH abuse  CVA (cerebral infarction)  CHB (complete heart block)  CHF (congestive heart failure)  CAD (coronary artery disease): S/P 4V CABG  S/P mitral valve repair: 2007  S/P CABG (coronary artery bypass graft): 4V CABG in 2007  AICD (automatic cardioverter/defibrillator) present: [place]    	    MEDICATIONS:  buMETAnide Infusion 0.5 mG/Hr IV Continuous <Continuous>  digoxin     Tablet 0.125 milliGRAM(s) Oral daily  spironolactone 50 milliGRAM(s) Oral daily  tamsulosin 0.4 milliGRAM(s) Oral at bedtime  warfarin 5 milliGRAM(s) Oral once            atorvastatin 20 milliGRAM(s) Oral at bedtime  dextrose 40% Gel 15 Gram(s) Oral once PRN  dextrose 50% Injectable 12.5 Gram(s) IV Push once  dextrose 50% Injectable 25 Gram(s) IV Push once  dextrose 50% Injectable 25 Gram(s) IV Push once  glucagon  Injectable 1 milliGRAM(s) IntraMuscular once PRN  insulin lispro (HumaLOG) corrective regimen sliding scale   SubCutaneous three times a day before meals  insulin lispro (HumaLOG) corrective regimen sliding scale   SubCutaneous at bedtime    dextrose 5%. 1000 milliLiter(s) IV Continuous <Continuous>  magnesium oxide 400 milliGRAM(s) Oral three times a day with meals      FAMILY HISTORY:  No pertinent family history in first degree relatives        Allergies    No Known Allergies    Intolerances    	      PHYSICAL EXAM:  T(C): 36.2 (03-15-19 @ 06:42), Max: 36.2 (03-15-19 @ 06:42)  HR: 68 (03-15-19 @ 14:20) (60 - 77)  BP: 116/69 (03-15-19 @ 14:20) (101/65 - 124/73)  RR: 17 (03-15-19 @ 14:20) (17 - 26)  SpO2: 100% (03-15-19 @ 14:20) (97% - 100%)  Wt(kg): --  I&O's Summary      	  HEENT:   Normal oral mucosa, PERRL, EOMI + jvd	  Cardiovascular: Normal S1 S2, + JVD, No murmurs, No edema  Respiratory: Lungs clear to auscultation	  Gastrointestinal:  Soft, Non-tender, + BS	  Extremities: 2+ edema    LABS:	 	    CARDIAC MARKERS:      CKMB: 6.13 ng/mL (03-15 @ 05:15)                              11.9   6.01  )-----------( 125      ( 15 Mar 2019 05:15 )             38.7     03-15    129<L>  |  91<L>  |  28<H>  ----------------------------<  107<H>  4.4   |  24  |  1.16    Ca    8.2<L>      15 Mar 2019 05:15  Phos  3.4     03-15  Mg     1.5     03-15    TPro  6.6  /  Alb  3.5  /  TBili  1.0  /  DBili  x   /  AST  33  /  ALT  25  /  AlkPhos  67  03-14    proBNP: Serum Pro-Brain Natriuretic Peptide: 5242 pg/mL (03-14 @ 21:43)    Lipid Profile:   HgA1c: Hemoglobin A1C, Whole Blood: 6.8 % (03-15 @ 05:15)    TSH: Thyroid Stimulating Hormone, Serum: 2.60 uIU/mL (03-15 @ 05:15)      ASSESSMENT/PLAN:

## 2019-03-15 NOTE — H&P ADULT - NSICDXPASTMEDICALHX_GEN_ALL_CORE_FT
PAST MEDICAL HISTORY:  AF (atrial fibrillation)     CAD (coronary artery disease) S/P 4V CABG    CHB (complete heart block)     CHF (congestive heart failure)     CKD (chronic kidney disease)     CVA (cerebral infarction)     DM (diabetes mellitus)     ETOH abuse     HLD (hyperlipidemia)     HTN (hypertension)     MR (mitral regurgitation)     TIA (transient ischemic attack)

## 2019-03-15 NOTE — H&P ADULT - HISTORY OF PRESENT ILLNESS
70 y/o M with PMH of MR s/p mitral valvuloplasty, Afib(on Coumadin), HLD, HTN, DM, CVA, CAD(s/p 4V CABG), CHF(with EF of 15-20% s/p AICD) presented with the complaint of worsening LE edema, weight gain and abdominal distention. As per the patient he was discharged from the hospital few weeks ago for acute CHF and was on Bumex gtt. Patient stated that when he was discharged he still had SOB and LE swelling. Patient stated that he is compliant with his heart medication and does not eat food high in salt. Patient stated that for the past few days he has noticed worsening of his chronic LE swelling with clear fluid draining out. Patient also endorsed of abdominal distention and COFFEY which is chronic for him. Patient stated that when he was discharged his weight was 161 and today his weight was 172. Patient stated that he sleeps with 3 pillow at night which is chronic for him and denied any orthopnea or PND. Patient also endorsed of dry cough for quite some time and stated that he urinates about 4-5 times a night. Patient denied any CP, fevers, chills, N/V/D/C, abdominal pain, dysuria, melena, hematochezia, recent travel, sick contact, pleuritic or positional chest pain.     On ED admission EKG revealed V-paced rhythm at a rate of 64 with QTc of 468, CE x 1: Trop: 69, CE x 2: Trop: 71, Plt: 142, Na: 127, BUN/Cr: 29/1.23, Gluc: 111, BNP: 5242, UA: Negative. Prelim CXR: No urgent findings. In the ED patient received IV Lasix 60mg x 1.

## 2019-03-15 NOTE — H&P ADULT - NEGATIVE OPHTHALMOLOGIC SYMPTOMS
no discharge L/no diplopia/no lacrimation L/no blurred vision L/no lacrimation R/no blurred vision R/no discharge R/no photophobia

## 2019-03-15 NOTE — H&P ADULT - NEGATIVE ENMT SYMPTOMS
no nasal discharge/no hearing difficulty/no ear pain/no sinus symptoms/no nasal congestion/no tinnitus/no vertigo

## 2019-03-15 NOTE — H&P ADULT - RS GEN PE MLT RESP DETAILS PC
no wheezes/no intercostal retractions/normal/airway patent/no chest wall tenderness/no rhonchi/rales/respirations non-labored

## 2019-03-16 LAB
ANION GAP SERPL CALC-SCNC: 16 MMO/L — HIGH (ref 7–14)
APTT BLD: 32.2 SEC — SIGNIFICANT CHANGE UP (ref 27.5–36.3)
BUN SERPL-MCNC: 27 MG/DL — HIGH (ref 7–23)
CALCIUM SERPL-MCNC: 8.7 MG/DL — SIGNIFICANT CHANGE UP (ref 8.4–10.5)
CHLORIDE SERPL-SCNC: 89 MMOL/L — LOW (ref 98–107)
CO2 SERPL-SCNC: 26 MMOL/L — SIGNIFICANT CHANGE UP (ref 22–31)
CREAT SERPL-MCNC: 1.1 MG/DL — SIGNIFICANT CHANGE UP (ref 0.5–1.3)
DIGOXIN SERPL-MCNC: 0.7 NG/ML — LOW (ref 0.8–2)
GLUCOSE BLDC GLUCOMTR-MCNC: 116 MG/DL — HIGH (ref 70–99)
GLUCOSE BLDC GLUCOMTR-MCNC: 116 MG/DL — HIGH (ref 70–99)
GLUCOSE BLDC GLUCOMTR-MCNC: 135 MG/DL — HIGH (ref 70–99)
GLUCOSE BLDC GLUCOMTR-MCNC: 161 MG/DL — HIGH (ref 70–99)
GLUCOSE SERPL-MCNC: 101 MG/DL — HIGH (ref 70–99)
HCT VFR BLD CALC: 41 % — SIGNIFICANT CHANGE UP (ref 39–50)
HGB BLD-MCNC: 12.8 G/DL — LOW (ref 13–17)
INR BLD: 1.54 — HIGH (ref 0.88–1.17)
MAGNESIUM SERPL-MCNC: 1.6 MG/DL — SIGNIFICANT CHANGE UP (ref 1.6–2.6)
MCHC RBC-ENTMCNC: 24.3 PG — LOW (ref 27–34)
MCHC RBC-ENTMCNC: 31.2 % — LOW (ref 32–36)
MCV RBC AUTO: 77.8 FL — LOW (ref 80–100)
NRBC # FLD: 0 K/UL — LOW (ref 25–125)
PLATELET # BLD AUTO: 126 K/UL — LOW (ref 150–400)
PMV BLD: 10.1 FL — SIGNIFICANT CHANGE UP (ref 7–13)
POTASSIUM SERPL-MCNC: 3.8 MMOL/L — SIGNIFICANT CHANGE UP (ref 3.5–5.3)
POTASSIUM SERPL-SCNC: 3.8 MMOL/L — SIGNIFICANT CHANGE UP (ref 3.5–5.3)
PROTHROM AB SERPL-ACNC: 17.3 SEC — HIGH (ref 9.8–13.1)
RBC # BLD: 5.27 M/UL — SIGNIFICANT CHANGE UP (ref 4.2–5.8)
RBC # FLD: 17.2 % — HIGH (ref 10.3–14.5)
SODIUM SERPL-SCNC: 131 MMOL/L — LOW (ref 135–145)
WBC # BLD: 5.26 K/UL — SIGNIFICANT CHANGE UP (ref 3.8–10.5)
WBC # FLD AUTO: 5.26 K/UL — SIGNIFICANT CHANGE UP (ref 3.8–10.5)

## 2019-03-16 RX ORDER — WARFARIN SODIUM 2.5 MG/1
7.5 TABLET ORAL ONCE
Qty: 0 | Refills: 0 | Status: COMPLETED | OUTPATIENT
Start: 2019-03-16 | End: 2019-03-16

## 2019-03-16 RX ADMIN — Medication 1: at 18:06

## 2019-03-16 RX ADMIN — TAMSULOSIN HYDROCHLORIDE 0.4 MILLIGRAM(S): 0.4 CAPSULE ORAL at 21:45

## 2019-03-16 RX ADMIN — ATORVASTATIN CALCIUM 20 MILLIGRAM(S): 80 TABLET, FILM COATED ORAL at 21:45

## 2019-03-16 RX ADMIN — Medication 0.12 MILLIGRAM(S): at 05:39

## 2019-03-16 RX ADMIN — MAGNESIUM OXIDE 400 MG ORAL TABLET 400 MILLIGRAM(S): 241.3 TABLET ORAL at 17:42

## 2019-03-16 RX ADMIN — BUMETANIDE 2.5 MG/HR: 0.25 INJECTION INTRAMUSCULAR; INTRAVENOUS at 07:42

## 2019-03-16 RX ADMIN — WARFARIN SODIUM 7.5 MILLIGRAM(S): 2.5 TABLET ORAL at 17:47

## 2019-03-16 RX ADMIN — MAGNESIUM OXIDE 400 MG ORAL TABLET 400 MILLIGRAM(S): 241.3 TABLET ORAL at 10:41

## 2019-03-16 RX ADMIN — MAGNESIUM OXIDE 400 MG ORAL TABLET 400 MILLIGRAM(S): 241.3 TABLET ORAL at 17:41

## 2019-03-16 NOTE — PROGRESS NOTE ADULT - ASSESSMENT
68 y/o M with PMH of MR s/p mitral valvuloplasty,  Afib(on Coumadin), HLD, HTN, DM, CVA, CAD(s/p 4V CABG), CHF(with EF of 15-20% s/p AICD) presented with the complaint of worsening LE edema and weight gain. R/o CHF exacerbation    acute on chronic systolic chf - bumex iv , cards f/u , daily weighs, elevated troponin - will trend    Afib - cont ac , rate control     Htn - cont home htn meds     DM2 - iss, diabetic education

## 2019-03-16 NOTE — PROGRESS NOTE ADULT - ASSESSMENT
EKG - Afib V paced   Echo - 2/19 - Severe LV dysfunction RV dysfunction mod to sev TR    a/p     1) Acute on chronic systolic CHF exacerbation -  start bumex drip 0.5mg/hr, add metolazone, cont aldactone, s/p ICD    2) Afib - INR 1.5 dose coumadin     3) CKD - creatnine around baseline f/u renal     4) Hyponatremia - likely sec to hypervolemia, f/u renal

## 2019-03-17 LAB
ANION GAP SERPL CALC-SCNC: 14 MMO/L — SIGNIFICANT CHANGE UP (ref 7–14)
APTT BLD: 32.3 SEC — SIGNIFICANT CHANGE UP (ref 27.5–36.3)
BUN SERPL-MCNC: 34 MG/DL — HIGH (ref 7–23)
CALCIUM SERPL-MCNC: 8.9 MG/DL — SIGNIFICANT CHANGE UP (ref 8.4–10.5)
CHLORIDE SERPL-SCNC: 88 MMOL/L — LOW (ref 98–107)
CO2 SERPL-SCNC: 28 MMOL/L — SIGNIFICANT CHANGE UP (ref 22–31)
CREAT SERPL-MCNC: 1.1 MG/DL — SIGNIFICANT CHANGE UP (ref 0.5–1.3)
GLUCOSE BLDC GLUCOMTR-MCNC: 105 MG/DL — HIGH (ref 70–99)
GLUCOSE BLDC GLUCOMTR-MCNC: 140 MG/DL — HIGH (ref 70–99)
GLUCOSE BLDC GLUCOMTR-MCNC: 159 MG/DL — HIGH (ref 70–99)
GLUCOSE BLDC GLUCOMTR-MCNC: 162 MG/DL — HIGH (ref 70–99)
GLUCOSE SERPL-MCNC: 111 MG/DL — HIGH (ref 70–99)
HCT VFR BLD CALC: 41.2 % — SIGNIFICANT CHANGE UP (ref 39–50)
HGB BLD-MCNC: 12.8 G/DL — LOW (ref 13–17)
INR BLD: 1.48 — HIGH (ref 0.88–1.17)
MAGNESIUM SERPL-MCNC: 1.6 MG/DL — SIGNIFICANT CHANGE UP (ref 1.6–2.6)
MCHC RBC-ENTMCNC: 24.7 PG — LOW (ref 27–34)
MCHC RBC-ENTMCNC: 31.1 % — LOW (ref 32–36)
MCV RBC AUTO: 79.5 FL — LOW (ref 80–100)
NRBC # FLD: 0 K/UL — LOW (ref 25–125)
NT-PROBNP SERPL-SCNC: 6685 PG/ML — SIGNIFICANT CHANGE UP
PLATELET # BLD AUTO: 107 K/UL — LOW (ref 150–400)
PMV BLD: 9.9 FL — SIGNIFICANT CHANGE UP (ref 7–13)
POTASSIUM SERPL-MCNC: 3.1 MMOL/L — LOW (ref 3.5–5.3)
POTASSIUM SERPL-SCNC: 3.1 MMOL/L — LOW (ref 3.5–5.3)
PROTHROM AB SERPL-ACNC: 17.1 SEC — HIGH (ref 9.8–13.1)
RBC # BLD: 5.18 M/UL — SIGNIFICANT CHANGE UP (ref 4.2–5.8)
RBC # FLD: 17.2 % — HIGH (ref 10.3–14.5)
SODIUM SERPL-SCNC: 130 MMOL/L — LOW (ref 135–145)
WBC # BLD: 4.82 K/UL — SIGNIFICANT CHANGE UP (ref 3.8–10.5)
WBC # FLD AUTO: 4.82 K/UL — SIGNIFICANT CHANGE UP (ref 3.8–10.5)

## 2019-03-17 RX ORDER — POTASSIUM CHLORIDE 20 MEQ
40 PACKET (EA) ORAL EVERY 4 HOURS
Qty: 0 | Refills: 0 | Status: COMPLETED | OUTPATIENT
Start: 2019-03-17 | End: 2019-03-17

## 2019-03-17 RX ORDER — MAGNESIUM SULFATE 500 MG/ML
1 VIAL (ML) INJECTION ONCE
Qty: 0 | Refills: 0 | Status: COMPLETED | OUTPATIENT
Start: 2019-03-17 | End: 2019-03-17

## 2019-03-17 RX ORDER — WARFARIN SODIUM 2.5 MG/1
10 TABLET ORAL ONCE
Qty: 0 | Refills: 0 | Status: COMPLETED | OUTPATIENT
Start: 2019-03-17 | End: 2019-03-17

## 2019-03-17 RX ADMIN — TAMSULOSIN HYDROCHLORIDE 0.4 MILLIGRAM(S): 0.4 CAPSULE ORAL at 21:51

## 2019-03-17 RX ADMIN — Medication 40 MILLIEQUIVALENT(S): at 13:25

## 2019-03-17 RX ADMIN — MAGNESIUM OXIDE 400 MG ORAL TABLET 400 MILLIGRAM(S): 241.3 TABLET ORAL at 09:00

## 2019-03-17 RX ADMIN — ATORVASTATIN CALCIUM 20 MILLIGRAM(S): 80 TABLET, FILM COATED ORAL at 21:51

## 2019-03-17 RX ADMIN — Medication 40 MILLIEQUIVALENT(S): at 09:00

## 2019-03-17 RX ADMIN — BUMETANIDE 2.5 MG/HR: 0.25 INJECTION INTRAMUSCULAR; INTRAVENOUS at 18:06

## 2019-03-17 RX ADMIN — Medication 100 GRAM(S): at 10:29

## 2019-03-17 RX ADMIN — Medication 40 MILLIEQUIVALENT(S): at 17:26

## 2019-03-17 RX ADMIN — Medication 0.12 MILLIGRAM(S): at 05:46

## 2019-03-17 RX ADMIN — WARFARIN SODIUM 10 MILLIGRAM(S): 2.5 TABLET ORAL at 17:27

## 2019-03-17 RX ADMIN — Medication 1: at 13:25

## 2019-03-17 NOTE — PROGRESS NOTE ADULT - ASSESSMENT
EKG - Afib V paced   Echo - 2/19 - Severe LV dysfunction RV dysfunction mod to sev TR    a/p     1) Acute on chronic systolic CHF exacerbation -  start bumex drip 0.5mg/hr, , cont aldactone, s/p ICD    2) Afib - INR 1.48 dose coumadin     3) CKD - creatnine around baseline f/u renal     4) Hyponatremia - likely sec to hypervolemia, f/u renal

## 2019-03-18 LAB
ANION GAP SERPL CALC-SCNC: 16 MMO/L — HIGH (ref 7–14)
ANION GAP SERPL CALC-SCNC: 16 MMO/L — HIGH (ref 7–14)
APTT BLD: 31.8 SEC — SIGNIFICANT CHANGE UP (ref 27.5–36.3)
BUN SERPL-MCNC: 36 MG/DL — HIGH (ref 7–23)
BUN SERPL-MCNC: 36 MG/DL — HIGH (ref 7–23)
CALCIUM SERPL-MCNC: 8.6 MG/DL — SIGNIFICANT CHANGE UP (ref 8.4–10.5)
CALCIUM SERPL-MCNC: 8.6 MG/DL — SIGNIFICANT CHANGE UP (ref 8.4–10.5)
CHLORIDE SERPL-SCNC: 89 MMOL/L — LOW (ref 98–107)
CHLORIDE SERPL-SCNC: 91 MMOL/L — LOW (ref 98–107)
CO2 SERPL-SCNC: 26 MMOL/L — SIGNIFICANT CHANGE UP (ref 22–31)
CO2 SERPL-SCNC: 26 MMOL/L — SIGNIFICANT CHANGE UP (ref 22–31)
CREAT SERPL-MCNC: 1.15 MG/DL — SIGNIFICANT CHANGE UP (ref 0.5–1.3)
CREAT SERPL-MCNC: 1.18 MG/DL — SIGNIFICANT CHANGE UP (ref 0.5–1.3)
GLUCOSE BLDC GLUCOMTR-MCNC: 152 MG/DL — HIGH (ref 70–99)
GLUCOSE BLDC GLUCOMTR-MCNC: 190 MG/DL — HIGH (ref 70–99)
GLUCOSE BLDC GLUCOMTR-MCNC: 205 MG/DL — HIGH (ref 70–99)
GLUCOSE BLDC GLUCOMTR-MCNC: 209 MG/DL — HIGH (ref 70–99)
GLUCOSE SERPL-MCNC: 116 MG/DL — HIGH (ref 70–99)
GLUCOSE SERPL-MCNC: 144 MG/DL — HIGH (ref 70–99)
HCT VFR BLD CALC: 37.9 % — LOW (ref 39–50)
HGB BLD-MCNC: 11.7 G/DL — LOW (ref 13–17)
INR BLD: 1.86 — HIGH (ref 0.88–1.17)
MAGNESIUM SERPL-MCNC: 1.6 MG/DL — SIGNIFICANT CHANGE UP (ref 1.6–2.6)
MAGNESIUM SERPL-MCNC: 1.7 MG/DL — SIGNIFICANT CHANGE UP (ref 1.6–2.6)
MCHC RBC-ENTMCNC: 24.8 PG — LOW (ref 27–34)
MCHC RBC-ENTMCNC: 30.9 % — LOW (ref 32–36)
MCV RBC AUTO: 80.3 FL — SIGNIFICANT CHANGE UP (ref 80–100)
NRBC # FLD: 0 K/UL — LOW (ref 25–125)
PHOSPHATE SERPL-MCNC: 3.8 MG/DL — SIGNIFICANT CHANGE UP (ref 2.5–4.5)
PLATELET # BLD AUTO: 102 K/UL — LOW (ref 150–400)
PMV BLD: 10.5 FL — SIGNIFICANT CHANGE UP (ref 7–13)
POTASSIUM SERPL-MCNC: 4 MMOL/L — SIGNIFICANT CHANGE UP (ref 3.5–5.3)
POTASSIUM SERPL-MCNC: 4.1 MMOL/L — SIGNIFICANT CHANGE UP (ref 3.5–5.3)
POTASSIUM SERPL-SCNC: 4 MMOL/L — SIGNIFICANT CHANGE UP (ref 3.5–5.3)
POTASSIUM SERPL-SCNC: 4.1 MMOL/L — SIGNIFICANT CHANGE UP (ref 3.5–5.3)
PROTHROM AB SERPL-ACNC: 21 SEC — HIGH (ref 9.8–13.1)
RBC # BLD: 4.72 M/UL — SIGNIFICANT CHANGE UP (ref 4.2–5.8)
RBC # FLD: 17 % — HIGH (ref 10.3–14.5)
SODIUM SERPL-SCNC: 131 MMOL/L — LOW (ref 135–145)
SODIUM SERPL-SCNC: 133 MMOL/L — LOW (ref 135–145)
WBC # BLD: 5.32 K/UL — SIGNIFICANT CHANGE UP (ref 3.8–10.5)
WBC # FLD AUTO: 5.32 K/UL — SIGNIFICANT CHANGE UP (ref 3.8–10.5)

## 2019-03-18 RX ORDER — MAGNESIUM SULFATE 500 MG/ML
1 VIAL (ML) INJECTION ONCE
Qty: 0 | Refills: 0 | Status: COMPLETED | OUTPATIENT
Start: 2019-03-18 | End: 2019-03-18

## 2019-03-18 RX ORDER — WARFARIN SODIUM 2.5 MG/1
7.5 TABLET ORAL ONCE
Qty: 0 | Refills: 0 | Status: COMPLETED | OUTPATIENT
Start: 2019-03-18 | End: 2019-03-18

## 2019-03-18 RX ADMIN — Medication 100 GRAM(S): at 06:17

## 2019-03-18 RX ADMIN — Medication 2: at 13:04

## 2019-03-18 RX ADMIN — Medication 1: at 09:27

## 2019-03-18 RX ADMIN — ATORVASTATIN CALCIUM 20 MILLIGRAM(S): 80 TABLET, FILM COATED ORAL at 21:20

## 2019-03-18 RX ADMIN — Medication 0.12 MILLIGRAM(S): at 06:17

## 2019-03-18 RX ADMIN — BUMETANIDE 2.5 MG/HR: 0.25 INJECTION INTRAMUSCULAR; INTRAVENOUS at 06:17

## 2019-03-18 RX ADMIN — Medication 2: at 18:23

## 2019-03-18 RX ADMIN — TAMSULOSIN HYDROCHLORIDE 0.4 MILLIGRAM(S): 0.4 CAPSULE ORAL at 21:20

## 2019-03-18 RX ADMIN — WARFARIN SODIUM 7.5 MILLIGRAM(S): 2.5 TABLET ORAL at 17:43

## 2019-03-18 NOTE — DIETITIAN INITIAL EVALUATION ADULT. - ADHERENCE
Patient reported he avoids salt at home and does not cook with salt. Patient monitors DM once a day in the AM- reported usual BG range 104 mg/dL. 3/15 Hba1c: 6.8%. Patient endorsed taking Metformin at home. Patient also on Coumadin. Coumadin education provided including: Coumadin/Vitamin K interaction, vitamin k points and serving sizes, and consistent vitamin K intake.

## 2019-03-18 NOTE — PROGRESS NOTE ADULT - ASSESSMENT
70 y/o M with PMH of MR s/p mitral valvuloplasty,  Afib(on Coumadin), HLD, HTN, DM, CVA, CAD(s/p 4V CABG), CHF(with EF of 15-20% s/p AICD) presented with the complaint of worsening LE edema and weight gain. R/o CHF exacerbation    acute on chronic systolic chf - bumex iv , cards f/u , daily weighs, elevated troponin - will trend    Afib - cont ac , rate control     Htn - cont home htn meds     DM2 - iss, diabetic education

## 2019-03-18 NOTE — DIETITIAN INITIAL EVALUATION ADULT. - OTHER INFO
Patient seen for nutrition consult for Registered Dietitian. Per chart: Patient with history of CAD s/p 4V CABG, ischemic cardiomyopathy with severe LV dysfunction, afib on coumadin, MR s/p mitral valvuloplasty, CVA, HTN, HLD and DM. Patient reported good PO intake during hospital course- consuming 75 to 100% of meals. Patient denies any nausea/vomiting/diarrhea/constipation( 3/17) or difficulty chewing and swallowing. Patient reports no food allergies or intolerances. Patient reported usual weight about 1 week ago - 177 pounds. Current weight: 163.8 pounds. Patient noted with + 2 and +3 edema- possible weight change due to suspected decrease in PO intake PTA + fluid shifts.

## 2019-03-18 NOTE — DIETITIAN INITIAL EVALUATION ADULT. - NS AS NUTRI INTERV MEALS SNACK
Liberalized diet to DASH + Consistent Carbohydrate - defer fluid restriction to team. Recommend d/c Renal diet- potassium/ phosphorus levels WDL/General/healthful diet

## 2019-03-18 NOTE — DIETITIAN INITIAL EVALUATION ADULT. - ENERGY NEEDS
Ht: 73 inches Wt: 163.8 pounds BMI: 21.5 kg/m2 IBW: 184 pounds (+/-10%) %IBW: 89%  Edema: + 2 edema - left and right leg, left and right ankle, left and right foot. + 3 edema- left wrist, left hand, left arm. Skin: intact, no pressure injuries noted

## 2019-03-18 NOTE — DIETITIAN INITIAL EVALUATION ADULT. - NS AS NUTRI INTERV ED CONTENT
The Patient was provided with Heart Healthy diet education (low sodium, low cholesterol, "good fats" vs "bad fats," fiber, label reading, meal planning, and daily weight monitoring). RDN reviewed DM diet education; including, carb counting, label reading, meal planning, pre-prandial and post-prandial finger stick goals, and HbA1c goal. Patient was also made aware of the physiological implications of poor glycemic control.

## 2019-03-18 NOTE — PROGRESS NOTE ADULT - ASSESSMENT
EKG - Afib V paced   Echo - 2/19 - Severe LV dysfunction RV dysfunction mod to sev TR    a/p     1) Acute on chronic systolic CHF exacerbation -  cont  bumex drip 0.5mg/hr, , cont aldactone, s/p ICD    2) Afib - INR 1.8 dose coumadin     3) CKD - creatnine around baseline f/u renal     4) Hyponatremia - likely sec to hypervolemia, f/u renal

## 2019-03-18 NOTE — DIETITIAN INITIAL EVALUATION ADULT. - PERTINENT MEDS FT
MEDICATIONS  (STANDING):  atorvastatin 20 milliGRAM(s) Oral at bedtime  buMETAnide Infusion 0.5 mG/Hr (2.5 mL/Hr) IV Continuous <Continuous>  dextrose 5%. 1000 milliLiter(s) (50 mL/Hr) IV Continuous <Continuous>  dextrose 50% Injectable 12.5 Gram(s) IV Push once  dextrose 50% Injectable 25 Gram(s) IV Push once  dextrose 50% Injectable 25 Gram(s) IV Push once  digoxin     Tablet 0.125 milliGRAM(s) Oral daily  insulin lispro (HumaLOG) corrective regimen sliding scale   SubCutaneous three times a day before meals  insulin lispro (HumaLOG) corrective regimen sliding scale   SubCutaneous at bedtime  spironolactone 50 milliGRAM(s) Oral daily  tamsulosin 0.4 milliGRAM(s) Oral at bedtime  warfarin 7.5 milliGRAM(s) Oral once    MEDICATIONS  (PRN):  dextrose 40% Gel 15 Gram(s) Oral once PRN Blood Glucose LESS THAN 70 milliGRAM(s)/deciliter  glucagon  Injectable 1 milliGRAM(s) IntraMuscular once PRN Glucose LESS THAN 70 milligrams/deciliter

## 2019-03-18 NOTE — PROGRESS NOTE ADULT - ASSESSMENT
70 y/o M with PMH of MR s/p mitral valvuloplasty, Afib (on Coumadin), HLD, HTN, DM, CVA, CAD (s/p 4V CABG), CHF (with EF of 15-20% s/p AICD) presented with the complaint of worsening LE edema, weight gain and abdominal distention. Got admitted with CHF, found to have hyponatremia    Assessment and Plan:      Hyponatremia:  Likely sec to fluid overload sec to CHF  Free water restriction 1L/day  Continue diuretics  Monitor Na level daily    Hypomagnesemia:  likely sec to diuresis  Supplement oral MgO 400mg TID  Monitor Mg level    CHF:  Getting diuresis  Monitor K, Mg level  Follow up cardiology

## 2019-03-18 NOTE — DIETITIAN INITIAL EVALUATION ADULT. - PHYSICAL APPEARANCE
History of Malnutrition- Nutrition focused physical exam conducted Subcutaneous fat loss: [moderate] Orbital fat pads region, Muscle wasting: [moderate]Temples region, [moderate]Clavicle region.

## 2019-03-18 NOTE — DIETITIAN INITIAL EVALUATION ADULT. - ORAL INTAKE PTA
Patient reported having a good appetite PTA, consuming 3 meals a day- however patient unable to provide what food he eats throughout the day. Patient seen by RDN (2/2019)- patient reported having a fair appetite x 3 weeks. Suspect decrease in PO intake PTA.

## 2019-03-19 LAB
ANION GAP SERPL CALC-SCNC: 12 MMO/L — SIGNIFICANT CHANGE UP (ref 7–14)
BUN SERPL-MCNC: 32 MG/DL — HIGH (ref 7–23)
CALCIUM SERPL-MCNC: 8.8 MG/DL — SIGNIFICANT CHANGE UP (ref 8.4–10.5)
CHLORIDE SERPL-SCNC: 93 MMOL/L — LOW (ref 98–107)
CO2 SERPL-SCNC: 29 MMOL/L — SIGNIFICANT CHANGE UP (ref 22–31)
CREAT SERPL-MCNC: 1.05 MG/DL — SIGNIFICANT CHANGE UP (ref 0.5–1.3)
GLUCOSE BLDC GLUCOMTR-MCNC: 127 MG/DL — HIGH (ref 70–99)
GLUCOSE BLDC GLUCOMTR-MCNC: 140 MG/DL — HIGH (ref 70–99)
GLUCOSE BLDC GLUCOMTR-MCNC: 163 MG/DL — HIGH (ref 70–99)
GLUCOSE BLDC GLUCOMTR-MCNC: 229 MG/DL — HIGH (ref 70–99)
GLUCOSE SERPL-MCNC: 113 MG/DL — HIGH (ref 70–99)
HCT VFR BLD CALC: 37.2 % — LOW (ref 39–50)
HGB BLD-MCNC: 11.6 G/DL — LOW (ref 13–17)
INR BLD: 2.67 — HIGH (ref 0.88–1.17)
MAGNESIUM SERPL-MCNC: 1.6 MG/DL — SIGNIFICANT CHANGE UP (ref 1.6–2.6)
MCHC RBC-ENTMCNC: 24.4 PG — LOW (ref 27–34)
MCHC RBC-ENTMCNC: 31.2 % — LOW (ref 32–36)
MCV RBC AUTO: 78.3 FL — LOW (ref 80–100)
NRBC # FLD: 0 K/UL — LOW (ref 25–125)
PLATELET # BLD AUTO: 103 K/UL — LOW (ref 150–400)
PMV BLD: 10.2 FL — SIGNIFICANT CHANGE UP (ref 7–13)
POTASSIUM SERPL-MCNC: 3.1 MMOL/L — LOW (ref 3.5–5.3)
POTASSIUM SERPL-SCNC: 3.1 MMOL/L — LOW (ref 3.5–5.3)
PROTHROM AB SERPL-ACNC: 31.4 SEC — HIGH (ref 9.8–13.1)
RBC # BLD: 4.75 M/UL — SIGNIFICANT CHANGE UP (ref 4.2–5.8)
RBC # FLD: 17.2 % — HIGH (ref 10.3–14.5)
SODIUM SERPL-SCNC: 134 MMOL/L — LOW (ref 135–145)
WBC # BLD: 5.68 K/UL — SIGNIFICANT CHANGE UP (ref 3.8–10.5)
WBC # FLD AUTO: 5.68 K/UL — SIGNIFICANT CHANGE UP (ref 3.8–10.5)

## 2019-03-19 PROCEDURE — 93971 EXTREMITY STUDY: CPT | Mod: 26

## 2019-03-19 RX ORDER — POTASSIUM CHLORIDE 20 MEQ
40 PACKET (EA) ORAL EVERY 4 HOURS
Qty: 0 | Refills: 0 | Status: COMPLETED | OUTPATIENT
Start: 2019-03-19 | End: 2019-03-19

## 2019-03-19 RX ORDER — WARFARIN SODIUM 2.5 MG/1
2.5 TABLET ORAL ONCE
Qty: 0 | Refills: 0 | Status: COMPLETED | OUTPATIENT
Start: 2019-03-19 | End: 2019-03-19

## 2019-03-19 RX ADMIN — Medication 2: at 17:56

## 2019-03-19 RX ADMIN — ATORVASTATIN CALCIUM 20 MILLIGRAM(S): 80 TABLET, FILM COATED ORAL at 21:52

## 2019-03-19 RX ADMIN — WARFARIN SODIUM 2.5 MILLIGRAM(S): 2.5 TABLET ORAL at 18:03

## 2019-03-19 RX ADMIN — Medication 0.12 MILLIGRAM(S): at 05:25

## 2019-03-19 RX ADMIN — TAMSULOSIN HYDROCHLORIDE 0.4 MILLIGRAM(S): 0.4 CAPSULE ORAL at 21:52

## 2019-03-19 RX ADMIN — SPIRONOLACTONE 50 MILLIGRAM(S): 25 TABLET, FILM COATED ORAL at 05:25

## 2019-03-19 RX ADMIN — Medication 40 MILLIEQUIVALENT(S): at 13:09

## 2019-03-19 RX ADMIN — Medication 1: at 13:08

## 2019-03-19 RX ADMIN — Medication 40 MILLIEQUIVALENT(S): at 10:54

## 2019-03-19 NOTE — PROGRESS NOTE ADULT - ASSESSMENT
68 y/o M with PMH of MR s/p mitral valvuloplasty, Afib (on Coumadin), HLD, HTN, DM, CVA, CAD (s/p 4V CABG), CHF (with EF of 15-20% s/p AICD) presented with the complaint of worsening LE edema, weight gain and abdominal distention. Got admitted with CHF, found to have hyponatremia    Assessment and Plan:      Hyponatremia:  Likely sec to fluid overload sec to CHF  Free water restriction 1L/day  Continue diuretics  Monitor Na level daily    Hypomagnesemia:  likely sec to diuresis  Supplement oral MgO 400mg TID  Monitor Mg level    Hypokalemia  supplement kcl 40meq x2 today  will likely need daily kcl 40meq supplement  monitor for now    CHF:  Getting diuresis  Monitor K, Mg level  Follow up cardiology 70 y/o M with PMH of MR s/p mitral valvuloplasty, Afib (on Coumadin), HLD, HTN, DM, CVA, CAD (s/p 4V CABG), CHF (with EF of 15-20% s/p AICD) presented with the complaint of worsening LE edema, weight gain and abdominal distention. Got admitted with CHF, found to have hyponatremia    Assessment and Plan:      Hyponatremia:  Likely sec to fluid overload sec to CHF  Free water restriction 1L/day  Continue diuretics  Monitor Na level daily    Hypomagnesemia:  likely sec to diuresis  Supplement oral MgO 400mg TID  Monitor Mg level    Hypokalemia  supplement kcl 40meq x2 today  will likely need daily kcl 40meq supplement  monitor for now    CHF:  Getting diuresis  Monitor K, Mg level  Follow up cardiology

## 2019-03-19 NOTE — PROGRESS NOTE ADULT - ASSESSMENT
EKG - Afib V paced   Echo - 2/19 - Severe LV dysfunction RV dysfunction mod to sev TR    a/p     1) Acute on chronic systolic CHF exacerbation -  cont  bumex drip 0.5mg/hr, , cont aldactone, s/p ICD, switch to PO bumex tomorrow    2) Afib - INR 1.8 dose coumadin     3) CKD - creatnine around baseline f/u renal     4) Hyponatremia - likely sec to hypervolemia, f/u renal EKG - Afib V paced   Echo - 2/19 - Severe LV dysfunction RV dysfunction mod to sev TR    a/p     1) Acute on chronic systolic CHF exacerbation -  cont  bumex drip 0.5mg/hr, , cont aldactone, s/p ICD, switch to PO bumex tomorrow    2) Afib - INR 2.6 dose coumadin     3) CKD - creatnine around baseline f/u renal     4) Hyponatremia - likely sec to hypervolemia, f/u renal

## 2019-03-20 LAB
ANION GAP SERPL CALC-SCNC: 12 MMO/L — SIGNIFICANT CHANGE UP (ref 7–14)
BUN SERPL-MCNC: 33 MG/DL — HIGH (ref 7–23)
CALCIUM SERPL-MCNC: 8.4 MG/DL — SIGNIFICANT CHANGE UP (ref 8.4–10.5)
CHLORIDE SERPL-SCNC: 94 MMOL/L — LOW (ref 98–107)
CO2 SERPL-SCNC: 27 MMOL/L — SIGNIFICANT CHANGE UP (ref 22–31)
CREAT SERPL-MCNC: 1.11 MG/DL — SIGNIFICANT CHANGE UP (ref 0.5–1.3)
GLUCOSE BLDC GLUCOMTR-MCNC: 128 MG/DL — HIGH (ref 70–99)
GLUCOSE BLDC GLUCOMTR-MCNC: 140 MG/DL — HIGH (ref 70–99)
GLUCOSE BLDC GLUCOMTR-MCNC: 144 MG/DL — HIGH (ref 70–99)
GLUCOSE BLDC GLUCOMTR-MCNC: 172 MG/DL — HIGH (ref 70–99)
GLUCOSE SERPL-MCNC: 156 MG/DL — HIGH (ref 70–99)
HCT VFR BLD CALC: 37.8 % — LOW (ref 39–50)
HGB BLD-MCNC: 11.8 G/DL — LOW (ref 13–17)
INR BLD: 2.93 — HIGH (ref 0.88–1.17)
MAGNESIUM SERPL-MCNC: 1.6 MG/DL — SIGNIFICANT CHANGE UP (ref 1.6–2.6)
MCHC RBC-ENTMCNC: 24.1 PG — LOW (ref 27–34)
MCHC RBC-ENTMCNC: 31.2 % — LOW (ref 32–36)
MCV RBC AUTO: 77.3 FL — LOW (ref 80–100)
NRBC # FLD: 0 K/UL — LOW (ref 25–125)
PHOSPHATE SERPL-MCNC: 3.3 MG/DL — SIGNIFICANT CHANGE UP (ref 2.5–4.5)
PLATELET # BLD AUTO: 106 K/UL — LOW (ref 150–400)
PMV BLD: 9.8 FL — SIGNIFICANT CHANGE UP (ref 7–13)
POTASSIUM SERPL-MCNC: 3.8 MMOL/L — SIGNIFICANT CHANGE UP (ref 3.5–5.3)
POTASSIUM SERPL-SCNC: 3.8 MMOL/L — SIGNIFICANT CHANGE UP (ref 3.5–5.3)
PROTHROM AB SERPL-ACNC: 33.6 SEC — HIGH (ref 9.8–13.1)
RBC # BLD: 4.89 M/UL — SIGNIFICANT CHANGE UP (ref 4.2–5.8)
RBC # FLD: 17.2 % — HIGH (ref 10.3–14.5)
SODIUM SERPL-SCNC: 133 MMOL/L — LOW (ref 135–145)
WBC # BLD: 5.32 K/UL — SIGNIFICANT CHANGE UP (ref 3.8–10.5)
WBC # FLD AUTO: 5.32 K/UL — SIGNIFICANT CHANGE UP (ref 3.8–10.5)

## 2019-03-20 RX ORDER — WARFARIN SODIUM 2.5 MG/1
1 TABLET ORAL ONCE
Qty: 0 | Refills: 0 | Status: COMPLETED | OUTPATIENT
Start: 2019-03-20 | End: 2019-03-20

## 2019-03-20 RX ORDER — BUMETANIDE 0.25 MG/ML
2 INJECTION INTRAMUSCULAR; INTRAVENOUS EVERY 12 HOURS
Qty: 0 | Refills: 0 | Status: DISCONTINUED | OUTPATIENT
Start: 2019-03-21 | End: 2019-03-21

## 2019-03-20 RX ADMIN — SPIRONOLACTONE 50 MILLIGRAM(S): 25 TABLET, FILM COATED ORAL at 12:37

## 2019-03-20 RX ADMIN — Medication 1: at 17:59

## 2019-03-20 RX ADMIN — BUMETANIDE 2.5 MG/HR: 0.25 INJECTION INTRAMUSCULAR; INTRAVENOUS at 23:17

## 2019-03-20 RX ADMIN — WARFARIN SODIUM 1 MILLIGRAM(S): 2.5 TABLET ORAL at 18:35

## 2019-03-20 RX ADMIN — ATORVASTATIN CALCIUM 20 MILLIGRAM(S): 80 TABLET, FILM COATED ORAL at 21:55

## 2019-03-20 RX ADMIN — TAMSULOSIN HYDROCHLORIDE 0.4 MILLIGRAM(S): 0.4 CAPSULE ORAL at 21:55

## 2019-03-20 NOTE — CHART NOTE - NSCHARTNOTEFT_GEN_A_CORE
Case discussed with Dr. Sorenson, recommend stop Bumex gtt after today and start Bumex 2mg PO BID tomorrow 3/21 (ordered). Will continue to monitor, daily weights, Is and Os, keep K>4, Mg >2, check BMP in AM.

## 2019-03-20 NOTE — PROGRESS NOTE ADULT - ASSESSMENT
68 y/o M with PMH of MR s/p mitral valvuloplasty, Afib (on Coumadin), HLD, HTN, DM, CVA, CAD (s/p 4V CABG), CHF (with EF of 15-20% s/p AICD) presented with the complaint of worsening LE edema, weight gain and abdominal distention. Got admitted with CHF, found to have hyponatremia    Assessment and Plan:      Hyponatremia:  Likely sec to fluid overload sec to CHF  Free water restriction 1L/day  Continue diuretics  Monitor Na level daily    Hypomagnesemia:  likely sec to diuresis  Improved  Monitor Mg level    Hypokalemia  likely sec to diuretics  Serum K improved today  Would consider   daily kcl 40meq supplement  monitor serum K for now    CHF:  ON bumex drip  Monitor K, Mg level  Follow up cardiology  Daily weight s

## 2019-03-20 NOTE — PROGRESS NOTE ADULT - ASSESSMENT
EKG - Afib V paced   Echo - 2/19 - Severe LV dysfunction RV dysfunction mod to sev TR    a/p     1) Acute on chronic systolic CHF exacerbation -  cont  bumex drip 0.5mg/hr, , cont aldactone, s/p ICD, switch to PO bumex tomorrow    2) Afib - INR 2.9 dose coumadin     3) CKD - creatnine around baseline f/u renal     4) Hyponatremia - likely sec to hypervolemia, f/u renal

## 2019-03-21 LAB
ANION GAP SERPL CALC-SCNC: 13 MMO/L — SIGNIFICANT CHANGE UP (ref 7–14)
ANION GAP SERPL CALC-SCNC: 13 MMO/L — SIGNIFICANT CHANGE UP (ref 7–14)
BUN SERPL-MCNC: 34 MG/DL — HIGH (ref 7–23)
BUN SERPL-MCNC: 35 MG/DL — HIGH (ref 7–23)
CALCIUM SERPL-MCNC: 8.5 MG/DL — SIGNIFICANT CHANGE UP (ref 8.4–10.5)
CALCIUM SERPL-MCNC: 8.7 MG/DL — SIGNIFICANT CHANGE UP (ref 8.4–10.5)
CHLORIDE SERPL-SCNC: 94 MMOL/L — LOW (ref 98–107)
CHLORIDE SERPL-SCNC: 95 MMOL/L — LOW (ref 98–107)
CO2 SERPL-SCNC: 25 MMOL/L — SIGNIFICANT CHANGE UP (ref 22–31)
CO2 SERPL-SCNC: 25 MMOL/L — SIGNIFICANT CHANGE UP (ref 22–31)
CREAT SERPL-MCNC: 1.14 MG/DL — SIGNIFICANT CHANGE UP (ref 0.5–1.3)
CREAT SERPL-MCNC: 1.17 MG/DL — SIGNIFICANT CHANGE UP (ref 0.5–1.3)
GLUCOSE BLDC GLUCOMTR-MCNC: 113 MG/DL — HIGH (ref 70–99)
GLUCOSE BLDC GLUCOMTR-MCNC: 142 MG/DL — HIGH (ref 70–99)
GLUCOSE BLDC GLUCOMTR-MCNC: 150 MG/DL — HIGH (ref 70–99)
GLUCOSE BLDC GLUCOMTR-MCNC: 232 MG/DL — HIGH (ref 70–99)
GLUCOSE SERPL-MCNC: 142 MG/DL — HIGH (ref 70–99)
GLUCOSE SERPL-MCNC: 142 MG/DL — HIGH (ref 70–99)
HCT VFR BLD CALC: 40.7 % — SIGNIFICANT CHANGE UP (ref 39–50)
HGB BLD-MCNC: 12.3 G/DL — LOW (ref 13–17)
INR BLD: 2.19 — HIGH (ref 0.88–1.17)
MAGNESIUM SERPL-MCNC: 1.7 MG/DL — SIGNIFICANT CHANGE UP (ref 1.6–2.6)
MAGNESIUM SERPL-MCNC: 1.7 MG/DL — SIGNIFICANT CHANGE UP (ref 1.6–2.6)
MCHC RBC-ENTMCNC: 24.1 PG — LOW (ref 27–34)
MCHC RBC-ENTMCNC: 30.2 % — LOW (ref 32–36)
MCV RBC AUTO: 79.8 FL — LOW (ref 80–100)
NRBC # FLD: 0 K/UL — LOW (ref 25–125)
PHOSPHATE SERPL-MCNC: 3.6 MG/DL — SIGNIFICANT CHANGE UP (ref 2.5–4.5)
PHOSPHATE SERPL-MCNC: 3.8 MG/DL — SIGNIFICANT CHANGE UP (ref 2.5–4.5)
PLATELET # BLD AUTO: 100 K/UL — LOW (ref 150–400)
PMV BLD: 9.6 FL — SIGNIFICANT CHANGE UP (ref 7–13)
POTASSIUM SERPL-MCNC: 3.7 MMOL/L — SIGNIFICANT CHANGE UP (ref 3.5–5.3)
POTASSIUM SERPL-MCNC: 3.8 MMOL/L — SIGNIFICANT CHANGE UP (ref 3.5–5.3)
POTASSIUM SERPL-SCNC: 3.7 MMOL/L — SIGNIFICANT CHANGE UP (ref 3.5–5.3)
POTASSIUM SERPL-SCNC: 3.8 MMOL/L — SIGNIFICANT CHANGE UP (ref 3.5–5.3)
PROTHROM AB SERPL-ACNC: 25.6 SEC — HIGH (ref 9.8–13.1)
RBC # BLD: 5.1 M/UL — SIGNIFICANT CHANGE UP (ref 4.2–5.8)
RBC # FLD: 17 % — HIGH (ref 10.3–14.5)
SODIUM SERPL-SCNC: 132 MMOL/L — LOW (ref 135–145)
SODIUM SERPL-SCNC: 133 MMOL/L — LOW (ref 135–145)
WBC # BLD: 5.26 K/UL — SIGNIFICANT CHANGE UP (ref 3.8–10.5)
WBC # FLD AUTO: 5.26 K/UL — SIGNIFICANT CHANGE UP (ref 3.8–10.5)

## 2019-03-21 RX ORDER — BUMETANIDE 0.25 MG/ML
2 INJECTION INTRAMUSCULAR; INTRAVENOUS EVERY 8 HOURS
Qty: 0 | Refills: 0 | Status: DISCONTINUED | OUTPATIENT
Start: 2019-03-21 | End: 2019-03-23

## 2019-03-21 RX ORDER — WARFARIN SODIUM 2.5 MG/1
5 TABLET ORAL ONCE
Qty: 0 | Refills: 0 | Status: COMPLETED | OUTPATIENT
Start: 2019-03-21 | End: 2019-03-21

## 2019-03-21 RX ADMIN — BUMETANIDE 2 MILLIGRAM(S): 0.25 INJECTION INTRAMUSCULAR; INTRAVENOUS at 22:15

## 2019-03-21 RX ADMIN — BUMETANIDE 2 MILLIGRAM(S): 0.25 INJECTION INTRAMUSCULAR; INTRAVENOUS at 14:25

## 2019-03-21 RX ADMIN — WARFARIN SODIUM 5 MILLIGRAM(S): 2.5 TABLET ORAL at 18:03

## 2019-03-21 RX ADMIN — BUMETANIDE 2 MILLIGRAM(S): 0.25 INJECTION INTRAMUSCULAR; INTRAVENOUS at 10:05

## 2019-03-21 RX ADMIN — BUMETANIDE 2 MILLIGRAM(S): 0.25 INJECTION INTRAMUSCULAR; INTRAVENOUS at 06:21

## 2019-03-21 RX ADMIN — TAMSULOSIN HYDROCHLORIDE 0.4 MILLIGRAM(S): 0.4 CAPSULE ORAL at 22:15

## 2019-03-21 RX ADMIN — Medication 2: at 13:06

## 2019-03-21 RX ADMIN — Medication 0.12 MILLIGRAM(S): at 06:21

## 2019-03-21 RX ADMIN — ATORVASTATIN CALCIUM 20 MILLIGRAM(S): 80 TABLET, FILM COATED ORAL at 22:15

## 2019-03-21 NOTE — PROGRESS NOTE ADULT - ASSESSMENT
68 y/o M with PMH of MR s/p mitral valvuloplasty, Afib (on Coumadin), HLD, HTN, DM, CVA, CAD (s/p 4V CABG), CHF (with EF of 15-20% s/p AICD) presented with the complaint of worsening LE edema, weight gain and abdominal distention. Got admitted with CHF, found to have hyponatremia    Assessment and Plan:      Hyponatremia:  Likely sec to fluid overload sec to CHF  Free water restriction 1L/day  Continue diuretics  Monitor Na level daily    Hypomagnesemia:  likely sec to diuresis  Improved  Monitor Mg level    Hypokalemia  likely sec to diuretics  Serum K improved today  Would consider   daily kcl 40meq supplement  monitor serum K for now    CHF:  Switched to bumex 2mg Q 8hrs IV today  Monitor K, Mg level  Follow up cardiology  Daily weight s

## 2019-03-21 NOTE — PROGRESS NOTE ADULT - ASSESSMENT
EKG - Afib V paced   Echo - 2/19 - Severe LV dysfunction RV dysfunction mod to sev TR    a/p     1) Acute on chronic systolic CHF exacerbation -  Volume up on exam , Bumex 2 MG IV q8 Today, Get BMP tonight , cont aldactone, s/p ICD,     2) Afib - INR 2.1 dose coumadin     3) CKD - creatnine around baseline f/u renal     4) Hyponatremia - likely sec to hypervolemia, f/u renal

## 2019-03-22 LAB
ANION GAP SERPL CALC-SCNC: 14 MMO/L — SIGNIFICANT CHANGE UP (ref 7–14)
APTT BLD: 35.1 SEC — SIGNIFICANT CHANGE UP (ref 27.5–36.3)
BUN SERPL-MCNC: 35 MG/DL — HIGH (ref 7–23)
CALCIUM SERPL-MCNC: 8.4 MG/DL — SIGNIFICANT CHANGE UP (ref 8.4–10.5)
CHLORIDE SERPL-SCNC: 93 MMOL/L — LOW (ref 98–107)
CO2 SERPL-SCNC: 24 MMOL/L — SIGNIFICANT CHANGE UP (ref 22–31)
CREAT SERPL-MCNC: 1.07 MG/DL — SIGNIFICANT CHANGE UP (ref 0.5–1.3)
GLUCOSE BLDC GLUCOMTR-MCNC: 110 MG/DL — HIGH (ref 70–99)
GLUCOSE BLDC GLUCOMTR-MCNC: 161 MG/DL — HIGH (ref 70–99)
GLUCOSE BLDC GLUCOMTR-MCNC: 162 MG/DL — HIGH (ref 70–99)
GLUCOSE BLDC GLUCOMTR-MCNC: 184 MG/DL — HIGH (ref 70–99)
GLUCOSE SERPL-MCNC: 128 MG/DL — HIGH (ref 70–99)
HCT VFR BLD CALC: 38.5 % — LOW (ref 39–50)
HGB BLD-MCNC: 12 G/DL — LOW (ref 13–17)
INR BLD: 1.75 — HIGH (ref 0.88–1.17)
MAGNESIUM SERPL-MCNC: 1.7 MG/DL — SIGNIFICANT CHANGE UP (ref 1.6–2.6)
MCHC RBC-ENTMCNC: 24.3 PG — LOW (ref 27–34)
MCHC RBC-ENTMCNC: 31.2 % — LOW (ref 32–36)
MCV RBC AUTO: 78.1 FL — LOW (ref 80–100)
NRBC # FLD: 0 K/UL — LOW (ref 25–125)
PHOSPHATE SERPL-MCNC: 3.9 MG/DL — SIGNIFICANT CHANGE UP (ref 2.5–4.5)
PLATELET # BLD AUTO: 112 K/UL — LOW (ref 150–400)
PMV BLD: 10.2 FL — SIGNIFICANT CHANGE UP (ref 7–13)
POTASSIUM SERPL-MCNC: 3.9 MMOL/L — SIGNIFICANT CHANGE UP (ref 3.5–5.3)
POTASSIUM SERPL-SCNC: 3.9 MMOL/L — SIGNIFICANT CHANGE UP (ref 3.5–5.3)
PROTHROM AB SERPL-ACNC: 20.3 SEC — HIGH (ref 9.8–13.1)
RBC # BLD: 4.93 M/UL — SIGNIFICANT CHANGE UP (ref 4.2–5.8)
RBC # FLD: 17.2 % — HIGH (ref 10.3–14.5)
SODIUM SERPL-SCNC: 131 MMOL/L — LOW (ref 135–145)
WBC # BLD: 6.1 K/UL — SIGNIFICANT CHANGE UP (ref 3.8–10.5)
WBC # FLD AUTO: 6.1 K/UL — SIGNIFICANT CHANGE UP (ref 3.8–10.5)

## 2019-03-22 RX ORDER — HEPARIN SODIUM 5000 [USP'U]/ML
3000 INJECTION INTRAVENOUS; SUBCUTANEOUS EVERY 6 HOURS
Qty: 0 | Refills: 0 | Status: DISCONTINUED | OUTPATIENT
Start: 2019-03-22 | End: 2019-03-22

## 2019-03-22 RX ORDER — WARFARIN SODIUM 2.5 MG/1
7.5 TABLET ORAL ONCE
Qty: 0 | Refills: 0 | Status: COMPLETED | OUTPATIENT
Start: 2019-03-22 | End: 2019-03-22

## 2019-03-22 RX ORDER — HEPARIN SODIUM 5000 [USP'U]/ML
6500 INJECTION INTRAVENOUS; SUBCUTANEOUS EVERY 6 HOURS
Qty: 0 | Refills: 0 | Status: DISCONTINUED | OUTPATIENT
Start: 2019-03-22 | End: 2019-03-22

## 2019-03-22 RX ORDER — HEPARIN SODIUM 5000 [USP'U]/ML
INJECTION INTRAVENOUS; SUBCUTANEOUS
Qty: 25000 | Refills: 0 | Status: DISCONTINUED | OUTPATIENT
Start: 2019-03-22 | End: 2019-03-22

## 2019-03-22 RX ORDER — HEPARIN SODIUM 5000 [USP'U]/ML
6500 INJECTION INTRAVENOUS; SUBCUTANEOUS ONCE
Qty: 0 | Refills: 0 | Status: COMPLETED | OUTPATIENT
Start: 2019-03-22 | End: 2019-03-22

## 2019-03-22 RX ORDER — ENOXAPARIN SODIUM 100 MG/ML
80 INJECTION SUBCUTANEOUS
Qty: 0 | Refills: 0 | Status: DISCONTINUED | OUTPATIENT
Start: 2019-03-22 | End: 2019-03-23

## 2019-03-22 RX ADMIN — Medication 1: at 17:50

## 2019-03-22 RX ADMIN — ENOXAPARIN SODIUM 80 MILLIGRAM(S): 100 INJECTION SUBCUTANEOUS at 17:50

## 2019-03-22 RX ADMIN — ATORVASTATIN CALCIUM 20 MILLIGRAM(S): 80 TABLET, FILM COATED ORAL at 22:20

## 2019-03-22 RX ADMIN — HEPARIN SODIUM 6500 UNIT(S): 5000 INJECTION INTRAVENOUS; SUBCUTANEOUS at 11:38

## 2019-03-22 RX ADMIN — SPIRONOLACTONE 50 MILLIGRAM(S): 25 TABLET, FILM COATED ORAL at 06:30

## 2019-03-22 RX ADMIN — Medication 1: at 12:56

## 2019-03-22 RX ADMIN — BUMETANIDE 2 MILLIGRAM(S): 0.25 INJECTION INTRAMUSCULAR; INTRAVENOUS at 14:32

## 2019-03-22 RX ADMIN — TAMSULOSIN HYDROCHLORIDE 0.4 MILLIGRAM(S): 0.4 CAPSULE ORAL at 22:20

## 2019-03-22 RX ADMIN — Medication 0.12 MILLIGRAM(S): at 06:30

## 2019-03-22 RX ADMIN — BUMETANIDE 2 MILLIGRAM(S): 0.25 INJECTION INTRAMUSCULAR; INTRAVENOUS at 06:30

## 2019-03-22 RX ADMIN — WARFARIN SODIUM 7.5 MILLIGRAM(S): 2.5 TABLET ORAL at 17:50

## 2019-03-22 RX ADMIN — BUMETANIDE 2 MILLIGRAM(S): 0.25 INJECTION INTRAMUSCULAR; INTRAVENOUS at 22:20

## 2019-03-22 NOTE — PROGRESS NOTE ADULT - ASSESSMENT
68 y/o M with PMH of MR s/p mitral valvuloplasty,  Afib(on Coumadin), HLD, HTN, DM, CVA, CAD(s/p 4V CABG), CHF(with EF of 15-20% s/p AICD) presented with the complaint of worsening LE edema and weight gain. R/o CHF exacerbation    acute on chronic systolic chf - bumex iv , cards f/u , daily weighs, elevated troponin - will trend    Afib - cont ac , rate control , subtherapeutic INR - lovenox + coumadin     Htn - cont home htn meds     DM2 - iss, diabetic education

## 2019-03-22 NOTE — PROGRESS NOTE ADULT - ASSESSMENT
EKG - Afib V paced   Echo - 2/19 - Severe LV dysfunction RV dysfunction mod to sev TR    a/p     1) Acute on chronic systolic CHF exacerbation -  Volume up on exam , Bumex 2 MG IV q8 switch to 2 mg po BID tomorrow, cont aldactone, s/p ICD,     2) Afib - INR 1.7 dose coumadin 10 mg , start lovenox     3) CKD - creatnine around baseline f/u renal     4) Hyponatremia - likely sec to hypervolemia, f/u renal

## 2019-03-22 NOTE — PROGRESS NOTE ADULT - ASSESSMENT
68 y/o M with PMH of MR s/p mitral valvuloplasty, Afib (on Coumadin), HLD, HTN, DM, CVA, CAD (s/p 4V CABG), CHF (with EF of 15-20% s/p AICD) presented with the complaint of worsening LE edema, weight gain and abdominal distention. Got admitted with CHF, found to have hyponatremia      Hyponatremia:  Likely sec to fluid overload sec to CHF  Free water restriction 1L/day  Continue diuretics  Monitor Na level daily    Hypomagnesemia:  likely sec to diuresis  Improved  Monitor Mg level    Hypokalemia  likely sec to diuretics  Serum K improved today  Would consider   daily kcl 40meq supplement  monitor serum K for now    CHF:  Switched to bumex 2mg Q 8hrs IV today  Monitor K, Mg level  Follow up cardiology  Daily weight s

## 2019-03-23 ENCOUNTER — TRANSCRIPTION ENCOUNTER (OUTPATIENT)
Age: 69
End: 2019-03-23

## 2019-03-23 VITALS
HEART RATE: 61 BPM | TEMPERATURE: 97 F | SYSTOLIC BLOOD PRESSURE: 112 MMHG | RESPIRATION RATE: 18 BRPM | OXYGEN SATURATION: 99 % | DIASTOLIC BLOOD PRESSURE: 72 MMHG

## 2019-03-23 LAB
ANION GAP SERPL CALC-SCNC: 15 MMO/L — HIGH (ref 7–14)
BUN SERPL-MCNC: 36 MG/DL — HIGH (ref 7–23)
CALCIUM SERPL-MCNC: 8.2 MG/DL — LOW (ref 8.4–10.5)
CHLORIDE SERPL-SCNC: 94 MMOL/L — LOW (ref 98–107)
CO2 SERPL-SCNC: 19 MMOL/L — LOW (ref 22–31)
CREAT SERPL-MCNC: 1.05 MG/DL — SIGNIFICANT CHANGE UP (ref 0.5–1.3)
GLUCOSE BLDC GLUCOMTR-MCNC: 113 MG/DL — HIGH (ref 70–99)
GLUCOSE BLDC GLUCOMTR-MCNC: 164 MG/DL — HIGH (ref 70–99)
GLUCOSE BLDC GLUCOMTR-MCNC: 189 MG/DL — HIGH (ref 70–99)
GLUCOSE SERPL-MCNC: 131 MG/DL — HIGH (ref 70–99)
HCT VFR BLD CALC: 40.8 % — SIGNIFICANT CHANGE UP (ref 39–50)
HGB BLD-MCNC: 12.1 G/DL — LOW (ref 13–17)
INR BLD: 2.08 — HIGH (ref 0.88–1.17)
MAGNESIUM SERPL-MCNC: 1.8 MG/DL — SIGNIFICANT CHANGE UP (ref 1.6–2.6)
MCHC RBC-ENTMCNC: 24.2 PG — LOW (ref 27–34)
MCHC RBC-ENTMCNC: 29.7 % — LOW (ref 32–36)
MCV RBC AUTO: 81.4 FL — SIGNIFICANT CHANGE UP (ref 80–100)
NRBC # FLD: 0 K/UL — LOW (ref 25–125)
PHOSPHATE SERPL-MCNC: 3.8 MG/DL — SIGNIFICANT CHANGE UP (ref 2.5–4.5)
PLATELET # BLD AUTO: 85 K/UL — LOW (ref 150–400)
PMV BLD: 11.9 FL — SIGNIFICANT CHANGE UP (ref 7–13)
POTASSIUM SERPL-MCNC: 4.3 MMOL/L — SIGNIFICANT CHANGE UP (ref 3.5–5.3)
POTASSIUM SERPL-SCNC: 4.3 MMOL/L — SIGNIFICANT CHANGE UP (ref 3.5–5.3)
PROTHROM AB SERPL-ACNC: 24.3 SEC — HIGH (ref 9.8–13.1)
RBC # BLD: 5.01 M/UL — SIGNIFICANT CHANGE UP (ref 4.2–5.8)
RBC # FLD: 17.9 % — HIGH (ref 10.3–14.5)
SODIUM SERPL-SCNC: 128 MMOL/L — LOW (ref 135–145)
WBC # BLD: 4.46 K/UL — SIGNIFICANT CHANGE UP (ref 3.8–10.5)
WBC # FLD AUTO: 4.46 K/UL — SIGNIFICANT CHANGE UP (ref 3.8–10.5)

## 2019-03-23 RX ORDER — BUMETANIDE 0.25 MG/ML
2 INJECTION INTRAMUSCULAR; INTRAVENOUS
Qty: 0 | Refills: 0 | Status: DISCONTINUED | OUTPATIENT
Start: 2019-03-23 | End: 2019-03-23

## 2019-03-23 RX ORDER — WARFARIN SODIUM 2.5 MG/1
2 TABLET ORAL
Qty: 60 | Refills: 0
Start: 2019-03-23 | End: 2019-04-21

## 2019-03-23 RX ORDER — SPIRONOLACTONE 25 MG/1
2 TABLET, FILM COATED ORAL
Qty: 60 | Refills: 0
Start: 2019-03-23 | End: 2019-04-21

## 2019-03-23 RX ORDER — DIGOXIN 250 MCG
1 TABLET ORAL
Qty: 30 | Refills: 0
Start: 2019-03-23 | End: 2019-04-21

## 2019-03-23 RX ORDER — DIGOXIN 250 MCG
1 TABLET ORAL
Qty: 0 | Refills: 0 | COMMUNITY

## 2019-03-23 RX ORDER — BUMETANIDE 0.25 MG/ML
1 INJECTION INTRAMUSCULAR; INTRAVENOUS
Qty: 60 | Refills: 0
Start: 2019-03-23 | End: 2019-04-21

## 2019-03-23 RX ORDER — ATORVASTATIN CALCIUM 80 MG/1
1 TABLET, FILM COATED ORAL
Qty: 30 | Refills: 0
Start: 2019-03-23 | End: 2019-04-21

## 2019-03-23 RX ORDER — WARFARIN SODIUM 2.5 MG/1
8 TABLET ORAL ONCE
Qty: 0 | Refills: 0 | Status: COMPLETED | OUTPATIENT
Start: 2019-03-23 | End: 2019-03-23

## 2019-03-23 RX ADMIN — Medication 1: at 13:03

## 2019-03-23 RX ADMIN — ENOXAPARIN SODIUM 80 MILLIGRAM(S): 100 INJECTION SUBCUTANEOUS at 06:24

## 2019-03-23 RX ADMIN — Medication 1: at 17:57

## 2019-03-23 RX ADMIN — WARFARIN SODIUM 8 MILLIGRAM(S): 2.5 TABLET ORAL at 17:56

## 2019-03-23 RX ADMIN — Medication 0.12 MILLIGRAM(S): at 06:24

## 2019-03-23 RX ADMIN — BUMETANIDE 2 MILLIGRAM(S): 0.25 INJECTION INTRAMUSCULAR; INTRAVENOUS at 06:24

## 2019-03-23 NOTE — CHART NOTE - NSCHARTNOTEFT_GEN_A_CORE
Patient with Na of 128- As per Cardio and Internal Medicine- Patient stable, mentating, Alert and oriented x 3- patient wanting to go home - wife present- states he will followup with renal and cardiology on Monday and Tuesday for lab work- instructed to have strict fluid restriction. Patient agrees and will be discharged.

## 2019-03-23 NOTE — DISCHARGE NOTE NURSING/CASE MANAGEMENT/SOCIAL WORK - NSDCPEPTCOWAR_GEN_ALL_CORE
Coumadin/Warfarin - Potential for adverse drug reactions and interactions/Coumadin/Warfarin - Follow up monitoring/Coumadin/Warfarin - Compliance/Coumadin/Warfarin - Dietary Advice

## 2019-03-23 NOTE — DISCHARGE NOTE NURSING/CASE MANAGEMENT/SOCIAL WORK - NSDCDPATPORTLINK_GEN_ALL_CORE
You can access the [facility], offered by [facility], by registering with the following website: [email or web address]

## 2019-03-23 NOTE — DISCHARGE NOTE PROVIDER - NSDCCPCAREPLAN_GEN_ALL_CORE_FT
PRINCIPAL DISCHARGE DIAGNOSIS  Diagnosis: CHF exacerbation  Assessment and Plan of Treatment: Followup with PMD and take all medications prescribed.      SECONDARY DISCHARGE DIAGNOSES  Diagnosis: Essential hypertension  Assessment and Plan of Treatment: Followup with PMD and take all medications prescribed.    Diagnosis: DM (diabetes mellitus)  Assessment and Plan of Treatment: Followup with PMD and take all medications prescribed.    Diagnosis: HLD (hyperlipidemia)  Assessment and Plan of Treatment: Followup with PMD and take all medications prescribed.

## 2019-03-23 NOTE — DISCHARGE NOTE PROVIDER - CARE PROVIDERS DIRECT ADDRESSES
,DirectAddress_Unknown,[email or web address]

## 2019-03-23 NOTE — PROGRESS NOTE ADULT - SUBJECTIVE AND OBJECTIVE BOX
SUBJECTIVE / OVERNIGHT EVENTS: pt denies chest pain , shortness of breath, nausea,v       MEDICATIONS  (STANDING):  atorvastatin 20 milliGRAM(s) Oral at bedtime  buMETAnide Infusion 0.5 mG/Hr (2.5 mL/Hr) IV Continuous <Continuous>  dextrose 5%. 1000 milliLiter(s) (50 mL/Hr) IV Continuous <Continuous>  dextrose 50% Injectable 12.5 Gram(s) IV Push once  dextrose 50% Injectable 25 Gram(s) IV Push once  dextrose 50% Injectable 25 Gram(s) IV Push once  digoxin     Tablet 0.125 milliGRAM(s) Oral daily  insulin lispro (HumaLOG) corrective regimen sliding scale   SubCutaneous three times a day before meals  insulin lispro (HumaLOG) corrective regimen sliding scale   SubCutaneous at bedtime  magnesium oxide 400 milliGRAM(s) Oral three times a day with meals  spironolactone 50 milliGRAM(s) Oral daily  tamsulosin 0.4 milliGRAM(s) Oral at bedtime    MEDICATIONS  (PRN):  dextrose 40% Gel 15 Gram(s) Oral once PRN Blood Glucose LESS THAN 70 milliGRAM(s)/deciliter  glucagon  Injectable 1 milliGRAM(s) IntraMuscular once PRN Glucose LESS THAN 70 milligrams/deciliter      Vital Signs Last 24 Hrs  T(C): 36.7 (16 Mar 2019 10:22), Max: 36.7 (16 Mar 2019 10:22)  T(F): 98.1 (16 Mar 2019 10:22), Max: 98.1 (16 Mar 2019 10:22)  HR: 71 (16 Mar 2019 10:22) (62 - 72)  BP: 105/60 (16 Mar 2019 10:22) (101/61 - 120/66)  BP(mean): --  RR: 18 (16 Mar 2019 10:22) (18 - 18)  SpO2: 100% (16 Mar 2019 10:22) (98% - 100%)  CAPILLARY BLOOD GLUCOSE      POCT Blood Glucose.: 161 mg/dL (16 Mar 2019 17:48)  POCT Blood Glucose.: 116 mg/dL (16 Mar 2019 12:32)  POCT Blood Glucose.: 116 mg/dL (16 Mar 2019 08:56)  POCT Blood Glucose.: 155 mg/dL (15 Mar 2019 22:23)    I&O's Summary    15 Mar 2019 07:01  -  16 Mar 2019 07:00  --------------------------------------------------------  IN: 0 mL / OUT: 1250 mL / NET: -1250 mL    16 Mar 2019 07:01  -  16 Mar 2019 20:24  --------------------------------------------------------  IN: 0 mL / OUT: 1700 mL / NET: -1700 mL        Constitutional: No fever, fatigue  Skin: No rash.  Eyes: No recent vision problems or eye pain.  ENT: No congestion, ear pain, or sore throat.  Cardiovascular: No chest pain or palpation.  Respiratory: No cough, shortness of breath, congestion, or wheezing.  Gastrointestinal: No abdominal pain, nausea, vomiting, or diarrhea.  Genitourinary: No dysuria.  Musculoskeletal: No joint swelling.  Neurologic: No headache.    PHYSICAL EXAM:  GENERAL: NAD  EYES: EOMI, PERRLA  NECK: Supple, No JVD  CHEST/LUNG: dec crackles + at bases  HEART:  S1 , S2 +  ABDOMEN: soft , bs+  EXTREMITIES:  edema+  NEUROLOGY:alert awake oriented       LABS:                        12.8   5.26  )-----------( 126      ( 16 Mar 2019 05:45 )             41.0     03-16    131<L>  |  89<L>  |  27<H>  ----------------------------<  101<H>  3.8   |  26  |  1.10    Ca    8.7      16 Mar 2019 05:45  Phos  3.4     03-15  Mg     1.6     03-16    TPro  6.6  /  Alb  3.5  /  TBili  1.0  /  DBili  x   /  AST  33  /  ALT  25  /  AlkPhos  67  03-14    PT/INR - ( 16 Mar 2019 05:45 )   PT: 17.3 SEC;   INR: 1.54          PTT - ( 16 Mar 2019 05:45 )  PTT:32.2 SEC  CARDIAC MARKERS ( 15 Mar 2019 05:15 )  x     / x     / 85 u/L / 6.13 ng/mL / x          Urinalysis Basic - ( 14 Mar 2019 23:30 )    Color: LIGHT YELLOW / Appearance: CLEAR / S.010 / pH: 7.5  Gluc: NEGATIVE / Ketone: NEGATIVE  / Bili: NEGATIVE / Urobili: NORMAL   Blood: NEGATIVE / Protein: NEGATIVE / Nitrite: NEGATIVE   Leuk Esterase: NEGATIVE / RBC: x / WBC x   Sq Epi: x / Non Sq Epi: x / Bacteria: x        RADIOLOGY & ADDITIONAL TESTS:    Imaging Personally Reviewed:    Consultant(s) Notes Reviewed:      Care Discussed with Consultants/Other Providers:
SUBJECTIVE / OVERNIGHT EVENTS: pt denies chest pain , shortness of breath, nausea,v     MEDICATIONS  (STANDING):  atorvastatin 20 milliGRAM(s) Oral at bedtime  buMETAnide Infusion 0.5 mG/Hr (2.5 mL/Hr) IV Continuous <Continuous>  dextrose 5%. 1000 milliLiter(s) (50 mL/Hr) IV Continuous <Continuous>  dextrose 50% Injectable 12.5 Gram(s) IV Push once  dextrose 50% Injectable 25 Gram(s) IV Push once  dextrose 50% Injectable 25 Gram(s) IV Push once  digoxin     Tablet 0.125 milliGRAM(s) Oral daily  insulin lispro (HumaLOG) corrective regimen sliding scale   SubCutaneous three times a day before meals  insulin lispro (HumaLOG) corrective regimen sliding scale   SubCutaneous at bedtime  spironolactone 50 milliGRAM(s) Oral daily  tamsulosin 0.4 milliGRAM(s) Oral at bedtime    MEDICATIONS  (PRN):  dextrose 40% Gel 15 Gram(s) Oral once PRN Blood Glucose LESS THAN 70 milliGRAM(s)/deciliter  glucagon  Injectable 1 milliGRAM(s) IntraMuscular once PRN Glucose LESS THAN 70 milligrams/deciliter    Vital Signs Last 24 Hrs  T(C): 36.3 (17 Mar 2019 22:24), Max: 36.7 (17 Mar 2019 13:30)  T(F): 97.3 (17 Mar 2019 22:24), Max: 98 (17 Mar 2019 13:30)  HR: 61 (17 Mar 2019 22:24) (60 - 82)  BP: 109/61 (17 Mar 2019 22:24) (102/64 - 111/55)  BP(mean): --  RR: 17 (17 Mar 2019 22:24) (16 - 18)  SpO2: 100% (17 Mar 2019 22:24) (99% - 100%)    Constitutional: No fever, fatigue  Skin: No rash.  Eyes: No recent vision problems or eye pain.  ENT: No congestion, ear pain, or sore throat.  Cardiovascular: No chest pain or palpation.  Respiratory: No cough, shortness of breath, congestion, or wheezing.  Gastrointestinal: No abdominal pain, nausea, vomiting, or diarrhea.  Genitourinary: No dysuria.  Musculoskeletal: No joint swelling.  Neurologic: No headache.    PHYSICAL EXAM:  GENERAL: NAD  EYES: EOMI, PERRLA  NECK: Supple, No JVD  CHEST/LUNG: dec crackles + at bases  HEART:  S1 , S2 +  ABDOMEN: soft , bs+  EXTREMITIES:  edema+  NEUROLOGY:alert awake oriented       LABS:      130<L>  |  88<L>  |  34<H>  ----------------------------<  111<H>  3.1<L>   |  28  |  1.10    Ca    8.9      17 Mar 2019 05:40  Mg     1.6           Creatinine Trend: 1.10 <--, 1.10 <--, 1.16 <--, 1.23 <--                        12.8   4.82  )-----------( 107      ( 17 Mar 2019 05:40 )             41.2     Urine Studies:  Urinalysis Basic - ( 14 Mar 2019 23:30 )    Color: LIGHT YELLOW / Appearance: CLEAR / S.010 / pH: 7.5  Gluc: NEGATIVE / Ketone: NEGATIVE  / Bili: NEGATIVE / Urobili: NORMAL   Blood: NEGATIVE / Protein: NEGATIVE / Nitrite: NEGATIVE   Leuk Esterase: NEGATIVE / RBC:  / WBC    Sq Epi:  / Non Sq Epi:  / Bacteria:                 PT/INR - ( 17 Mar 2019 05:40 )   PT: 17.1 SEC;   INR: 1.48          PTT - ( 17 Mar 2019 05:40 )  PTT:32.3 SEC        RADIOLOGY & ADDITIONAL TESTS:    Imaging Personally Reviewed:    Consultant(s) Notes Reviewed:      Care Discussed with Consultants/Other Providers:
[facility] [place] [facility]   MD LUCA MCKEON MD RUORU WONG, PA    TEL:  OFFICE: [phone]  DR TAYLOR CELL: [phone]  BENTON POWER CELL: [phone]  DR. SWARTZ CELL: [phone]    RENAL FOLLOW UP NOTE  --------------------------------------------------------------------------------  HPI:      Pt seen and examined at bedside.       PAST HISTORY  --------------------------------------------------------------------------------  No significant changes to PMH, PSH, FHx, SHx, unless otherwise noted    ALLERGIES & MEDICATIONS  --------------------------------------------------------------------------------  Allergies    No Known Allergies    Intolerances      Standing Inpatient Medications  atorvastatin 20 milliGRAM(s) Oral at bedtime  buMETAnide Infusion 0.5 mG/Hr IV Continuous <Continuous>  dextrose 5%. 1000 milliLiter(s) IV Continuous <Continuous>  dextrose 50% Injectable 12.5 Gram(s) IV Push once  dextrose 50% Injectable 25 Gram(s) IV Push once  dextrose 50% Injectable 25 Gram(s) IV Push once  digoxin     Tablet 0.125 milliGRAM(s) Oral daily  insulin lispro (HumaLOG) corrective regimen sliding scale   SubCutaneous three times a day before meals  insulin lispro (HumaLOG) corrective regimen sliding scale   SubCutaneous at bedtime  spironolactone 50 milliGRAM(s) Oral daily  tamsulosin 0.4 milliGRAM(s) Oral at bedtime    PRN Inpatient Medications  dextrose 40% Gel 15 Gram(s) Oral once PRN  glucagon  Injectable 1 milliGRAM(s) IntraMuscular once PRN      REVIEW OF SYSTEMS  --------------------------------------------------------------------------------  General: no fever  CVS: no chest pain  : no dysuria,  MSK: + edema     VITALS/PHYSICAL EXAM  --------------------------------------------------------------------------------  T(C): 36.4 (03-20-19 @ 05:03), Max: 36.5 (03-19-19 @ 21:45)  HR: 75 (03-20-19 @ 12:36) (57 - 75)  BP: 109/87 (03-20-19 @ 12:36) (108/66 - 111/61)  RR: 17 (03-20-19 @ 05:03) (17 - 17)  SpO2: 100% (03-20-19 @ 05:03) (95% - 100%)  Wt(kg): --        03-19-19 @ 07:01  -  03-20-19 @ 07:00  --------------------------------------------------------  IN: 0 mL / OUT: 800 mL / NET: -800 mL      Physical Exam:  	Gen: NAD  	HEENT: MMM  	Pulm: CTA B/L  	CV: S1S2  	Abd: Soft, +BS  	Ext: + LE edema B/L                      Neuro: Awake   	Skin: Warm and Dry   	    LABS/STUDIES  --------------------------------------------------------------------------------              11.8   5.32  >-----------<  106      [03-20-19 @ 06:30]              37.8     133  |  94  |  33  ----------------------------<  156      [03-20-19 @ 06:30]  3.8   |  27  |  1.11        Ca     8.4     [03-20-19 @ 06:30]      Mg     1.6     [03-20-19 @ 06:30]      Phos  3.3     [03-20-19 @ 06:30]      PT/INR: PT 33.6 , INR 2.93       [03-20-19 @ 06:30]      Creatinine Trend:  SCr 1.11 [03-20 @ 06:30]  SCr 1.05 [03-19 @ 05:50]  SCr 1.15 [03-18 @ 05:10]  SCr 1.18 [03-17 @ 22:50]  SCr 1.10 [03-17 @ 05:40]    Urinalysis - [03-14-19 @ 23:30]      Color LIGHT YELLOW / Appearance CLEAR / SG 1.010 / pH 7.5      Gluc NEGATIVE / Ketone NEGATIVE  / Bili NEGATIVE / Urobili NORMAL       Blood NEGATIVE / Protein NEGATIVE / Leuk Est NEGATIVE / Nitrite NEGATIVE      RBC  / WBC  / Hyaline  / Gran  / Sq Epi  / Non Sq Epi  / Bacteria       .1 (Ca --)      [02-17-19 @ 15:00]   --  Vitamin D (25OH) 12.6      [02-17-19 @ 15:00]  HbA1c 6.8      [03-15-19 @ 05:15]  TSH 2.60      [03-15-19 @ 05:15]  Lipid: chol 126, TG 87, HDL 38, LDL 85      [03-15-19 @ 05:15]
[facility] [place] [facility]   MD LUCA MCKEON MD ANGELA WONG, PA    TEL:  OFFICE: [phone]  DR TAYLOR CELL: [phone]  DR. SWARTZ CELL: [phone]  KATHERINE POWER CELL: [phone]        Patient is a 69y old  Male who presents with a chief complaint of Worsening LE swelling and abdominal distention (21 Mar 2019 13:06)      Patient seen and examined at bedside. No chest pain/sob    VITALS:  T(F): 97.2 (03-22-19 @ 10:30), Max: 97.9 (03-21-19 @ 14:20)  HR: 68 (03-22-19 @ 10:30)  BP: 117/63 (03-22-19 @ 10:30)  RR: 17 (03-22-19 @ 10:30)  SpO2: 99% (03-22-19 @ 10:30)  Wt(kg): --    03-21 @ 07:01  -  03-22 @ 07:00  --------------------------------------------------------  IN: 220 mL / OUT: 1550 mL / NET: -1330 mL        Weight (kg): 75.2 (03-22 @ 12:25)    PHYSICAL EXAM:  Constitutional: NAD  Neck: No JVD  Respiratory: CTAB, no wheezes, rales or rhonchi  Cardiovascular: S1, S2, RRR  Gastrointestinal: BS+, soft, NT/ND  Extremities: 2+ peripheral edema    Hospital Medications:   MEDICATIONS  (STANDING):  atorvastatin 20 milliGRAM(s) Oral at bedtime  buMETAnide Injectable 2 milliGRAM(s) IV Push every 8 hours  dextrose 5%. 1000 milliLiter(s) (50 mL/Hr) IV Continuous <Continuous>  dextrose 50% Injectable 12.5 Gram(s) IV Push once  dextrose 50% Injectable 25 Gram(s) IV Push once  dextrose 50% Injectable 25 Gram(s) IV Push once  digoxin     Tablet 0.125 milliGRAM(s) Oral daily  insulin lispro (HumaLOG) corrective regimen sliding scale   SubCutaneous three times a day before meals  insulin lispro (HumaLOG) corrective regimen sliding scale   SubCutaneous at bedtime  spironolactone 50 milliGRAM(s) Oral daily  tamsulosin 0.4 milliGRAM(s) Oral at bedtime  warfarin 7.5 milliGRAM(s) Oral once      LABS:  03-22    131<L>  |  93<L>  |  35<H>  ----------------------------<  128<H>  3.9   |  24  |  1.07    Ca    8.4      22 Mar 2019 06:35  Phos  3.9     03-22  Mg     1.7     03-22      Creatinine Trend: 1.07 <--, 1.17 <--, 1.14 <--, 1.11 <--, 1.05 <--, 1.15 <--, 1.18 <--, 1.10 <--, 1.10 <--    Phosphorus Level, Serum: 3.9 mg/dL (03-22 @ 06:35)  Phosphorus Level, Serum: 3.6 mg/dL (03-21 @ 18:10)                              12.0   6.10  )-----------( 112      ( 22 Mar 2019 06:35 )             38.5     Urine Studies:  Urinalysis - [03-14-19 @ 23:30]      Color LIGHT YELLOW / Appearance CLEAR / SG 1.010 / pH 7.5      Gluc NEGATIVE / Ketone NEGATIVE  / Bili NEGATIVE / Urobili NORMAL       Blood NEGATIVE / Protein NEGATIVE / Leuk Est NEGATIVE / Nitrite NEGATIVE      RBC  / WBC  / Hyaline  / Gran  / Sq Epi  / Non Sq Epi  / Bacteria       .1 (Ca --)      [02-17-19 @ 15:00]   --  Vitamin D (25OH) 12.6      [02-17-19 @ 15:00]  HbA1c 6.8      [03-15-19 @ 05:15]  TSH 2.60      [03-15-19 @ 05:15]  Lipid: chol 126, TG 87, HDL 38, LDL 85      [03-15-19 @ 05:15]        RADIOLOGY & ADDITIONAL STUDIES:
[facility] [place] [facility]   MD LUCA MCKEON MD RUORU WONG, PA    TEL:  OFFICE: [phone]  DR TAYLOR CELL: [phone]  BENT[place] POWER CELL: [phone]  DR. SWARTZ CELL: [phone]    RENAL FOLLOW UP NOTE  --------------------------------------------------------------------------------  HPI:      Pt seen and examined at bedside.   improving SOB, no chest pain     PAST HISTORY  --------------------------------------------------------------------------------  No significant changes to PMH, PSH, FHx, SHx, unless otherwise noted    ALLERGIES & MEDICATIONS  --------------------------------------------------------------------------------  Allergies    No Known Allergies    Intolerances      Standing Inpatient Medications  atorvastatin 20 milliGRAM(s) Oral at bedtime  buMETAnide Injectable 2 milliGRAM(s) IV Push every 8 hours  dextrose 5%. 1000 milliLiter(s) IV Continuous <Continuous>  dextrose 50% Injectable 12.5 Gram(s) IV Push once  dextrose 50% Injectable 25 Gram(s) IV Push once  dextrose 50% Injectable 25 Gram(s) IV Push once  digoxin     Tablet 0.125 milliGRAM(s) Oral daily  insulin lispro (HumaLOG) corrective regimen sliding scale   SubCutaneous three times a day before meals  insulin lispro (HumaLOG) corrective regimen sliding scale   SubCutaneous at bedtime  spironolactone 50 milliGRAM(s) Oral daily  tamsulosin 0.4 milliGRAM(s) Oral at bedtime  warfarin 5 milliGRAM(s) Oral once    PRN Inpatient Medications  dextrose 40% Gel 15 Gram(s) Oral once PRN  glucagon  Injectable 1 milliGRAM(s) IntraMuscular once PRN      REVIEW OF SYSTEMS  --------------------------------------------------------------------------------  General: no fever  CVS: no chest pain  RESP: improving sob, no cough      VITALS/PHYSICAL EXAM  --------------------------------------------------------------------------------  T(C): 36.6 (03-21-19 @ 10:03), Max: 36.6 (03-21-19 @ 10:03)  HR: 63 (03-21-19 @ 10:03) (60 - 63)  BP: 120/64 (03-21-19 @ 10:03) (106/52 - 120/64)  RR: 16 (03-21-19 @ 10:03) (16 - 18)  SpO2: 100% (03-21-19 @ 10:03) (99% - 100%)  Wt(kg): --        03-20-19 @ 07:01  -  03-21-19 @ 07:00  --------------------------------------------------------  IN: 0 mL / OUT: 475 mL / NET: -475 mL    03-21-19 @ 07:01  -  03-21-19 @ 13:06  --------------------------------------------------------  IN: 0 mL / OUT: 400 mL / NET: -400 mL      Physical Exam:  	Gen: NAD  	HEENT: MMM  	Pulm: CTA B/L  	CV: S1S2  	Abd: Soft, Distended   	Ext: + LE edema B/L                      Neuro: Awake   	Skin: Warm and Dry   	Gu no dooley    LABS/STUDIES  --------------------------------------------------------------------------------              12.3   5.26  >-----------<  100      [03-21-19 @ 07:25]              40.7     132  |  94  |  34  ----------------------------<  142      [03-21-19 @ 07:25]  3.8   |  25  |  1.14        Ca     8.7     [03-21-19 @ 07:25]      Mg     1.7     [03-21-19 @ 07:25]      Phos  3.8     [03-21-19 @ 07:25]      PT/INR: PT 25.6 , INR 2.19       [03-21-19 @ 07:25]      Creatinine Trend:  SCr 1.14 [03-21 @ 07:25]  SCr 1.11 [03-20 @ 06:30]  SCr 1.05 [03-19 @ 05:50]  SCr 1.15 [03-18 @ 05:10]  SCr 1.18 [03-17 @ 22:50]    Urinalysis - [03-14-19 @ 23:30]      Color LIGHT YELLOW / Appearance CLEAR / SG 1.010 / pH 7.5      Gluc NEGATIVE / Ketone NEGATIVE  / Bili NEGATIVE / Urobili NORMAL       Blood NEGATIVE / Protein NEGATIVE / Leuk Est NEGATIVE / Nitrite NEGATIVE      RBC  / WBC  / Hyaline  / Gran  / Sq Epi  / Non Sq Epi  / Bacteria       .1 (Ca --)      [02-17-19 @ 15:00]   --  Vitamin D (25OH) 12.6      [02-17-19 @ 15:00]  HbA1c 6.8      [03-15-19 @ 05:15]  TSH 2.60      [03-15-19 @ 05:15]  Lipid: chol 126, TG 87, HDL 38, LDL 85      [03-15-19 @ 05:15]
[facility] [place]   MD LUCA MCKEON MD RUORU WONG, PA    TEL:  OFFICE: [phone]  DR TAYLOR CELL: [phone]  [place]ENT[place] POWER CELL: [phone]  DR. SWARTZ CELL: [phone]    RENAL FOLLOW UP NOTE  --------------------------------------------------------------------------------  HPI:      Pt seen and examined at bedside.     PAST HISTORY  --------------------------------------------------------------------------------  No significant changes to PMH, PSH, FHx, SHx, unless otherwise noted    ALLERGIES & MEDICATIONS  --------------------------------------------------------------------------------  Allergies    No Known Allergies    Intolerances      Standing Inpatient Medications  atorvastatin 20 milliGRAM(s) Oral at bedtime  buMETAnide 2 milliGRAM(s) Oral two times a day  dextrose 5%. 1000 milliLiter(s) IV Continuous <Continuous>  dextrose 50% Injectable 12.5 Gram(s) IV Push once  dextrose 50% Injectable 25 Gram(s) IV Push once  dextrose 50% Injectable 25 Gram(s) IV Push once  digoxin     Tablet 0.125 milliGRAM(s) Oral daily  enoxaparin Injectable 80 milliGRAM(s) SubCutaneous two times a day  insulin lispro (HumaLOG) corrective regimen sliding scale   SubCutaneous three times a day before meals  insulin lispro (HumaLOG) corrective regimen sliding scale   SubCutaneous at bedtime  spironolactone 50 milliGRAM(s) Oral daily  tamsulosin 0.4 milliGRAM(s) Oral at bedtime    PRN Inpatient Medications  dextrose 40% Gel 15 Gram(s) Oral once PRN  glucagon  Injectable 1 milliGRAM(s) IntraMuscular once PRN      REVIEW OF SYSTEMS  --------------------------------------------------------------------------------  General: no fever  CVS: no chest pain  : no dysuria,  MSK: + edema     VITALS/PHYSICAL EXAM  --------------------------------------------------------------------------------  T(C): 36.3 (03-23-19 @ 10:33), Max: 36.8 (03-22-19 @ 14:31)  HR: 68 (03-23-19 @ 10:33) (61 - 71)  BP: 124/69 (03-23-19 @ 10:33) (106/64 - 124/69)  RR: 18 (03-23-19 @ 10:33) (16 - 18)  SpO2: 100% (03-23-19 @ 10:33) (97% - 100%)  Wt(kg): --    Weight (kg): 75.2 (03-22-19 @ 12:25)      03-22-19 @ 07:01  -  03-23-19 @ 07:00  --------------------------------------------------------  IN: 385 mL / OUT: 1100 mL / NET: -715 mL    03-23-19 @ 07:01  -  03-23-19 @ 12:11  --------------------------------------------------------  IN: 100 mL / OUT: 300 mL / NET: -200 mL      Physical Exam:  	Gen: NAD  	HEENT: MMM  	Pulm: CTA B/L  	CV: S1S2  	Abd: Soft, +BS  	Ext: + LE edema B/L                      Neuro: Awake   	Skin: Warm and Dry   	    LABS/STUDIES  --------------------------------------------------------------------------------              12.1   4.46  >-----------<  85       [03-23-19 @ 06:00]              40.8     128  |  94  |  36  ----------------------------<  131      [03-23-19 @ 06:00]  4.3   |  19  |  1.05        Ca     8.2     [03-23-19 @ 06:00]      Mg     1.8     [03-23-19 @ 06:00]      Phos  3.8     [03-23-19 @ 06:00]      PT/INR: PT 24.3 , INR 2.08       [03-23-19 @ 10:27]  PTT: 35.1       [03-22-19 @ 06:35]      Creatinine Trend:  SCr 1.05 [03-23 @ 06:00]  SCr 1.07 [03-22 @ 06:35]  SCr 1.17 [03-21 @ 18:10]  SCr 1.14 [03-21 @ 07:25]  SCr 1.11 [03-20 @ 06:30]    Urinalysis - [03-14-19 @ 23:30]      Color LIGHT YELLOW / Appearance CLEAR / SG 1.010 / pH 7.5      Gluc NEGATIVE / Ketone NEGATIVE  / Bili NEGATIVE / Urobili NORMAL       Blood NEGATIVE / Protein NEGATIVE / Leuk Est NEGATIVE / Nitrite NEGATIVE      RBC  / WBC  / Hyaline  / Gran  / Sq Epi  / Non Sq Epi  / Bacteria       .1 (Ca --)      [02-17-19 @ 15:00]   --  Vitamin D (25OH) 12.6      [02-17-19 @ 15:00]  HbA1c 6.8      [03-15-19 @ 05:15]  TSH 2.60      [03-15-19 @ 05:15]  Lipid: chol 126, TG 87, HDL 38, LDL 85      [03-15-19 @ 05:15]
[facility]   MD LUCA MCKEON MD ANGELA WONG, PA    TEL:  OFFICE: [phone]  DR TAYLOR CELL: [phone]  DR. SWARTZ CELL: [phone]  KATHERINE POWER CELL: [phone]        Patient is a 69y old  Male who presents with a chief complaint of Worsening LE swelling and abdominal distention (18 Mar 2019 11:11)      Patient seen and examined at bedside. No chest pain/sob    VITALS:  T(F): 97.9 (03-18-19 @ 09:43), Max: 97.9 (03-17-19 @ 17:30)  HR: 72 (03-18-19 @ 09:43)  BP: 102/66 (03-18-19 @ 09:43)  RR: 18 (03-18-19 @ 09:43)  SpO2: 98% (03-18-19 @ 09:43)  Wt(kg): --    03-17 @ 07:01  -  03-18 @ 07:00  --------------------------------------------------------  IN: 420 mL / OUT: 2525 mL / NET: -2105 mL    03-18 @ 07:01  -  03-18 @ 13:35  --------------------------------------------------------  IN: 0 mL / OUT: 250 mL / NET: -250 mL          PHYSICAL EXAM:  Constitutional: NAD  Neck: No JVD  Respiratory: CTAB, no wheezes, rales or rhonchi  Cardiovascular: S1, S2, RRR  Gastrointestinal: BS+, soft, distended NT  Extremities: 3+ peripheral edema    Hospital Medications:   MEDICATIONS  (STANDING):  atorvastatin 20 milliGRAM(s) Oral at bedtime  buMETAnide Infusion 0.5 mG/Hr (2.5 mL/Hr) IV Continuous <Continuous>  dextrose 5%. 1000 milliLiter(s) (50 mL/Hr) IV Continuous <Continuous>  dextrose 50% Injectable 12.5 Gram(s) IV Push once  dextrose 50% Injectable 25 Gram(s) IV Push once  dextrose 50% Injectable 25 Gram(s) IV Push once  digoxin     Tablet 0.125 milliGRAM(s) Oral daily  insulin lispro (HumaLOG) corrective regimen sliding scale   SubCutaneous three times a day before meals  insulin lispro (HumaLOG) corrective regimen sliding scale   SubCutaneous at bedtime  spironolactone 50 milliGRAM(s) Oral daily  tamsulosin 0.4 milliGRAM(s) Oral at bedtime  warfarin 7.5 milliGRAM(s) Oral once      LABS:  03-18    133<L>  |  91<L>  |  36<H>  ----------------------------<  116<H>  4.0   |  26  |  1.15    Ca    8.6      18 Mar 2019 05:10  Phos  3.8     03-17  Mg     1.6     03-18      Creatinine Trend: 1.15 <--, 1.18 <--, 1.10 <--, 1.10 <--, 1.16 <--, 1.23 <--    Phosphorus Level, Serum: 3.8 mg/dL (03-17 @ 22:50)                              11.7   5.32  )-----------( 102      ( 18 Mar 2019 05:10 )             37.9     Urine Studies:  Urinalysis - [03-14-19 @ 23:30]      Color LIGHT YELLOW / Appearance CLEAR / SG 1.010 / pH 7.5      Gluc NEGATIVE / Ketone NEGATIVE  / Bili NEGATIVE / Urobili NORMAL       Blood NEGATIVE / Protein NEGATIVE / Leuk Est NEGATIVE / Nitrite NEGATIVE      RBC  / WBC  / Hyaline  / Gran  / Sq Epi  / Non Sq Epi  / Bacteria       .1 (Ca --)      [02-17-19 @ 15:00]   --  Vitamin D (25OH) 12.6      [02-17-19 @ 15:00]  HbA1c 6.8      [03-15-19 @ 05:15]  TSH 2.60      [03-15-19 @ 05:15]  Lipid: chol 126, TG 87, HDL 38, LDL 85      [03-15-19 @ 05:15]    HCV 0.10, Nonreactive Hepatitis C AB  S/CO Ratio                        Interpretation  < 1.0                                     Non-Reactive  1.0 - 4.9                           Weakly-Reactive  > 5.0                                 Reactive  Non-Reactive: [place] with a non-reactive HCV antibody  result is considered uninfected.  No further action is  needed unless recent infection is suspected.  In these  cases, consider repeat testing later to detect  seroconversion..  Weakly-Reactive: HCV antibody test is abnormal, HCV RNA  Qualitative test will follow.  Reactive: HCV antibody test is abnormal, HCV RNA  Qualitative test will follow.  Note: HCV antibody testing is performed on the [employer]   system.      [02-16-19 @ 15:05]      RADIOLOGY & ADDITIONAL STUDIES:
Phillip Sorenson MD  Interventional Cardiology  [place] Office : [address] [place] [address]  Tel:   [place] Office : [address] [facility] [place] [address]  Tel: [phone]  Cell : [phone]    Subjective : Pt lying in bed comfortable, not in distress, denies any chest pain or SOB  	  MEDICATIONS:  buMETAnide Injectable 2 milliGRAM(s) IV Push every 8 hours  digoxin     Tablet 0.125 milliGRAM(s) Oral daily  enoxaparin Injectable 80 milliGRAM(s) SubCutaneous two times a day  spironolactone 50 milliGRAM(s) Oral daily  tamsulosin 0.4 milliGRAM(s) Oral at bedtime  atorvastatin 20 milliGRAM(s) Oral at bedtime  dextrose 40% Gel 15 Gram(s) Oral once PRN  dextrose 50% Injectable 12.5 Gram(s) IV Push once  dextrose 50% Injectable 25 Gram(s) IV Push once  dextrose 50% Injectable 25 Gram(s) IV Push once  glucagon  Injectable 1 milliGRAM(s) IntraMuscular once PRN  insulin lispro (HumaLOG) corrective regimen sliding scale   SubCutaneous three times a day before meals  insulin lispro (HumaLOG) corrective regimen sliding scale   SubCutaneous at bedtime  dextrose 5%. 1000 milliLiter(s) IV Continuous <Continuous>      PHYSICAL EXAM:  T(C): 36.6 (03-22-19 @ 18:35), Max: 36.8 (03-22-19 @ 14:31)  HR: 70 (03-22-19 @ 18:35) (60 - 70)  BP: 112/65 (03-22-19 @ 18:35) (106/64 - 117/63)  RR: 17 (03-22-19 @ 18:35) (16 - 18)  SpO2: 98% (03-22-19 @ 18:35) (97% - 100%)  Wt(kg): --  I&O's Summary    21 Mar 2019 07:01  -  22 Mar 2019 07:00  --------------------------------------------------------  IN: 220 mL / OUT: 1550 mL / NET: -1330 mL    22 Mar 2019 07:01  -  22 Mar 2019 19:24  --------------------------------------------------------  IN: 265 mL / OUT: 300 mL / NET: -35 mL        Weight (kg): 75.2 (03-22 @ 12:25)        Appearance: Normal	  HEENT:   Normal oral mucosa, PERRL, EOMI	  Cardiovascular: Normal S1 S2, + JVD, No murmurs, No edema  Respiratory: Lungs clear to auscultation	  Gastrointestinal:  Soft, Non-tender, + BS	  Extremities: b/l Pitting edema                                   12.0   6.10  )-----------( 112      ( 22 Mar 2019 06:35 )             38.5     03-22    131<L>  |  93<L>  |  35<H>  ----------------------------<  128<H>  3.9   |  24  |  1.07    Ca    8.4      22 Mar 2019 06:35  Phos  3.9     03-22  Mg     1.7     03-22      proBNP:   Lipid Profile:   HgA1c:   TSH:
Phillip Sorenson MD  Interventional Cardiology  [place] Office : [address] [place] [address]  Tel:   [place] Office : [address] [facility] [place] [address]  Tel: [phone]  Cell : [phone]    Subjective : Pt lying in bed comfortable, not in distress, denies any chest pain or SOB  	  MEDICATIONS:  buMETAnide Injectable 2 milliGRAM(s) IV Push every 8 hours  digoxin     Tablet 0.125 milliGRAM(s) Oral daily  spironolactone 50 milliGRAM(s) Oral daily  tamsulosin 0.4 milliGRAM(s) Oral at bedtime  warfarin 5 milliGRAM(s) Oral once  atorvastatin 20 milliGRAM(s) Oral at bedtime  dextrose 40% Gel 15 Gram(s) Oral once PRN  dextrose 50% Injectable 12.5 Gram(s) IV Push once  dextrose 50% Injectable 25 Gram(s) IV Push once  dextrose 50% Injectable 25 Gram(s) IV Push once  glucagon  Injectable 1 milliGRAM(s) IntraMuscular once PRN  insulin lispro (HumaLOG) corrective regimen sliding scale   SubCutaneous three times a day before meals  insulin lispro (HumaLOG) corrective regimen sliding scale   SubCutaneous at bedtime  dextrose 5%. 1000 milliLiter(s) IV Continuous <Continuous>      PHYSICAL EXAM:  T(C): 36.6 (03-21-19 @ 10:03), Max: 36.6 (03-21-19 @ 10:03)  HR: 63 (03-21-19 @ 10:03) (60 - 63)  BP: 120/64 (03-21-19 @ 10:03) (106/52 - 120/64)  RR: 16 (03-21-19 @ 10:03) (16 - 18)  SpO2: 100% (03-21-19 @ 10:03) (99% - 100%)  Wt(kg): --  I&O's Summary    20 Mar 2019 07:01  -  21 Mar 2019 07:00  --------------------------------------------------------  IN: 0 mL / OUT: 475 mL / NET: -475 mL    21 Mar 2019 07:01  -  21 Mar 2019 14:03  --------------------------------------------------------  IN: 0 mL / OUT: 400 mL / NET: -400 mL          Appearance: Normal	  HEENT:   Normal oral mucosa, PERRL, EOMI	  Cardiovascular: Normal S1 S2, + JVD, No murmurs, No edema  Respiratory: Lungs clear to auscultation	  Gastrointestinal:  Soft, Non-tender, + BS	  Extremities: b/l Pitting edema                                   12.3   5.26  )-----------( 100      ( 21 Mar 2019 07:25 )             40.7     03-21    132<L>  |  94<L>  |  34<H>  ----------------------------<  142<H>  3.8   |  25  |  1.14    Ca    8.7      21 Mar 2019 07:25  Phos  3.8     03-21  Mg     1.7     03-21      proBNP:   Lipid Profile:   HgA1c:   TSH:
Phillip Sorenson MD  Interventional Cardiology  [place] Office : [address] [place] [address]  Tel:   [place] Office : [address] [facility] [place] [address]  Tel: [phone]  Cell : [phone]    Subjective : Pt lying in bed comfortable, not in distress, denies any chest pain or SOB  	  MEDICATIONS:  buMETAnide 2 milliGRAM(s) Oral two times a day  digoxin     Tablet 0.125 milliGRAM(s) Oral daily  enoxaparin Injectable 80 milliGRAM(s) SubCutaneous two times a day  spironolactone 50 milliGRAM(s) Oral daily  tamsulosin 0.4 milliGRAM(s) Oral at bedtime            atorvastatin 20 milliGRAM(s) Oral at bedtime  dextrose 40% Gel 15 Gram(s) Oral once PRN  dextrose 50% Injectable 12.5 Gram(s) IV Push once  dextrose 50% Injectable 25 Gram(s) IV Push once  dextrose 50% Injectable 25 Gram(s) IV Push once  glucagon  Injectable 1 milliGRAM(s) IntraMuscular once PRN  insulin lispro (HumaLOG) corrective regimen sliding scale   SubCutaneous three times a day before meals  insulin lispro (HumaLOG) corrective regimen sliding scale   SubCutaneous at bedtime    dextrose 5%. 1000 milliLiter(s) IV Continuous <Continuous>      PHYSICAL EXAM:  T(C): 36.3 (03-23-19 @ 10:33), Max: 36.8 (03-22-19 @ 14:31)  HR: 68 (03-23-19 @ 10:33) (61 - 71)  BP: 124/69 (03-23-19 @ 10:33) (106/64 - 124/69)  RR: 18 (03-23-19 @ 10:33) (16 - 18)  SpO2: 100% (03-23-19 @ 10:33) (97% - 100%)  Wt(kg): --  I&O's Summary    22 Mar 2019 07:01  -  23 Mar 2019 07:00  --------------------------------------------------------  IN: 385 mL / OUT: 1100 mL / NET: -715 mL    23 Mar 2019 07:01  -  23 Mar 2019 13:12  --------------------------------------------------------  IN: 100 mL / OUT: 480 mL / NET: -380 mL          Appearance: Normal	  HEENT:   Normal oral mucosa, PERRL, EOMI	  Cardiovascular: Normal S1 S2, No JVD, No murmurs, No edema  Respiratory: Lungs clear to auscultation	  Gastrointestinal:  Soft, Non-tender, + BS	  Extremities: No clubbing, cyanosis or 1 +edema RLE chronic                                    12.1   4.46  )-----------( 85       ( 23 Mar 2019 06:00 )             40.8     03-23    128<L>  |  94<L>  |  36<H>  ----------------------------<  131<H>  4.3   |  19<L>  |  1.05    Ca    8.2<L>      23 Mar 2019 06:00  Phos  3.8     03-23  Mg     1.8     03-23      proBNP:   Lipid Profile:   HgA1c:   TSH:
Phillip Sorenson MD  Interventional Cardiology / Advance Heart Failure and Cardiac Transplant Specialist  [place] Office : [address] [place] [address]  Tel:   [place] Office : [address] [facility] [place] [address]  Tel: [phone]  Cell : [phone]    Subjective : Pt lying in bed comfortable, not in distress, denies any chest pain or SOB  	  MEDICATIONS:  buMETAnide Infusion 0.5 mG/Hr IV Continuous <Continuous>  digoxin     Tablet 0.125 milliGRAM(s) Oral daily  spironolactone 50 milliGRAM(s) Oral daily  tamsulosin 0.4 milliGRAM(s) Oral at bedtime            atorvastatin 20 milliGRAM(s) Oral at bedtime  dextrose 40% Gel 15 Gram(s) Oral once PRN  dextrose 50% Injectable 12.5 Gram(s) IV Push once  dextrose 50% Injectable 25 Gram(s) IV Push once  dextrose 50% Injectable 25 Gram(s) IV Push once  glucagon  Injectable 1 milliGRAM(s) IntraMuscular once PRN  insulin lispro (HumaLOG) corrective regimen sliding scale   SubCutaneous three times a day before meals  insulin lispro (HumaLOG) corrective regimen sliding scale   SubCutaneous at bedtime    dextrose 5%. 1000 milliLiter(s) IV Continuous <Continuous>      PHYSICAL EXAM:  T(C): 36.2 (03-20-19 @ 20:41), Max: 36.4 (03-20-19 @ 05:03)  HR: 62 (03-20-19 @ 20:41) (57 - 75)  BP: 109/69 (03-20-19 @ 20:41) (109/69 - 111/61)  RR: 18 (03-20-19 @ 20:41) (17 - 18)  SpO2: 100% (03-20-19 @ 20:41) (96% - 100%)  Wt(kg): --  I&O's Summary    19 Mar 2019 07:01  -  20 Mar 2019 07:00  --------------------------------------------------------  IN: 0 mL / OUT: 800 mL / NET: -800 mL    20 Mar 2019 07:01  -  20 Mar 2019 22:37  --------------------------------------------------------  IN: 0 mL / OUT: 275 mL / NET: -275 mL          Appearance: Normal	  HEENT:   Normal oral mucosa, PERRL, EOMI	  Cardiovascular: Normal S1 S2, No JVD, No murmurs, No edema  Respiratory: Lungs clear to auscultation	  Gastrointestinal:  Soft, Non-tender, + BS	  Extremities: Normal range of motion, No clubbing, cyanosis or edema                                    11.8   5.32  )-----------( 106      ( 20 Mar 2019 06:30 )             37.8     03-20    133<L>  |  94<L>  |  33<H>  ----------------------------<  156<H>  3.8   |  27  |  1.11    Ca    8.4      20 Mar 2019 06:30  Phos  3.3     03-20  Mg     1.6     03-20      proBNP:   Lipid Profile:   HgA1c:   TSH:
Phillip Sorenson MD  Interventional Cardiology / Advance Heart Failure and Cardiac Transplant Specialist  [place] Office : [address] [place] [address]  Tel:   [place] Office : [address] [facility] [place] [address]  Tel: [phone]  Cell : [phone]    Subjective : Pt lying in bed comfortable, not in distress, denies any chest pain or SOB  	  MEDICATIONS:  buMETAnide Infusion 0.5 mG/Hr IV Continuous <Continuous>  digoxin     Tablet 0.125 milliGRAM(s) Oral daily  spironolactone 50 milliGRAM(s) Oral daily  tamsulosin 0.4 milliGRAM(s) Oral at bedtime  atorvastatin 20 milliGRAM(s) Oral at bedtime  dextrose 40% Gel 15 Gram(s) Oral once PRN  dextrose 50% Injectable 12.5 Gram(s) IV Push once  dextrose 50% Injectable 25 Gram(s) IV Push once  dextrose 50% Injectable 25 Gram(s) IV Push once  glucagon  Injectable 1 milliGRAM(s) IntraMuscular once PRN  insulin lispro (HumaLOG) corrective regimen sliding scale   SubCutaneous three times a day before meals  insulin lispro (HumaLOG) corrective regimen sliding scale   SubCutaneous at bedtime  dextrose 5%. 1000 milliLiter(s) IV Continuous <Continuous>    PHYSICAL EXAM:  T(C): 36.6 (03-17-19 @ 17:30), Max: 36.7 (03-17-19 @ 13:30)  HR: 65 (03-17-19 @ 17:30) (62 - 82)  BP: 102/64 (03-17-19 @ 17:30) (102/64 - 111/55)  RR: 18 (03-17-19 @ 17:30) (16 - 18)  SpO2: 99% (03-17-19 @ 17:30) (99% - 100%)  Wt(kg): --  I&O's Summary    16 Mar 2019 07:01  -  17 Mar 2019 07:00  --------------------------------------------------------  IN: 100 mL / OUT: 3700 mL / NET: -3600 mL    17 Mar 2019 07:01  -  17 Mar 2019 17:39  --------------------------------------------------------  IN: 320 mL / OUT: 1575 mL / NET: -1255 mL          	  HEENT:   Normal oral mucosa, PERRL, EOMI	  Cardiovascular: Normal S1 S2, No JVD, No murmurs, No edema  Respiratory: Lungs clear to auscultation	  Gastrointestinal:  Soft, Non-tender, + BS	  Extremities: 1+ edema b/l                                     12.8   4.82  )-----------( 107      ( 17 Mar 2019 05:40 )             41.2     03-17    130<L>  |  88<L>  |  34<H>  ----------------------------<  111<H>  3.1<L>   |  28  |  1.10    Ca    8.9      17 Mar 2019 05:40  Mg     1.6     03-17      proBNP: Serum Pro-Brain Natriuretic Peptide: 6685 pg/mL (03-17 @ 05:40)    Lipid Profile:   HgA1c:   TSH:
Phillip Sorenson MD  Interventional Cardiology / Advance Heart Failure and Cardiac Transplant Specialist  [place] Office : [address] [place] [address]  Tel:   [place] Office : [address] [facility] [place] [address]  Tel: [phone]  Cell : [phone]    Subjective : Pt lying in bed comfortable, not in distress, denies any chest pain,  SOB improving   	  MEDICATIONS:  buMETAnide Infusion 0.5 mG/Hr IV Continuous <Continuous>  digoxin     Tablet 0.125 milliGRAM(s) Oral daily  spironolactone 50 milliGRAM(s) Oral daily  tamsulosin 0.4 milliGRAM(s) Oral at bedtime  warfarin 7.5 milliGRAM(s) Oral once            atorvastatin 20 milliGRAM(s) Oral at bedtime  dextrose 40% Gel 15 Gram(s) Oral once PRN  dextrose 50% Injectable 12.5 Gram(s) IV Push once  dextrose 50% Injectable 25 Gram(s) IV Push once  dextrose 50% Injectable 25 Gram(s) IV Push once  glucagon  Injectable 1 milliGRAM(s) IntraMuscular once PRN  insulin lispro (HumaLOG) corrective regimen sliding scale   SubCutaneous three times a day before meals  insulin lispro (HumaLOG) corrective regimen sliding scale   SubCutaneous at bedtime    dextrose 5%. 1000 milliLiter(s) IV Continuous <Continuous>  magnesium oxide 400 milliGRAM(s) Oral three times a day with meals      PHYSICAL EXAM:  T(C): 36.7 (03-16-19 @ 10:22), Max: 36.7 (03-16-19 @ 10:22)  HR: 71 (03-16-19 @ 10:22) (62 - 72)  BP: 105/60 (03-16-19 @ 10:22) (101/61 - 120/66)  RR: 18 (03-16-19 @ 10:22) (18 - 18)  SpO2: 100% (03-16-19 @ 10:22) (98% - 100%)  Wt(kg): --  I&O's Summary    15 Mar 2019 07:01  -  16 Mar 2019 07:00  --------------------------------------------------------  IN: 0 mL / OUT: 1250 mL / NET: -1250 mL    16 Mar 2019 07:01  -  16 Mar 2019 16:59  --------------------------------------------------------  IN: 0 mL / OUT: 1200 mL / NET: -1200 mL            HEENT:   Normal oral mucosa, PERRL, EOMI	  Cardiovascular: Normal S1 S2, No JVD, No murmurs, No edema  Respiratory: Lungs clear to auscultation	  Gastrointestinal:  Soft, Non-tender, + BS	  Extremities: 2+ edema                                    12.8   5.26  )-----------( 126      ( 16 Mar 2019 05:45 )             41.0     03-16    131<L>  |  89<L>  |  27<H>  ----------------------------<  101<H>  3.8   |  26  |  1.10    Ca    8.7      16 Mar 2019 05:45  Phos  3.4     03-15  Mg     1.6     03-16    TPro  6.6  /  Alb  3.5  /  TBili  1.0  /  DBili  x   /  AST  33  /  ALT  25  /  AlkPhos  67  03-14    proBNP:   Lipid Profile:   HgA1c:   TSH:
Phillip Sorenson MD  Interventional Cardiology / Advance Heart Failure and Cardiac Transplant Specialist  [place] Office : [address] [place] [address]  Tel:   [place] Office : [address] [facility] [address] [address]  Tel: [phone]  Cell : [phone]    Subjective : Pt lying in bed comfortable, not in distress, denies any chest pain or SOB  	  MEDICATIONS:  buMETAnide Infusion 0.5 mG/Hr IV Continuous <Continuous>  digoxin     Tablet 0.125 milliGRAM(s) Oral daily  spironolactone 50 milliGRAM(s) Oral daily  tamsulosin 0.4 milliGRAM(s) Oral at bedtime  warfarin 7.5 milliGRAM(s) Oral once  atorvastatin 20 milliGRAM(s) Oral at bedtime  dextrose 40% Gel 15 Gram(s) Oral once PRN  dextrose 50% Injectable 12.5 Gram(s) IV Push once  dextrose 50% Injectable 25 Gram(s) IV Push once  dextrose 50% Injectable 25 Gram(s) IV Push once  glucagon  Injectable 1 milliGRAM(s) IntraMuscular once PRN  insulin lispro (HumaLOG) corrective regimen sliding scale   SubCutaneous three times a day before meals  insulin lispro (HumaLOG) corrective regimen sliding scale   SubCutaneous at bedtime  dextrose 5%. 1000 milliLiter(s) IV Continuous <Continuous>      PHYSICAL EXAM:  T(C): 36.6 (03-18-19 @ 09:43), Max: 36.7 (03-17-19 @ 13:30)  HR: 72 (03-18-19 @ 09:43) (60 - 72)  BP: 102/66 (03-18-19 @ 09:43) (102/64 - 109/61)  RR: 18 (03-18-19 @ 09:43) (16 - 18)  SpO2: 98% (03-18-19 @ 09:43) (98% - 100%)  Wt(kg): --  I&O's Summary    17 Mar 2019 07:01  -  18 Mar 2019 07:00  --------------------------------------------------------  IN: 420 mL / OUT: 2525 mL / NET: -2105 mL    18 Mar 2019 07:01  -  18 Mar 2019 12:04  --------------------------------------------------------  IN: 0 mL / OUT: 250 mL / NET: -250 mL            HEENT:   Normal oral mucosa, PERRL, EOMI	  Cardiovascular: Normal S1 S2, No JVD, No murmurs, No edema  Respiratory: Lungs clear to auscultation	  Gastrointestinal:  Soft, Non-tender, + BS	  Extremities: 1+ edema                                    11.7   5.32  )-----------( 102      ( 18 Mar 2019 05:10 )             37.9     03-18    133<L>  |  91<L>  |  36<H>  ----------------------------<  116<H>  4.0   |  26  |  1.15    Ca    8.6      18 Mar 2019 05:10  Phos  3.8     03-17  Mg     1.6     03-18      proBNP:   Lipid Profile:   HgA1c:   TSH:
Phillip Sorenson MD  Interventional Cardiology / Advance Heart Failure and Cardiac Transplant Specialist  [place] Office : [address] [place] [address]  Tel:   [place] Office : [address] [facility] [address] [address]  Tel: [phone]  Cell : [phone]    Subjective : Pt lying in bed comfortable, not in distress, denies any chest pain or SOB  	  MEDICATIONS:  buMETAnide Infusion 0.5 mG/Hr IV Continuous <Continuous>  digoxin     Tablet 0.125 milliGRAM(s) Oral daily  spironolactone 50 milliGRAM(s) Oral daily  tamsulosin 0.4 milliGRAM(s) Oral at bedtime            atorvastatin 20 milliGRAM(s) Oral at bedtime  dextrose 40% Gel 15 Gram(s) Oral once PRN  dextrose 50% Injectable 12.5 Gram(s) IV Push once  dextrose 50% Injectable 25 Gram(s) IV Push once  dextrose 50% Injectable 25 Gram(s) IV Push once  glucagon  Injectable 1 milliGRAM(s) IntraMuscular once PRN  insulin lispro (HumaLOG) corrective regimen sliding scale   SubCutaneous three times a day before meals  insulin lispro (HumaLOG) corrective regimen sliding scale   SubCutaneous at bedtime    dextrose 5%. 1000 milliLiter(s) IV Continuous <Continuous>  potassium chloride   Powder 40 milliEquivalent(s) Oral every 4 hours      PHYSICAL EXAM:  T(C): 36.3 (03-19-19 @ 05:22), Max: 36.7 (03-18-19 @ 13:48)  HR: 65 (03-19-19 @ 05:22) (62 - 65)  BP: 112/66 (03-19-19 @ 05:22) (103/63 - 112/66)  RR: 18 (03-19-19 @ 05:22) (18 - 18)  SpO2: 100% (03-19-19 @ 05:22) (98% - 100%)  Wt(kg): --  I&O's Summary    18 Mar 2019 07:01  -  19 Mar 2019 07:00  --------------------------------------------------------  IN: 540 mL / OUT: 2450 mL / NET: -1910 mL          Appearance: Normal	  HEENT:   Normal oral mucosa, PERRL, EOMI	  Cardiovascular: Normal S1 S2, No JVD, No murmurs, No edema  Respiratory: Lungs clear to auscultation	  Gastrointestinal:  Soft, Non-tender, + BS	  Extremities: 1+ edema                                    11.6   5.68  )-----------( 103      ( 19 Mar 2019 05:50 )             37.2     03-19    134<L>  |  93<L>  |  32<H>  ----------------------------<  113<H>  3.1<L>   |  29  |  1.05    Ca    8.8      19 Mar 2019 05:50  Phos  3.8     03-17  Mg     1.6     03-19      proBNP:   Lipid Profile:   HgA1c:   TSH:
SUBJECTIVE / OVERNIGHT EVENTS: pt denies chest pain , shortness of breath, nausea,v     MEDICATIONS  (STANDING):  atorvastatin 20 milliGRAM(s) Oral at bedtime  buMETAnide Infusion 0.5 mG/Hr (2.5 mL/Hr) IV Continuous <Continuous>  dextrose 5%. 1000 milliLiter(s) (50 mL/Hr) IV Continuous <Continuous>  dextrose 50% Injectable 12.5 Gram(s) IV Push once  dextrose 50% Injectable 25 Gram(s) IV Push once  dextrose 50% Injectable 25 Gram(s) IV Push once  digoxin     Tablet 0.125 milliGRAM(s) Oral daily  insulin lispro (HumaLOG) corrective regimen sliding scale   SubCutaneous three times a day before meals  insulin lispro (HumaLOG) corrective regimen sliding scale   SubCutaneous at bedtime  spironolactone 50 milliGRAM(s) Oral daily  tamsulosin 0.4 milliGRAM(s) Oral at bedtime    MEDICATIONS  (PRN):  dextrose 40% Gel 15 Gram(s) Oral once PRN Blood Glucose LESS THAN 70 milliGRAM(s)/deciliter  glucagon  Injectable 1 milliGRAM(s) IntraMuscular once PRN Glucose LESS THAN 70 milligrams/deciliter    Vital Signs Last 24 Hrs  T(C): 36.2 (20 Mar 2019 20:41), Max: 36.4 (20 Mar 2019 05:03)  T(F): 97.2 (20 Mar 2019 20:41), Max: 97.6 (20 Mar 2019 05:03)  HR: 62 (20 Mar 2019 20:41) (57 - 75)  BP: 109/69 (20 Mar 2019 20:41) (109/69 - 111/61)  BP(mean): --  RR: 18 (20 Mar 2019 20:41) (17 - 18)  SpO2: 100% (20 Mar 2019 20:41) (96% - 100%)    Constitutional: No fever, fatigue  Skin: No rash.  Eyes: No recent vision problems or eye pain.  ENT: No congestion, ear pain, or sore throat.  Cardiovascular: No chest pain or palpation.  Respiratory: No cough, shortness of breath, congestion, or wheezing.  Gastrointestinal: No abdominal pain, nausea, vomiting, or diarrhea.  Genitourinary: No dysuria.  Musculoskeletal: No joint swelling.  Neurologic: No headache.    PHYSICAL EXAM:  GENERAL: NAD  EYES: EOMI, PERRLA  NECK: Supple, No JVD  CHEST/LUNG: dec crackles + at bases  HEART:  S1 , S2 +  ABDOMEN: soft , bs+  EXTREMITIES:  edema+  NEUROLOGY:alert awake oriented     LABS:      133<L>  |  94<L>  |  33<H>  ----------------------------<  156<H>  3.8   |  27  |  1.11    Ca    8.4      20 Mar 2019 06:30  Phos  3.3       Mg     1.6           Creatinine Trend: 1.11 <--, 1.05 <--, 1.15 <--, 1.18 <--, 1.10 <--, 1.10 <--, 1.16 <--, 1.23 <--                        11.8   5.32  )-----------( 106      ( 20 Mar 2019 06:30 )             37.8     Urine Studies:  Urinalysis Basic - ( 14 Mar 2019 23:30 )    Color: LIGHT YELLOW / Appearance: CLEAR / S.010 / pH: 7.5  Gluc: NEGATIVE / Ketone: NEGATIVE  / Bili: NEGATIVE / Urobili: NORMAL   Blood: NEGATIVE / Protein: NEGATIVE / Nitrite: NEGATIVE   Leuk Esterase: NEGATIVE / RBC:  / WBC    Sq Epi:  / Non Sq Epi:  / Bacteria:                 PT/INR - ( 20 Mar 2019 06:30 )   PT: 33.6 SEC;   INR: 2.93
SUBJECTIVE / OVERNIGHT EVENTS: pt denies chest pain , shortness of breath, nausea,v     MEDICATIONS  (STANDING):  atorvastatin 20 milliGRAM(s) Oral at bedtime  buMETAnide Infusion 0.5 mG/Hr (2.5 mL/Hr) IV Continuous <Continuous>  dextrose 5%. 1000 milliLiter(s) (50 mL/Hr) IV Continuous <Continuous>  dextrose 50% Injectable 12.5 Gram(s) IV Push once  dextrose 50% Injectable 25 Gram(s) IV Push once  dextrose 50% Injectable 25 Gram(s) IV Push once  digoxin     Tablet 0.125 milliGRAM(s) Oral daily  insulin lispro (HumaLOG) corrective regimen sliding scale   SubCutaneous three times a day before meals  insulin lispro (HumaLOG) corrective regimen sliding scale   SubCutaneous at bedtime  spironolactone 50 milliGRAM(s) Oral daily  tamsulosin 0.4 milliGRAM(s) Oral at bedtime    MEDICATIONS  (PRN):  dextrose 40% Gel 15 Gram(s) Oral once PRN Blood Glucose LESS THAN 70 milliGRAM(s)/deciliter  glucagon  Injectable 1 milliGRAM(s) IntraMuscular once PRN Glucose LESS THAN 70 milligrams/deciliter    Vital Signs Last 24 Hrs  T(C): 36.3 (18 Mar 2019 21:15), Max: 36.7 (18 Mar 2019 13:48)  T(F): 97.3 (18 Mar 2019 21:15), Max: 98.1 (18 Mar 2019 13:48)  HR: 62 (18 Mar 2019 21:15) (60 - 72)  BP: 104/64 (18 Mar 2019 21:15) (102/66 - 109/61)  BP(mean): --  RR: 18 (18 Mar 2019 21:15) (16 - 18)  SpO2: 100% (18 Mar 2019 21:15) (98% - 100%)    Constitutional: No fever, fatigue  Skin: No rash.  Eyes: No recent vision problems or eye pain.  ENT: No congestion, ear pain, or sore throat.  Cardiovascular: No chest pain or palpation.  Respiratory: No cough, shortness of breath, congestion, or wheezing.  Gastrointestinal: No abdominal pain, nausea, vomiting, or diarrhea.  Genitourinary: No dysuria.  Musculoskeletal: No joint swelling.  Neurologic: No headache.    PHYSICAL EXAM:  GENERAL: NAD  EYES: EOMI, PERRLA  NECK: Supple, No JVD  CHEST/LUNG: dec crackles + at bases  HEART:  S1 , S2 +  ABDOMEN: soft , bs+  EXTREMITIES:  edema+  NEUROLOGY:alert awake oriented     LABS:      133<L>  |  91<L>  |  36<H>  ----------------------------<  116<H>  4.0   |  26  |  1.15    Ca    8.6      18 Mar 2019 05:10  Phos  3.8     03-17  Mg     1.6           Creatinine Trend: 1.15 <--, 1.18 <--, 1.10 <--, 1.10 <--, 1.16 <--, 1.23 <--                        11.7   5.32  )-----------( 102      ( 18 Mar 2019 05:10 )             37.9     Urine Studies:  Urinalysis Basic - ( 14 Mar 2019 23:30 )    Color: LIGHT YELLOW / Appearance: CLEAR / S.010 / pH: 7.5  Gluc: NEGATIVE / Ketone: NEGATIVE  / Bili: NEGATIVE / Urobili: NORMAL   Blood: NEGATIVE / Protein: NEGATIVE / Nitrite: NEGATIVE   Leuk Esterase: NEGATIVE / RBC:  / WBC    Sq Epi:  / Non Sq Epi:  / Bacteria:                 PT/INR - ( 18 Mar 2019 05:10 )   PT: 21.0 SEC;   INR: 1.86          PTT - ( 18 Mar 2019 05:10 )  PTT:31.8 SEC  Imaging Personally Reviewed:    Consultant(s) Notes Reviewed:      Care Discussed with Consultants/Other Providers:
SUBJECTIVE / OVERNIGHT EVENTS: pt denies chest pain , shortness of breath, nausea,v     MEDICATIONS  (STANDING):  atorvastatin 20 milliGRAM(s) Oral at bedtime  buMETAnide Infusion 0.5 mG/Hr (2.5 mL/Hr) IV Continuous <Continuous>  dextrose 5%. 1000 milliLiter(s) (50 mL/Hr) IV Continuous <Continuous>  dextrose 50% Injectable 12.5 Gram(s) IV Push once  dextrose 50% Injectable 25 Gram(s) IV Push once  dextrose 50% Injectable 25 Gram(s) IV Push once  digoxin     Tablet 0.125 milliGRAM(s) Oral daily  insulin lispro (HumaLOG) corrective regimen sliding scale   SubCutaneous three times a day before meals  insulin lispro (HumaLOG) corrective regimen sliding scale   SubCutaneous at bedtime  spironolactone 50 milliGRAM(s) Oral daily  tamsulosin 0.4 milliGRAM(s) Oral at bedtime    MEDICATIONS  (PRN):  dextrose 40% Gel 15 Gram(s) Oral once PRN Blood Glucose LESS THAN 70 milliGRAM(s)/deciliter  glucagon  Injectable 1 milliGRAM(s) IntraMuscular once PRN Glucose LESS THAN 70 milligrams/deciliter    Vital Signs Last 24 Hrs  T(C): 36.3 (19 Mar 2019 05:22), Max: 36.3 (19 Mar 2019 04:12)  T(F): 97.4 (19 Mar 2019 05:22), Max: 97.4 (19 Mar 2019 04:12)  HR: 64 (19 Mar 2019 09:30) (62 - 65)  BP: 108/65 (19 Mar 2019 09:30) (103/63 - 112/66)  BP(mean): --  RR: 17 (19 Mar 2019 09:30) (17 - 18)  SpO2: 99% (19 Mar 2019 09:30) (98% - 100%)    Constitutional: No fever, fatigue  Skin: No rash.  Eyes: No recent vision problems or eye pain.  ENT: No congestion, ear pain, or sore throat.  Cardiovascular: No chest pain or palpation.  Respiratory: No cough, shortness of breath, congestion, or wheezing.  Gastrointestinal: No abdominal pain, nausea, vomiting, or diarrhea.  Genitourinary: No dysuria.  Musculoskeletal: No joint swelling.  Neurologic: No headache.    PHYSICAL EXAM:  GENERAL: NAD  EYES: EOMI, PERRLA  NECK: Supple, No JVD  CHEST/LUNG: dec crackles + at bases  HEART:  S1 , S2 +  ABDOMEN: soft , bs+  EXTREMITIES:  edema+  NEUROLOGY:alert awake oriented     LABS:      134<L>  |  93<L>  |  32<H>  ----------------------------<  113<H>  3.1<L>   |  29  |  1.05    Ca    8.8      19 Mar 2019 05:50  Phos  3.8     03-17  Mg     1.6           Creatinine Trend: 1.05 <--, 1.15 <--, 1.18 <--, 1.10 <--, 1.10 <--, 1.16 <--, 1.23 <--                        11.6   5.68  )-----------( 103      ( 19 Mar 2019 05:50 )             37.2     Urine Studies:  Urinalysis Basic - ( 14 Mar 2019 23:30 )    Color: LIGHT YELLOW / Appearance: CLEAR / S.010 / pH: 7.5  Gluc: NEGATIVE / Ketone: NEGATIVE  / Bili: NEGATIVE / Urobili: NORMAL   Blood: NEGATIVE / Protein: NEGATIVE / Nitrite: NEGATIVE   Leuk Esterase: NEGATIVE / RBC:  / WBC    Sq Epi:  / Non Sq Epi:  / Bacteria:                 PT/INR - ( 19 Mar 2019 05:50 )   PT: 31.4 SEC;   INR: 2.67          PTT - ( 18 Mar 2019 05:10 )  PTT:31.8 SEC
SUBJECTIVE / OVERNIGHT EVENTS: pt denies chest pain , shortness of breath, nausea,v     MEDICATIONS  (STANDING):  atorvastatin 20 milliGRAM(s) Oral at bedtime  buMETAnide Injectable 2 milliGRAM(s) IV Push every 8 hours  dextrose 5%. 1000 milliLiter(s) (50 mL/Hr) IV Continuous <Continuous>  dextrose 50% Injectable 12.5 Gram(s) IV Push once  dextrose 50% Injectable 25 Gram(s) IV Push once  dextrose 50% Injectable 25 Gram(s) IV Push once  digoxin     Tablet 0.125 milliGRAM(s) Oral daily  enoxaparin Injectable 80 milliGRAM(s) SubCutaneous two times a day  insulin lispro (HumaLOG) corrective regimen sliding scale   SubCutaneous three times a day before meals  insulin lispro (HumaLOG) corrective regimen sliding scale   SubCutaneous at bedtime  spironolactone 50 milliGRAM(s) Oral daily  tamsulosin 0.4 milliGRAM(s) Oral at bedtime  warfarin 7.5 milliGRAM(s) Oral once    MEDICATIONS  (PRN):  dextrose 40% Gel 15 Gram(s) Oral once PRN Blood Glucose LESS THAN 70 milliGRAM(s)/deciliter  glucagon  Injectable 1 milliGRAM(s) IntraMuscular once PRN Glucose LESS THAN 70 milligrams/deciliter    Vital Signs Last 24 Hrs  T(C): 36.8 (22 Mar 2019 14:31), Max: 36.8 (22 Mar 2019 14:31)  T(F): 98.2 (22 Mar 2019 14:31), Max: 98.2 (22 Mar 2019 14:31)  HR: 67 (22 Mar 2019 14:31) (60 - 68)  BP: 106/64 (22 Mar 2019 14:31) (106/64 - 117/63)  BP(mean): --  RR: 16 (22 Mar 2019 14:31) (16 - 18)  SpO2: 97% (22 Mar 2019 14:31) (97% - 100%)    Constitutional: No fever, fatigue  Skin: No rash.  Eyes: No recent vision problems or eye pain.  ENT: No congestion, ear pain, or sore throat.  Cardiovascular: No chest pain or palpation.  Respiratory: No cough, shortness of breath, congestion, or wheezing.  Gastrointestinal: No abdominal pain, nausea, vomiting, or diarrhea.  Genitourinary: No dysuria.  Musculoskeletal: No joint swelling.  Neurologic: No headache.    PHYSICAL EXAM:  GENERAL: NAD  EYES: EOMI, PERRLA  NECK: Supple, No JVD  CHEST/LUNG: dec crackles + at bases  HEART:  S1 , S2 +  ABDOMEN: soft , bs+  EXTREMITIES:  edema+  NEUROLOGY:alert awake oriented     LABS:  03-22    131<L>  |  93<L>  |  35<H>  ----------------------------<  128<H>  3.9   |  24  |  1.07    Ca    8.4      22 Mar 2019 06:35  Phos  3.9     03-22  Mg     1.7     03-22      Creatinine Trend: 1.07 <--, 1.17 <--, 1.14 <--, 1.11 <--, 1.05 <--, 1.15 <--, 1.18 <--, 1.10 <--, 1.10 <--                        12.0   6.10  )-----------( 112      ( 22 Mar 2019 06:35 )             38.5     Urine Studies:              PT/INR - ( 22 Mar 2019 06:35 )   PT: 20.3 SEC;   INR: 1.75          PTT - ( 22 Mar 2019 06:35 )  PTT:35.1 SEC  Sq Epi:  / Non Sq Epi:  / Bacteria:                 PT/INR - ( 21 Mar 2019 07:25 )   PT: 25.6 SEC;   INR: 2.19
SUBJECTIVE / OVERNIGHT EVENTS: pt denies chest pain , shortness of breath, nausea,v     MEDICATIONS  (STANDING):  atorvastatin 20 milliGRAM(s) Oral at bedtime  buMETAnide Injectable 2 milliGRAM(s) IV Push every 8 hours  dextrose 5%. 1000 milliLiter(s) (50 mL/Hr) IV Continuous <Continuous>  dextrose 50% Injectable 12.5 Gram(s) IV Push once  dextrose 50% Injectable 25 Gram(s) IV Push once  dextrose 50% Injectable 25 Gram(s) IV Push once  digoxin     Tablet 0.125 milliGRAM(s) Oral daily  insulin lispro (HumaLOG) corrective regimen sliding scale   SubCutaneous three times a day before meals  insulin lispro (HumaLOG) corrective regimen sliding scale   SubCutaneous at bedtime  spironolactone 50 milliGRAM(s) Oral daily  tamsulosin 0.4 milliGRAM(s) Oral at bedtime    MEDICATIONS  (PRN):  dextrose 40% Gel 15 Gram(s) Oral once PRN Blood Glucose LESS THAN 70 milliGRAM(s)/deciliter  glucagon  Injectable 1 milliGRAM(s) IntraMuscular once PRN Glucose LESS THAN 70 milligrams/deciliter    Vital Signs Last 24 Hrs  T(C): 36.6 (21 Mar 2019 14:20), Max: 36.6 (21 Mar 2019 10:03)  T(F): 97.9 (21 Mar 2019 14:20), Max: 97.9 (21 Mar 2019 10:03)  HR: 61 (21 Mar 2019 14:20) (60 - 63)  BP: 113/67 (21 Mar 2019 14:20) (106/52 - 120/64)  BP(mean): --  RR: 16 (21 Mar 2019 14:20) (16 - 17)  SpO2: 97% (21 Mar 2019 14:20) (97% - 100%)    Constitutional: No fever, fatigue  Skin: No rash.  Eyes: No recent vision problems or eye pain.  ENT: No congestion, ear pain, or sore throat.  Cardiovascular: No chest pain or palpation.  Respiratory: No cough, shortness of breath, congestion, or wheezing.  Gastrointestinal: No abdominal pain, nausea, vomiting, or diarrhea.  Genitourinary: No dysuria.  Musculoskeletal: No joint swelling.  Neurologic: No headache.    PHYSICAL EXAM:  GENERAL: NAD  EYES: EOMI, PERRLA  NECK: Supple, No JVD  CHEST/LUNG: dec crackles + at bases  HEART:  S1 , S2 +  ABDOMEN: soft , bs+  EXTREMITIES:  edema+  NEUROLOGY:alert awake oriented     LABS:      133<L>  |  95<L>  |  35<H>  ----------------------------<  142<H>  3.7   |  25  |  1.17    Ca    8.5      21 Mar 2019 18:10  Phos  3.6       Mg     1.7           Creatinine Trend: 1.17 <--, 1.14 <--, 1.11 <--, 1.05 <--, 1.15 <--, 1.18 <--, 1.10 <--, 1.10 <--, 1.16 <--                        12.3   5.26  )-----------( 100      ( 21 Mar 2019 07:25 )             40.7     Urine Studies:  Urinalysis Basic - ( 14 Mar 2019 23:30 )    Color: LIGHT YELLOW / Appearance: CLEAR / S.010 / pH: 7.5  Gluc: NEGATIVE / Ketone: NEGATIVE  / Bili: NEGATIVE / Urobili: NORMAL   Blood: NEGATIVE / Protein: NEGATIVE / Nitrite: NEGATIVE   Leuk Esterase: NEGATIVE / RBC:  / WBC    Sq Epi:  / Non Sq Epi:  / Bacteria:                 PT/INR - ( 21 Mar 2019 07:25 )   PT: 25.6 SEC;   INR: 2.19
[facility]   MD LUCA MCKEON MD ANGELA WONG, PA    TEL:  OFFICE: [phone]  DR TAYLOR CELL: [phone]  DR. SWARTZ CELL: [phone]  KATHERINE POWER CELL: [phone]        Patient is a 69y old  Male who presents with a chief complaint of Worsening LE swelling and abdominal distention (19 Mar 2019 11:57)      Patient seen and examined at bedside. No chest pain/sob    VITALS:  T(F): 97.4 (03-19-19 @ 05:22), Max: 97.4 (03-19-19 @ 04:12)  HR: 65 (03-19-19 @ 05:22)  BP: 112/66 (03-19-19 @ 05:22)  RR: 18 (03-19-19 @ 05:22)  SpO2: 100% (03-19-19 @ 05:22)  Wt(kg): --    03-18 @ 07:01  -  03-19 @ 07:00  --------------------------------------------------------  IN: 540 mL / OUT: 2450 mL / NET: -1910 mL          PHYSICAL EXAM:  Constitutional: NAD  Neck: No JVD  Respiratory: CTAB, no wheezes, rales or rhonchi  Cardiovascular: S1, S2, RRR  Gastrointestinal: BS+, distended, NT  Extremities: 2+ peripheral edema    Hospital Medications:   MEDICATIONS  (STANDING):  atorvastatin 20 milliGRAM(s) Oral at bedtime  buMETAnide Infusion 0.5 mG/Hr (2.5 mL/Hr) IV Continuous <Continuous>  dextrose 5%. 1000 milliLiter(s) (50 mL/Hr) IV Continuous <Continuous>  dextrose 50% Injectable 12.5 Gram(s) IV Push once  dextrose 50% Injectable 25 Gram(s) IV Push once  dextrose 50% Injectable 25 Gram(s) IV Push once  digoxin     Tablet 0.125 milliGRAM(s) Oral daily  insulin lispro (HumaLOG) corrective regimen sliding scale   SubCutaneous three times a day before meals  insulin lispro (HumaLOG) corrective regimen sliding scale   SubCutaneous at bedtime  spironolactone 50 milliGRAM(s) Oral daily  tamsulosin 0.4 milliGRAM(s) Oral at bedtime  warfarin 2.5 milliGRAM(s) Oral once      LABS:  03-19    134<L>  |  93<L>  |  32<H>  ----------------------------<  113<H>  3.1<L>   |  29  |  1.05    Ca    8.8      19 Mar 2019 05:50  Phos  3.8     03-17  Mg     1.6     03-19      Creatinine Trend: 1.05 <--, 1.15 <--, 1.18 <--, 1.10 <--, 1.10 <--, 1.16 <--, 1.23 <--                                11.6   5.68  )-----------( 103      ( 19 Mar 2019 05:50 )             37.2     Urine Studies:  Urinalysis - [03-14-19 @ 23:30]      Color LIGHT YELLOW / Appearance CLEAR / SG 1.010 / pH 7.5      Gluc NEGATIVE / Ketone NEGATIVE  / Bili NEGATIVE / Urobili NORMAL       Blood NEGATIVE / Protein NEGATIVE / Leuk Est NEGATIVE / Nitrite NEGATIVE      RBC  / WBC  / Hyaline  / Gran  / Sq Epi  / Non Sq Epi  / Bacteria       .1 (Ca --)      [02-17-19 @ 15:00]   --  Vitamin D (25OH) 12.6      [02-17-19 @ 15:00]  HbA1c 6.8      [03-15-19 @ 05:15]  TSH 2.60      [03-15-19 @ 05:15]  Lipid: chol 126, TG 87, HDL 38, LDL 85      [03-15-19 @ 05:15]        RADIOLOGY & ADDITIONAL STUDIES:

## 2019-03-23 NOTE — DISCHARGE NOTE PROVIDER - CARE PROVIDER_API CALL
Robert Padilla)  Internal Medicine  [address] [place], [place] [id number]  Phone: [phone]  Fax: [phone]  Follow Up Time: 1 week    Balaji Valdovinos)  Internal Medicine; Nephrology  [address] [place] [address], [address] [place] [address]  Phone: [phone]  Fax: [phone]  Follow Up Time: 1 week

## 2019-03-23 NOTE — DISCHARGE NOTE NURSING/CASE MANAGEMENT/SOCIAL WORK - NSDCPEWEB_GEN_ALL_CORE
[facility] for Tobacco Control website --- [email or web address] --- [email or web address]

## 2019-03-23 NOTE — PROGRESS NOTE ADULT - REASON FOR ADMISSION
Worsening LE swelling and abdominal distention

## 2019-03-23 NOTE — DISCHARGE NOTE PROVIDER - PROVIDER TOKENS
PROVIDER:[TOKEN:[[id number]],FOLLOWUP:[1 week]],PROVIDER:[TOKEN:[[id number]],FOLLOWUP:[1 week]]

## 2019-03-23 NOTE — DISCHARGE NOTE NURSING/CASE MANAGEMENT/SOCIAL WORK - NSDCPEEMAIL_GEN_ALL_CORE
[facility] for Tobacco Control email [email or web address]

## 2019-03-23 NOTE — DISCHARGE NOTE PROVIDER - HOSPITAL COURSE
70 y/o M with PMH of MR s/p mitral valvuloplasty,  Afib(on Coumadin), HLD, HTN, DM, CVA, CAD(s/p 4V CABG), CHF(with EF of 15-20% s/p AICD) presented with the complaint of worsening LE edema and weight gain. R/o CHF exacerbation    Acute on chronic systolic CHF exacerbation -  Volume up on exam , Bumex 2 mg po BID, cont aldactone, s/p ICD,     Afib - >2 dose, d/c loveonx , d/c home with coumadin 8mg po daily, f/u PCP/CArds next week     CKD - creatnine around baseline f/u renal     3/23- As per Cardiology attending, patient stable for discharge. 68 y/o M with PMH of MR s/p mitral valvuloplasty,  Afib(on Coumadin), HLD, HTN, DM, CVA, CAD(s/p 4V CABG), CHF(with EF of 15-20% s/p AICD) presented with the complaint of worsening LE edema and weight gain. R/o CHF exacerbation    Acute on chronic systolic CHF exacerbation -  Volume up on exam , Bumex 2 mg po BID, cont aldactone, s/p ICD,     Afib - >2 dose, d/c loveonx , d/c home with coumadin 8mg po daily, f/u PCP/CArds next week     CKD - creatnine around baseline f/u renal     3/23- As per Cardiology attending, patient stable for discharge.     3/23- Patient with Na of 128- As per Cardio and Internal Medicine- Patient stable, mentating, Alert and oriented x 3- patient wanting to go home - wife present- states he will followup with renal and cardiology on Monday and Tuesday for lab work- instructed to have strict fluid restriction. Patient agrees and will be discharged.

## 2019-03-23 NOTE — PROGRESS NOTE ADULT - PROVIDER SPECIALTY LIST ADULT
Cardiology
Internal Medicine
Nephrology
Internal Medicine
Internal Medicine

## 2019-03-23 NOTE — DISCHARGE NOTE NURSING/CASE MANAGEMENT/SOCIAL WORK - NSDCPEPT PROEDHF_GEN_ALL_CORE
Activities as tolerated/Low salt diet/Report signs and symptoms to primary care provider/Call primary care provider for follow up after discharge/Monitor weight daily

## 2019-03-23 NOTE — PROGRESS NOTE ADULT - ASSESSMENT
EKG - Afib V paced   Echo - 2/19 - Severe LV dysfunction RV dysfunction mod to sev TR    a/p     1) Acute on chronic systolic CHF exacerbation -  Volume up on exam , Bumex 2 mg po BID, cont aldactone, s/p ICD,     2) Afib - >2 dose, d/c loveonx , d/c home with coumadin 8mg po daily, f/u PCP/CArds next week     3) CKD - creatnine around baseline f/u renal

## 2019-03-23 NOTE — PROGRESS NOTE ADULT - ASSESSMENT
70 y/o M with PMH of MR s/p mitral valvuloplasty, Afib (on Coumadin), HLD, HTN, DM, CVA, CAD (s/p 4V CABG), CHF (with EF of 15-20% s/p AICD) presented with the complaint of worsening LE edema, weight gain and abdominal distention. Got admitted with CHF, found to have hyponatremia      Hyponatremia:  Likely sec to fluid overload sec to CHF  Free water restriction 1L/day  Continue diuretics  Monitor Na level daily    Hypomagnesemia:  likely sec to diuresis  Improved  Monitor Mg level    Hypokalemia  likely sec to diuretics  Serum K improved today  Would consider   daily kcl 40meq supplement  monitor serum K for now    CHF:  Switched to bumex 2mg BID PO  today  Monitor K, Mg level  Follow up cardiology  Daily weight s

## 2019-03-25 ENCOUNTER — INBOUND DOCUMENT (OUTPATIENT)
Age: 69
End: 2019-03-25

## 2019-04-02 ENCOUNTER — EMERGENCY (EMERGENCY)
Facility: HOSPITAL | Age: 69
LOS: 1 days | Discharge: HOME | End: 2019-04-02
Attending: EMERGENCY MEDICINE | Admitting: EMERGENCY MEDICINE
Payer: MEDICARE

## 2019-04-02 VITALS
DIASTOLIC BLOOD PRESSURE: 63 MMHG | RESPIRATION RATE: 18 BRPM | TEMPERATURE: 98 F | HEART RATE: 74 BPM | SYSTOLIC BLOOD PRESSURE: 110 MMHG | OXYGEN SATURATION: 98 %

## 2019-04-02 VITALS
OXYGEN SATURATION: 100 % | RESPIRATION RATE: 16 BRPM | SYSTOLIC BLOOD PRESSURE: 113 MMHG | DIASTOLIC BLOOD PRESSURE: 66 MMHG | HEART RATE: 79 BPM | TEMPERATURE: 98 F

## 2019-04-02 DIAGNOSIS — Z98.890 OTHER SPECIFIED POSTPROCEDURAL STATES: Chronic | ICD-10-CM

## 2019-04-02 DIAGNOSIS — Z95.1 PRESENCE OF AORTOCORONARY BYPASS GRAFT: Chronic | ICD-10-CM

## 2019-04-02 LAB
ALBUMIN SERPL ELPH-MCNC: 3.2 G/DL — LOW (ref 3.3–5)
ALP SERPL-CCNC: 78 U/L — SIGNIFICANT CHANGE UP (ref 40–120)
ALT FLD-CCNC: 23 U/L — SIGNIFICANT CHANGE UP (ref 4–41)
ANION GAP SERPL CALC-SCNC: 14 MMO/L — SIGNIFICANT CHANGE UP (ref 7–14)
APTT BLD: 42.9 SEC — HIGH (ref 27.5–36.3)
AST SERPL-CCNC: 27 U/L — SIGNIFICANT CHANGE UP (ref 4–40)
BASOPHILS # BLD AUTO: 0.04 K/UL — SIGNIFICANT CHANGE UP (ref 0–0.2)
BASOPHILS NFR BLD AUTO: 0.6 % — SIGNIFICANT CHANGE UP (ref 0–2)
BILIRUB SERPL-MCNC: 1.5 MG/DL — HIGH (ref 0.2–1.2)
BUN SERPL-MCNC: 32 MG/DL — HIGH (ref 7–23)
CALCIUM SERPL-MCNC: 8.6 MG/DL — SIGNIFICANT CHANGE UP (ref 8.4–10.5)
CHLORIDE SERPL-SCNC: 90 MMOL/L — LOW (ref 98–107)
CO2 SERPL-SCNC: 25 MMOL/L — SIGNIFICANT CHANGE UP (ref 22–31)
CREAT SERPL-MCNC: 1.28 MG/DL — SIGNIFICANT CHANGE UP (ref 0.5–1.3)
EOSINOPHIL # BLD AUTO: 0.08 K/UL — SIGNIFICANT CHANGE UP (ref 0–0.5)
EOSINOPHIL NFR BLD AUTO: 1.2 % — SIGNIFICANT CHANGE UP (ref 0–6)
GLUCOSE SERPL-MCNC: 103 MG/DL — HIGH (ref 70–99)
HCT VFR BLD CALC: 39.3 % — SIGNIFICANT CHANGE UP (ref 39–50)
HGB BLD-MCNC: 12.2 G/DL — LOW (ref 13–17)
IMM GRANULOCYTES NFR BLD AUTO: 0.6 % — SIGNIFICANT CHANGE UP (ref 0–1.5)
INR BLD: 2.22 — HIGH (ref 0.88–1.17)
LYMPHOCYTES # BLD AUTO: 0.81 K/UL — LOW (ref 1–3.3)
LYMPHOCYTES # BLD AUTO: 12 % — LOW (ref 13–44)
MCHC RBC-ENTMCNC: 23.6 PG — LOW (ref 27–34)
MCHC RBC-ENTMCNC: 31 % — LOW (ref 32–36)
MCV RBC AUTO: 76 FL — LOW (ref 80–100)
MONOCYTES # BLD AUTO: 0.97 K/UL — HIGH (ref 0–0.9)
MONOCYTES NFR BLD AUTO: 14.3 % — HIGH (ref 2–14)
NEUTROPHILS # BLD AUTO: 4.83 K/UL — SIGNIFICANT CHANGE UP (ref 1.8–7.4)
NEUTROPHILS NFR BLD AUTO: 71.3 % — SIGNIFICANT CHANGE UP (ref 43–77)
NRBC # FLD: 0.03 K/UL — SIGNIFICANT CHANGE UP (ref 0–0)
PLATELET # BLD AUTO: 159 K/UL — SIGNIFICANT CHANGE UP (ref 150–400)
PMV BLD: 9.5 FL — SIGNIFICANT CHANGE UP (ref 7–13)
POTASSIUM SERPL-MCNC: 4.8 MMOL/L — SIGNIFICANT CHANGE UP (ref 3.5–5.3)
POTASSIUM SERPL-SCNC: 4.8 MMOL/L — SIGNIFICANT CHANGE UP (ref 3.5–5.3)
PROT SERPL-MCNC: 5.9 G/DL — LOW (ref 6–8.3)
PROTHROM AB SERPL-ACNC: 25.9 SEC — HIGH (ref 9.8–13.1)
RBC # BLD: 5.17 M/UL — SIGNIFICANT CHANGE UP (ref 4.2–5.8)
RBC # FLD: 17 % — HIGH (ref 10.3–14.5)
SODIUM SERPL-SCNC: 129 MMOL/L — LOW (ref 135–145)
WBC # BLD: 6.77 K/UL — SIGNIFICANT CHANGE UP (ref 3.8–10.5)
WBC # FLD AUTO: 6.77 K/UL — SIGNIFICANT CHANGE UP (ref 3.8–10.5)

## 2019-04-02 PROCEDURE — 71045 X-RAY EXAM CHEST 1 VIEW: CPT | Mod: 26

## 2019-04-02 PROCEDURE — 99284 EMERGENCY DEPT VISIT MOD MDM: CPT | Mod: 25

## 2019-04-02 RX ORDER — WARFARIN SODIUM 2.5 MG/1
5 TABLET ORAL ONCE
Qty: 0 | Refills: 0 | Status: COMPLETED | OUTPATIENT
Start: 2019-04-02 | End: 2019-04-02

## 2019-04-02 RX ORDER — WARFARIN SODIUM 2.5 MG/1
1 TABLET ORAL
Qty: 7 | Refills: 0
Start: 2019-04-02 | End: 2019-04-08

## 2019-04-02 RX ADMIN — WARFARIN SODIUM 5 MILLIGRAM(S): 2.5 TABLET ORAL at 18:25

## 2019-04-02 NOTE — ED PROVIDER NOTE - NSFOLLOWUPINSTRUCTIONS_ED_ALL_ED_FT
You were seen in the emergency department for elevated INR. Please follow up with your primary doctor in the next 3-5 days. Take coumadin 5 mg daily. Return to the emergency department immediately if you experience bloody stool, black stool, worsening shortness of breath or any other concerning symptoms.

## 2019-04-02 NOTE — ED PROVIDER NOTE - OBJECTIVE STATEMENT
17:00 Nora att: 69M h/o afib on coumadin sent in by PMD for elevated INR. Patient reports 3 days ago told to hold coumadin, number was supratherapeutic. Referred to ER today for persistently elevated number. Patient denies ams, nose bleed, gum bleed, hematemesis, melena, hematochezia, lightheadedness.

## 2019-04-02 NOTE — ED PROVIDER NOTE - ATTENDING CONTRIBUTION TO CARE
Dr. Melendez: I have personally seen and examined this patient at the bedside. I have fully participated in the care of this patient. I have reviewed all pertinent clinical information, including history, physical exam, plan and the Resident's note and agree except as noted. HPI above as by me. PE above as by me. Asymptoamtic elevated INR PLAN check cbc, cmp, inr, confirm PCP can recheck

## 2019-04-02 NOTE — ED PROVIDER NOTE - PROGRESS NOTE DETAILS
Nora att: INR 2.22. PMD Robert Padilla contacted at [phone]. Reports over the weekend INR 8, referred to Cardio Aruna, who referred patient to ED. Requests patient receive coumadin 5 mg po, take 5 mg qd, and recheck INR this weekend.

## 2019-04-02 NOTE — ED ADULT NURSE NOTE - CHIEF COMPLAINT QUOTE
Patient brought to ER from Cardiac care check up as a followup visit s/p lab results. His INR is 8 with no complaints or symptoms.  Pt stopped taking Coumadin three days ago under the advisement of his MD.

## 2019-04-02 NOTE — ED ADULT NURSE NOTE - OBJECTIVE STATEMENT
69yom a&ox4 amb w/ cane sent to ED by PMD for elevated INR. pt reports this was his routine bloodwork and he was told his INR was 8. pt was instructed to stop taking his Coumadin 3 days ago. pt denies any bleeding episodes. pt denies any new/worsening cp, sob, dizziness, lightheadedness, n/v/d, fever/chills. pt breathing even/unlabored. abdomen soft nontender, nondistended. pt presents w/ skin tear to LLE, otherwise skin warm, dry, and intact. 22g iv lock placed via US by MD to L upper arm. labs sent. pt on cardiac monitor. will continue to monitor.

## 2019-04-02 NOTE — ED ADULT TRIAGE NOTE - CHIEF COMPLAINT QUOTE
Patient brought to ER from Cardiac care check up as a followup visit s/p lab results. His INR is 8 with no complaints or symptoms. Patient brought to ER from Cardiac care check up as a followup visit s/p lab results. His INR is 8 with no complaints or symptoms.  Pt stopped taking Coumadin three days ago under the advisement of his MD.

## 2019-04-02 NOTE — ED ADULT NURSE NOTE - NSIMPLEMENTINTERV_GEN_ALL_ED
Implemented All Universal Safety Interventions:  Donna to call system. Call bell, personal items and telephone within reach. Instruct patient to call for assistance. Room bathroom lighting operational. Non-slip footwear when patient is off stretcher. Physically safe environment: no spills, clutter or unnecessary equipment. Stretcher in lowest position, wheels locked, appropriate side rails in place.

## 2019-04-04 ENCOUNTER — INBOUND DOCUMENT (OUTPATIENT)
Age: 69
End: 2019-04-04

## 2019-07-18 NOTE — PATIENT PROFILE ADULT - ABILITY TO HEAR (WITH HEARING AID OR HEARING APPLIANCE IF NORMALLY USED):
Office Policies    Phone calls/patient messages:            Please allow up to 24 hours for someone in the office to contact you about your call or message. Be mindful your provider may be out of the office or your message may require further review. We encourage you to use [facility] for your messages as this is a faster, more efficient way to communicate with our office                         Medication Refills:            Prescription medications require 48-72 business hours to process. We encourage you to use [facility] for your refills. For controlled medications: Please allow 72 business hours to process. Certain medications may require you to  a written prescription at our office. NO narcotic/controlled medications will be prescribed after 4pm Monday through Friday or on weekends              Form/Paperwork Completion:            Please note a $25 fee may incur for all paperwork for completed by our providers. We ask that you allow 7-10 business days. Pre-payment is due prior to picking up/faxing the completed form. You may also download your forms to [facility] to have your doctor print off. Chronic Obstructive Pulmonary Disease (COPD): Care Instructions  Your Care Instructions    Chronic obstructive pulmonary disease (COPD) is a general term for a group of lung diseases, including emphysema and chronic bronchitis. People with COPD have decreased airflow in and out of the lungs, which makes it hard to breathe. The airways also can get clogged with thick mucus. Cigarette smoking is a major cause of COPD. Although there is no cure for COPD, you can slow its progress. Following your treatment plan and taking care of yourself can help you feel better and live longer. Follow-up care is a key part of your treatment and safety. Be sure to make and go to all appointments, and call your doctor if you are having problems.  It's also a good idea to know your test results and keep a list of the medicines you take. How can you care for yourself at home?   Staying healthy    · Do not smoke. This is the most important step you can take to prevent more damage to your lungs. If you need help quitting, talk to your doctor about stop-smoking programs and medicines. These can increase your chances of quitting for good.     · Avoid colds and flu. Get a pneumococcal vaccine shot. If you have had one before, ask your doctor whether you need a second dose. Get the flu vaccine every fall. If you must be around people with colds or the flu, wash your hands often.     · Avoid secondhand smoke, air pollution, and high altitudes. Also avoid cold, dry air and hot, humid air. Stay at home with your windows closed when air pollution is bad.    Medicines and oxygen therapy    · Take your medicines exactly as prescribed. Call your doctor if you think you are having a problem with your medicine.     · You may be taking medicines such as:  ? Bronchodilators. These help open your airways and make breathing easier. Bronchodilators are either short-acting (work for 6 to 9 hours) or long-acting (work for 24 hours). You inhale most bronchodilators, so they start to act quickly. Always carry your quick-relief inhaler with you in case you need it while you are away from home. ? Corticosteroids (prednisone, budesonide). These reduce airway inflammation. They come in pill or inhaled form. You must take these medicines every day for them to work well.     · A spacer may help you get more inhaled medicine to your lungs. Ask your doctor or pharmacist if a spacer is right for you. If it is, ask how to use it properly.     · Do not take any vitamins, over-the-counter medicine, or herbal products without talking to your doctor first.     · If your doctor prescribed antibiotics, take them as directed. Do not stop taking them just because you feel better.  You need to take the full course of antibiotics.     · Oxygen therapy boosts the amount of Adequate: hears normal conversation without difficulty oxygen in your blood and helps you breathe easier. Use the flow rate your doctor has recommended, and do not change it without talking to your doctor first.   Activity    · Get regular exercise. Walking is an easy way to get exercise. Start out slowly, and walk a little more each day.     · Pay attention to your breathing. You are exercising too hard if you cannot talk while you are exercising.     · Take short rest breaks when doing household chores and other activities.     · Learn breathing methods--such as breathing through pursed lips--to help you become less short of breath.     · If your doctor has not set you up with a pulmonary rehabilitation program, talk to him or her about whether rehab is right for you. Rehab includes exercise programs, education about your disease and how to manage it, help with diet and other changes, and emotional support. Diet    · Eat regular, healthy meals. Use bronchodilators about 1 hour before you eat to make it easier to eat. Eat several small meals instead of three large ones. Drink beverages at the end of the meal. Avoid foods that are hard to chew.     · Eat foods that contain protein so that you do not lose muscle mass.     · Talk with your doctor if you gain too much weight or if you lose weight without trying.    Mental health    · Talk to your family, friends, or a therapist about your feelings. It is normal to feel frightened, angry, hopeless, helpless, and even guilty. Talking openly about bad feelings can help you cope. If these feelings last, talk to your doctor. When should you call for help? Call 911 anytime you think you may need emergency care.  For example, call if:    · You have severe trouble breathing.    Call your doctor now or seek immediate medical care if:    · You have new or worse trouble breathing.     · You cough up blood.     · You have a fever.    Watch closely for changes in your health, and be sure to contact your doctor if:    · You cough more deeply or more often, especially if you notice more mucus or a change in the color of your mucus.     · You have new or worse swelling in your legs or belly.     · You are not getting better as expected. Where can you learn more? Go to http://mac-rufus.info/. Ratna Hernandes in the search box to learn more about \"Chronic Obstructive Pulmonary Disease (COPD): Care Instructions. \"  Current as of: September 5, 2018  Content Version: 11.9  © 3860-7226 [employer]. Care instructions adapted under license by [employer] (which disclaims liability or warranty for this information). If you have questions about a medical condition or this instruction, always ask your healthcare professional. [address] any warranty or liability for your use of this information. Start probiotic, to help prevent any diarrhea from taking antibiotic.

## 2019-08-07 ENCOUNTER — INPATIENT (INPATIENT)
Facility: HOSPITAL | Age: 69
LOS: 19 days | Discharge: HOME | End: 2019-08-27
Attending: INTERNAL MEDICINE | Admitting: INTERNAL MEDICINE
Payer: SELF-PAY

## 2019-08-07 VITALS
RESPIRATION RATE: 20 BRPM | SYSTOLIC BLOOD PRESSURE: 113 MMHG | HEART RATE: 63 BPM | OXYGEN SATURATION: 100 % | DIASTOLIC BLOOD PRESSURE: 69 MMHG | TEMPERATURE: 98 F

## 2019-08-07 DIAGNOSIS — I50.23 ACUTE ON CHRONIC SYSTOLIC (CONGESTIVE) HEART FAILURE: ICD-10-CM

## 2019-08-07 DIAGNOSIS — E11.42 TYPE 2 DIABETES MELLITUS WITH DIABETIC POLYNEUROPATHY: ICD-10-CM

## 2019-08-07 DIAGNOSIS — I50.9 HEART FAILURE, UNSPECIFIED: ICD-10-CM

## 2019-08-07 DIAGNOSIS — Z95.1 PRESENCE OF AORTOCORONARY BYPASS GRAFT: Chronic | ICD-10-CM

## 2019-08-07 DIAGNOSIS — Z98.890 OTHER SPECIFIED POSTPROCEDURAL STATES: Chronic | ICD-10-CM

## 2019-08-07 DIAGNOSIS — I48.91 UNSPECIFIED ATRIAL FIBRILLATION: ICD-10-CM

## 2019-08-07 DIAGNOSIS — E78.5 HYPERLIPIDEMIA, UNSPECIFIED: ICD-10-CM

## 2019-08-07 LAB
ALBUMIN SERPL ELPH-MCNC: 3 G/DL — LOW (ref 3.3–5)
ALP SERPL-CCNC: 79 U/L — SIGNIFICANT CHANGE UP (ref 40–120)
ALT FLD-CCNC: 14 U/L — SIGNIFICANT CHANGE UP (ref 4–41)
ANION GAP SERPL CALC-SCNC: 14 MMO/L — SIGNIFICANT CHANGE UP (ref 7–14)
AST SERPL-CCNC: 20 U/L — SIGNIFICANT CHANGE UP (ref 4–40)
BASOPHILS # BLD AUTO: 0.03 K/UL — SIGNIFICANT CHANGE UP (ref 0–0.2)
BASOPHILS NFR BLD AUTO: 0.5 % — SIGNIFICANT CHANGE UP (ref 0–2)
BILIRUB SERPL-MCNC: 0.8 MG/DL — SIGNIFICANT CHANGE UP (ref 0.2–1.2)
BUN SERPL-MCNC: 42 MG/DL — HIGH (ref 7–23)
CALCIUM SERPL-MCNC: 8.9 MG/DL — SIGNIFICANT CHANGE UP (ref 8.4–10.5)
CHLORIDE SERPL-SCNC: 91 MMOL/L — LOW (ref 98–107)
CO2 SERPL-SCNC: 25 MMOL/L — SIGNIFICANT CHANGE UP (ref 22–31)
CREAT SERPL-MCNC: 1.27 MG/DL — SIGNIFICANT CHANGE UP (ref 0.5–1.3)
EOSINOPHIL # BLD AUTO: 0.1 K/UL — SIGNIFICANT CHANGE UP (ref 0–0.5)
EOSINOPHIL NFR BLD AUTO: 1.5 % — SIGNIFICANT CHANGE UP (ref 0–6)
GLUCOSE SERPL-MCNC: 127 MG/DL — HIGH (ref 70–99)
HCT VFR BLD CALC: 37.2 % — LOW (ref 39–50)
HGB BLD-MCNC: 11.7 G/DL — LOW (ref 13–17)
IMM GRANULOCYTES NFR BLD AUTO: 0.5 % — SIGNIFICANT CHANGE UP (ref 0–1.5)
LYMPHOCYTES # BLD AUTO: 0.59 K/UL — LOW (ref 1–3.3)
LYMPHOCYTES # BLD AUTO: 8.9 % — LOW (ref 13–44)
MCHC RBC-ENTMCNC: 25.5 PG — LOW (ref 27–34)
MCHC RBC-ENTMCNC: 31.5 % — LOW (ref 32–36)
MCV RBC AUTO: 81 FL — SIGNIFICANT CHANGE UP (ref 80–100)
MONOCYTES # BLD AUTO: 0.85 K/UL — SIGNIFICANT CHANGE UP (ref 0–0.9)
MONOCYTES NFR BLD AUTO: 12.9 % — SIGNIFICANT CHANGE UP (ref 2–14)
NEUTROPHILS # BLD AUTO: 5.01 K/UL — SIGNIFICANT CHANGE UP (ref 1.8–7.4)
NEUTROPHILS NFR BLD AUTO: 75.7 % — SIGNIFICANT CHANGE UP (ref 43–77)
NRBC # FLD: 0 K/UL — SIGNIFICANT CHANGE UP (ref 0–0)
NT-PROBNP SERPL-SCNC: SIGNIFICANT CHANGE UP PG/ML
NT-PROBNP SERPL-SCNC: SIGNIFICANT CHANGE UP PG/ML
PLATELET # BLD AUTO: 128 K/UL — LOW (ref 150–400)
PMV BLD: 9.1 FL — SIGNIFICANT CHANGE UP (ref 7–13)
POTASSIUM SERPL-MCNC: 4.8 MMOL/L — SIGNIFICANT CHANGE UP (ref 3.5–5.3)
POTASSIUM SERPL-SCNC: 4.8 MMOL/L — SIGNIFICANT CHANGE UP (ref 3.5–5.3)
PROT SERPL-MCNC: 6.2 G/DL — SIGNIFICANT CHANGE UP (ref 6–8.3)
RBC # BLD: 4.59 M/UL — SIGNIFICANT CHANGE UP (ref 4.2–5.8)
RBC # FLD: 18.9 % — HIGH (ref 10.3–14.5)
SODIUM SERPL-SCNC: 130 MMOL/L — LOW (ref 135–145)
TROPONIN T, HIGH SENSITIVITY: 109 NG/L — CRITICAL HIGH (ref ?–14)
TROPONIN T, HIGH SENSITIVITY: 111 NG/L — CRITICAL HIGH (ref ?–14)
WBC # BLD: 6.61 K/UL — SIGNIFICANT CHANGE UP (ref 3.8–10.5)
WBC # FLD AUTO: 6.61 K/UL — SIGNIFICANT CHANGE UP (ref 3.8–10.5)

## 2019-08-07 PROCEDURE — 71046 X-RAY EXAM CHEST 2 VIEWS: CPT | Mod: 26

## 2019-08-07 PROCEDURE — 99223 1ST HOSP IP/OBS HIGH 75: CPT

## 2019-08-07 RX ORDER — FUROSEMIDE 40 MG
60 TABLET ORAL ONCE
Refills: 0 | Status: COMPLETED | OUTPATIENT
Start: 2019-08-07 | End: 2019-08-07

## 2019-08-07 RX ORDER — METFORMIN HYDROCHLORIDE 850 MG/1
2 TABLET ORAL
Qty: 0 | Refills: 0 | DISCHARGE

## 2019-08-07 RX ORDER — METFORMIN HYDROCHLORIDE 850 MG/1
1 TABLET ORAL
Qty: 0 | Refills: 0 | DISCHARGE

## 2019-08-07 RX ORDER — GABAPENTIN 400 MG/1
1 CAPSULE ORAL
Qty: 0 | Refills: 0 | DISCHARGE

## 2019-08-07 RX ADMIN — Medication 60 MILLIGRAM(S): at 18:52

## 2019-08-07 NOTE — ED ADULT NURSE REASSESSMENT NOTE - NS ED NURSE REASSESS COMMENT FT1
Pt requesting to use urinal. Pt sat at edge of the bed, denies dizziness or weakness. PT stood at side of the bed with no difficulty. Pt requesting to use urinal on his own. Will continue to monitor.

## 2019-08-07 NOTE — ED PROVIDER NOTE - NS ED ROS FT
Gen: Denies fever, weight loss  CV: Denies chest pain, palpitations  Skin: Denies rash, erythema, color changes  Resp: Denies SOB, cough  Endo: Denies sensitivity to heat, cold, increased urination  GI: Denies constipation, nausea, vomiting  Msk: Denies back pain, extremity pain  : Denies dysuria, increased frequency  Neuro: Denies LOC, weakness  Psych: Denies hx of psych Gen: Denies fever, weight loss  CV: Denies chest pain, palpitations  Skin: Denies rash, erythema, color changes  Resp: Denies SOB, cough  Endo: Denies sensitivity to heat, cold, increased urination  GI: Denies constipation, nausea, vomiting.  abd distention  Msk: Denies back pain, extremity pain.  b/l le swelling  : Denies dysuria, increased frequency  Neuro: Denies LOC, weakness  Psych: Denies hx of psych

## 2019-08-07 NOTE — ED ADULT TRIAGE NOTE - CHIEF COMPLAINT QUOTE
Pt scheduled for Palliative care  but no beds available, c/o B/L edema, abd  distention  and discomfort.  Pt with hx of kidney failure, Heart failure 15%.

## 2019-08-07 NOTE — ED PROVIDER NOTE - OBJECTIVE STATEMENT
68yo M with pmh CABG (2007), mitral valve repair (2007), CVA, afib on apixaban, CHF (EF 15-20%) who presents with abdominal distention and b/l lower extremity edema. He reports 1 week of worsening symptoms. Associated with abdominal pain, decreased PO intake 2/2 the discomfort. Denies fever, chills, CP, SOB, n/v, diarrhea, constipation. Takes bumex 2mg BID at home for his CHF, reports compliance with his medications. He was recently told he could increase the dose but has not increased it yet. He denies any diet or medication changes. He has had similar symptoms in the past admitted for CHF exacerbation, most recently 2 months ago at [facility]. His most recent echo was February 2019. Does not remember when his most recent stress test was.

## 2019-08-07 NOTE — ED ADULT NURSE NOTE - OBJECTIVE STATEMENT
Pt. A&Ox3, presents to room 12 from home with niece at bedside. c/o abdominal discomfort and distention worsening over past week. Also c/o b/l pitting LE edema. Denies n/v/d, abdominal pain or fevers. States there are no palative beds available so came to ER care. Lives with family, ambulates independently at baseline-sometimes with cane if needed. #20g IV placed in right AC, labs sent. MD at bedside for eval. VSS, breathing well on RA. Respirations even and unlabored. Will continue to monitor.

## 2019-08-07 NOTE — H&P ADULT - ATTENDING COMMENTS
Pt seen and examined.  Dr Sorenson will take over care of patient in am. EKG - not in chart   Echo 2/19 - severe LV and RV dysfunction    a/p     1) Acute on chronic CHF - cont bumex 2mg q12, cont aldactone, hold bp meds, get paracentesis as pt has tense ascites sec to RHF     2) Afib - was discharged on coumadin not on it now, will find out why, hold AC for paracentesis     3) Hyponatremia - f/u nephro likely sec to CHF

## 2019-08-07 NOTE — H&P ADULT - NSHPPHYSICALEXAM_GEN_ALL_CORE
Vital Signs Last 24 Hrs  T(C): 36.6 (07 Aug 2019 21:44), Max: 36.6 (07 Aug 2019 21:44)  T(F): 97.8 (07 Aug 2019 21:44), Max: 97.8 (07 Aug 2019 21:44)  HR: 81 (07 Aug 2019 21:44) (63 - 81)  BP: 107/72 (07 Aug 2019 21:44) (107/72 - 113/69)  BP(mean): --  RR: 20 (07 Aug 2019 21:44) (20 - 20)  SpO2: 100% (07 Aug 2019 21:44) (100% - 100%)    PHYSICAL EXAM:  GENERAL: No Acute Distress  EYES: conjunctiva and sclera clear  ENMT: Moist mucous membranes   NECK: Supple  PULMONARY: Tachypnea, Clear to auscultation bilaterally  CARDIAC: Irregular. No murmurs, rubs, or gallops  GASTROINTESTINAL: Abdomen soft, Nontender.  + distension. Bowel sounds normal  EXTREMITIES:   No clubbing or cyanosis.  2+ pedal edema  PSYCH: Normal Affect, Normal Behavior  NEUROLOGY:   - Mental status A&O x 3  SKIN: No rashes or lesions  MUSCULOSKELETAL: No joint swelling

## 2019-08-07 NOTE — H&P ADULT - NSHPREVIEWOFSYSTEMS_GEN_ALL_CORE
Review of Systems:   CONSTITUTIONAL: No fever or chills  EYES: No eye pain, visual disturbances, or discharge  ENMT:  No difficulty hearing, no throat pain  NECK: No pain or stiffness  RESPIRATORY: + dyspnea. No cough  CARDIOVASCULAR: No chest pain, palpitations, or dizziness.  + leg swelling  GASTROINTESTINAL: + abdominal distension. No abdominal pain, nausea, vomiting or diarrhea  GENITOURINARY: No dysuria, or hematuria  NEUROLOGICAL: No headaches, weakness, or numbness  SKIN: No rashes, or lesions   LYMPH NODES: No enlarged glands  ENDOCRINE: No heat or cold intolerance  MUSCULOSKELETAL: No joint pain or swelling  PSYCHIATRIC: No depression or anxiety  HEME/LYMPH: No easy bruising, or bleeding  ALLERGY AND IMMUNOLOGIC: No hives or eczema

## 2019-08-07 NOTE — ED PROVIDER NOTE - ATTENDING CONTRIBUTION TO CARE
DR. ORTEGA, ATTENDING MD-  I have reviewed and discussed the medical student’s documentation and findings with the student. After personally examining the patient, my findings have been added to this documentation.    DR. ORTEGA, ATTENDING MD-  I performed a face to face bedside interview with the patient regarding history of present illness, review of symptoms and past medical history. I completed an independent physical exam.  I have discussed the patient's plan of care with the resident.   Documentation as above in the note.    70 y/o male h/o cabg, hld, htn, cva, dm, chf, afib here with abd distention and b/l le swelling x1 wk.  States compliant with diuretics, denies any inc salt intake.  Denies f/c, ha, neck stiffness, cp, sob, cough, n/v/d, dysuria, rash.  Afebrile, vs wnl, fatigued, ctabil, s1s2 rrr no m/r/g, abd soft distended non ttp no r/g, no cva tenderness b/l, leg swelling b/l, no rash.  Likely fluid overload secondary to known chf, unclear precipitating factor.  Obtain cbc, cmp, bnp, trop, cxr, ekg, give diuretic, admit for further care and evaluation.

## 2019-08-07 NOTE — ED PROVIDER NOTE - CLINICAL SUMMARY MEDICAL DECISION MAKING FREE TEXT BOX
68yo M with extensive cardiac history and CHF presents with abdominal distention and b/l lower extremity edema consistent with his previous HF exacerbations. Most concerning for CHF exacerbation, low concern for med noncompliance or dietary changes. Low concern for MI, given negative for CP, negative EKG. Low concern for PNA, pulmonary edema given no SOB, JVD, lungs clear on exam. Plan: ordered Trop to r/o MI, CXR to r/o PNA or pulm edema, BNP to confirm CHF exacerbation. Ordered 60mg furosemide IV to treat volume overload.

## 2019-08-07 NOTE — H&P ADULT - PROBLEM SELECTOR PLAN 2
Pt previously on warfarin, but switched to Eliquis, which per family was also stopped.  Will need to clarify with cardiologist in am  - continue metoprolol

## 2019-08-07 NOTE — ED PROVIDER NOTE - PROGRESS NOTE DETAILS
ProBNP 15k (baseline 5-6k); Trop 111->109 and spoke to patient cardiologist Dr. Phillip Sorenson and will admit to Tele. Text paged MAR.   -Jd Reid PGY4 [facility]IM [employer]#[id number]

## 2019-08-07 NOTE — H&P ADULT - NSHPLABSRESULTS_GEN_ALL_CORE
08-07    130<L>  |  91<L>  |  42<H>  ----------------------------<  127<H>  4.8   |  25  |  1.27    Ca    8.9      07 Aug 2019 18:18    TPro  6.2  /  Alb  3.0<L>  /  TBili  0.8  /  DBili  x   /  AST  20  /  ALT  14  /  AlkPhos  79  08-07                            11.7   6.61  )-----------( 128      ( 07 Aug 2019 18:18 )             37.2           cxr film reviewed: clear lungs    EKG tracing reviewed: V paced

## 2019-08-07 NOTE — H&P ADULT - PROBLEM SELECTOR PLAN 1
- will start diuresis with IV Bumex.  May require infusion.   - Continue metoprolol and spironolactone  - monitor on tele

## 2019-08-07 NOTE — ED PROVIDER NOTE - PSH
AICD (automatic cardioverter/defibrillator) present  [employer]  S/P CABG (coronary artery bypass graft)  4V CABG in 2007  S/P mitral valve repair  2007

## 2019-08-07 NOTE — ED PROVIDER NOTE - PHYSICAL EXAMINATION
Gen: Lying in a stretcher, NAD  HEENT: EOMI, no nasal discharge, mucous membranes moist. No JVD.  CV: RRR, +S1/S2, no M/R/G  Resp: CTAB, no W/R/R  GI: Abdomen tense, severely distended, diffuse mild tenderness.   MSK: No open wounds, no bruising. 2+ pitting edema present b/l lower extremities up to the knee. 2+ distal pulses.  Neuro: A&Ox4, following commands, moving all four extremities spontaneously  Psych: appropriate mood, denies AH, VH, SI

## 2019-08-08 LAB
ANION GAP SERPL CALC-SCNC: 11 MMO/L — SIGNIFICANT CHANGE UP (ref 7–14)
APTT BLD: 31.5 SEC — SIGNIFICANT CHANGE UP (ref 27.5–36.3)
BUN SERPL-MCNC: 39 MG/DL — HIGH (ref 7–23)
CALCIUM SERPL-MCNC: 8.7 MG/DL — SIGNIFICANT CHANGE UP (ref 8.4–10.5)
CHLORIDE SERPL-SCNC: 95 MMOL/L — LOW (ref 98–107)
CO2 SERPL-SCNC: 24 MMOL/L — SIGNIFICANT CHANGE UP (ref 22–31)
CREAT SERPL-MCNC: 1.06 MG/DL — SIGNIFICANT CHANGE UP (ref 0.5–1.3)
GLUCOSE SERPL-MCNC: 129 MG/DL — HIGH (ref 70–99)
HCT VFR BLD CALC: 35.8 % — LOW (ref 39–50)
HGB BLD-MCNC: 11.4 G/DL — LOW (ref 13–17)
INR BLD: 1.13 — SIGNIFICANT CHANGE UP (ref 0.88–1.17)
MAGNESIUM SERPL-MCNC: 1.7 MG/DL — SIGNIFICANT CHANGE UP (ref 1.6–2.6)
MCHC RBC-ENTMCNC: 26.5 PG — LOW (ref 27–34)
MCHC RBC-ENTMCNC: 31.8 % — LOW (ref 32–36)
MCV RBC AUTO: 83.3 FL — SIGNIFICANT CHANGE UP (ref 80–100)
NRBC # FLD: 0 K/UL — SIGNIFICANT CHANGE UP (ref 0–0)
PHOSPHATE SERPL-MCNC: 4.3 MG/DL — SIGNIFICANT CHANGE UP (ref 2.5–4.5)
PLATELET # BLD AUTO: 110 K/UL — LOW (ref 150–400)
PMV BLD: 8.7 FL — SIGNIFICANT CHANGE UP (ref 7–13)
POTASSIUM SERPL-MCNC: 3.9 MMOL/L — SIGNIFICANT CHANGE UP (ref 3.5–5.3)
POTASSIUM SERPL-SCNC: 3.9 MMOL/L — SIGNIFICANT CHANGE UP (ref 3.5–5.3)
PROTHROM AB SERPL-ACNC: 12.9 SEC — SIGNIFICANT CHANGE UP (ref 9.8–13.1)
RBC # BLD: 4.3 M/UL — SIGNIFICANT CHANGE UP (ref 4.2–5.8)
RBC # FLD: 18.6 % — HIGH (ref 10.3–14.5)
SODIUM SERPL-SCNC: 130 MMOL/L — LOW (ref 135–145)
WBC # BLD: 4.68 K/UL — SIGNIFICANT CHANGE UP (ref 3.8–10.5)
WBC # FLD AUTO: 4.68 K/UL — SIGNIFICANT CHANGE UP (ref 3.8–10.5)

## 2019-08-08 RX ORDER — GABAPENTIN 400 MG/1
100 CAPSULE ORAL AT BEDTIME
Refills: 0 | Status: DISCONTINUED | OUTPATIENT
Start: 2019-08-08 | End: 2019-08-27

## 2019-08-08 RX ORDER — SPIRONOLACTONE 25 MG/1
50 TABLET, FILM COATED ORAL DAILY
Refills: 0 | Status: DISCONTINUED | OUTPATIENT
Start: 2019-08-08 | End: 2019-08-27

## 2019-08-08 RX ORDER — INSULIN LISPRO 100/ML
VIAL (ML) SUBCUTANEOUS AT BEDTIME
Refills: 0 | Status: DISCONTINUED | OUTPATIENT
Start: 2019-08-08 | End: 2019-08-27

## 2019-08-08 RX ORDER — MAGNESIUM SULFATE 500 MG/ML
2 VIAL (ML) INJECTION ONCE
Refills: 0 | Status: COMPLETED | OUTPATIENT
Start: 2019-08-08 | End: 2019-08-08

## 2019-08-08 RX ORDER — ATORVASTATIN CALCIUM 80 MG/1
20 TABLET, FILM COATED ORAL AT BEDTIME
Refills: 0 | Status: DISCONTINUED | OUTPATIENT
Start: 2019-08-08 | End: 2019-08-27

## 2019-08-08 RX ORDER — GLUCAGON INJECTION, SOLUTION 0.5 MG/.1ML
1 INJECTION, SOLUTION SUBCUTANEOUS ONCE
Refills: 0 | Status: DISCONTINUED | OUTPATIENT
Start: 2019-08-08 | End: 2019-08-27

## 2019-08-08 RX ORDER — BUMETANIDE 0.25 MG/ML
2 INJECTION INTRAMUSCULAR; INTRAVENOUS
Refills: 0 | Status: DISCONTINUED | OUTPATIENT
Start: 2019-08-08 | End: 2019-08-10

## 2019-08-08 RX ORDER — TAMSULOSIN HYDROCHLORIDE 0.4 MG/1
0.4 CAPSULE ORAL DAILY
Refills: 0 | Status: DISCONTINUED | OUTPATIENT
Start: 2019-08-08 | End: 2019-08-27

## 2019-08-08 RX ORDER — DEXTROSE 50 % IN WATER 50 %
25 SYRINGE (ML) INTRAVENOUS ONCE
Refills: 0 | Status: DISCONTINUED | OUTPATIENT
Start: 2019-08-08 | End: 2019-08-27

## 2019-08-08 RX ORDER — INSULIN LISPRO 100/ML
VIAL (ML) SUBCUTANEOUS
Refills: 0 | Status: DISCONTINUED | OUTPATIENT
Start: 2019-08-08 | End: 2019-08-27

## 2019-08-08 RX ORDER — DEXTROSE 50 % IN WATER 50 %
12.5 SYRINGE (ML) INTRAVENOUS ONCE
Refills: 0 | Status: DISCONTINUED | OUTPATIENT
Start: 2019-08-08 | End: 2019-08-27

## 2019-08-08 RX ORDER — DEXTROSE 50 % IN WATER 50 %
15 SYRINGE (ML) INTRAVENOUS ONCE
Refills: 0 | Status: DISCONTINUED | OUTPATIENT
Start: 2019-08-08 | End: 2019-08-27

## 2019-08-08 RX ADMIN — TAMSULOSIN HYDROCHLORIDE 0.4 MILLIGRAM(S): 0.4 CAPSULE ORAL at 11:40

## 2019-08-08 RX ADMIN — Medication 50 GRAM(S): at 11:40

## 2019-08-08 RX ADMIN — GABAPENTIN 100 MILLIGRAM(S): 400 CAPSULE ORAL at 20:17

## 2019-08-08 RX ADMIN — BUMETANIDE 2 MILLIGRAM(S): 0.25 INJECTION INTRAMUSCULAR; INTRAVENOUS at 17:54

## 2019-08-08 RX ADMIN — ATORVASTATIN CALCIUM 20 MILLIGRAM(S): 80 TABLET, FILM COATED ORAL at 20:17

## 2019-08-08 RX ADMIN — Medication 1: at 13:56

## 2019-08-08 NOTE — CONSULT NOTE ADULT - SUBJECTIVE AND OBJECTIVE BOX
[facility]   MD ULICES MCKEON DO ANGELA WONG, PA    TEL:  OFFICE: [phone]  DR TAYLOR CELL: [phone]  DR. MCFARLAND CELL: [phone]  KATHERINE POWER CELL: [phone]      HPI:  68 y/o M with h/o MR s/p mitral valvuloplasty, AF, HLD, HTN, DM, CVA, CAD(s/p 4V CABG), CHF(with EF of 15-20% s/p AICD) p/w worsening abdominal distension and leg edema.  Pt reports several months of abd distension and leg swelling which has been worsening recently.  He reports chronic orthopnea which has not changed.  Feels some dyspnea 2/2 abd distension.  Reports no chest pain, palpitations or other new symptoms.    + dry cough, states he is not making much urine.     Allergies:  No Known Allergies      PAST MEDICAL & SURGICAL HISTORY:  CKD (chronic kidney disease)  MR (mitral regurgitation)  AF (atrial fibrillation)  DM (diabetes mellitus)  HLD (hyperlipidemia)  TIA (transient ischemic attack)  HTN (hypertension)  CVA (cerebral infarction)  CHB (complete heart block)  CHF (congestive heart failure)  CAD (coronary artery disease): S/P 4V CABG  S/P mitral valve repair: 2007  S/P CABG (coronary artery bypass graft): 4V CABG in 2007  AICD (automatic cardioverter/defibrillator) present: [employer]      Home Medications Reviewed    Hospital Medications:   MEDICATIONS  (STANDING):  atorvastatin 20 milliGRAM(s) Oral at bedtime  buMETAnide Injectable 2 milliGRAM(s) IV Push two times a day  dextrose 50% Injectable 12.5 Gram(s) IV Push once  dextrose 50% Injectable 25 Gram(s) IV Push once  dextrose 50% Injectable 25 Gram(s) IV Push once  gabapentin 100 milliGRAM(s) Oral at bedtime  insulin lispro (HumaLOG) corrective regimen sliding scale   SubCutaneous three times a day before meals  insulin lispro (HumaLOG) corrective regimen sliding scale   SubCutaneous at bedtime  spironolactone 50 milliGRAM(s) Oral daily  tamsulosin 0.4 milliGRAM(s) Oral daily      SOCIAL HISTORY:  Denies ETOh, Smoking,     FAMILY HISTORY:  No pertinent family history in first degree relatives      REVIEW OF SYSTEMS:  CONSTITUTIONAL: No weakness, fevers or chills  EYES/ENT: No visual changes;  No vertigo or throat pain   NECK: No pain or stiffness  RESPIRATORY: see hpi  CARDIOVASCULAR: No chest pain or palpitations.  GASTROINTESTINAL: see hpi  GENITOURINARY: see hpi  NEUROLOGICAL: No numbness or weakness  SKIN: No itching, burning, rashes, or lesions   VASCULAR: + bilateral lower extremity edema.   All other review of systems is negative unless indicated above.    VITALS:  T(F): 98.6 (08-08-19 @ 09:29), Max: 98.6 (08-08-19 @ 09:29)  HR: 76 (08-08-19 @ 09:29)  BP: 96/58 (08-08-19 @ 09:29)  RR: 18 (08-08-19 @ 09:29)  SpO2: 98% (08-08-19 @ 09:29)  Wt(kg): --    08-07 @ 07:01  -  08-08 @ 07:00  --------------------------------------------------------  IN: 0 mL / OUT: 250 mL / NET: -250 mL          PHYSICAL EXAM:  Constitutional: NAD  HEENT: anicteric sclera, oropharynx clear, MMM  Neck: No JVD  Respiratory: CTAB, no wheezes, rales or rhonchi  Cardiovascular: S1, S2, RRR  Gastrointestinal: distended, NT  Extremities: 3+ peripheral edema  Neurological: A/O x 3, no focal deficits  Psychiatric: Normal mood, normal affect  : No CVA tenderness. No dooley.   Skin: No rashes      LABS:  08-08    130<L>  |  95<L>  |  39<H>  ----------------------------<  129<H>  3.9   |  24  |  1.06    Ca    8.7      08 Aug 2019 05:30  Phos  4.3     08-08  Mg     1.7     08-08    TPro  6.2  /  Alb  3.0<L>  /  TBili  0.8  /  DBili      /  AST  20  /  ALT  14  /  AlkPhos  79  08-07    Creatinine Trend: 1.06 <--, 1.27 <--                        11.4   4.68  )-----------( 110      ( 08 Aug 2019 05:30 )             35.8     Urine Studies:        RADIOLOGY & ADDITIONAL STUDIES:

## 2019-08-08 NOTE — CONSULT NOTE ADULT - ASSESSMENT
68 y/o M with PMH of MR s/p mitral valvuloplasty, Afib (on Coumadin), HLD, HTN, DM, CVA, CAD (s/p 4V CABG), CHF (with EF of 15-20% s/p AICD) presented with the complaint of worsening LE edema, weight gain and abdominal distention. Got admitted with CHF, found to have hyponatremia      Hyponatremia:  Likely sec to fluid overload sec to CHF  Free water restriction 1L/day  Continue diuretics  Monitor Na level daily    CHF:  on bumex 2mg BID iv  Follow up cardiology  Daily weight, monitor I/O    HTN  bp soft  diurese as tolerated  monitor bp 70 y/o M with PMH of MR s/p mitral valvuloplasty, Afib (on Coumadin), HLD, HTN, DM, CVA, CAD (s/p 4V CABG), CHF (with EF of 15-20% s/p AICD) presented with the complaint of worsening LE edema, weight gain and abdominal distention. Got admitted with CHF, found to have hyponatremia      Hyponatremia:  Likely secondary to hypervolemia in the setting of CHF  Free water restriction 1L/day  Continue diuretics  Monitor Na level daily    CHF:  on bumex 2mg IV BID   Follow up cardiology  Daily weight, monitor I/O    HTN  bp borderline   diurese as tolerated  monitor bp

## 2019-08-08 NOTE — CONSULT NOTE ADULT - ATTENDING COMMENTS
Assessment/Rec-  -Acute on chronic systolic CHF-cont iv bumex.spironolactone.ECHO,Cardiology consult.May consider Bumex infusion  -A.FIB with mvr-rate control,A/C?  --DM,check A1C,monitor FS with coverage  -Elevared BNP,Troponin-trend  -Abdominal distention-abd.u/s  -d/w family at bedside  -will f/u.Thanks

## 2019-08-08 NOTE — CONSULT NOTE ADULT - SUBJECTIVE AND OBJECTIVE BOX
CARLO, LISSETH  69y  Male      Patient is a 69y old  Male who presents with a chief complaint of Abdominal distension, leg swelling (08 Aug 2019 14:06)  Patient seen and examined.chart reviewed.d/w family at bedside.Patient is admitted with chf,  no sob,no cp,no fever,no cough.c/o swelling of abd.area.legs.recent admission at [facility]      HPI:  68 y/o M with h/o MR s/p mitral valvuloplasty, AF, HLD, HTN, DM, CVA, CAD(s/p 4V CABG), CHF(with EF of 15-20% s/p AICD) p/w worsening abdominal distension and leg edema.  Pt reports several months of abd distension and leg swelling which has been worsening recently.  He reports chronic orthopnea which has not changed.  Feels some dyspnea 2/2 abd distension.  Reports no chest pain, palpitations or other new symptoms.  Most recently admitted to [facility] 2 months ago for CHF exacerbation, and prior to that was admitted to [facility] earlier this year and required Bumex infusion.      In the ED today, pt was given Lasix 80mg IV. (07 Aug 2019 22:27)    INTERVAL HPI/OVERNIGHT EVENTS:  Medications:MEDICATIONS  (STANDING):  atorvastatin 20 milliGRAM(s) Oral at bedtime  buMETAnide Injectable 2 milliGRAM(s) IV Push two times a day  dextrose 50% Injectable 12.5 Gram(s) IV Push once  dextrose 50% Injectable 25 Gram(s) IV Push once  dextrose 50% Injectable 25 Gram(s) IV Push once  gabapentin 100 milliGRAM(s) Oral at bedtime  insulin lispro (HumaLOG) corrective regimen sliding scale   SubCutaneous three times a day before meals  insulin lispro (HumaLOG) corrective regimen sliding scale   SubCutaneous at bedtime  spironolactone 50 milliGRAM(s) Oral daily  tamsulosin 0.4 milliGRAM(s) Oral daily    MEDICATIONS  (PRN):  dextrose 40% Gel 15 Gram(s) Oral once PRN Blood Glucose LESS THAN 70 milliGRAM(s)/deciliter  glucagon  Injectable 1 milliGRAM(s) IntraMuscular once PRN Glucose LESS THAN 70 milligrams/deciliter      Allergies: Allergies    No Known Allergies    Intolerances        REVIEW OF SYSTEMS:  as above  T(C): 36.6 (08-08-19 @ 20:18), Max: 37 (08-08-19 @ 09:29)  HR: 76 (08-08-19 @ 20:18) (66 - 83)  BP: 111/63 (08-08-19 @ 20:18) (96/58 - 115/63)  RR: 16 (08-08-19 @ 20:18) (16 - 20)  SpO2: 98% (08-08-19 @ 20:18) (98% - 100%)  Wt(kg): --Vital Signs Last 24 Hrs  T(C): 36.6 (08 Aug 2019 20:18), Max: 37 (08 Aug 2019 09:29)  T(F): 97.8 (08 Aug 2019 20:18), Max: 98.6 (08 Aug 2019 09:29)  HR: 76 (08 Aug 2019 20:18) (66 - 83)  BP: 111/63 (08 Aug 2019 20:18) (96/58 - 115/63)  BP(mean): --  RR: 16 (08 Aug 2019 20:18) (16 - 20)  SpO2: 98% (08 Aug 2019 20:18) (98% - 100%)  I&O's Summary    07 Aug 2019 07:01  -  08 Aug 2019 07:00  --------------------------------------------------------  IN: 0 mL / OUT: 250 mL / NET: -250 mL    08 Aug 2019 07:01  -  08 Aug 2019 20:44  --------------------------------------------------------  IN: 150 mL / OUT: 350 mL / NET: -200 mL        PHYSICAL EXAM:  GENERAL: NAD, well-groomed, well-developed  HEAD:  Atraumatic, Normocephalic  EYES: EOMI, PERRLA, conjunctiva and sclera clear  ENMT: No tonsillar erythema, exudates, or enlargement; Moist mucous membranes, Good dentition, No lesions  NECK: Supple, No JVD, Normal thyroid  NERVOUS SYSTEM:  Alert & Oriented X3, Good concentration; Motor Strength 5/5 B/L upper and lower extremities; DTRs 2+ intact and symmetric  CHEST/LUNG: mild diffuse rhonchi b/l  HEART: Regular rate and rhythm; No murmurs, rubs, or gallops  ABDOMEN: Soft, Nontender, distended; Bowel sounds present  EXTREMITIES:  2+ Peripheral Pulses, No clubbing, cyanosis,  edema 1++  LYMPH: No lymphadenopathy noted  SKIN: No rashes or lesions    Consultant(s) Notes Reviewed:  [x ] YES  [ ] NO  Care Discussed with Consultants/Other Providers [ x] YES  [ ] NO    LABS:                        11.4   4.68  )-----------( 110      ( 08 Aug 2019 05:30 )             35.8     08-08    130<L>  |  95<L>  |  39<H>  ----------------------------<  129<H>  3.9   |  24  |  1.06    Ca    8.7      08 Aug 2019 05:30  Phos  4.3     08-08  Mg     1.7     08-08    TPro  6.2  /  Alb  3.0<L>  /  TBili  0.8  /  DBili  x   /  AST  20  /  ALT  14  /  AlkPhos  79  08-07    PT/INR - ( 08 Aug 2019 11:33 )   PT: 12.9 SEC;   INR: 1.13          PTT - ( 08 Aug 2019 11:33 )  PTT:31.5 SEC    CAPILLARY BLOOD GLUCOSE      POCT Blood Glucose.: 136 mg/dL (08 Aug 2019 17:52)  POCT Blood Glucose.: 160 mg/dL (08 Aug 2019 13:52)  POCT Blood Glucose.: 118 mg/dL (08 Aug 2019 09:06)            RADIOLOGY & ADDITIONAL TESTS:    Imaging Personally Reviewed:  [ ] YES  [ ] NO

## 2019-08-09 LAB
ALBUMIN FLD-MCNC: 0.9 G/DL — SIGNIFICANT CHANGE UP
ANION GAP SERPL CALC-SCNC: 15 MMO/L — HIGH (ref 7–14)
APTT BLD: 30.7 SEC — SIGNIFICANT CHANGE UP (ref 27.5–36.3)
BODY FLUID TYPE: SIGNIFICANT CHANGE UP
BUN SERPL-MCNC: 37 MG/DL — HIGH (ref 7–23)
CALCIUM SERPL-MCNC: 9 MG/DL — SIGNIFICANT CHANGE UP (ref 8.4–10.5)
CHLORIDE SERPL-SCNC: 93 MMOL/L — LOW (ref 98–107)
CLARITY SPEC: SIGNIFICANT CHANGE UP
CO2 SERPL-SCNC: 21 MMOL/L — LOW (ref 22–31)
COLOR FLD: YELLOW — SIGNIFICANT CHANGE UP
CREAT SERPL-MCNC: 1.04 MG/DL — SIGNIFICANT CHANGE UP (ref 0.5–1.3)
EOSINOPHIL # FLD: 1 % — SIGNIFICANT CHANGE UP
GLUCOSE FLD-MCNC: 151 MG/DL — SIGNIFICANT CHANGE UP
GLUCOSE SERPL-MCNC: 112 MG/DL — HIGH (ref 70–99)
GRAM STN FLD: SIGNIFICANT CHANGE UP
HBA1C BLD-MCNC: 6.5 % — HIGH (ref 4–5.6)
HCT VFR BLD CALC: 36.8 % — LOW (ref 39–50)
HGB BLD-MCNC: 11.7 G/DL — LOW (ref 13–17)
INR BLD: 1.08 — SIGNIFICANT CHANGE UP (ref 0.88–1.17)
LDH SERPL L TO P-CCNC: 60 U/L — SIGNIFICANT CHANGE UP
LYMPHOCYTES NFR FLD: 68 % — SIGNIFICANT CHANGE UP
MAGNESIUM SERPL-MCNC: 2 MG/DL — SIGNIFICANT CHANGE UP (ref 1.6–2.6)
MCHC RBC-ENTMCNC: 26.5 PG — LOW (ref 27–34)
MCHC RBC-ENTMCNC: 31.8 % — LOW (ref 32–36)
MCV RBC AUTO: 83.3 FL — SIGNIFICANT CHANGE UP (ref 80–100)
MONOCYTES # FLD: 5 % — SIGNIFICANT CHANGE UP
NEUTS SEG NFR FLD MANUAL: 26 % — SIGNIFICANT CHANGE UP
NRBC # FLD: 0 K/UL — SIGNIFICANT CHANGE UP (ref 0–0)
PLATELET # BLD AUTO: 122 K/UL — LOW (ref 150–400)
PMV BLD: 9.5 FL — SIGNIFICANT CHANGE UP (ref 7–13)
POTASSIUM SERPL-MCNC: 4.1 MMOL/L — SIGNIFICANT CHANGE UP (ref 3.5–5.3)
POTASSIUM SERPL-SCNC: 4.1 MMOL/L — SIGNIFICANT CHANGE UP (ref 3.5–5.3)
PROT FLD-MCNC: 1.6 G/DL — SIGNIFICANT CHANGE UP
PROTHROM AB SERPL-ACNC: 12.4 SEC — SIGNIFICANT CHANGE UP (ref 9.8–13.1)
RBC # BLD: 4.42 M/UL — SIGNIFICANT CHANGE UP (ref 4.2–5.8)
RBC # FLD: 19 % — HIGH (ref 10.3–14.5)
RCV VOL RI: 5000 CELL/UL — HIGH (ref 0–5)
SODIUM SERPL-SCNC: 129 MMOL/L — LOW (ref 135–145)
SPECIMEN SOURCE: SIGNIFICANT CHANGE UP
TOTAL CELLS COUNTED, BODY FLUID: 100 CELLS — SIGNIFICANT CHANGE UP
TOTAL NUCLEATED CELL COUNT, BODY FLUID: 311 CELL/UL — HIGH (ref 0–5)
WBC # BLD: 5.4 K/UL — SIGNIFICANT CHANGE UP (ref 3.8–10.5)
WBC # FLD AUTO: 5.4 K/UL — SIGNIFICANT CHANGE UP (ref 3.8–10.5)

## 2019-08-09 PROCEDURE — 76705 ECHO EXAM OF ABDOMEN: CPT | Mod: 26

## 2019-08-09 PROCEDURE — 49082 ABD PARACENTESIS: CPT

## 2019-08-09 RX ORDER — APIXABAN 2.5 MG/1
5 TABLET, FILM COATED ORAL EVERY 12 HOURS
Refills: 0 | Status: DISCONTINUED | OUTPATIENT
Start: 2019-08-09 | End: 2019-08-27

## 2019-08-09 RX ADMIN — TAMSULOSIN HYDROCHLORIDE 0.4 MILLIGRAM(S): 0.4 CAPSULE ORAL at 09:29

## 2019-08-09 RX ADMIN — GABAPENTIN 100 MILLIGRAM(S): 400 CAPSULE ORAL at 21:45

## 2019-08-09 RX ADMIN — Medication 1: at 12:34

## 2019-08-09 RX ADMIN — BUMETANIDE 2 MILLIGRAM(S): 0.25 INJECTION INTRAMUSCULAR; INTRAVENOUS at 17:32

## 2019-08-09 RX ADMIN — ATORVASTATIN CALCIUM 20 MILLIGRAM(S): 80 TABLET, FILM COATED ORAL at 21:45

## 2019-08-09 RX ADMIN — SPIRONOLACTONE 50 MILLIGRAM(S): 25 TABLET, FILM COATED ORAL at 06:05

## 2019-08-09 RX ADMIN — APIXABAN 5 MILLIGRAM(S): 2.5 TABLET, FILM COATED ORAL at 21:45

## 2019-08-09 RX ADMIN — BUMETANIDE 2 MILLIGRAM(S): 0.25 INJECTION INTRAMUSCULAR; INTRAVENOUS at 06:05

## 2019-08-09 NOTE — PROGRESS NOTE ADULT - ASSESSMENT
Echo 2/19 - severe LV and RV dysfunction    a/p     1) Acute on chronic CHF - cont bumex 2mg q12, cont aldactone, hold bp meds, s/p paracentesis 3.8 liters out    2) Afib - was discharged on coumadin not on it now, resume eliquis, spoke to pt no bleeding issues no falls    3) Hyponatremia - f/u nephro likely sec to CHF

## 2019-08-09 NOTE — PROGRESS NOTE ADULT - SUBJECTIVE AND OBJECTIVE BOX
Phillip Sorenson MD  Interventional Cardiology / Advance Heart Failure and Cardiac Transplant Specialist  [place] Office : [address] [place] [address] [address] [address]  Tel:   [place] Office : [address] [facility] [place] [address]  Tel: [phone]  Cell : [phone]    Subjective : Pt lying in bed comfortable, not in distress, denies any chest pain or SOB  	  MEDICATIONS:  apixaban 5 milliGRAM(s) Oral every 12 hours  buMETAnide Injectable 2 milliGRAM(s) IV Push two times a day  spironolactone 50 milliGRAM(s) Oral daily  tamsulosin 0.4 milliGRAM(s) Oral daily        gabapentin 100 milliGRAM(s) Oral at bedtime      atorvastatin 20 milliGRAM(s) Oral at bedtime  dextrose 40% Gel 15 Gram(s) Oral once PRN  dextrose 50% Injectable 12.5 Gram(s) IV Push once  dextrose 50% Injectable 25 Gram(s) IV Push once  dextrose 50% Injectable 25 Gram(s) IV Push once  glucagon  Injectable 1 milliGRAM(s) IntraMuscular once PRN  insulin lispro (HumaLOG) corrective regimen sliding scale   SubCutaneous three times a day before meals  insulin lispro (HumaLOG) corrective regimen sliding scale   SubCutaneous at bedtime        PHYSICAL EXAM:  T(C): 36.5 (08-09-19 @ 11:33), Max: 36.6 (08-08-19 @ 20:18)  HR: 70 (08-09-19 @ 17:05) (70 - 83)  BP: 112/70 (08-09-19 @ 17:05) (104/70 - 112/73)  RR: 18 (08-09-19 @ 11:33) (16 - 18)  SpO2: 100% (08-09-19 @ 11:33) (98% - 100%)  Wt(kg): --  I&O's Summary    08 Aug 2019 07:01  -  09 Aug 2019 07:00  --------------------------------------------------------  IN: 250 mL / OUT: 1250 mL / NET: -1000 mL    09 Aug 2019 07:01  -  09 Aug 2019 17:46  --------------------------------------------------------  IN: 0 mL / OUT: 350 mL / NET: -350 mL            HEENT:   Normal oral mucosa, PERRL, EOMI	  Cardiovascular: Normal S1 S2, No JVD, No murmurs, No edema  Respiratory: Lungs clear to auscultation	  Gastrointestinal:  Soft, Non-tender, + BS	  Extremities: 1+ edema                                    11.7   5.40  )-----------( 122      ( 09 Aug 2019 06:00 )             36.8     08-09    129<L>  |  93<L>  |  37<H>  ----------------------------<  112<H>  4.1   |  21<L>  |  1.04    Ca    9.0      09 Aug 2019 06:00  Phos  4.3     08-08  Mg     2.0     08-09    TPro  6.2  /  Alb  3.0<L>  /  TBili  0.8  /  DBili  x   /  AST  20  /  ALT  14  /  AlkPhos  79  08-07    proBNP:   Lipid Profile:   HgA1c: Hemoglobin A1C, Whole Blood: 6.5 % (08-09 @ 06:00)    TSH:

## 2019-08-09 NOTE — PROCEDURE NOTE - NSTIMEOUT_GEN_A_CORE
Patient's first and last name, , procedure, and correct site confirmed prior to the start of procedure. intact

## 2019-08-09 NOTE — PROGRESS NOTE ADULT - SUBJECTIVE AND OBJECTIVE BOX
CARLO, LISSETH  69y  Male      Patient is a 69y old  Male who presents with a chief complaint of Abdominal distension, leg swelling (09 Aug 2019 13:18)  s/p paracentesis.doing well.no sob,no cp,no fever.no abd.pain    REVIEW OF SYSTEMS:  neg      INTERVAL HPI/OVERNIGHT EVENTS:  T(C): 36.5 (08-09-19 @ 21:49), Max: 36.6 (08-09-19 @ 05:43)  HR: 78 (08-09-19 @ 21:49) (70 - 78)  BP: 108/66 (08-09-19 @ 21:49) (104/70 - 112/70)  RR: 18 (08-09-19 @ 21:49) (16 - 18)  SpO2: 100% (08-09-19 @ 21:49) (98% - 100%)  Wt(kg): --  I&O's Summary    08 Aug 2019 07:01  -  09 Aug 2019 07:00  --------------------------------------------------------  IN: 250 mL / OUT: 1250 mL / NET: -1000 mL    09 Aug 2019 07:01  -  09 Aug 2019 22:50  --------------------------------------------------------  IN: 100 mL / OUT: 650 mL / NET: -550 mL      T(C): 36.5 (08-09-19 @ 21:49), Max: 36.6 (08-09-19 @ 05:43)  HR: 78 (08-09-19 @ 21:49) (70 - 78)  BP: 108/66 (08-09-19 @ 21:49) (104/70 - 112/70)  RR: 18 (08-09-19 @ 21:49) (16 - 18)  SpO2: 100% (08-09-19 @ 21:49) (98% - 100%)  Wt(kg): --Vital Signs Last 24 Hrs  T(C): 36.5 (09 Aug 2019 21:49), Max: 36.6 (09 Aug 2019 05:43)  T(F): 97.7 (09 Aug 2019 21:49), Max: 97.9 (09 Aug 2019 05:43)  HR: 78 (09 Aug 2019 21:49) (70 - 78)  BP: 108/66 (09 Aug 2019 21:49) (104/70 - 112/70)  BP(mean): --  RR: 18 (09 Aug 2019 21:49) (16 - 18)  SpO2: 100% (09 Aug 2019 21:49) (98% - 100%)    LABS:                        11.7   5.40  )-----------( 122      ( 09 Aug 2019 06:00 )             36.8     08-09    129<L>  |  93<L>  |  37<H>  ----------------------------<  112<H>  4.1   |  21<L>  |  1.04    Ca    9.0      09 Aug 2019 06:00  Phos  4.3     08-08  Mg     2.0     08-09      PT/INR - ( 09 Aug 2019 06:00 )   PT: 12.4 SEC;   INR: 1.08          PTT - ( 09 Aug 2019 06:00 )  PTT:30.7 SEC    CAPILLARY BLOOD GLUCOSE      POCT Blood Glucose.: 140 mg/dL (09 Aug 2019 22:27)  POCT Blood Glucose.: 114 mg/dL (09 Aug 2019 17:25)  POCT Blood Glucose.: 159 mg/dL (09 Aug 2019 12:03)  POCT Blood Glucose.: 123 mg/dL (09 Aug 2019 08:56)            PAST MEDICAL & SURGICAL HISTORY:  CKD (chronic kidney disease)  MR (mitral regurgitation)  AF (atrial fibrillation)  DM (diabetes mellitus)  HLD (hyperlipidemia)  TIA (transient ischemic attack)  HTN (hypertension)  CVA (cerebral infarction)  CHB (complete heart block)  CHF (congestive heart failure)  CAD (coronary artery disease): S/P 4V CABG  S/P mitral valve repair: 2007  S/P CABG (coronary artery bypass graft): 4V CABG in 2007  AICD (automatic cardioverter/defibrillator) present: SendRR      MEDICATIONS  (STANDING):  apixaban 5 milliGRAM(s) Oral every 12 hours  atorvastatin 20 milliGRAM(s) Oral at bedtime  buMETAnide Injectable 2 milliGRAM(s) IV Push two times a day  dextrose 50% Injectable 12.5 Gram(s) IV Push once  dextrose 50% Injectable 25 Gram(s) IV Push once  dextrose 50% Injectable 25 Gram(s) IV Push once  gabapentin 100 milliGRAM(s) Oral at bedtime  insulin lispro (HumaLOG) corrective regimen sliding scale   SubCutaneous three times a day before meals  insulin lispro (HumaLOG) corrective regimen sliding scale   SubCutaneous at bedtime  spironolactone 50 milliGRAM(s) Oral daily  tamsulosin 0.4 milliGRAM(s) Oral daily    MEDICATIONS  (PRN):  dextrose 40% Gel 15 Gram(s) Oral once PRN Blood Glucose LESS THAN 70 milliGRAM(s)/deciliter  glucagon  Injectable 1 milliGRAM(s) IntraMuscular once PRN Glucose LESS THAN 70 milligrams/deciliter        RADIOLOGY & ADDITIONAL TESTS:    Imaging Personally Reviewed:  [ ] YES  [ ] NO    Consultant(s) Notes Reviewed:  [ x] YES  [ ] NO    PHYSICAL EXAM:  GENERAL: NAD, well-groomed, well-developed  HEAD:  Atraumatic, Normocephalic  EYES: EOMI, PERRLA, conjunctiva and sclera clear  ENMT: No tonsillar erythema, exudates, or enlargement; Moist mucous membranes, Good dentition, No lesions  NECK: Supple, No JVD, Normal thyroid  NERVOUS SYSTEM:  Alert & Oriented X3, Good concentration; Motor Strength 5/5 B/L upper and lower extremities; DTRs 2+ intact and symmetric  CHEST/LUNG: Clear to percussion bilaterally; No rales, rhonchi, wheezing, or rubs  HEART: Regular rate and rhythm; No murmurs, rubs, or gallops  ABDOMEN: Soft, Nontender, distended; Bowel sounds present  EXTREMITIES:  2+ Peripheral Pulses, No clubbing, cyanosis,  edema +  LYMPH: No lymphadenopathy noted  SKIN: No rashes or lesions    Care Discussed with Consultants/Other Providers [ ] YES  [ ] NO      Code Status: [] Full Code [] DNR [] DNI [] Goals of Care:   Disposition: [] ICU [] Stroke Unit [] RCU []PCU []Floor [] Discharge Home         ALANIS MathewsP

## 2019-08-09 NOTE — PROGRESS NOTE ADULT - SUBJECTIVE AND OBJECTIVE BOX
[facility]   MD ULICES MCKEON DO ANGELA WONG, PA    TEL:  OFFICE: [phone]  DR TAYLOR CELL: [phone]  DR. MCFARLAND CELL: [phone]  KATHERINE POWER CELL: [phone]        Patient is a 69y old  Male who presents with a chief complaint of Abdominal distension, leg swelling (08 Aug 2019 20:43)      Patient seen and examined at bedside. No chest pain/sob    VITALS:  T(F): 97.7 (08-09-19 @ 11:33), Max: 97.9 (08-09-19 @ 05:43)  HR: 72 (08-09-19 @ 11:33)  BP: 104/70 (08-09-19 @ 11:33)  RR: 18 (08-09-19 @ 11:33)  SpO2: 100% (08-09-19 @ 11:33)  Wt(kg): --    08-08 @ 07:01  -  08-09 @ 07:00  --------------------------------------------------------  IN: 250 mL / OUT: 1250 mL / NET: -1000 mL    08-09 @ 07:01  -  08-09 @ 13:18  --------------------------------------------------------  IN: 0 mL / OUT: 150 mL / NET: -150 mL      Height (cm): 185.4 (08-08 @ 16:22)  Weight (kg): 78.7 (08-08 @ 16:22)  BMI (kg/m2): 22.9 (08-08 @ 16:22)  BSA (m2): 2.03 (08-08 @ 16:22)    PHYSICAL EXAM:  Constitutional: NAD  Neck: No JVD  Respiratory: CTAB, no wheezes, rales or rhonchi  Cardiovascular: S1, S2, RRR  Gastrointestinal: distended, NT  Extremities: 2-3+ peripheral edema    Hospital Medications:   MEDICATIONS  (STANDING):  atorvastatin 20 milliGRAM(s) Oral at bedtime  buMETAnide Injectable 2 milliGRAM(s) IV Push two times a day  dextrose 50% Injectable 12.5 Gram(s) IV Push once  dextrose 50% Injectable 25 Gram(s) IV Push once  dextrose 50% Injectable 25 Gram(s) IV Push once  gabapentin 100 milliGRAM(s) Oral at bedtime  insulin lispro (HumaLOG) corrective regimen sliding scale   SubCutaneous three times a day before meals  insulin lispro (HumaLOG) corrective regimen sliding scale   SubCutaneous at bedtime  spironolactone 50 milliGRAM(s) Oral daily  tamsulosin 0.4 milliGRAM(s) Oral daily      LABS:  08-09    129<L>  |  93<L>  |  37<H>  ----------------------------<  112<H>  4.1   |  21<L>  |  1.04    Ca    9.0      09 Aug 2019 06:00  Phos  4.3     08-08  Mg     2.0     08-09    TPro  6.2  /  Alb  3.0<L>  /  TBili  0.8  /  DBili      /  AST  20  /  ALT  14  /  AlkPhos  79  08-07    Creatinine Trend: 1.04 <--, 1.06 <--, 1.27 <--                                11.7   5.40  )-----------( 122      ( 09 Aug 2019 06:00 )             36.8     Urine Studies:      .1 (Ca --)      [02-17-19 @ 15:00]   --  Vitamin D (25OH) 12.6      [02-17-19 @ 15:00]  HbA1c 6.5      [08-09-19 @ 06:00]  TSH 2.60      [03-15-19 @ 05:15]  Lipid: chol 126, TG 87, HDL 38, LDL 85      [03-15-19 @ 05:15]        RADIOLOGY & ADDITIONAL STUDIES:

## 2019-08-09 NOTE — PROGRESS NOTE ADULT - ATTENDING COMMENTS
Assessment/Rec-  -Acute on chronic systolic CHF-cont iv bumex.spironolactone.ECHO,Cardiology consult.  -A.FIB with mvr-rate control,A/C-Eliquis  --DM,check A1C,monitor FS with coverage  -Elevared BNP,Troponin-trend  -Abdominal distention-abd.u/s.s/p paracentesis-f/u result  -d/w family at bedside  -will f/u.Thanks Assessment/Rec-  -Acute on chronic systolic CHF-cont iv bumex.spironolactone.ECHO,Cardiology consult.  -A.FIB with mvr-rate control,A/C-Eliquis  -Hyponatremia-likely sec.to chf-fluid restriction  --DM,check A1C,monitor FS with coverage  -Elevared BNP,Troponin-trend  -Abdominal distention-abd.u/s.s/p paracentesis-f/u result  -d/w family at bedside  -will f/u.Thanks

## 2019-08-09 NOTE — PROGRESS NOTE ADULT - ASSESSMENT
70 y/o M with PMH of MR s/p mitral valvuloplasty, Afib (on Coumadin), HLD, HTN, DM, CVA, CAD (s/p 4V CABG), CHF (with EF of 15-20% s/p AICD) presented with the complaint of worsening LE edema, weight gain and abdominal distention. Got admitted with CHF, found to have hyponatremia      Hyponatremia:  Likely sec to fluid overload sec to CHF  stable  Free water restriction 1L/day  s/p paracentesis ~3.6L removed  Continue diuretics  Monitor Na level daily    Hypomagnesemia:  likely sec to diuresis  Improved  Monitor Mg level    Hypokalemia  likely sec to diuretics  Serum K improved today  monitor serum K for now    CHF:  on bumex 2mg BID iv   Monitor K, Mg level  Follow up cardiology  Daily weight s

## 2019-08-10 LAB
ANION GAP SERPL CALC-SCNC: 13 MMO/L — SIGNIFICANT CHANGE UP (ref 7–14)
BASOPHILS # BLD AUTO: 0.02 K/UL — SIGNIFICANT CHANGE UP (ref 0–0.2)
BASOPHILS NFR BLD AUTO: 0.4 % — SIGNIFICANT CHANGE UP (ref 0–2)
BUN SERPL-MCNC: 36 MG/DL — HIGH (ref 7–23)
CALCIUM SERPL-MCNC: 8.5 MG/DL — SIGNIFICANT CHANGE UP (ref 8.4–10.5)
CHLORIDE SERPL-SCNC: 93 MMOL/L — LOW (ref 98–107)
CO2 SERPL-SCNC: 24 MMOL/L — SIGNIFICANT CHANGE UP (ref 22–31)
CREAT SERPL-MCNC: 1.03 MG/DL — SIGNIFICANT CHANGE UP (ref 0.5–1.3)
CULTURE - ACID FAST SMEAR CONCENTRATED: SIGNIFICANT CHANGE UP
EOSINOPHIL # BLD AUTO: 0.18 K/UL — SIGNIFICANT CHANGE UP (ref 0–0.5)
EOSINOPHIL NFR BLD AUTO: 3.8 % — SIGNIFICANT CHANGE UP (ref 0–6)
GLUCOSE SERPL-MCNC: 113 MG/DL — HIGH (ref 70–99)
HCT VFR BLD CALC: 36.9 % — LOW (ref 39–50)
HGB BLD-MCNC: 11.5 G/DL — LOW (ref 13–17)
IMM GRANULOCYTES NFR BLD AUTO: 0.4 % — SIGNIFICANT CHANGE UP (ref 0–1.5)
LYMPHOCYTES # BLD AUTO: 0.7 K/UL — LOW (ref 1–3.3)
LYMPHOCYTES # BLD AUTO: 14.6 % — SIGNIFICANT CHANGE UP (ref 13–44)
MAGNESIUM SERPL-MCNC: 1.9 MG/DL — SIGNIFICANT CHANGE UP (ref 1.6–2.6)
MCHC RBC-ENTMCNC: 24.9 PG — LOW (ref 27–34)
MCHC RBC-ENTMCNC: 31.2 % — LOW (ref 32–36)
MCV RBC AUTO: 80 FL — SIGNIFICANT CHANGE UP (ref 80–100)
MONOCYTES # BLD AUTO: 0.76 K/UL — SIGNIFICANT CHANGE UP (ref 0–0.9)
MONOCYTES NFR BLD AUTO: 15.9 % — HIGH (ref 2–14)
NEUTROPHILS # BLD AUTO: 3.11 K/UL — SIGNIFICANT CHANGE UP (ref 1.8–7.4)
NEUTROPHILS NFR BLD AUTO: 64.9 % — SIGNIFICANT CHANGE UP (ref 43–77)
NRBC # FLD: 0 K/UL — SIGNIFICANT CHANGE UP (ref 0–0)
PLATELET # BLD AUTO: 112 K/UL — LOW (ref 150–400)
PMV BLD: 9.2 FL — SIGNIFICANT CHANGE UP (ref 7–13)
POTASSIUM SERPL-MCNC: 4.2 MMOL/L — SIGNIFICANT CHANGE UP (ref 3.5–5.3)
POTASSIUM SERPL-SCNC: 4.2 MMOL/L — SIGNIFICANT CHANGE UP (ref 3.5–5.3)
RBC # BLD: 4.61 M/UL — SIGNIFICANT CHANGE UP (ref 4.2–5.8)
RBC # FLD: 18.6 % — HIGH (ref 10.3–14.5)
SODIUM SERPL-SCNC: 130 MMOL/L — LOW (ref 135–145)
SPECIMEN SOURCE: SIGNIFICANT CHANGE UP
WBC # BLD: 4.79 K/UL — SIGNIFICANT CHANGE UP (ref 3.8–10.5)
WBC # FLD AUTO: 4.79 K/UL — SIGNIFICANT CHANGE UP (ref 3.8–10.5)

## 2019-08-10 RX ORDER — BUMETANIDE 0.25 MG/ML
0.5 INJECTION INTRAMUSCULAR; INTRAVENOUS
Qty: 20 | Refills: 0 | Status: DISCONTINUED | OUTPATIENT
Start: 2019-08-10 | End: 2019-08-12

## 2019-08-10 RX ADMIN — TAMSULOSIN HYDROCHLORIDE 0.4 MILLIGRAM(S): 0.4 CAPSULE ORAL at 08:48

## 2019-08-10 RX ADMIN — BUMETANIDE 2.5 MG/HR: 0.25 INJECTION INTRAMUSCULAR; INTRAVENOUS at 12:28

## 2019-08-10 RX ADMIN — APIXABAN 5 MILLIGRAM(S): 2.5 TABLET, FILM COATED ORAL at 21:35

## 2019-08-10 RX ADMIN — Medication 1: at 08:49

## 2019-08-10 RX ADMIN — BUMETANIDE 2 MILLIGRAM(S): 0.25 INJECTION INTRAMUSCULAR; INTRAVENOUS at 05:06

## 2019-08-10 RX ADMIN — SPIRONOLACTONE 50 MILLIGRAM(S): 25 TABLET, FILM COATED ORAL at 05:06

## 2019-08-10 RX ADMIN — ATORVASTATIN CALCIUM 20 MILLIGRAM(S): 80 TABLET, FILM COATED ORAL at 21:35

## 2019-08-10 RX ADMIN — GABAPENTIN 100 MILLIGRAM(S): 400 CAPSULE ORAL at 21:35

## 2019-08-10 RX ADMIN — Medication 2: at 17:43

## 2019-08-10 RX ADMIN — APIXABAN 5 MILLIGRAM(S): 2.5 TABLET, FILM COATED ORAL at 08:48

## 2019-08-10 NOTE — PROGRESS NOTE ADULT - ASSESSMENT
Echo 2/19 - severe LV and RV dysfunction    a/p     1) Acute on chronic CHF - switch bumex to 0.5mg /hr , cont aldactone, hold bp meds, s/p paracentesis 3.8 liters out    2) Afib - resumed eliquis    3) Hyponatremia - f/u nephro likely sec to CHF

## 2019-08-10 NOTE — PROGRESS NOTE ADULT - SUBJECTIVE AND OBJECTIVE BOX
[facility]   MD ULICES MCKEON DO ANGELA WONG, PA    TEL:  OFFICE: [phone]  DR TAYLOR CELL: [phone]  DR. MCFARLAND CELL: [phone]  KATHERINE POWER CELL: [phone]        Patient is a 69y old  Male who presents with a chief complaint of Abdominal distension, leg swelling (10 Aug 2019 11:34)      Patient seen and examined at bedside. No chest pain/sob    VITALS:  T(F): 97.6 (08-10-19 @ 12:15), Max: 97.7 (08-09-19 @ 21:49)  HR: 70 (08-10-19 @ 12:15)  BP: 104/63 (08-10-19 @ 12:15)  RR: 18 (08-10-19 @ 12:15)  SpO2: 95% (08-10-19 @ 12:15)  Wt(kg): --    08-09 @ 07:01  -  08-10 @ 07:00  --------------------------------------------------------  IN: 200 mL / OUT: 1550 mL / NET: -1350 mL    08-10 @ 07:01  -  08-10 @ 14:04  --------------------------------------------------------  IN: 7.5 mL / OUT: 700 mL / NET: -692.5 mL          PHYSICAL EXAM:  Constitutional: NAD  Neck: No JVD  Respiratory: CTAB, no wheezes, rales or rhonchi  Cardiovascular: S1, S2, RRR  Gastrointestinal: distended, NT  Extremities: 3+ peripheral edema    Hospital Medications:   MEDICATIONS  (STANDING):  apixaban 5 milliGRAM(s) Oral every 12 hours  atorvastatin 20 milliGRAM(s) Oral at bedtime  buMETAnide Infusion 0.5 mG/Hr (2.5 mL/Hr) IV Continuous <Continuous>  dextrose 50% Injectable 12.5 Gram(s) IV Push once  dextrose 50% Injectable 25 Gram(s) IV Push once  dextrose 50% Injectable 25 Gram(s) IV Push once  gabapentin 100 milliGRAM(s) Oral at bedtime  insulin lispro (HumaLOG) corrective regimen sliding scale   SubCutaneous three times a day before meals  insulin lispro (HumaLOG) corrective regimen sliding scale   SubCutaneous at bedtime  spironolactone 50 milliGRAM(s) Oral daily  tamsulosin 0.4 milliGRAM(s) Oral daily      LABS:  08-10    130<L>  |  93<L>  |  36<H>  ----------------------------<  113<H>  4.2   |  24  |  1.03    Ca    8.5      10 Aug 2019 04:40  Mg     1.9     08-10      Creatinine Trend: 1.03 <--, 1.04 <--, 1.06 <--, 1.27 <--                                11.5   4.79  )-----------( 112      ( 10 Aug 2019 04:40 )             36.9     Urine Studies:      .1 (Ca --)      [02-17-19 @ 15:00]   --  Vitamin D (25OH) 12.6      [02-17-19 @ 15:00]  HbA1c 6.5      [08-09-19 @ 06:00]  TSH 2.60      [03-15-19 @ 05:15]  Lipid: chol 126, TG 87, HDL 38, LDL 85      [03-15-19 @ 05:15]        RADIOLOGY & ADDITIONAL STUDIES:

## 2019-08-10 NOTE — PROGRESS NOTE ADULT - ATTENDING COMMENTS
Assessment/Rec-  -Acute on chronic systolic CHF-cont iv bumex drip..spironolactone.ECHO,Cardiology f/u noted.  -A.FIB with mvr-rate control,A/C-Eliquis  -Hyponatremia-likely sec.to chf-fluid restriction  --DM,check A1C,monitor FS with coverage  -Elevared BNP,  -Abdominal distention-abd.u/s.s/p paracentesis-f/u [id number].so far.May need repeat therapeutic paracentesis  -d/w family at bedside

## 2019-08-10 NOTE — PROGRESS NOTE ADULT - ASSESSMENT
70 y/o M with PMH of MR s/p mitral valvuloplasty, Afib (on Coumadin), HLD, HTN, DM, CVA, CAD (s/p 4V CABG), CHF (with EF of 15-20% s/p AICD) presented with the complaint of worsening LE edema, weight gain and abdominal distention. Got admitted with CHF, found to have hyponatremia      Hyponatremia:  Likely sec to fluid overload sec to CHF  stable  Free water restriction 1L/day  s/p paracentesis ~3.6L removed  Continue diuretics  Monitor Na level daily    Hypomagnesemia:  likely sec to diuresis  Improved  Monitor Mg level    Hypokalemia  likely sec to diuretics  Serum K improved today  monitor serum K for now    CHF:  on bumex gtt  Monitor K, Mg level  Follow up cardiology  Daily weight s

## 2019-08-10 NOTE — PROGRESS NOTE ADULT - SUBJECTIVE AND OBJECTIVE BOX
CARLO, NFN  69y  Male      Patient is a 69y old  Male who presents with a chief complaint of Abdominal distension, leg swelling (10 Aug 2019 14:04)  c/o abd.distension.leg edema.no sob,no cp,no fever.s/p paracentesis    REVIEW OF SYSTEMS:  as above    INTERVAL HPI/OVERNIGHT EVENTS:  T(C): 36.4 (08-10-19 @ 12:15), Max: 36.5 (08-09-19 @ 21:49)  HR: 70 (08-10-19 @ 12:15) (70 - 80)  BP: 104/63 (08-10-19 @ 12:15) (104/63 - 108/66)  RR: 18 (08-10-19 @ 12:15) (16 - 18)  SpO2: 95% (08-10-19 @ 12:15) (95% - 100%)  Wt(kg): --  I&O's Summary    09 Aug 2019 07:01  -  10 Aug 2019 07:00  --------------------------------------------------------  IN: 200 mL / OUT: 1550 mL / NET: -1350 mL    10 Aug 2019 07:01  -  10 Aug 2019 19:46  --------------------------------------------------------  IN: 20 mL / OUT: 1150 mL / NET: -1130 mL      T(C): 36.4 (08-10-19 @ 12:15), Max: 36.5 (08-09-19 @ 21:49)  HR: 70 (08-10-19 @ 12:15) (70 - 80)  BP: 104/63 (08-10-19 @ 12:15) (104/63 - 108/66)  RR: 18 (08-10-19 @ 12:15) (16 - 18)  SpO2: 95% (08-10-19 @ 12:15) (95% - 100%)  Wt(kg): --Vital Signs Last 24 Hrs  T(C): 36.4 (10 Aug 2019 12:15), Max: 36.5 (09 Aug 2019 21:49)  T(F): 97.6 (10 Aug 2019 12:15), Max: 97.7 (09 Aug 2019 21:49)  HR: 70 (10 Aug 2019 12:15) (70 - 80)  BP: 104/63 (10 Aug 2019 12:15) (104/63 - 108/66)  BP(mean): --  RR: 18 (10 Aug 2019 12:15) (16 - 18)  SpO2: 95% (10 Aug 2019 12:15) (95% - 100%)    LABS:                        11.5   4.79  )-----------( 112      ( 10 Aug 2019 04:40 )             36.9     08-10    130<L>  |  93<L>  |  36<H>  ----------------------------<  113<H>  4.2   |  24  |  1.03    Ca    8.5      10 Aug 2019 04:40  Mg     1.9     08-10      PT/INR - ( 09 Aug 2019 06:00 )   PT: 12.4 SEC;   INR: 1.08          PTT - ( 09 Aug 2019 06:00 )  PTT:30.7 SEC    CAPILLARY BLOOD GLUCOSE      POCT Blood Glucose.: 207 mg/dL (10 Aug 2019 17:23)  POCT Blood Glucose.: 128 mg/dL (10 Aug 2019 12:31)  POCT Blood Glucose.: 183 mg/dL (10 Aug 2019 08:30)  POCT Blood Glucose.: 140 mg/dL (09 Aug 2019 22:27)            PAST MEDICAL & SURGICAL HISTORY:  CKD (chronic kidney disease)  MR (mitral regurgitation)  AF (atrial fibrillation)  DM (diabetes mellitus)  HLD (hyperlipidemia)  TIA (transient ischemic attack)  HTN (hypertension)  CVA (cerebral infarction)  CHB (complete heart block)  CHF (congestive heart failure)  CAD (coronary artery disease): S/P 4V CABG  S/P mitral valve repair: 2007  S/P CABG (coronary artery bypass graft): 4V CABG in 2007  AICD (automatic cardioverter/defibrillator) present: Cymax      MEDICATIONS  (STANDING):  apixaban 5 milliGRAM(s) Oral every 12 hours  atorvastatin 20 milliGRAM(s) Oral at bedtime  buMETAnide Infusion 0.5 mG/Hr (2.5 mL/Hr) IV Continuous <Continuous>  dextrose 50% Injectable 12.5 Gram(s) IV Push once  dextrose 50% Injectable 25 Gram(s) IV Push once  dextrose 50% Injectable 25 Gram(s) IV Push once  gabapentin 100 milliGRAM(s) Oral at bedtime  insulin lispro (HumaLOG) corrective regimen sliding scale   SubCutaneous three times a day before meals  insulin lispro (HumaLOG) corrective regimen sliding scale   SubCutaneous at bedtime  spironolactone 50 milliGRAM(s) Oral daily  tamsulosin 0.4 milliGRAM(s) Oral daily    MEDICATIONS  (PRN):  dextrose 40% Gel 15 Gram(s) Oral once PRN Blood Glucose LESS THAN 70 milliGRAM(s)/deciliter  glucagon  Injectable 1 milliGRAM(s) IntraMuscular once PRN Glucose LESS THAN 70 milligrams/deciliter        RADIOLOGY & ADDITIONAL TESTS:    Imaging Personally Reviewed:  [ ] YES  [ ] NO    Consultant(s) Notes Reviewed:  [ ] YES  [ ] NO    PHYSICAL EXAM:  GENERAL: NAD, well-groomed, well-developed  HEAD:  Atraumatic, Normocephalic  EYES: EOMI, PERRLA, conjunctiva and sclera clear  ENMT: No tonsillar erythema, exudates, or enlargement; Moist mucous membranes, Good dentition, No lesions  NECK: Supple, No JVD, Normal thyroid  NERVOUS SYSTEM:  Alert & Oriented X3, Good concentration; Motor Strength 5/5 B/L upper and lower extremities; DTRs 2+ intact and symmetric  CHEST/LUNG: Clear to percussion bilaterally; No rales, rhonchi, wheezing, or rubs  HEART: Regular rate and rhythm; No murmurs, rubs, or gallops  ABDOMEN: Soft, Nontender, distended; Bowel sounds present  EXTREMITIES:  2+ Peripheral Pulses, No clubbing, cyanosis, or edema  LYMPH: No lymphadenopathy noted  SKIN: No rashes or lesions    Care Discussed with Consultants/Other Providers [ ] YES  [ ] NO      Code Status: [] Full Code [] DNR [] DNI [] Goals of Care:   Disposition: [] ICU [] Stroke Unit [] RCU []PCU []Floor [] Discharge Home         ALANIS MathewsP

## 2019-08-10 NOTE — PROGRESS NOTE ADULT - SUBJECTIVE AND OBJECTIVE BOX
Phillip Sorenson MD  Interventional Cardiology / Advance Heart Failure and Cardiac Transplant Specialist  [place] Office : [address] [place] [address]  Tel:   [place] Office : [address] [facility] [place] [address]  Tel: [phone]  Cell : [phone]    Subjective : Pt lying in bed comfortable, not in distress, denies any chest pain or SOB  	  MEDICATIONS:  apixaban 5 milliGRAM(s) Oral every 12 hours  buMETAnide Injectable 2 milliGRAM(s) IV Push two times a day  spironolactone 50 milliGRAM(s) Oral daily  tamsulosin 0.4 milliGRAM(s) Oral daily        gabapentin 100 milliGRAM(s) Oral at bedtime      atorvastatin 20 milliGRAM(s) Oral at bedtime  dextrose 40% Gel 15 Gram(s) Oral once PRN  dextrose 50% Injectable 12.5 Gram(s) IV Push once  dextrose 50% Injectable 25 Gram(s) IV Push once  dextrose 50% Injectable 25 Gram(s) IV Push once  glucagon  Injectable 1 milliGRAM(s) IntraMuscular once PRN  insulin lispro (HumaLOG) corrective regimen sliding scale   SubCutaneous three times a day before meals  insulin lispro (HumaLOG) corrective regimen sliding scale   SubCutaneous at bedtime        PHYSICAL EXAM:  T(C): 36.4 (08-10-19 @ 05:21), Max: 36.5 (08-09-19 @ 21:49)  HR: 80 (08-10-19 @ 05:21) (70 - 80)  BP: 104/68 (08-10-19 @ 05:21) (104/68 - 112/70)  RR: 16 (08-10-19 @ 05:21) (16 - 18)  SpO2: 100% (08-10-19 @ 05:21) (100% - 100%)  Wt(kg): --  I&O's Summary    09 Aug 2019 07:01  -  10 Aug 2019 07:00  --------------------------------------------------------  IN: 200 mL / OUT: 1550 mL / NET: -1350 mL    10 Aug 2019 07:01  -  10 Aug 2019 11:34  --------------------------------------------------------  IN: 0 mL / OUT: 300 mL / NET: -300 mL        	  HEENT:   Normal oral mucosa, PERRL, EOMI	  Cardiovascular: Normal S1 S2, No JVD, No murmurs, No edema  Respiratory: Lungs clear to auscultation	  Gastrointestinal:  + ascites  Extremities: 2+ edema                                    11.5   4.79  )-----------( 112      ( 10 Aug 2019 04:40 )             36.9     08-10    130<L>  |  93<L>  |  36<H>  ----------------------------<  113<H>  4.2   |  24  |  1.03    Ca    8.5      10 Aug 2019 04:40  Mg     1.9     08-10      proBNP:   Lipid Profile:   HgA1c:   TSH:

## 2019-08-11 LAB
ANION GAP SERPL CALC-SCNC: 13 MMO/L — SIGNIFICANT CHANGE UP (ref 7–14)
BASOPHILS # BLD AUTO: 0.03 K/UL — SIGNIFICANT CHANGE UP (ref 0–0.2)
BASOPHILS NFR BLD AUTO: 0.5 % — SIGNIFICANT CHANGE UP (ref 0–2)
BUN SERPL-MCNC: 34 MG/DL — HIGH (ref 7–23)
CALCIUM SERPL-MCNC: 8.2 MG/DL — LOW (ref 8.4–10.5)
CHLORIDE SERPL-SCNC: 90 MMOL/L — LOW (ref 98–107)
CO2 SERPL-SCNC: 26 MMOL/L — SIGNIFICANT CHANGE UP (ref 22–31)
CREAT SERPL-MCNC: 0.9 MG/DL — SIGNIFICANT CHANGE UP (ref 0.5–1.3)
EOSINOPHIL # BLD AUTO: 0.17 K/UL — SIGNIFICANT CHANGE UP (ref 0–0.5)
EOSINOPHIL NFR BLD AUTO: 2.7 % — SIGNIFICANT CHANGE UP (ref 0–6)
GLUCOSE SERPL-MCNC: 137 MG/DL — HIGH (ref 70–99)
HCT VFR BLD CALC: 39.7 % — SIGNIFICANT CHANGE UP (ref 39–50)
HGB BLD-MCNC: 12.4 G/DL — LOW (ref 13–17)
IMM GRANULOCYTES NFR BLD AUTO: 0.6 % — SIGNIFICANT CHANGE UP (ref 0–1.5)
LYMPHOCYTES # BLD AUTO: 0.72 K/UL — LOW (ref 1–3.3)
LYMPHOCYTES # BLD AUTO: 11.5 % — LOW (ref 13–44)
MAGNESIUM SERPL-MCNC: 1.6 MG/DL — SIGNIFICANT CHANGE UP (ref 1.6–2.6)
MCHC RBC-ENTMCNC: 25.7 PG — LOW (ref 27–34)
MCHC RBC-ENTMCNC: 31.2 % — LOW (ref 32–36)
MCV RBC AUTO: 82.4 FL — SIGNIFICANT CHANGE UP (ref 80–100)
MONOCYTES # BLD AUTO: 0.62 K/UL — SIGNIFICANT CHANGE UP (ref 0–0.9)
MONOCYTES NFR BLD AUTO: 9.9 % — SIGNIFICANT CHANGE UP (ref 2–14)
NEUTROPHILS # BLD AUTO: 4.66 K/UL — SIGNIFICANT CHANGE UP (ref 1.8–7.4)
NEUTROPHILS NFR BLD AUTO: 74.8 % — SIGNIFICANT CHANGE UP (ref 43–77)
NRBC # FLD: 0 K/UL — SIGNIFICANT CHANGE UP (ref 0–0)
PLATELET # BLD AUTO: 143 K/UL — LOW (ref 150–400)
PMV BLD: 9.4 FL — SIGNIFICANT CHANGE UP (ref 7–13)
POTASSIUM SERPL-MCNC: 4 MMOL/L — SIGNIFICANT CHANGE UP (ref 3.5–5.3)
POTASSIUM SERPL-SCNC: 4 MMOL/L — SIGNIFICANT CHANGE UP (ref 3.5–5.3)
RBC # BLD: 4.82 M/UL — SIGNIFICANT CHANGE UP (ref 4.2–5.8)
RBC # FLD: 18.8 % — HIGH (ref 10.3–14.5)
SODIUM SERPL-SCNC: 129 MMOL/L — LOW (ref 135–145)
WBC # BLD: 6.24 K/UL — SIGNIFICANT CHANGE UP (ref 3.8–10.5)
WBC # FLD AUTO: 6.24 K/UL — SIGNIFICANT CHANGE UP (ref 3.8–10.5)

## 2019-08-11 RX ORDER — ACETAMINOPHEN 500 MG
650 TABLET ORAL EVERY 6 HOURS
Refills: 0 | Status: DISCONTINUED | OUTPATIENT
Start: 2019-08-11 | End: 2019-08-27

## 2019-08-11 RX ADMIN — BUMETANIDE 2.5 MG/HR: 0.25 INJECTION INTRAMUSCULAR; INTRAVENOUS at 08:56

## 2019-08-11 RX ADMIN — GABAPENTIN 100 MILLIGRAM(S): 400 CAPSULE ORAL at 20:20

## 2019-08-11 RX ADMIN — Medication 2: at 17:50

## 2019-08-11 RX ADMIN — Medication 650 MILLIGRAM(S): at 21:30

## 2019-08-11 RX ADMIN — ATORVASTATIN CALCIUM 20 MILLIGRAM(S): 80 TABLET, FILM COATED ORAL at 20:20

## 2019-08-11 RX ADMIN — Medication 650 MILLIGRAM(S): at 20:45

## 2019-08-11 RX ADMIN — TAMSULOSIN HYDROCHLORIDE 0.4 MILLIGRAM(S): 0.4 CAPSULE ORAL at 11:32

## 2019-08-11 RX ADMIN — APIXABAN 5 MILLIGRAM(S): 2.5 TABLET, FILM COATED ORAL at 08:55

## 2019-08-11 RX ADMIN — BUMETANIDE 2.5 MG/HR: 0.25 INJECTION INTRAMUSCULAR; INTRAVENOUS at 17:54

## 2019-08-11 RX ADMIN — SPIRONOLACTONE 50 MILLIGRAM(S): 25 TABLET, FILM COATED ORAL at 04:50

## 2019-08-11 RX ADMIN — APIXABAN 5 MILLIGRAM(S): 2.5 TABLET, FILM COATED ORAL at 20:20

## 2019-08-11 RX ADMIN — Medication 1: at 08:55

## 2019-08-11 NOTE — PROGRESS NOTE ADULT - SUBJECTIVE AND OBJECTIVE BOX
Patient seen and examined at bedside. No chest pain/sob    VITALS:  T(F): 98 (08-11-19 @ 12:08), Max: 98 (08-10-19 @ 22:14)  HR: 70 (08-11-19 @ 12:08)  BP: 109/66 (08-11-19 @ 12:08)  RR: 17 (08-11-19 @ 12:08)  SpO2: 96% (08-11-19 @ 12:08)  Wt(kg): --    08-10 @ 07:01  -  08-11 @ 07:00  --------------------------------------------------------  IN: 247.5 mL / OUT: 1850 mL / NET: -1602.5 mL    08-11 @ 07:01  -  08-11 @ 14:05  --------------------------------------------------------  IN: 240 mL / OUT: 400 mL / NET: -160 mL          PHYSICAL EXAM:  Constitutional: NAD  Neck: No JVD  Respiratory: CTAB, no wheezes, rales or rhonchi  Cardiovascular: S1, S2, RRR  Gastrointestinal: BS+, soft, NT/ND  Extremities: (+) peripheral edema    Hospital Medications:   MEDICATIONS  (STANDING):  apixaban 5 milliGRAM(s) Oral every 12 hours  atorvastatin 20 milliGRAM(s) Oral at bedtime  buMETAnide Infusion 0.5 mG/Hr (2.5 mL/Hr) IV Continuous <Continuous>  dextrose 50% Injectable 12.5 Gram(s) IV Push once  dextrose 50% Injectable 25 Gram(s) IV Push once  dextrose 50% Injectable 25 Gram(s) IV Push once  gabapentin 100 milliGRAM(s) Oral at bedtime  insulin lispro (HumaLOG) corrective regimen sliding scale   SubCutaneous three times a day before meals  insulin lispro (HumaLOG) corrective regimen sliding scale   SubCutaneous at bedtime  spironolactone 50 milliGRAM(s) Oral daily  tamsulosin 0.4 milliGRAM(s) Oral daily      LABS:  08-11    129<L>  |  90<L>  |  34<H>  ----------------------------<  137<H>  4.0   |  26  |  0.90    Ca    8.2<L>      11 Aug 2019 05:40  Mg     1.6     08-11      Creatinine Trend: 0.90 <--, 1.03 <--, 1.04 <--, 1.06 <--, 1.27 <--                              12.4   6.24  )-----------( 143      ( 11 Aug 2019 05:40 )             39.7     Urine Studies:          .1 (Ca --)      [02-17-19 @ 15:00]   --  Vitamin D (25OH) 12.6      [02-17-19 @ 15:00]  HbA1c 6.5      [08-09-19 @ 06:00]  TSH 2.60      [03-15-19 @ 05:15]  Lipid: chol 126, TG 87, HDL 38, LDL 85      [03-15-19 @ 05:15]        RADIOLOGY & ADDITIONAL STUDIES:

## 2019-08-11 NOTE — PROGRESS NOTE ADULT - ATTENDING COMMENTS
Assessment/Rec-  -Acute on chronic systolic CHF-cont iv bumex drip..spironolactone.ECHO,Cardiology f/u noted.  -A.FIB with mvr-rate control,A/C-Eliquis  -Hyponatremia-likely sec.to chf-fluid restriction  --DM,check A1C,monitor FS with coverage  -Elevared BNP,  -Ascites.u/s.s/p paracentesis-f/u tjpowo-zngobdir-jts.so far.May need repeat therapeutic paracentesis  -d/w family at bedside

## 2019-08-11 NOTE — PROGRESS NOTE ADULT - SUBJECTIVE AND OBJECTIVE BOX
Patient is a 69y old  Male who presents with a chief complaint of Abdominal distension, leg swelling (10 Aug 2019 19:45)      INTERVAL HISTORY: feels ok       MEDICATIONS:  buMETAnide Infusion 0.5 mG/Hr IV Continuous <Continuous>  spironolactone 50 milliGRAM(s) Oral daily  tamsulosin 0.4 milliGRAM(s) Oral daily        PHYSICAL EXAM:  T(C): 36.7 (08-11-19 @ 12:08), Max: 36.7 (08-10-19 @ 22:14)  HR: 70 (08-11-19 @ 12:08) (70 - 77)  BP: 109/66 (08-11-19 @ 12:08) (109/66 - 129/70)  RR: 17 (08-11-19 @ 12:08) (16 - 17)  SpO2: 96% (08-11-19 @ 12:08) (96% - 98%)  Wt(kg): --  I&O's Summary    10 Aug 2019 07:01  -  11 Aug 2019 07:00  --------------------------------------------------------  IN: 247.5 mL / OUT: 1850 mL / NET: -1602.5 mL    11 Aug 2019 07:01  -  11 Aug 2019 12:46  --------------------------------------------------------  IN: 240 mL / OUT: 400 mL / NET: -160 mL          Appearance: In no distress	  HEENT:    PERRL, EOMI	  Cardiovascular:  S1 S2, No JVD  Respiratory: Lungs clear to auscultation	  Gastrointestinal:  Soft, Non-tender, + BS	  Extremities:  No edema of LE                                12.4   6.24  )-----------( 143      ( 11 Aug 2019 05:40 )             39.7     08-11    129<L>  |  90<L>  |  34<H>  ----------------------------<  137<H>  4.0   |  26  |  0.90    Ca    8.2<L>      11 Aug 2019 05:40  Mg     1.6     08-11          Labs personally reviewed      Assessment and Plan:   · Assessment		  Echo 2/19 - severe LV and RV dysfunction    a/p     1) Acute on chronic CHF - switch bumex to 0.5mg /hr , cont aldactone, hold bp meds, s/p paracentesis 3.8 liters out    2) Afib - resumed eliquis    3) Hyponatremia - f/u nephro likely sec to CHF        Luis Ordaz DO [facility]  Cardiovascular Medicine  [phone]

## 2019-08-11 NOTE — PROGRESS NOTE ADULT - SUBJECTIVE AND OBJECTIVE BOX
CARLO, NFN  69y  Male      Patient is a 69y old  Male who presents with a chief complaint of Abdominal distension, leg swelling (11 Aug 2019 14:05)  feels better,no sob,no cp,no fever,no cough.    REVIEW OF SYSTEMS:  as above      INTERVAL HPI/OVERNIGHT EVENTS:  T(C): 36.7 (08-11-19 @ 12:08), Max: 36.7 (08-10-19 @ 22:14)  HR: 70 (08-11-19 @ 12:08) (70 - 77)  BP: 109/66 (08-11-19 @ 12:08) (109/66 - 129/70)  RR: 17 (08-11-19 @ 12:08) (16 - 17)  SpO2: 96% (08-11-19 @ 12:08) (96% - 98%)  Wt(kg): --  I&O's Summary    10 Aug 2019 07:01  -  11 Aug 2019 07:00  --------------------------------------------------------  IN: 247.5 mL / OUT: 1850 mL / NET: -1602.5 mL    11 Aug 2019 07:01  -  11 Aug 2019 16:41  --------------------------------------------------------  IN: 520 mL / OUT: 400 mL / NET: 120 mL      T(C): 36.7 (08-11-19 @ 12:08), Max: 36.7 (08-10-19 @ 22:14)  HR: 70 (08-11-19 @ 12:08) (70 - 77)  BP: 109/66 (08-11-19 @ 12:08) (109/66 - 129/70)  RR: 17 (08-11-19 @ 12:08) (16 - 17)  SpO2: 96% (08-11-19 @ 12:08) (96% - 98%)  Wt(kg): --Vital Signs Last 24 Hrs  T(C): 36.7 (11 Aug 2019 12:08), Max: 36.7 (10 Aug 2019 22:14)  T(F): 98 (11 Aug 2019 12:08), Max: 98 (10 Aug 2019 22:14)  HR: 70 (11 Aug 2019 12:08) (70 - 77)  BP: 109/66 (11 Aug 2019 12:08) (109/66 - 129/70)  BP(mean): --  RR: 17 (11 Aug 2019 12:08) (16 - 17)  SpO2: 96% (11 Aug 2019 12:08) (96% - 98%)    LABS:                        12.4   6.24  )-----------( 143      ( 11 Aug 2019 05:40 )             39.7     08-11    129<L>  |  90<L>  |  34<H>  ----------------------------<  137<H>  4.0   |  26  |  0.90    Ca    8.2<L>      11 Aug 2019 05:40  Mg     1.6     08-11          CAPILLARY BLOOD GLUCOSE      POCT Blood Glucose.: 129 mg/dL (11 Aug 2019 12:27)  POCT Blood Glucose.: 161 mg/dL (11 Aug 2019 08:45)  POCT Blood Glucose.: 147 mg/dL (10 Aug 2019 21:55)  POCT Blood Glucose.: 207 mg/dL (10 Aug 2019 17:23)            PAST MEDICAL & SURGICAL HISTORY:  CKD (chronic kidney disease)  MR (mitral regurgitation)  AF (atrial fibrillation)  DM (diabetes mellitus)  HLD (hyperlipidemia)  TIA (transient ischemic attack)  HTN (hypertension)  CVA (cerebral infarction)  CHB (complete heart block)  CHF (congestive heart failure)  CAD (coronary artery disease): S/P 4V CABG  S/P mitral valve repair: 2007  S/P CABG (coronary artery bypass graft): 4V CABG in 2007  AICD (automatic cardioverter/defibrillator) present: Clear [employer]onic      MEDICATIONS  (STANDING):  apixaban 5 milliGRAM(s) Oral every 12 hours  atorvastatin 20 milliGRAM(s) Oral at bedtime  buMETAnide Infusion 0.5 mG/Hr (2.5 mL/Hr) IV Continuous <Continuous>  dextrose 50% Injectable 12.5 Gram(s) IV Push once  dextrose 50% Injectable 25 Gram(s) IV Push once  dextrose 50% Injectable 25 Gram(s) IV Push once  gabapentin 100 milliGRAM(s) Oral at bedtime  insulin lispro (HumaLOG) corrective regimen sliding scale   SubCutaneous three times a day before meals  insulin lispro (HumaLOG) corrective regimen sliding scale   SubCutaneous at bedtime  spironolactone 50 milliGRAM(s) Oral daily  tamsulosin 0.4 milliGRAM(s) Oral daily    MEDICATIONS  (PRN):  dextrose 40% Gel 15 Gram(s) Oral once PRN Blood Glucose LESS THAN 70 milliGRAM(s)/deciliter  glucagon  Injectable 1 milliGRAM(s) IntraMuscular once PRN Glucose LESS THAN 70 milligrams/deciliter        RADIOLOGY & ADDITIONAL TESTS:    Imaging Personally Reviewed:  [ ] YES  [ ] NO    Consultant(s) Notes Reviewed:  [ x] YES  [ ] NO    PHYSICAL EXAM:  GENERAL: NAD, well-groomed, well-developed  HEAD:  Atraumatic, Normocephalic  EYES: EOMI, PERRLA, conjunctiva and sclera clear  ENMT: No tonsillar erythema, exudates, or enlargement; Moist mucous membranes, Good dentition, No lesions  NECK: Supple, No JVD, Normal thyroid  NERVOUS SYSTEM:  Alert & Oriented X3, Good concentration; Motor Strength 5/5 B/L upper and lower extremities; DTRs 2+ intact and symmetric  CHEST/LUNG: Clear to percussion bilaterally; No rales, rhonchi, wheezing, or rubs  HEART: Regular rate and rhythm; No murmurs, rubs, or gallops  ABDOMEN: Distended,nt,positive bowel sounds  EXTREMITIES:  2+ Peripheral Pulses, No clubbing, cyanosis,  edema ++  LYMPH: No lymphadenopathy noted  SKIN: No rashes or lesions    Care Discussed with Consultants/Other Providers [ ] YES  [ ] NO      Code Status: [] Full Code [] DNR [] DNI [] Goals of Care:   Disposition: [] ICU [] Stroke Unit [] RCU []PCU []Floor [] Discharge Home         WINDY Mathews

## 2019-08-11 NOTE — PROGRESS NOTE ADULT - ASSESSMENT
70 y/o M with PMH of MR s/p mitral valvuloplasty, Afib (on Coumadin), HLD, HTN, DM, CVA, CAD (s/p 4V CABG), CHF (with EF of 15-20% s/p AICD) presented with the complaint of worsening LE edema, weight gain and abdominal distention. Got admitted with CHF, found to have hyponatremia      Hyponatremia:  Likely sec to fluid overload sec to CHF  Na level continues to drop  Free water restriction 1L/day  s/p paracentesis ~3.6L removed  Continue diuretics  Monitor Na level daily    Hypomagnesemia:  likely sec to diuresis  Improved  Monitor Mg level    Hypokalemia  likely sec to diuretics  Serum K improved  monitor serum K for now    CHF:  on bumex gtt  Monitor K, Mg level  Follow up cardiology  Daily weight s 68 y/o M with PMH of MR s/p mitral valvuloplasty, Afib (on Coumadin), HLD, HTN, DM, CVA, CAD (s/p 4V CABG), CHF (with EF of 15-20% s/p AICD) presented with the complaint of worsening LE edema, weight gain and abdominal distention. Got admitted with CHF, found to have hyponatremia      Hyponatremia:  Likely sec to fluid overload sec to CHF  Na level continues to drop  Free water restriction decrease to 800ml/day  s/p paracentesis ~3.6L removed  Continue diuretics  Monitor Na level daily    Hypomagnesemia:  likely sec to diuresis  Improved  Monitor Mg level    Hypokalemia  likely sec to diuretics  Serum K improved  monitor serum K for now    CHF:  on bumex gtt  Monitor K, Mg level  Follow up cardiology  Daily weight s

## 2019-08-12 LAB
ANION GAP SERPL CALC-SCNC: 14 MMO/L — SIGNIFICANT CHANGE UP (ref 7–14)
ANION GAP SERPL CALC-SCNC: 15 MMO/L — HIGH (ref 7–14)
BUN SERPL-MCNC: 35 MG/DL — HIGH (ref 7–23)
BUN SERPL-MCNC: 38 MG/DL — HIGH (ref 7–23)
CALCIUM SERPL-MCNC: 8.2 MG/DL — LOW (ref 8.4–10.5)
CALCIUM SERPL-MCNC: 8.2 MG/DL — LOW (ref 8.4–10.5)
CHLORIDE SERPL-SCNC: 91 MMOL/L — LOW (ref 98–107)
CHLORIDE SERPL-SCNC: 94 MMOL/L — LOW (ref 98–107)
CO2 SERPL-SCNC: 21 MMOL/L — LOW (ref 22–31)
CO2 SERPL-SCNC: 23 MMOL/L — SIGNIFICANT CHANGE UP (ref 22–31)
CREAT SERPL-MCNC: 1 MG/DL — SIGNIFICANT CHANGE UP (ref 0.5–1.3)
CREAT SERPL-MCNC: 1.17 MG/DL — SIGNIFICANT CHANGE UP (ref 0.5–1.3)
GLUCOSE SERPL-MCNC: 120 MG/DL — HIGH (ref 70–99)
GLUCOSE SERPL-MCNC: 154 MG/DL — HIGH (ref 70–99)
HCT VFR BLD CALC: 39.1 % — SIGNIFICANT CHANGE UP (ref 39–50)
HGB BLD-MCNC: 12 G/DL — LOW (ref 13–17)
MAGNESIUM SERPL-MCNC: 1.7 MG/DL — SIGNIFICANT CHANGE UP (ref 1.6–2.6)
MAGNESIUM SERPL-MCNC: 1.8 MG/DL — SIGNIFICANT CHANGE UP (ref 1.6–2.6)
MCHC RBC-ENTMCNC: 25.3 PG — LOW (ref 27–34)
MCHC RBC-ENTMCNC: 30.7 % — LOW (ref 32–36)
MCV RBC AUTO: 82.3 FL — SIGNIFICANT CHANGE UP (ref 80–100)
NRBC # FLD: 0 K/UL — SIGNIFICANT CHANGE UP (ref 0–0)
PLATELET # BLD AUTO: 119 K/UL — LOW (ref 150–400)
PMV BLD: 8.6 FL — SIGNIFICANT CHANGE UP (ref 7–13)
POTASSIUM SERPL-MCNC: 4.7 MMOL/L — SIGNIFICANT CHANGE UP (ref 3.5–5.3)
POTASSIUM SERPL-MCNC: 4.7 MMOL/L — SIGNIFICANT CHANGE UP (ref 3.5–5.3)
POTASSIUM SERPL-SCNC: 4.7 MMOL/L — SIGNIFICANT CHANGE UP (ref 3.5–5.3)
POTASSIUM SERPL-SCNC: 4.7 MMOL/L — SIGNIFICANT CHANGE UP (ref 3.5–5.3)
RBC # BLD: 4.75 M/UL — SIGNIFICANT CHANGE UP (ref 4.2–5.8)
RBC # FLD: 18.6 % — HIGH (ref 10.3–14.5)
SODIUM SERPL-SCNC: 129 MMOL/L — LOW (ref 135–145)
SODIUM SERPL-SCNC: 129 MMOL/L — LOW (ref 135–145)
WBC # BLD: 5.92 K/UL — SIGNIFICANT CHANGE UP (ref 3.8–10.5)
WBC # FLD AUTO: 5.92 K/UL — SIGNIFICANT CHANGE UP (ref 3.8–10.5)

## 2019-08-12 RX ORDER — BUMETANIDE 0.25 MG/ML
1 INJECTION INTRAMUSCULAR; INTRAVENOUS
Qty: 20 | Refills: 0 | Status: DISCONTINUED | OUTPATIENT
Start: 2019-08-12 | End: 2019-08-20

## 2019-08-12 RX ORDER — MAGNESIUM SULFATE 500 MG/ML
1 VIAL (ML) INJECTION ONCE
Refills: 0 | Status: COMPLETED | OUTPATIENT
Start: 2019-08-12 | End: 2019-08-13

## 2019-08-12 RX ADMIN — Medication 1: at 17:39

## 2019-08-12 RX ADMIN — ATORVASTATIN CALCIUM 20 MILLIGRAM(S): 80 TABLET, FILM COATED ORAL at 20:26

## 2019-08-12 RX ADMIN — APIXABAN 5 MILLIGRAM(S): 2.5 TABLET, FILM COATED ORAL at 09:05

## 2019-08-12 RX ADMIN — Medication 1: at 09:05

## 2019-08-12 RX ADMIN — SPIRONOLACTONE 50 MILLIGRAM(S): 25 TABLET, FILM COATED ORAL at 05:36

## 2019-08-12 RX ADMIN — GABAPENTIN 100 MILLIGRAM(S): 400 CAPSULE ORAL at 20:27

## 2019-08-12 RX ADMIN — BUMETANIDE 5 MG/HR: 0.25 INJECTION INTRAMUSCULAR; INTRAVENOUS at 13:08

## 2019-08-12 RX ADMIN — TAMSULOSIN HYDROCHLORIDE 0.4 MILLIGRAM(S): 0.4 CAPSULE ORAL at 09:05

## 2019-08-12 RX ADMIN — Medication 2: at 13:08

## 2019-08-12 RX ADMIN — BUMETANIDE 2.5 MG/HR: 0.25 INJECTION INTRAMUSCULAR; INTRAVENOUS at 05:37

## 2019-08-12 RX ADMIN — APIXABAN 5 MILLIGRAM(S): 2.5 TABLET, FILM COATED ORAL at 20:27

## 2019-08-12 NOTE — PROGRESS NOTE ADULT - SUBJECTIVE AND OBJECTIVE BOX
CARLO, LISSETH  69y  Male      Patient is a 69y old  Male who presents with a chief complaint of Abdominal distension, leg swelling (12 Aug 2019 15:56)  no sob,no cp,c/o abd.discomfort-worsening.no fever    REVIEW OF SYSTEMS:  as above    INTERVAL HPI/OVERNIGHT EVENTS:  T(C): 36.4 (08-12-19 @ 11:54), Max: 36.7 (08-12-19 @ 05:00)  HR: 69 (08-12-19 @ 11:54) (69 - 70)  BP: 100/67 (08-12-19 @ 11:54) (100/67 - 108/60)  RR: 17 (08-12-19 @ 11:54) (16 - 17)  SpO2: 100% (08-12-19 @ 11:54) (98% - 100%)  Wt(kg): --  I&O's Summary    11 Aug 2019 07:01  -  12 Aug 2019 07:00  --------------------------------------------------------  IN: 1277.5 mL / OUT: 1950 mL / NET: -672.5 mL    12 Aug 2019 07:01  -  12 Aug 2019 20:55  --------------------------------------------------------  IN: 387.5 mL / OUT: 1475 mL / NET: -1087.5 mL      T(C): 36.4 (08-12-19 @ 11:54), Max: 36.7 (08-12-19 @ 05:00)  HR: 69 (08-12-19 @ 11:54) (69 - 70)  BP: 100/67 (08-12-19 @ 11:54) (100/67 - 108/60)  RR: 17 (08-12-19 @ 11:54) (16 - 17)  SpO2: 100% (08-12-19 @ 11:54) (98% - 100%)  Wt(kg): --Vital Signs Last 24 Hrs  T(C): 36.4 (12 Aug 2019 11:54), Max: 36.7 (12 Aug 2019 05:00)  T(F): 97.5 (12 Aug 2019 11:54), Max: 98 (12 Aug 2019 05:00)  HR: 69 (12 Aug 2019 11:54) (69 - 70)  BP: 100/67 (12 Aug 2019 11:54) (100/67 - 108/60)  BP(mean): --  RR: 17 (12 Aug 2019 11:54) (16 - 17)  SpO2: 100% (12 Aug 2019 11:54) (98% - 100%)    LABS:                        12.0   5.92  )-----------( 119      ( 12 Aug 2019 09:37 )             39.1     08-12    129<L>  |  91<L>  |  38<H>  ----------------------------<  154<H>  4.7   |  23  |  1.17    Ca    8.2<L>      12 Aug 2019 17:40  Mg     1.7     08-12          CAPILLARY BLOOD GLUCOSE      POCT Blood Glucose.: 187 mg/dL (12 Aug 2019 16:50)  POCT Blood Glucose.: 208 mg/dL (12 Aug 2019 12:25)  POCT Blood Glucose.: 172 mg/dL (12 Aug 2019 08:35)  POCT Blood Glucose.: 153 mg/dL (11 Aug 2019 21:50)            PAST MEDICAL & SURGICAL HISTORY:  CKD (chronic kidney disease)  MR (mitral regurgitation)  AF (atrial fibrillation)  DM (diabetes mellitus)  HLD (hyperlipidemia)  TIA (transient ischemic attack)  HTN (hypertension)  CVA (cerebral infarction)  CHB (complete heart block)  CHF (congestive heart failure)  CAD (coronary artery disease): S/P 4V CABG  S/P mitral valve repair: 2007  S/P CABG (coronary artery bypass graft): 4V CABG in 2007  AICD (automatic cardioverter/defibrillator) present: Bag [employer] or Stealtronic      MEDICATIONS  (STANDING):  apixaban 5 milliGRAM(s) Oral every 12 hours  atorvastatin 20 milliGRAM(s) Oral at bedtime  buMETAnide Infusion 1 mG/Hr (5 mL/Hr) IV Continuous <Continuous>  dextrose 50% Injectable 12.5 Gram(s) IV Push once  dextrose 50% Injectable 25 Gram(s) IV Push once  dextrose 50% Injectable 25 Gram(s) IV Push once  gabapentin 100 milliGRAM(s) Oral at bedtime  insulin lispro (HumaLOG) corrective regimen sliding scale   SubCutaneous three times a day before meals  insulin lispro (HumaLOG) corrective regimen sliding scale   SubCutaneous at bedtime  metolazone 2.5 milliGRAM(s) Oral daily  spironolactone 50 milliGRAM(s) Oral daily  tamsulosin 0.4 milliGRAM(s) Oral daily    MEDICATIONS  (PRN):  acetaminophen   Tablet .. 650 milliGRAM(s) Oral every 6 hours PRN Mild Pain (1 - 3), Moderate Pain (4 - 6), Severe Pain (7 - 10)  dextrose 40% Gel 15 Gram(s) Oral once PRN Blood Glucose LESS THAN 70 milliGRAM(s)/deciliter  glucagon  Injectable 1 milliGRAM(s) IntraMuscular once PRN Glucose LESS THAN 70 milligrams/deciliter        RADIOLOGY & ADDITIONAL TESTS:    Imaging Personally Reviewed:  [ ] YES  [ ] NO    Consultant(s) Notes Reviewed:  [x ] YES  [ ] NO    PHYSICAL EXAM:  GENERAL: NAD, well-groomed, well-developed  HEAD:  Atraumatic, Normocephalic  EYES: EOMI, PERRLA, conjunctiva and sclera clear  ENMT: No tonsillar erythema, exudates, or enlargement; Moist mucous membranes, Good dentition, No lesions  NECK: Supple, No JVD, Normal thyroid  NERVOUS SYSTEM:  Alert & Oriented X3, Good concentration; Motor Strength 5/5 B/L upper and lower extremities; DTRs 2+ intact and symmetric  CHEST/LUNG: Clear to percussion bilaterally; No rales, rhonchi, wheezing, or rubs  HEART: Regular rate and rhythm; No murmurs, rubs, or gallops  ABDOMEN: distended,positive bowel sounds,NT  EXTREMITIES:  2+ Peripheral Pulses, No clubbing, cyanosis, or edema  LYMPH: No lymphadenopathy noted  SKIN: No rashes or lesions    Care Discussed with Consultants/Other Providers [ ] YES  [ ] NO      Code Status: [] Full Code [] DNR [] DNI [] Goals of Care:   Disposition: [] ICU [] Stroke Unit [] RCU []PCU []Floor [] Discharge Home         ALANIS MathewsP

## 2019-08-12 NOTE — PROGRESS NOTE ADULT - SUBJECTIVE AND OBJECTIVE BOX
Phillip Sorenson MD  Interventional Cardiology / Advance Heart Failure and Cardiac Transplant Specialist  [place] Office : [address] [place] [address] [address] [address]  Tel:   [place] Office : [address] [facility] [place] [address]  Tel: [phone]  Cell : [phone]    Subjective : Pt lying in bed comfortable, not in distress, denies any chest pain or SOB  	  MEDICATIONS:  apixaban 5 milliGRAM(s) Oral every 12 hours  buMETAnide Infusion 1 mG/Hr IV Continuous <Continuous>  metolazone 2.5 milliGRAM(s) Oral daily  spironolactone 50 milliGRAM(s) Oral daily  tamsulosin 0.4 milliGRAM(s) Oral daily        acetaminophen   Tablet .. 650 milliGRAM(s) Oral every 6 hours PRN  gabapentin 100 milliGRAM(s) Oral at bedtime      atorvastatin 20 milliGRAM(s) Oral at bedtime  dextrose 40% Gel 15 Gram(s) Oral once PRN  dextrose 50% Injectable 12.5 Gram(s) IV Push once  dextrose 50% Injectable 25 Gram(s) IV Push once  dextrose 50% Injectable 25 Gram(s) IV Push once  glucagon  Injectable 1 milliGRAM(s) IntraMuscular once PRN  insulin lispro (HumaLOG) corrective regimen sliding scale   SubCutaneous three times a day before meals  insulin lispro (HumaLOG) corrective regimen sliding scale   SubCutaneous at bedtime        PHYSICAL EXAM:  T(C): 36.4 (08-12-19 @ 11:54), Max: 36.7 (08-12-19 @ 05:00)  HR: 69 (08-12-19 @ 11:54) (69 - 75)  BP: 100/67 (08-12-19 @ 11:54) (100/67 - 110/67)  RR: 17 (08-12-19 @ 11:54) (15 - 17)  SpO2: 100% (08-12-19 @ 11:54) (97% - 100%)  Wt(kg): --  I&O's Summary    11 Aug 2019 07:01  -  12 Aug 2019 07:00  --------------------------------------------------------  IN: 1277.5 mL / OUT: 1950 mL / NET: -672.5 mL    12 Aug 2019 07:01  -  12 Aug 2019 17:39  --------------------------------------------------------  IN: 282.5 mL / OUT: 1075 mL / NET: -792.5 mL          HEENT:   Normal oral mucosa, PERRL, EOMI	  Cardiovascular: Normal S1 S2, No JVD, No murmurs, No edema  Respiratory: Lungs clear to auscultation	  Gastrointestinal:  Soft, Non-tender, + BS	  Extremities: 2+ edema                                    12.0   5.92  )-----------( 119      ( 12 Aug 2019 09:37 )             39.1     08-12    129<L>  |  94<L>  |  35<H>  ----------------------------<  120<H>  4.7   |  21<L>  |  1.00    Ca    8.2<L>      12 Aug 2019 07:00  Mg     1.8     08-12      proBNP:   Lipid Profile:   HgA1c:   TSH:

## 2019-08-12 NOTE — CHART NOTE - NSCHARTNOTEFT_GEN_A_CORE
Discussed case with cardiology attending Dr. Sorenson.  Recommended to increase bumex drip to 1mg/hr & to start metolazone 2.5mg po qd.  Will continue to monitor.

## 2019-08-12 NOTE — PROGRESS NOTE ADULT - SUBJECTIVE AND OBJECTIVE BOX
[facility]   MD ULICES MCKEON DO ANGELA WONG, PA    TEL:  OFFICE: [phone]  DR TAYLOR CELL: [phone]  DR. MCFARLAND CELL: [phone]  KATHERINE POWER CELL: [phone]        Patient is a 69y old  Male who presents with a chief complaint of Abdominal distension, leg swelling (11 Aug 2019 16:41)      Patient seen and examined at bedside. No chest pain/sob    VITALS:  T(F): 97.5 (08-12-19 @ 11:54), Max: 98 (08-12-19 @ 05:00)  HR: 69 (08-12-19 @ 11:54)  BP: 100/67 (08-12-19 @ 11:54)  RR: 17 (08-12-19 @ 11:54)  SpO2: 100% (08-12-19 @ 11:54)  Wt(kg): --    08-11 @ 07:01  -  08-12 @ 07:00  --------------------------------------------------------  IN: 1277.5 mL / OUT: 1950 mL / NET: -672.5 mL    08-12 @ 07:01  -  08-12 @ 15:56  --------------------------------------------------------  IN: 272.5 mL / OUT: 775 mL / NET: -502.5 mL          PHYSICAL EXAM:  Constitutional: NAD  Neck: No JVD  Respiratory: CTAB, no wheezes, rales or rhonchi  Cardiovascular: S1, S2, RRR  Gastrointestinal: distended, NT  Extremities: 3+ peripheral edema    Hospital Medications:   MEDICATIONS  (STANDING):  apixaban 5 milliGRAM(s) Oral every 12 hours  atorvastatin 20 milliGRAM(s) Oral at bedtime  buMETAnide Infusion 1 mG/Hr (5 mL/Hr) IV Continuous <Continuous>  dextrose 50% Injectable 12.5 Gram(s) IV Push once  dextrose 50% Injectable 25 Gram(s) IV Push once  dextrose 50% Injectable 25 Gram(s) IV Push once  gabapentin 100 milliGRAM(s) Oral at bedtime  insulin lispro (HumaLOG) corrective regimen sliding scale   SubCutaneous three times a day before meals  insulin lispro (HumaLOG) corrective regimen sliding scale   SubCutaneous at bedtime  metolazone 2.5 milliGRAM(s) Oral daily  spironolactone 50 milliGRAM(s) Oral daily  tamsulosin 0.4 milliGRAM(s) Oral daily      LABS:  08-12    129<L>  |  94<L>  |  35<H>  ----------------------------<  120<H>  4.7   |  21<L>  |  1.00    Ca    8.2<L>      12 Aug 2019 07:00  Mg     1.8     08-12      Creatinine Trend: 1.00 <--, 0.90 <--, 1.03 <--, 1.04 <--, 1.06 <--, 1.27 <--                                12.0   5.92  )-----------( 119      ( 12 Aug 2019 09:37 )             39.1     Urine Studies:      .1 (Ca --)      [02-17-19 @ 15:00]   --  Vitamin D (25OH) 12.6      [02-17-19 @ 15:00]  HbA1c 6.5      [08-09-19 @ 06:00]  TSH 2.60      [03-15-19 @ 05:15]  Lipid: chol 126, TG 87, HDL 38, LDL 85      [03-15-19 @ 05:15]        RADIOLOGY & ADDITIONAL STUDIES:

## 2019-08-12 NOTE — PROGRESS NOTE ADULT - ATTENDING COMMENTS
Assessment/Rec-  -Acute on chronic systolic CHF-cont iv bumex drip-INCREASE dose..spironolactone added metazolone.ECHO,Cardiology f/u noted.  -A.FIB with mvr-rate control,A/C-Eliquis  -Hyponatremia-likely sec.to chf-fluid restriction  --DM,check A1C,monitor FS with coverage  -Elevared BNP,  -Ascites.u/s.s/p paracentesis-f/u coobgv-[id number]-txk.so far.May need repeat therapeutic paracentesis,d/w PA,will inform procedure team,  -d/w family at bedside

## 2019-08-12 NOTE — PROGRESS NOTE ADULT - ASSESSMENT
68 y/o M with PMH of MR s/p mitral valvuloplasty, Afib (on Coumadin), HLD, HTN, DM, CVA, CAD (s/p 4V CABG), CHF (with EF of 15-20% s/p AICD) presented with the complaint of worsening LE edema, weight gain and abdominal distention. Got admitted with CHF, found to have hyponatremia      Hyponatremia:  Likely sec to fluid overload sec to CHF  Na level continues to drop  Free water restriction decrease to 800ml/day  s/p paracentesis ~3.6L removed  Continue diuretics  Monitor Na level daily    Hypomagnesemia:  likely sec to diuresis  Improved  Monitor Mg level    Hypokalemia  likely sec to diuretics  Serum K improved  monitor serum K for now    CHF:  on bumex gtt  Monitor K, Mg level  Follow up cardiology  Daily weight s

## 2019-08-12 NOTE — PROGRESS NOTE ADULT - ASSESSMENT
Echo 2/19 - severe LV and RV dysfunction    a/p     1) Acute on chronic CHF - add metolazone, switch bumex to 1mg /hr , cont aldactone, hold bp meds, s/p paracentesis 3.8 liters out    2) Afib - resumed eliquis    3) Hyponatremia - f/u nephro likely sec to CHF

## 2019-08-13 LAB
ANION GAP SERPL CALC-SCNC: 15 MMO/L — HIGH (ref 7–14)
BUN SERPL-MCNC: 35 MG/DL — HIGH (ref 7–23)
CALCIUM SERPL-MCNC: 8.3 MG/DL — LOW (ref 8.4–10.5)
CHLORIDE SERPL-SCNC: 91 MMOL/L — LOW (ref 98–107)
CO2 SERPL-SCNC: 22 MMOL/L — SIGNIFICANT CHANGE UP (ref 22–31)
CREAT SERPL-MCNC: 1.04 MG/DL — SIGNIFICANT CHANGE UP (ref 0.5–1.3)
GLUCOSE SERPL-MCNC: 149 MG/DL — HIGH (ref 70–99)
HCT VFR BLD CALC: 38.4 % — LOW (ref 39–50)
HGB BLD-MCNC: 11.8 G/DL — LOW (ref 13–17)
MAGNESIUM SERPL-MCNC: 1.9 MG/DL — SIGNIFICANT CHANGE UP (ref 1.6–2.6)
MCHC RBC-ENTMCNC: 25.1 PG — LOW (ref 27–34)
MCHC RBC-ENTMCNC: 30.7 % — LOW (ref 32–36)
MCV RBC AUTO: 81.7 FL — SIGNIFICANT CHANGE UP (ref 80–100)
NRBC # FLD: 0 K/UL — SIGNIFICANT CHANGE UP (ref 0–0)
PLATELET # BLD AUTO: 116 K/UL — LOW (ref 150–400)
PMV BLD: 8.9 FL — SIGNIFICANT CHANGE UP (ref 7–13)
POTASSIUM SERPL-MCNC: 4.2 MMOL/L — SIGNIFICANT CHANGE UP (ref 3.5–5.3)
POTASSIUM SERPL-SCNC: 4.2 MMOL/L — SIGNIFICANT CHANGE UP (ref 3.5–5.3)
RBC # BLD: 4.7 M/UL — SIGNIFICANT CHANGE UP (ref 4.2–5.8)
RBC # FLD: 18.8 % — HIGH (ref 10.3–14.5)
SODIUM SERPL-SCNC: 128 MMOL/L — LOW (ref 135–145)
WBC # BLD: 6.55 K/UL — SIGNIFICANT CHANGE UP (ref 3.8–10.5)
WBC # FLD AUTO: 6.55 K/UL — SIGNIFICANT CHANGE UP (ref 3.8–10.5)

## 2019-08-13 RX ADMIN — Medication 2: at 08:54

## 2019-08-13 RX ADMIN — BUMETANIDE 5 MG/HR: 0.25 INJECTION INTRAMUSCULAR; INTRAVENOUS at 08:56

## 2019-08-13 RX ADMIN — GABAPENTIN 100 MILLIGRAM(S): 400 CAPSULE ORAL at 21:55

## 2019-08-13 RX ADMIN — Medication 100 GRAM(S): at 00:03

## 2019-08-13 RX ADMIN — APIXABAN 5 MILLIGRAM(S): 2.5 TABLET, FILM COATED ORAL at 21:55

## 2019-08-13 RX ADMIN — SPIRONOLACTONE 50 MILLIGRAM(S): 25 TABLET, FILM COATED ORAL at 05:30

## 2019-08-13 RX ADMIN — ATORVASTATIN CALCIUM 20 MILLIGRAM(S): 80 TABLET, FILM COATED ORAL at 21:55

## 2019-08-13 RX ADMIN — BUMETANIDE 5 MG/HR: 0.25 INJECTION INTRAMUSCULAR; INTRAVENOUS at 21:55

## 2019-08-13 RX ADMIN — APIXABAN 5 MILLIGRAM(S): 2.5 TABLET, FILM COATED ORAL at 08:55

## 2019-08-13 RX ADMIN — BUMETANIDE 5 MG/HR: 0.25 INJECTION INTRAMUSCULAR; INTRAVENOUS at 05:30

## 2019-08-13 RX ADMIN — TAMSULOSIN HYDROCHLORIDE 0.4 MILLIGRAM(S): 0.4 CAPSULE ORAL at 12:41

## 2019-08-13 NOTE — PROGRESS NOTE ADULT - SUBJECTIVE AND OBJECTIVE BOX
[facility]   MD ULICES MCKEON DO ANGELA WONG, PA    TEL:  OFFICE: [phone]  DR TAYLOR CELL: [phone]  DR. MCFARLAND CELL: [phone]  KATHERINE POWER CELL: [phone]        Patient is a 69y old  Male who presents with a chief complaint of Abdominal distension, leg swelling (12 Aug 2019 20:54)      Patient seen and examined at bedside. No chest pain/sob    VITALS:  T(F): 97.9 (08-13-19 @ 05:00), Max: 98 (08-12-19 @ 20:30)  HR: 86 (08-13-19 @ 07:42)  BP: 120/67 (08-13-19 @ 05:00)  RR: 14 (08-13-19 @ 05:00)  SpO2: 98% (08-13-19 @ 05:00)  Wt(kg): --    08-12 @ 07:01  -  08-13 @ 07:00  --------------------------------------------------------  IN: 527.5 mL / OUT: 2375 mL / NET: -1847.5 mL    08-13 @ 07:01  -  08-13 @ 12:05  --------------------------------------------------------  IN: 240 mL / OUT: 900 mL / NET: -660 mL          PHYSICAL EXAM:  Constitutional: NAD  Neck: No JVD  Respiratory: CTAB, no wheezes, rales or rhonchi  Cardiovascular: S1, S2, RRR  Gastrointestinal: distended, NT  Extremities: 3+ peripheral edema    Hospital Medications:   MEDICATIONS  (STANDING):  apixaban 5 milliGRAM(s) Oral every 12 hours  atorvastatin 20 milliGRAM(s) Oral at bedtime  buMETAnide Infusion 1 mG/Hr (5 mL/Hr) IV Continuous <Continuous>  dextrose 50% Injectable 12.5 Gram(s) IV Push once  dextrose 50% Injectable 25 Gram(s) IV Push once  dextrose 50% Injectable 25 Gram(s) IV Push once  gabapentin 100 milliGRAM(s) Oral at bedtime  insulin lispro (HumaLOG) corrective regimen sliding scale   SubCutaneous three times a day before meals  insulin lispro (HumaLOG) corrective regimen sliding scale   SubCutaneous at bedtime  metolazone 2.5 milliGRAM(s) Oral daily  spironolactone 50 milliGRAM(s) Oral daily  tamsulosin 0.4 milliGRAM(s) Oral daily      LABS:  08-13    128<L>  |  91<L>  |  35<H>  ----------------------------<  149<H>  4.2   |  22  |  1.04    Ca    8.3<L>      13 Aug 2019 05:00  Mg     1.9     08-13      Creatinine Trend: 1.04 <--, 1.17 <--, 1.00 <--, 0.90 <--, 1.03 <--, 1.04 <--, 1.06 <--, 1.27 <--                                11.8   6.55  )-----------( 116      ( 13 Aug 2019 05:00 )             38.4     Urine Studies:      .1 (Ca --)      [02-17-19 @ 15:00]   --  Vitamin D (25OH) 12.6      [02-17-19 @ 15:00]  HbA1c 6.5      [08-09-19 @ 06:00]  TSH 2.60      [03-15-19 @ 05:15]  Lipid: chol 126, TG 87, HDL 38, LDL 85      [03-15-19 @ 05:15]        RADIOLOGY & ADDITIONAL STUDIES: [facility]   MD ULICES MCKEON DO ANGELA WONG, PA    TEL:  OFFICE: [phone]  DR TAYLOR CELL: [phone]  DR. MCFARLAND CELL: [phone]  KATHERINE POWER CELL: [phone]        Patient is a 69y old  Male who presents with a chief complaint of Abdominal distension, leg swelling (12 Aug 2019 20:54)      Patient seen and examined at bedside. No chest pain/sob    VITALS:  T(F): 97.9 (08-13-19 @ 05:00), Max: 98 (08-12-19 @ 20:30)  HR: 86 (08-13-19 @ 07:42)  BP: 120/67 (08-13-19 @ 05:00)  RR: 14 (08-13-19 @ 05:00)  SpO2: 98% (08-13-19 @ 05:00)  Wt(kg): --    08-12 @ 07:01  -  08-13 @ 07:00  --------------------------------------------------------  IN: 527.5 mL / OUT: 2375 mL / NET: -1847.5 mL    08-13 @ 07:01  -  08-13 @ 12:05  --------------------------------------------------------  IN: 240 mL / OUT: 900 mL / NET: -660 mL          PHYSICAL EXAM:  Constitutional: NAD  Neck: No JVD  Respiratory: CTAB, no wheezes, rales or rhonchi  Cardiovascular: S1, S2, RRR  Gastrointestinal: distended, NT  Extremities: 3+ peripheral edema    Hospital Medications:   MEDICATIONS  (STANDING):  apixaban 5 milliGRAM(s) Oral every 12 hours  atorvastatin 20 milliGRAM(s) Oral at bedtime  buMETAnide Infusion 1 mG/Hr (5 mL/Hr) IV Continuous <Continuous>  dextrose 50% Injectable 12.5 Gram(s) IV Push once  dextrose 50% Injectable 25 Gram(s) IV Push once  dextrose 50% Injectable 25 Gram(s) IV Push once  gabapentin 100 milliGRAM(s) Oral at bedtime  insulin lispro (HumaLOG) corrective regimen sliding scale   SubCutaneous three times a day before meals  insulin lispro (HumaLOG) corrective regimen sliding scale   SubCutaneous at bedtime  metolazone 2.5 milliGRAM(s) Oral daily  spironolactone 50 milliGRAM(s) Oral daily  tamsulosin 0.4 milliGRAM(s) Oral daily    LABS:  08-13    128<L>  |  91<L>  |  35<H>  ----------------------------<  149<H>  4.2   |  22  |  1.04    Ca    8.3<L>      13 Aug 2019 05:00  Mg     1.9     08-13    Creatinine Trend: 1.04 <--, 1.17 <--, 1.00 <--, 0.90 <--, 1.03 <--, 1.04 <--, 1.06 <--, 1.27 <--               11.8   6.55  )-----------( 116      ( 13 Aug 2019 05:00 )             38.4     Urine Studies:    .1 (Ca --)      [02-17-19 @ 15:00]   --  Vitamin D (25OH) 12.6      [02-17-19 @ 15:00]  HbA1c 6.5      [08-09-19 @ 06:00]  TSH 2.60      [03-15-19 @ 05:15]  Lipid: chol 126, TG 87, HDL 38, LDL 85      [03-15-19 @ 05:15]    RADIOLOGY & ADDITIONAL STUDIES:

## 2019-08-13 NOTE — PROGRESS NOTE ADULT - SUBJECTIVE AND OBJECTIVE BOX
Phillip Sorenson MD  Interventional Cardiology / Advance Heart Failure and Cardiac Transplant Specialist  [place] Office : [address] [place] [address] [facility] [address]  Tel:   [place] Office : [address] [facility] [place] [address]  Tel: [phone]  Cell : [phone]    Subjective : Pt lying in bed comfortable, not in distress, denies any chest pain or SOB  	  MEDICATIONS:  apixaban 5 milliGRAM(s) Oral every 12 hours  buMETAnide Infusion 1 mG/Hr IV Continuous <Continuous>  metolazone 2.5 milliGRAM(s) Oral daily  spironolactone 50 milliGRAM(s) Oral daily  tamsulosin 0.4 milliGRAM(s) Oral daily        acetaminophen   Tablet .. 650 milliGRAM(s) Oral every 6 hours PRN  gabapentin 100 milliGRAM(s) Oral at bedtime      atorvastatin 20 milliGRAM(s) Oral at bedtime  dextrose 40% Gel 15 Gram(s) Oral once PRN  dextrose 50% Injectable 12.5 Gram(s) IV Push once  dextrose 50% Injectable 25 Gram(s) IV Push once  dextrose 50% Injectable 25 Gram(s) IV Push once  glucagon  Injectable 1 milliGRAM(s) IntraMuscular once PRN  insulin lispro (HumaLOG) corrective regimen sliding scale   SubCutaneous three times a day before meals  insulin lispro (HumaLOG) corrective regimen sliding scale   SubCutaneous at bedtime        PHYSICAL EXAM:  T(C): 36.3 (08-13-19 @ 12:36), Max: 36.7 (08-12-19 @ 20:30)  HR: 70 (08-13-19 @ 12:36) (67 - 86)  BP: 109/58 (08-13-19 @ 12:36) (109/58 - 120/67)  RR: 16 (08-13-19 @ 12:36) (14 - 16)  SpO2: 100% (08-13-19 @ 12:36) (98% - 100%)  Wt(kg): --  I&O's Summary    12 Aug 2019 07:01  -  13 Aug 2019 07:00  --------------------------------------------------------  IN: 527.5 mL / OUT: 2375 mL / NET: -1847.5 mL    13 Aug 2019 07:01  -  13 Aug 2019 13:13  --------------------------------------------------------  IN: 240 mL / OUT: 1500 mL / NET: -1260 mL        	  HEENT:   Normal oral mucosa, PERRL, EOMI	  Cardiovascular: Normal S1 S2, No JVD, No murmurs, No edema  Respiratory: Lungs clear to auscultation	  Gastrointestinal:  Soft, Non-tender, + BS	, paracentesis site covered as it was leaking  Extremities: 2+ edema                                    11.8   6.55  )-----------( 116      ( 13 Aug 2019 05:00 )             38.4     08-13    128<L>  |  91<L>  |  35<H>  ----------------------------<  149<H>  4.2   |  22  |  1.04    Ca    8.3<L>      13 Aug 2019 05:00  Mg     1.9     08-13      proBNP:   Lipid Profile:   HgA1c:   TSH:

## 2019-08-13 NOTE — PROGRESS NOTE ADULT - ATTENDING COMMENTS
Assessment/Rec-  -Acute on chronic systolic CHF-cont iv bumex drip-INCREASE dose..spironolactone added metazolone.ECHO,Cardiology f/u noted.  -A.FIB with mvr-rate control,A/C-Eliquis  -Hyponatremia-likely sec.to chf-fluid restriction  --DM,check A1C,monitor FS with coverage  -Elevared BNP,  -Ascites.u/s.s/p paracentesis-f/u comecz-[id number]-ybw.so far.May need repeat therapeutic paracentesis,d/w PA, procedure team,f/u  -d/w family at bedside

## 2019-08-13 NOTE — PROGRESS NOTE ADULT - SUBJECTIVE AND OBJECTIVE BOX
CARLO, NFN  69y  Male      Patient is a 69y old  Male who presents with a chief complaint of Abdominal distension, leg swelling (13 Aug 2019 12:05)  comfortable,resting.no sob,no cp,no fever,no abd.pain    REVIEW OF SYSTEMS:  as above      INTERVAL HPI/OVERNIGHT EVENTS:  T(C): 36.6 (08-13-19 @ 21:58), Max: 36.6 (08-13-19 @ 05:00)  HR: 69 (08-13-19 @ 21:58) (67 - 86)  BP: 108/66 (08-13-19 @ 21:58) (100/61 - 120/67)  RR: 15 (08-13-19 @ 21:58) (14 - 16)  SpO2: 100% (08-13-19 @ 21:58) (98% - 100%)  Wt(kg): --  I&O's Summary    12 Aug 2019 07:01  -  13 Aug 2019 07:00  --------------------------------------------------------  IN: 527.5 mL / OUT: 2375 mL / NET: -1847.5 mL    13 Aug 2019 07:01  -  13 Aug 2019 22:43  --------------------------------------------------------  IN: 760 mL / OUT: 2650 mL / NET: -1890 mL      T(C): 36.6 (08-13-19 @ 21:58), Max: 36.6 (08-13-19 @ 05:00)  HR: 69 (08-13-19 @ 21:58) (67 - 86)  BP: 108/66 (08-13-19 @ 21:58) (100/61 - 120/67)  RR: 15 (08-13-19 @ 21:58) (14 - 16)  SpO2: 100% (08-13-19 @ 21:58) (98% - 100%)  Wt(kg): --Vital Signs Last 24 Hrs  T(C): 36.6 (13 Aug 2019 21:58), Max: 36.6 (13 Aug 2019 05:00)  T(F): 97.8 (13 Aug 2019 21:58), Max: 97.9 (13 Aug 2019 05:00)  HR: 69 (13 Aug 2019 21:58) (67 - 86)  BP: 108/66 (13 Aug 2019 21:58) (100/61 - 120/67)  BP(mean): --  RR: 15 (13 Aug 2019 21:58) (14 - 16)  SpO2: 100% (13 Aug 2019 21:58) (98% - 100%)    LABS:                        11.8   6.55  )-----------( 116      ( 13 Aug 2019 05:00 )             38.4     08-13    128<L>  |  91<L>  |  35<H>  ----------------------------<  149<H>  4.2   |  22  |  1.04    Ca    8.3<L>      13 Aug 2019 05:00  Mg     1.9     08-13          CAPILLARY BLOOD GLUCOSE      POCT Blood Glucose.: 205 mg/dL (13 Aug 2019 21:22)  POCT Blood Glucose.: 127 mg/dL (13 Aug 2019 17:39)  POCT Blood Glucose.: 124 mg/dL (13 Aug 2019 12:04)  POCT Blood Glucose.: 244 mg/dL (13 Aug 2019 08:32)            PAST MEDICAL & SURGICAL HISTORY:  CKD (chronic kidney disease)  MR (mitral regurgitation)  AF (atrial fibrillation)  DM (diabetes mellitus)  HLD (hyperlipidemia)  TIA (transient ischemic attack)  HTN (hypertension)  CVA (cerebral infarction)  CHB (complete heart block)  CHF (congestive heart failure)  CAD (coronary artery disease): S/P 4V CABG  S/P mitral valve repair: 2007  S/P CABG (coronary artery bypass graft): 4V CABG in 2007  AICD (automatic cardioverter/defibrillator) present: "[employer]"      MEDICATIONS  (STANDING):  apixaban 5 milliGRAM(s) Oral every 12 hours  atorvastatin 20 milliGRAM(s) Oral at bedtime  buMETAnide Infusion 1 mG/Hr (5 mL/Hr) IV Continuous <Continuous>  dextrose 50% Injectable 12.5 Gram(s) IV Push once  dextrose 50% Injectable 25 Gram(s) IV Push once  dextrose 50% Injectable 25 Gram(s) IV Push once  gabapentin 100 milliGRAM(s) Oral at bedtime  insulin lispro (HumaLOG) corrective regimen sliding scale   SubCutaneous three times a day before meals  insulin lispro (HumaLOG) corrective regimen sliding scale   SubCutaneous at bedtime  metolazone 2.5 milliGRAM(s) Oral daily  spironolactone 50 milliGRAM(s) Oral daily  tamsulosin 0.4 milliGRAM(s) Oral daily    MEDICATIONS  (PRN):  acetaminophen   Tablet .. 650 milliGRAM(s) Oral every 6 hours PRN Mild Pain (1 - 3), Moderate Pain (4 - 6), Severe Pain (7 - 10)  dextrose 40% Gel 15 Gram(s) Oral once PRN Blood Glucose LESS THAN 70 milliGRAM(s)/deciliter  glucagon  Injectable 1 milliGRAM(s) IntraMuscular once PRN Glucose LESS THAN 70 milligrams/deciliter        RADIOLOGY & ADDITIONAL TESTS:    Imaging Personally Reviewed:  [ ] YES  [ ] NO    Consultant(s) Notes Reviewed:  x[ ] YES  [ ] NO    PHYSICAL EXAM:  GENERAL: NAD, well-groomed, well-developed  HEAD:  Atraumatic, Normocephalic  EYES: EOMI, PERRLA, conjunctiva and sclera clear  ENMT: No tonsillar erythema, exudates, or enlargement; Moist mucous membranes, Good dentition, No lesions  NECK: Supple, No JVD, Normal thyroid  NERVOUS SYSTEM:  Alert & Oriented X3, Good concentration; Motor Strength 5/5 B/L upper and lower extremities; DTRs 2+ intact and symmetric  CHEST/LUNG: Clear to percussion bilaterally; No rales, rhonchi, wheezing, or rubs  HEART: Regular rate and rhythm; No murmurs, rubs, or gallops  ABDOMEN: Soft, Nontender, distended; Bowel sounds present  EXTREMITIES:  2+ Peripheral Pulses, No clubbing, cyanosis, or edema 2+  LYMPH: No lymphadenopathy noted  SKIN: No rashes or lesions    Care Discussed with Consultants/Other Providers [ ] YES  [ ] NO      Code Status: [] Full Code [] DNR [] DNI [] Goals of Care:   Disposition: [] ICU [] Stroke Unit [] RCU []PCU []Floor [] Discharge Home         EPI [email or web address].[email or web address]

## 2019-08-13 NOTE — PROGRESS NOTE ADULT - ASSESSMENT
Echo 2/19 - severe LV and RV dysfunction    a/p     1) Acute on chronic CHF - cont metolazone,  bumex to 1mg /hr , cont aldactone, hold bp meds, s/p paracentesis 3.8 liters out , site leaking so its covered, f/u tomorrow    2) Afib -  eliquis    3) Hyponatremia - f/u nephro likely sec to CHF

## 2019-08-13 NOTE — PROGRESS NOTE ADULT - ASSESSMENT
70 y/o M with PMH of MR s/p mitral valvuloplasty, Afib (on Coumadin), HLD, HTN, DM, CVA, CAD (s/p 4V CABG), CHF (with EF of 15-20% s/p AICD) presented with the complaint of worsening LE edema, weight gain and abdominal distention. Got admitted with CHF, found to have hyponatremia      Hyponatremia:  Likely sec to fluid overload sec to CHF  Na level continues to drop  Free water restriction decrease to 800ml/day  s/p paracentesis ~3.6L removed  Continue diuretics  Monitor Na level daily    Hypomagnesemia:  likely sec to diuresis  Improved  Monitor Mg level    Hypokalemia  likely sec to diuretics  Serum K improved  monitor serum K for now    CHF:  on bumex gtt  Monitor K, Mg level  Follow up cardiology  Daily weight s 68 y/o M with PMH of MR s/p mitral valvuloplasty, Afib (on Coumadin), HLD, HTN, DM, CVA, CAD (s/p 4V CABG), CHF (with EF of 15-20% s/p AICD) presented with the complaint of worsening LE edema, weight gain and abdominal distention. Got admitted with CHF, found to have hyponatremia    Hyponatremia:  Likely sec to fluid overload sec to CHF  Na level decreased slightly today 128. Has been stable at 129-130 during this stay.   Free water restriction decrease to 800ml/day  s/p paracentesis ~3.6L removed  Continue diuretics. Monitor Na while on metolazone   Monitor Na level daily    Hypomagnesemia:  likely sec to diuresis  Improved  Monitor Mg level    Hypokalemia  likely sec to diuretics  Serum K improved  monitor serum K for now    CHF:  on bumex gtt  Monitor K, Mg level  Follow up cardiology  Daily weight s

## 2019-08-14 LAB
ANION GAP SERPL CALC-SCNC: 13 MMO/L — SIGNIFICANT CHANGE UP (ref 7–14)
ANION GAP SERPL CALC-SCNC: 16 MMO/L — HIGH (ref 7–14)
BUN SERPL-MCNC: 41 MG/DL — HIGH (ref 7–23)
BUN SERPL-MCNC: 44 MG/DL — HIGH (ref 7–23)
CALCIUM SERPL-MCNC: 8.9 MG/DL — SIGNIFICANT CHANGE UP (ref 8.4–10.5)
CALCIUM SERPL-MCNC: 9.1 MG/DL — SIGNIFICANT CHANGE UP (ref 8.4–10.5)
CHLORIDE SERPL-SCNC: 86 MMOL/L — LOW (ref 98–107)
CHLORIDE SERPL-SCNC: 89 MMOL/L — LOW (ref 98–107)
CO2 SERPL-SCNC: 25 MMOL/L — SIGNIFICANT CHANGE UP (ref 22–31)
CO2 SERPL-SCNC: 30 MMOL/L — SIGNIFICANT CHANGE UP (ref 22–31)
CREAT SERPL-MCNC: 0.99 MG/DL — SIGNIFICANT CHANGE UP (ref 0.5–1.3)
CREAT SERPL-MCNC: 1.14 MG/DL — SIGNIFICANT CHANGE UP (ref 0.5–1.3)
GLUCOSE SERPL-MCNC: 118 MG/DL — HIGH (ref 70–99)
GLUCOSE SERPL-MCNC: 189 MG/DL — HIGH (ref 70–99)
HCT VFR BLD CALC: 39.9 % — SIGNIFICANT CHANGE UP (ref 39–50)
HGB BLD-MCNC: 12.4 G/DL — LOW (ref 13–17)
MAGNESIUM SERPL-MCNC: 1.8 MG/DL — SIGNIFICANT CHANGE UP (ref 1.6–2.6)
MAGNESIUM SERPL-MCNC: 1.9 MG/DL — SIGNIFICANT CHANGE UP (ref 1.6–2.6)
MCHC RBC-ENTMCNC: 25.3 PG — LOW (ref 27–34)
MCHC RBC-ENTMCNC: 31.1 % — LOW (ref 32–36)
MCV RBC AUTO: 81.3 FL — SIGNIFICANT CHANGE UP (ref 80–100)
NRBC # FLD: 0 K/UL — SIGNIFICANT CHANGE UP (ref 0–0)
PHOSPHATE SERPL-MCNC: 4.2 MG/DL — SIGNIFICANT CHANGE UP (ref 2.5–4.5)
PHOSPHATE SERPL-MCNC: 4.4 MG/DL — SIGNIFICANT CHANGE UP (ref 2.5–4.5)
PLATELET # BLD AUTO: 127 K/UL — LOW (ref 150–400)
PMV BLD: 9.3 FL — SIGNIFICANT CHANGE UP (ref 7–13)
POTASSIUM SERPL-MCNC: 3.8 MMOL/L — SIGNIFICANT CHANGE UP (ref 3.5–5.3)
POTASSIUM SERPL-MCNC: 4 MMOL/L — SIGNIFICANT CHANGE UP (ref 3.5–5.3)
POTASSIUM SERPL-SCNC: 3.8 MMOL/L — SIGNIFICANT CHANGE UP (ref 3.5–5.3)
POTASSIUM SERPL-SCNC: 4 MMOL/L — SIGNIFICANT CHANGE UP (ref 3.5–5.3)
RBC # BLD: 4.91 M/UL — SIGNIFICANT CHANGE UP (ref 4.2–5.8)
RBC # FLD: 18.3 % — HIGH (ref 10.3–14.5)
SODIUM SERPL-SCNC: 129 MMOL/L — LOW (ref 135–145)
SODIUM SERPL-SCNC: 130 MMOL/L — LOW (ref 135–145)
WBC # BLD: 5.62 K/UL — SIGNIFICANT CHANGE UP (ref 3.8–10.5)
WBC # FLD AUTO: 5.62 K/UL — SIGNIFICANT CHANGE UP (ref 3.8–10.5)

## 2019-08-14 RX ADMIN — SPIRONOLACTONE 50 MILLIGRAM(S): 25 TABLET, FILM COATED ORAL at 05:56

## 2019-08-14 RX ADMIN — Medication 1: at 22:22

## 2019-08-14 RX ADMIN — Medication 650 MILLIGRAM(S): at 22:22

## 2019-08-14 RX ADMIN — APIXABAN 5 MILLIGRAM(S): 2.5 TABLET, FILM COATED ORAL at 22:24

## 2019-08-14 RX ADMIN — Medication 650 MILLIGRAM(S): at 23:20

## 2019-08-14 RX ADMIN — GABAPENTIN 100 MILLIGRAM(S): 400 CAPSULE ORAL at 22:24

## 2019-08-14 RX ADMIN — Medication 1: at 12:39

## 2019-08-14 RX ADMIN — Medication 1: at 08:55

## 2019-08-14 RX ADMIN — BUMETANIDE 5 MG/HR: 0.25 INJECTION INTRAMUSCULAR; INTRAVENOUS at 22:24

## 2019-08-14 RX ADMIN — TAMSULOSIN HYDROCHLORIDE 0.4 MILLIGRAM(S): 0.4 CAPSULE ORAL at 10:22

## 2019-08-14 RX ADMIN — ATORVASTATIN CALCIUM 20 MILLIGRAM(S): 80 TABLET, FILM COATED ORAL at 22:24

## 2019-08-14 RX ADMIN — APIXABAN 5 MILLIGRAM(S): 2.5 TABLET, FILM COATED ORAL at 10:23

## 2019-08-14 RX ADMIN — Medication 1: at 18:23

## 2019-08-14 NOTE — DIETITIAN INITIAL EVALUATION ADULT. - PHYSICAL APPEARANCE
underweight/other (specify) Nutrition focused physical exam conducted - found signs of malnutrition [ ] absent [X] present Subcutaneous fat loss: [moderate] Buccal fat region, [moderate] Ribs region. Muscle wasting: [ ] Temples region, [moderate] Clavicle region [moderate] Shoulder region,

## 2019-08-14 NOTE — PROGRESS NOTE ADULT - SUBJECTIVE AND OBJECTIVE BOX
Phillip Sorenson MD  Interventional Cardiology / Advance Heart Failure and Cardiac Transplant Specialist  [place] Office : [address] [place] [address] [facility] [address]  Tel:   [place] Office : [address] [facility] [place] [address]  Tel: [phone]  Cell : [phone]    Subjective : Pt lying in bed comfortable, not in distress, denies any chest pain or SOB  	  MEDICATIONS:  apixaban 5 milliGRAM(s) Oral every 12 hours  buMETAnide Infusion 1 mG/Hr IV Continuous <Continuous>  metolazone 2.5 milliGRAM(s) Oral daily  spironolactone 50 milliGRAM(s) Oral daily  tamsulosin 0.4 milliGRAM(s) Oral daily        acetaminophen   Tablet .. 650 milliGRAM(s) Oral every 6 hours PRN  gabapentin 100 milliGRAM(s) Oral at bedtime      atorvastatin 20 milliGRAM(s) Oral at bedtime  dextrose 40% Gel 15 Gram(s) Oral once PRN  dextrose 50% Injectable 12.5 Gram(s) IV Push once  dextrose 50% Injectable 25 Gram(s) IV Push once  dextrose 50% Injectable 25 Gram(s) IV Push once  glucagon  Injectable 1 milliGRAM(s) IntraMuscular once PRN  insulin lispro (HumaLOG) corrective regimen sliding scale   SubCutaneous three times a day before meals  insulin lispro (HumaLOG) corrective regimen sliding scale   SubCutaneous at bedtime        PHYSICAL EXAM:  T(C): 36.2 (08-14-19 @ 05:56), Max: 36.6 (08-13-19 @ 21:58)  HR: 72 (08-14-19 @ 05:56) (69 - 72)  BP: 116/72 (08-14-19 @ 05:56) (100/61 - 116/72)  RR: 16 (08-14-19 @ 05:56) (14 - 16)  SpO2: 100% (08-14-19 @ 05:56) (99% - 100%)  Wt(kg): --  I&O's Summary    13 Aug 2019 07:01  -  14 Aug 2019 07:00  --------------------------------------------------------  IN: 960 mL / OUT: 3700 mL / NET: -2740 mL    14 Aug 2019 07:01  -  14 Aug 2019 12:59  --------------------------------------------------------  IN: 240 mL / OUT: 950 mL / NET: -710 mL          	  HEENT:   Normal oral mucosa, PERRL, EOMI	  Cardiovascular: Normal S1 S2, No JVD, No murmurs, No edema  Respiratory: Lungs clear to auscultation	  Gastrointestinal:  + ascites   Extremities: 2+ edema                                    12.4   5.62  )-----------( 127      ( 14 Aug 2019 04:35 )             39.9     08-14    130<L>  |  89<L>  |  41<H>  ----------------------------<  118<H>  4.0   |  25  |  0.99    Ca    8.9      14 Aug 2019 04:35  Phos  4.2     08-14  Mg     1.9     08-14      proBNP:   Lipid Profile:   HgA1c:   TSH:

## 2019-08-14 NOTE — DIETITIAN INITIAL EVALUATION ADULT. - DIET TYPE
PO diet regular/consistent carbohydrate (evening snack)/Fluid Restriction per MD discretion/supplement (specify)/DASH/TLC (sodium and cholesterol restricted diet)/Glucerna Therapeutic Nutrition 8oz. 1x daily (will provide additional ~220kcals, ~10g protein);

## 2019-08-14 NOTE — PROGRESS NOTE ADULT - SUBJECTIVE AND OBJECTIVE BOX
CARLO, NFN  69y  Male      Patient is a 69y old  Male who presents with a chief complaint of Abdominal distension, leg swelling (14 Aug 2019 11:59)  no sob,no cp,no fever,no abd.pain.comfortable    REVIEW OF SYSTEMS:  as above      INTERVAL HPI/OVERNIGHT EVENTS:  T(C): 36.8 (08-14-19 @ 22:34), Max: 37 (08-14-19 @ 16:54)  HR: 74 (08-14-19 @ 22:34) (70 - 74)  BP: 114/64 (08-14-19 @ 22:34) (98/61 - 116/72)  RR: 15 (08-14-19 @ 22:34) (15 - 18)  SpO2: 98% (08-14-19 @ 22:34) (98% - 100%)  Wt(kg): --  I&O's Summary    13 Aug 2019 07:01  -  14 Aug 2019 07:00  --------------------------------------------------------  IN: 960 mL / OUT: 3700 mL / NET: -2740 mL    14 Aug 2019 07:01  -  14 Aug 2019 23:05  --------------------------------------------------------  IN: 390 mL / OUT: 1200 mL / NET: -810 mL      T(C): 36.8 (08-14-19 @ 22:34), Max: 37 (08-14-19 @ 16:54)  HR: 74 (08-14-19 @ 22:34) (70 - 74)  BP: 114/64 (08-14-19 @ 22:34) (98/61 - 116/72)  RR: 15 (08-14-19 @ 22:34) (15 - 18)  SpO2: 98% (08-14-19 @ 22:34) (98% - 100%)  Wt(kg): --Vital Signs Last 24 Hrs  T(C): 36.8 (14 Aug 2019 22:34), Max: 37 (14 Aug 2019 16:54)  T(F): 98.2 (14 Aug 2019 22:34), Max: 98.6 (14 Aug 2019 16:54)  HR: 74 (14 Aug 2019 22:34) (70 - 74)  BP: 114/64 (14 Aug 2019 22:34) (98/61 - 116/72)  BP(mean): --  RR: 15 (14 Aug 2019 22:34) (15 - 18)  SpO2: 98% (14 Aug 2019 22:34) (98% - 100%)    LABS:                        12.4   5.62  )-----------( 127      ( 14 Aug 2019 04:35 )             39.9     08-14    129<L>  |  86<L>  |  44<H>  ----------------------------<  189<H>  3.8   |  30  |  1.14    Ca    9.1      14 Aug 2019 18:30  Phos  4.4     08-14  Mg     1.8     08-14          CAPILLARY BLOOD GLUCOSE      POCT Blood Glucose.: 271 mg/dL (14 Aug 2019 21:44)  POCT Blood Glucose.: 151 mg/dL (14 Aug 2019 17:30)  POCT Blood Glucose.: 155 mg/dL (14 Aug 2019 12:06)  POCT Blood Glucose.: 159 mg/dL (14 Aug 2019 08:45)            PAST MEDICAL & SURGICAL HISTORY:  CKD (chronic kidney disease)  MR (mitral regurgitation)  AF (atrial fibrillation)  DM (diabetes mellitus)  HLD (hyperlipidemia)  TIA (transient ischemic attack)  HTN (hypertension)  CVA (cerebral infarction)  CHB (complete heart block)  CHF (congestive heart failure)  CAD (coronary artery disease): S/P 4V CABG  S/P mitral valve repair: 2007  S/P CABG (coronary artery bypass graft): 4V CABG in 2007  AICD (automatic cardioverter/defibrillator) present: [employer]      MEDICATIONS  (STANDING):  apixaban 5 milliGRAM(s) Oral every 12 hours  atorvastatin 20 milliGRAM(s) Oral at bedtime  buMETAnide Infusion 1 mG/Hr (5 mL/Hr) IV Continuous <Continuous>  dextrose 50% Injectable 12.5 Gram(s) IV Push once  dextrose 50% Injectable 25 Gram(s) IV Push once  dextrose 50% Injectable 25 Gram(s) IV Push once  gabapentin 100 milliGRAM(s) Oral at bedtime  insulin lispro (HumaLOG) corrective regimen sliding scale   SubCutaneous three times a day before meals  insulin lispro (HumaLOG) corrective regimen sliding scale   SubCutaneous at bedtime  metolazone 2.5 milliGRAM(s) Oral daily  spironolactone 50 milliGRAM(s) Oral daily  tamsulosin 0.4 milliGRAM(s) Oral daily    MEDICATIONS  (PRN):  acetaminophen   Tablet .. 650 milliGRAM(s) Oral every 6 hours PRN Mild Pain (1 - 3), Moderate Pain (4 - 6), Severe Pain (7 - 10)  dextrose 40% Gel 15 Gram(s) Oral once PRN Blood Glucose LESS THAN 70 milliGRAM(s)/deciliter  glucagon  Injectable 1 milliGRAM(s) IntraMuscular once PRN Glucose LESS THAN 70 milligrams/deciliter        RADIOLOGY & ADDITIONAL TESTS:    Imaging Personally Reviewed:  [ ] YES  [ ] NO    Consultant(s) Notes Reviewed:  [ ] YES  [ ] NO    PHYSICAL EXAM:  GENERAL: NAD, well-groomed, well-developed  HEAD:  Atraumatic, Normocephalic  EYES: EOMI, PERRLA, conjunctiva and sclera clear  ENMT: No tonsillar erythema, exudates, or enlargement; Moist mucous membranes, Good dentition, No lesions  NECK: Supple, No JVD, Normal thyroid  NERVOUS SYSTEM:  Alert & Oriented X3, Good concentration; Motor Strength 5/5 B/L upper and lower extremities; DTRs 2+ intact and symmetric  CHEST/LUNG: Clear to percussion bilaterally; No rales, rhonchi, wheezing, or rubs  HEART: Regular rate and rhythm; No murmurs, rubs, or gallops  ABDOMEN: Soft, Nontender, distended; Bowel sounds present  EXTREMITIES:  2+ Peripheral Pulses, No clubbing, cyanosis,  edema 1+  LYMPH: No lymphadenopathy noted  SKIN: No rashes or lesions    Care Discussed with Consultants/Other Providers [ ] YES  [ ] NO      Code Status: [] Full Code [] DNR [] DNI [] Goals of Care:   Disposition: [] ICU [] Stroke Unit [] RCU []PCU []Floor [] Discharge Home         ALANIS MathewsP

## 2019-08-14 NOTE — PROGRESS NOTE ADULT - ATTENDING COMMENTS
Assessment/Rec-  -Acute on chronic systolic CHF-cont iv bumex drip-INCREASE dose..spironolactone added metazolone.ECHO,Cardiology f/u noted.  -A.FIB with mvr-rate control,A/C-Eliquis  -Hyponatremia-likely sec.to chf-fluid restriction,renal f/u noted,monitor  --DM,check A1C,monitor FS with coverage  -Elevared BNP,  -Ascites.u/s.s/p paracentesis-f/u zahrge-apquvhte-bjh.so far.May need repeat therapeutic paracentesis,d/w PA, procedure team,f/u  -d/w family at bedside

## 2019-08-14 NOTE — DIETITIAN INITIAL EVALUATION ADULT. - PERTINENT LABORATORY DATA
(8/14) Hbg 12.4 L,  L, Na 130 L, Cl 89 L, BUN 41 H Glu 118 H;            (8/9) HbA1c 6.5% H;     (8/7) Albumin 3.0 L

## 2019-08-14 NOTE — PROGRESS NOTE ADULT - SUBJECTIVE AND OBJECTIVE BOX
[facility]   MD ULICES MCKEON DO ANGELA WONG, PA    TEL:  OFFICE: [phone]  DR TAYLOR CELL: [phone]  DR. MCFARLAND CELL: [phone]  KATHERINE POWER CELL: [phone]        Patient is a 69y old  Male who presents with a chief complaint of Abdominal distension, leg swelling (13 Aug 2019 22:43)      Patient seen and examined at bedside. No chest pain/sob    VITALS:  T(F): 97.2 (08-14-19 @ 05:56), Max: 97.8 (08-13-19 @ 21:58)  HR: 72 (08-14-19 @ 05:56)  BP: 116/72 (08-14-19 @ 05:56)  RR: 16 (08-14-19 @ 05:56)  SpO2: 100% (08-14-19 @ 05:56)  Wt(kg): --    08-13 @ 07:01  -  08-14 @ 07:00  --------------------------------------------------------  IN: 960 mL / OUT: 3700 mL / NET: -2740 mL    08-14 @ 07:01  -  08-14 @ 11:18  --------------------------------------------------------  IN: 240 mL / OUT: 550 mL / NET: -310 mL          PHYSICAL EXAM:  Constitutional: NAD  Neck: No JVD  Respiratory: CTAB, no wheezes, rales or rhonchi  Cardiovascular: S1, S2, RRR  Gastrointestinal: distended NT  Extremities: 3+ peripheral edema    Hospital Medications:   MEDICATIONS  (STANDING):  apixaban 5 milliGRAM(s) Oral every 12 hours  atorvastatin 20 milliGRAM(s) Oral at bedtime  buMETAnide Infusion 1 mG/Hr (5 mL/Hr) IV Continuous <Continuous>  dextrose 50% Injectable 12.5 Gram(s) IV Push once  dextrose 50% Injectable 25 Gram(s) IV Push once  dextrose 50% Injectable 25 Gram(s) IV Push once  gabapentin 100 milliGRAM(s) Oral at bedtime  insulin lispro (HumaLOG) corrective regimen sliding scale   SubCutaneous three times a day before meals  insulin lispro (HumaLOG) corrective regimen sliding scale   SubCutaneous at bedtime  metolazone 2.5 milliGRAM(s) Oral daily  spironolactone 50 milliGRAM(s) Oral daily  tamsulosin 0.4 milliGRAM(s) Oral daily      LABS:  08-14    130<L>  |  89<L>  |  41<H>  ----------------------------<  118<H>  4.0   |  25  |  0.99    Ca    8.9      14 Aug 2019 04:35  Phos  4.2     08-14  Mg     1.9     08-14      Creatinine Trend: 0.99 <--, 1.04 <--, 1.17 <--, 1.00 <--, 0.90 <--, 1.03 <--, 1.04 <--, 1.06 <--, 1.27 <--    Phosphorus Level, Serum: 4.2 mg/dL (08-14 @ 04:35)                              12.4   5.62  )-----------( 127      ( 14 Aug 2019 04:35 )             39.9     Urine Studies:      .1 (Ca --)      [02-17-19 @ 15:00]   --  Vitamin D (25OH) 12.6      [02-17-19 @ 15:00]  HbA1c 6.5      [08-09-19 @ 06:00]  TSH 2.60      [03-15-19 @ 05:15]  Lipid: chol 126, TG 87, HDL 38, LDL 85      [03-15-19 @ 05:15]        RADIOLOGY & ADDITIONAL STUDIES:

## 2019-08-14 NOTE — DIETITIAN INITIAL EVALUATION ADULT. - OTHER INFO
Pt 68 yo male with Heart Failure. At time of visit Pt appears alert, oriented. Per Pt his appetite not well for past several months. Food preferences discussed with Pt; Pt agreed to try PO supplement: Glucerna Shake. No report of chewing/swallowing difficulty; no report of nausea, vomiting or diarrhea @ this time. Per Pt his height: ~73"; but Pt not Pt 68 yo male with Heart Failure. At time of visit Pt appears alert, oriented. Per Pt his appetite has been not well for past several months. Pt's PO intake now < 50% reported. Food preferences discussed with Pt; Pt agreed to try PO supplement: Glucerna Shake. No report of chewing/swallowing difficulty; no report of nausea, vomiting or diarrhea @ this time. Per Pt his height: ~73"; but Pt not sure about his weight. Pt stated he lost weight in past several months, but unable to quantify. Pt with abdominal distention; S/P paracentesis (8/9) ->> 3.8 liter removed - per chart review. At home Pt's wife prepares food for him with adding minimal amount of salt reported; Pt avoids Regular sugar reported as well. Of note Pt's HbA1c level 6.5% (8/9). RDN offered written materials on Consistent Carbohydrate diet, DASH/TLC (cholesterol and sodium restricted) with Fluid Restriction, but Pt declined. No  other food related concerns voiced. RDN remains available, Pt made aware.

## 2019-08-14 NOTE — PROGRESS NOTE ADULT - ASSESSMENT
70 y/o M with PMH of MR s/p mitral valvuloplasty, Afib (on Coumadin), HLD, HTN, DM, CVA, CAD (s/p 4V CABG), CHF (with EF of 15-20% s/p AICD) presented with the complaint of worsening LE edema, weight gain and abdominal distention. Got admitted with CHF, found to have hyponatremia    Hyponatremia:  Likely sec to fluid overload sec to CHF  Has been stable at 129-130 during this stay.   Free water restriction decrease to 800ml/day  s/p paracentesis ~3.6L removed  on metolazone 2.5mg daily.  Continue diuretics. Monitor Na while on metolazone   Monitor Na level daily    Hypomagnesemia:  likely sec to diuresis  Improved  Monitor Mg level    Hypokalemia  likely sec to diuretics  Serum K improved  monitor serum K for now    CHF:  on bumex gtt  and metolazone   Monitor K, Mg level  Follow up cardiology  Daily weight s

## 2019-08-14 NOTE — PROVIDER CONTACT NOTE (OTHER) - BACKGROUND
(Admit Diagnosis) Heart failure  (PMH) CKD (chronic kidney disease)  (PMH) MR (mitral regurgitation)  (PMH) TIA (transient ischemic attack)  (PMH) AF (atrial fibrillation)

## 2019-08-14 NOTE — DIETITIAN INITIAL EVALUATION ADULT. - EST PROTEIN NEEDS6
TRIAGE NOTE: Patient arrived from home with c/o LEFT eye swelling that started today. Seen here yesterday for skin infection on forehead. 95.76

## 2019-08-15 LAB
ANION GAP SERPL CALC-SCNC: 15 MMO/L — HIGH (ref 7–14)
BACTERIA FLD CULT: SIGNIFICANT CHANGE UP
BUN SERPL-MCNC: 47 MG/DL — HIGH (ref 7–23)
CALCIUM SERPL-MCNC: 9.1 MG/DL — SIGNIFICANT CHANGE UP (ref 8.4–10.5)
CHLORIDE SERPL-SCNC: 87 MMOL/L — LOW (ref 98–107)
CO2 SERPL-SCNC: 26 MMOL/L — SIGNIFICANT CHANGE UP (ref 22–31)
CREAT SERPL-MCNC: 1.08 MG/DL — SIGNIFICANT CHANGE UP (ref 0.5–1.3)
GLUCOSE SERPL-MCNC: 140 MG/DL — HIGH (ref 70–99)
HCT VFR BLD CALC: 38.3 % — LOW (ref 39–50)
HGB BLD-MCNC: 12.1 G/DL — LOW (ref 13–17)
MAGNESIUM SERPL-MCNC: 1.9 MG/DL — SIGNIFICANT CHANGE UP (ref 1.6–2.6)
MCHC RBC-ENTMCNC: 25.3 PG — LOW (ref 27–34)
MCHC RBC-ENTMCNC: 31.6 % — LOW (ref 32–36)
MCV RBC AUTO: 80.1 FL — SIGNIFICANT CHANGE UP (ref 80–100)
NRBC # FLD: 0 K/UL — SIGNIFICANT CHANGE UP (ref 0–0)
PLATELET # BLD AUTO: 120 K/UL — LOW (ref 150–400)
PMV BLD: 9 FL — SIGNIFICANT CHANGE UP (ref 7–13)
POTASSIUM SERPL-MCNC: 4.3 MMOL/L — SIGNIFICANT CHANGE UP (ref 3.5–5.3)
POTASSIUM SERPL-SCNC: 4.3 MMOL/L — SIGNIFICANT CHANGE UP (ref 3.5–5.3)
RBC # BLD: 4.78 M/UL — SIGNIFICANT CHANGE UP (ref 4.2–5.8)
RBC # FLD: 18.3 % — HIGH (ref 10.3–14.5)
SODIUM SERPL-SCNC: 128 MMOL/L — LOW (ref 135–145)
WBC # BLD: 6.02 K/UL — SIGNIFICANT CHANGE UP (ref 3.8–10.5)
WBC # FLD AUTO: 6.02 K/UL — SIGNIFICANT CHANGE UP (ref 3.8–10.5)

## 2019-08-15 PROCEDURE — 93971 EXTREMITY STUDY: CPT | Mod: 26

## 2019-08-15 RX ADMIN — Medication 2: at 12:48

## 2019-08-15 RX ADMIN — SPIRONOLACTONE 50 MILLIGRAM(S): 25 TABLET, FILM COATED ORAL at 05:58

## 2019-08-15 RX ADMIN — GABAPENTIN 100 MILLIGRAM(S): 400 CAPSULE ORAL at 21:31

## 2019-08-15 RX ADMIN — APIXABAN 5 MILLIGRAM(S): 2.5 TABLET, FILM COATED ORAL at 21:31

## 2019-08-15 RX ADMIN — ATORVASTATIN CALCIUM 20 MILLIGRAM(S): 80 TABLET, FILM COATED ORAL at 21:31

## 2019-08-15 RX ADMIN — TAMSULOSIN HYDROCHLORIDE 0.4 MILLIGRAM(S): 0.4 CAPSULE ORAL at 08:48

## 2019-08-15 RX ADMIN — APIXABAN 5 MILLIGRAM(S): 2.5 TABLET, FILM COATED ORAL at 08:48

## 2019-08-15 NOTE — PROGRESS NOTE ADULT - SUBJECTIVE AND OBJECTIVE BOX
CARLO, NFN  69y  Male      Patient is a 69y old  Male who presents with a chief complaint of Abdominal distension, leg swelling (15 Aug 2019 11:50)  feels better,no sob,no cp,no abs.pain,no fever    REVIEW OF SYSTEMS:  as above    INTERVAL HPI/OVERNIGHT EVENTS:  T(C): 36.7 (08-15-19 @ 16:05), Max: 36.8 (08-14-19 @ 22:34)  HR: 67 (08-15-19 @ 16:05) (67 - 74)  BP: 110/70 (08-15-19 @ 16:05) (108/70 - 119/69)  RR: 18 (08-15-19 @ 16:05) (15 - 18)  SpO2: 98% (08-15-19 @ 16:05) (97% - 100%)  Wt(kg): --  I&O's Summary    14 Aug 2019 07:01  -  15 Aug 2019 07:00  --------------------------------------------------------  IN: 390 mL / OUT: 2750 mL / NET: -2360 mL    15 Aug 2019 07:01  -  15 Aug 2019 20:36  --------------------------------------------------------  IN: 160 mL / OUT: 2400 mL / NET: -2240 mL      T(C): 36.7 (08-15-19 @ 16:05), Max: 36.8 (08-14-19 @ 22:34)  HR: 67 (08-15-19 @ 16:05) (67 - 74)  BP: 110/70 (08-15-19 @ 16:05) (108/70 - 119/69)  RR: 18 (08-15-19 @ 16:05) (15 - 18)  SpO2: 98% (08-15-19 @ 16:05) (97% - 100%)  Wt(kg): --Vital Signs Last 24 Hrs  T(C): 36.7 (15 Aug 2019 16:05), Max: 36.8 (14 Aug 2019 22:34)  T(F): 98 (15 Aug 2019 16:05), Max: 98.2 (14 Aug 2019 22:34)  HR: 67 (15 Aug 2019 16:05) (67 - 74)  BP: 110/70 (15 Aug 2019 16:05) (108/70 - 119/69)  BP(mean): --  RR: 18 (15 Aug 2019 16:05) (15 - 18)  SpO2: 98% (15 Aug 2019 16:05) (97% - 100%)    LABS:                        12.1   6.02  )-----------( 120      ( 15 Aug 2019 07:00 )             38.3     08-15    128<L>  |  87<L>  |  47<H>  ----------------------------<  140<H>  4.3   |  26  |  1.08    Ca    9.1      15 Aug 2019 07:00  Phos  4.4     08-14  Mg     1.9     08-15          CAPILLARY BLOOD GLUCOSE      POCT Blood Glucose.: 146 mg/dL (15 Aug 2019 17:36)  POCT Blood Glucose.: 233 mg/dL (15 Aug 2019 12:08)  POCT Blood Glucose.: 136 mg/dL (15 Aug 2019 08:36)  POCT Blood Glucose.: 271 mg/dL (14 Aug 2019 21:44)            PAST MEDICAL & SURGICAL HISTORY:  CKD (chronic kidney disease)  MR (mitral regurgitation)  AF (atrial fibrillation)  DM (diabetes mellitus)  HLD (hyperlipidemia)  TIA (transient ischemic attack)  HTN (hypertension)  CVA (cerebral infarction)  CHB (complete heart block)  CHF (congestive heart failure)  CAD (coronary artery disease): S/P 4V CABG  S/P mitral valve repair: 2007  S/P CABG (coronary artery bypass graft): 4V CABG in 2007  AICD (automatic cardioverter/defibrillator) present: Level      MEDICATIONS  (STANDING):  apixaban 5 milliGRAM(s) Oral every 12 hours  atorvastatin 20 milliGRAM(s) Oral at bedtime  buMETAnide Infusion 1 mG/Hr (5 mL/Hr) IV Continuous <Continuous>  dextrose 50% Injectable 12.5 Gram(s) IV Push once  dextrose 50% Injectable 25 Gram(s) IV Push once  dextrose 50% Injectable 25 Gram(s) IV Push once  gabapentin 100 milliGRAM(s) Oral at bedtime  insulin lispro (HumaLOG) corrective regimen sliding scale   SubCutaneous three times a day before meals  insulin lispro (HumaLOG) corrective regimen sliding scale   SubCutaneous at bedtime  metolazone 2.5 milliGRAM(s) Oral daily  spironolactone 50 milliGRAM(s) Oral daily  tamsulosin 0.4 milliGRAM(s) Oral daily    MEDICATIONS  (PRN):  acetaminophen   Tablet .. 650 milliGRAM(s) Oral every 6 hours PRN Mild Pain (1 - 3), Moderate Pain (4 - 6), Severe Pain (7 - 10)  dextrose 40% Gel 15 Gram(s) Oral once PRN Blood Glucose LESS THAN 70 milliGRAM(s)/deciliter  glucagon  Injectable 1 milliGRAM(s) IntraMuscular once PRN Glucose LESS THAN 70 milligrams/deciliter        RADIOLOGY & ADDITIONAL TESTS:    Imaging Personally Reviewed:  [ ] YES  [ ] NO    Consultant(s) Notes Reviewed:  [x ] YES  [ ] NO    PHYSICAL EXAM:  GENERAL: NAD, well-groomed, well-developed  HEAD:  Atraumatic, Normocephalic  EYES: EOMI, PERRLA, conjunctiva and sclera clear  ENMT: No tonsillar erythema, exudates, or enlargement; Moist mucous membranes, Good dentition, No lesions  NECK: Supple, No JVD, Normal thyroid  NERVOUS SYSTEM:  Alert & Oriented X3, Good concentration; Motor Strength 5/5 B/L upper and lower extremities; DTRs 2+ intact and symmetric  CHEST/LUNG: Clear to percussion bilaterally; No rales, rhonchi, wheezing, or rubs  HEART: Regular rate and rhythm; No murmurs, rubs, or gallops  ABDOMEN: Soft, Nontender, distended; Bowel sounds present  EXTREMITIES:  2+ Peripheral Pulses, No clubbing, cyanosis, or edema  LYMPH: No lymphadenopathy noted  SKIN: No rashes or lesions    Care Discussed with Consultants/Other Providers [ x] YES  [ ] NO      Code Status: [] Full Code [] DNR [] DNI [] Goals of Care:   Disposition: [] ICU [] Stroke Unit [] RCU []PCU []Floor [] Discharge Home         ALANIS MathewsP

## 2019-08-15 NOTE — PROGRESS NOTE ADULT - SUBJECTIVE AND OBJECTIVE BOX
Phillip Soresnon MD  Interventional Cardiology / Advance Heart Failure and Cardiac Transplant Specialist  [place] Office : [address] [place] [address] [address]. [address]  Tel:   [place] Office : [address] [facility] [place] [address]  Tel: [phone]  Cell : [phone]    Subjective : Pt lying in bed comfortable, not in distress, denies any chest pain or SOB  	  MEDICATIONS:  apixaban 5 milliGRAM(s) Oral every 12 hours  buMETAnide Infusion 1 mG/Hr IV Continuous <Continuous>  metolazone 2.5 milliGRAM(s) Oral daily  spironolactone 50 milliGRAM(s) Oral daily  tamsulosin 0.4 milliGRAM(s) Oral daily  acetaminophen   Tablet .. 650 milliGRAM(s) Oral every 6 hours PRN  gabapentin 100 milliGRAM(s) Oral at bedtime  atorvastatin 20 milliGRAM(s) Oral at bedtime  dextrose 40% Gel 15 Gram(s) Oral once PRN  dextrose 50% Injectable 12.5 Gram(s) IV Push once  dextrose 50% Injectable 25 Gram(s) IV Push once  dextrose 50% Injectable 25 Gram(s) IV Push once  glucagon  Injectable 1 milliGRAM(s) IntraMuscular once PRN  insulin lispro (HumaLOG) corrective regimen sliding scale   SubCutaneous three times a day before meals  insulin lispro (HumaLOG) corrective regimen sliding scale   SubCutaneous at bedtime        PHYSICAL EXAM:  T(C): 36.4 (08-15-19 @ 05:57), Max: 37 (08-14-19 @ 16:54)  HR: 71 (08-15-19 @ 05:57) (70 - 74)  BP: 108/70 (08-15-19 @ 05:57) (98/61 - 114/64)  RR: 16 (08-15-19 @ 05:57) (15 - 18)  SpO2: 97% (08-15-19 @ 05:57) (97% - 100%)  Wt(kg): --  I&O's Summary    14 Aug 2019 07:01  -  15 Aug 2019 07:00  --------------------------------------------------------  IN: 390 mL / OUT: 2750 mL / NET: -2360 mL    15 Aug 2019 07:01  -  15 Aug 2019 12:56  --------------------------------------------------------  IN: 50 mL / OUT: 700 mL / NET: -650 mL            HEENT:   Normal oral mucosa, PERRL, EOMI	  Cardiovascular: Normal S1 S2, No JVD, No murmurs, No edema  Respiratory: Lungs clear to auscultation	  Gastrointestinal:  Soft, Non-tender, + BS	  Extremities: 2+ edema                                    12.1   6.02  )-----------( 120      ( 15 Aug 2019 07:00 )             38.3     08-15    128<L>  |  87<L>  |  47<H>  ----------------------------<  140<H>  4.3   |  26  |  1.08    Ca    9.1      15 Aug 2019 07:00  Phos  4.4     08-14  Mg     1.9     08-15      proBNP:   Lipid Profile:   HgA1c:   TSH:

## 2019-08-15 NOTE — PROGRESS NOTE ADULT - SUBJECTIVE AND OBJECTIVE BOX
[facility]   MD ULICES MCKEON DO ANGELA WONG, PA    TEL:  OFFICE: [phone]  DR TAYLOR CELL: [phone]  DR. MCFARLAND CELL: [phone]  KATHERINE POWER CELL: [phone]        Patient is a 69y old  Male who presents with a chief complaint of Abdominal distension, leg swelling (14 Aug 2019 23:05)      Patient seen and examined at bedside. No chest pain/sob    VITALS:  T(F): 97.5 (08-15-19 @ 05:57), Max: 98.6 (08-14-19 @ 16:54)  HR: 71 (08-15-19 @ 05:57)  BP: 108/70 (08-15-19 @ 05:57)  RR: 16 (08-15-19 @ 05:57)  SpO2: 97% (08-15-19 @ 05:57)  Wt(kg): --    08-14 @ 07:01  -  08-15 @ 07:00  --------------------------------------------------------  IN: 390 mL / OUT: 2750 mL / NET: -2360 mL    08-15 @ 07:01  -  08-15 @ 11:50  --------------------------------------------------------  IN: 50 mL / OUT: 700 mL / NET: -650 mL          PHYSICAL EXAM:  Constitutional: NAD  Neck: No JVD  Respiratory: CTAB, no wheezes, rales or rhonchi  Cardiovascular: S1, S2, RRR  Gastrointestinal: distended, NT  Extremities: 3+ peripheral edema    Hospital Medications:   MEDICATIONS  (STANDING):  apixaban 5 milliGRAM(s) Oral every 12 hours  atorvastatin 20 milliGRAM(s) Oral at bedtime  buMETAnide Infusion 1 mG/Hr (5 mL/Hr) IV Continuous <Continuous>  dextrose 50% Injectable 12.5 Gram(s) IV Push once  dextrose 50% Injectable 25 Gram(s) IV Push once  dextrose 50% Injectable 25 Gram(s) IV Push once  gabapentin 100 milliGRAM(s) Oral at bedtime  insulin lispro (HumaLOG) corrective regimen sliding scale   SubCutaneous three times a day before meals  insulin lispro (HumaLOG) corrective regimen sliding scale   SubCutaneous at bedtime  metolazone 2.5 milliGRAM(s) Oral daily  spironolactone 50 milliGRAM(s) Oral daily  tamsulosin 0.4 milliGRAM(s) Oral daily      LABS:  08-15    128<L>  |  87<L>  |  47<H>  ----------------------------<  140<H>  4.3   |  26  |  1.08    Ca    9.1      15 Aug 2019 07:00  Phos  4.4     08-14  Mg     1.9     08-15      Creatinine Trend: 1.08 <--, 1.14 <--, 0.99 <--, 1.04 <--, 1.17 <--, 1.00 <--, 0.90 <--, 1.03 <--, 1.04 <--    Phosphorus Level, Serum: 4.4 mg/dL (08-14 @ 18:30)                              12.1   6.02  )-----------( 120      ( 15 Aug 2019 07:00 )             38.3     Urine Studies:      .1 (Ca --)      [02-17-19 @ 15:00]   --  Vitamin D (25OH) 12.6      [02-17-19 @ 15:00]  HbA1c 6.5      [08-09-19 @ 06:00]  TSH 2.60      [03-15-19 @ 05:15]  Lipid: chol 126, TG 87, HDL 38, LDL 85      [03-15-19 @ 05:15]        RADIOLOGY & ADDITIONAL STUDIES:

## 2019-08-15 NOTE — PROGRESS NOTE ADULT - ASSESSMENT
68 y/o M with PMH of MR s/p mitral valvuloplasty, Afib (on Coumadin), HLD, HTN, DM, CVA, CAD (s/p 4V CABG), CHF (with EF of 15-20% s/p AICD) presented with the complaint of worsening LE edema, weight gain and abdominal distention. Got admitted with CHF, found to have hyponatremia    Hyponatremia:  Likely sec to fluid overload sec to CHF  Has been stable at 129-130 during this stay.   Free water restriction decrease to 800ml/day  s/p paracentesis ~3.6L removed  on metolazone 2.5mg daily.  Continue diuretics. Monitor Na while on metolazone   Monitor Na level daily    Hypomagnesemia:  likely sec to diuresis  Improved  Monitor Mg level    Hypokalemia  likely sec to diuretics  Serum K improved  monitor serum K for now    CHF:  on bumex gtt  and metolazone   Monitor K, Mg level  Follow up cardiology  Daily weight s

## 2019-08-16 LAB
ANION GAP SERPL CALC-SCNC: 14 MMO/L — SIGNIFICANT CHANGE UP (ref 7–14)
BUN SERPL-MCNC: 52 MG/DL — HIGH (ref 7–23)
CALCIUM SERPL-MCNC: 9.4 MG/DL — SIGNIFICANT CHANGE UP (ref 8.4–10.5)
CHLORIDE SERPL-SCNC: 86 MMOL/L — LOW (ref 98–107)
CO2 SERPL-SCNC: 28 MMOL/L — SIGNIFICANT CHANGE UP (ref 22–31)
CREAT SERPL-MCNC: 1.2 MG/DL — SIGNIFICANT CHANGE UP (ref 0.5–1.3)
GLUCOSE SERPL-MCNC: 132 MG/DL — HIGH (ref 70–99)
HCT VFR BLD CALC: 36.6 % — LOW (ref 39–50)
HGB BLD-MCNC: 11.5 G/DL — LOW (ref 13–17)
MAGNESIUM SERPL-MCNC: 1.8 MG/DL — SIGNIFICANT CHANGE UP (ref 1.6–2.6)
MCHC RBC-ENTMCNC: 25.2 PG — LOW (ref 27–34)
MCHC RBC-ENTMCNC: 31.4 % — LOW (ref 32–36)
MCV RBC AUTO: 80.3 FL — SIGNIFICANT CHANGE UP (ref 80–100)
NRBC # FLD: 0 K/UL — SIGNIFICANT CHANGE UP (ref 0–0)
PHOSPHATE SERPL-MCNC: 4.9 MG/DL — HIGH (ref 2.5–4.5)
PLATELET # BLD AUTO: 123 K/UL — LOW (ref 150–400)
PMV BLD: 8.7 FL — SIGNIFICANT CHANGE UP (ref 7–13)
POTASSIUM SERPL-MCNC: 4 MMOL/L — SIGNIFICANT CHANGE UP (ref 3.5–5.3)
POTASSIUM SERPL-SCNC: 4 MMOL/L — SIGNIFICANT CHANGE UP (ref 3.5–5.3)
RBC # BLD: 4.56 M/UL — SIGNIFICANT CHANGE UP (ref 4.2–5.8)
RBC # FLD: 18.1 % — HIGH (ref 10.3–14.5)
SODIUM SERPL-SCNC: 128 MMOL/L — LOW (ref 135–145)
WBC # BLD: 5.57 K/UL — SIGNIFICANT CHANGE UP (ref 3.8–10.5)
WBC # FLD AUTO: 5.57 K/UL — SIGNIFICANT CHANGE UP (ref 3.8–10.5)

## 2019-08-16 RX ADMIN — APIXABAN 5 MILLIGRAM(S): 2.5 TABLET, FILM COATED ORAL at 20:29

## 2019-08-16 RX ADMIN — BUMETANIDE 5 MG/HR: 0.25 INJECTION INTRAMUSCULAR; INTRAVENOUS at 20:30

## 2019-08-16 RX ADMIN — GABAPENTIN 100 MILLIGRAM(S): 400 CAPSULE ORAL at 20:29

## 2019-08-16 RX ADMIN — APIXABAN 5 MILLIGRAM(S): 2.5 TABLET, FILM COATED ORAL at 08:58

## 2019-08-16 RX ADMIN — SPIRONOLACTONE 50 MILLIGRAM(S): 25 TABLET, FILM COATED ORAL at 08:58

## 2019-08-16 RX ADMIN — Medication 2: at 17:51

## 2019-08-16 RX ADMIN — Medication 1: at 08:58

## 2019-08-16 RX ADMIN — TAMSULOSIN HYDROCHLORIDE 0.4 MILLIGRAM(S): 0.4 CAPSULE ORAL at 08:58

## 2019-08-16 RX ADMIN — Medication 3: at 12:30

## 2019-08-16 RX ADMIN — ATORVASTATIN CALCIUM 20 MILLIGRAM(S): 80 TABLET, FILM COATED ORAL at 20:29

## 2019-08-16 NOTE — PROGRESS NOTE ADULT - ATTENDING COMMENTS
Assessment/Rec-  -Acute on chronic systolic CHF-cont iv bumex drip-..spironolactone added metazolone.ECHO,Cardiology f/u noted.  -A.FIB with mvr-rate control,A/C-Eliquis  -Hyponatremia-likely sec.to chf-fluid restriction,renal f/u noted,monitor-128  --DM,check A1C,monitor FS with coverage  -Elevared BNP,  -Ascites.u/s.s/p paracentesis-f/u [id number]-qmi.so far.  -d/w family at bedside

## 2019-08-16 NOTE — PROGRESS NOTE ADULT - SUBJECTIVE AND OBJECTIVE BOX
CARLO, NFN  69y  Male      Patient is a 69y old  Male who presents with a chief complaint of Abdominal distension, leg swelling (16 Aug 2019 13:39)  no cp,no sob,no abd.pain,no fever    REVIEW OF SYSTEMS:  CONSTITUTIONAL: No fever, weight loss, or fatigue  EYES: No eye pain, visual disturbances, or discharge  ENMT:  No difficulty hearing, tinnitus, vertigo; No sinus or throat pain  NECK: No pain or stiffness  RESPIRATORY: No cough, wheezing, chills or hemoptysis; No shortness of breath  CARDIOVASCULAR: No chest pain, palpitations, dizziness, or leg swelling  GASTROINTESTINAL: distended.,nt,positive bowel sounds.  GENITOURINARY: No dysuria, frequency, hematuria, or incontinence  NEUROLOGICAL: No headaches, memory loss, loss of strength, numbness, or tremors  SKIN: No itching, burning, rashes, or lesions   ENDOCRINE: No heat or cold intolerance; No hair loss  MUSCULOSKELETAL: No joint pain or swelling; No muscle, back, or extremity pain  PSYCHIATRIC: No depression, anxiety, mood swings, or difficulty sleeping  HEME/LYMPH: No easy bruising, or bleeding gums        INTERVAL HPI/OVERNIGHT EVENTS:  T(C): 36.7 (08-16-19 @ 17:42), Max: 37.1 (08-16-19 @ 13:46)  HR: 70 (08-16-19 @ 17:42) (70 - 91)  BP: 110/60 (08-16-19 @ 17:42) (99/67 - 119/80)  RR: 18 (08-16-19 @ 17:42) (16 - 18)  SpO2: 98% (08-16-19 @ 17:42) (98% - 100%)  Wt(kg): --  I&O's Summary    15 Aug 2019 07:01  -  16 Aug 2019 07:00  --------------------------------------------------------  IN: 360 mL / OUT: 3550 mL / NET: -3190 mL    16 Aug 2019 07:01  -  16 Aug 2019 22:37  --------------------------------------------------------  IN: 410 mL / OUT: 1750 mL / NET: -1340 mL      T(C): 36.7 (08-16-19 @ 17:42), Max: 37.1 (08-16-19 @ 13:46)  HR: 70 (08-16-19 @ 17:42) (70 - 91)  BP: 110/60 (08-16-19 @ 17:42) (99/67 - 119/80)  RR: 18 (08-16-19 @ 17:42) (16 - 18)  SpO2: 98% (08-16-19 @ 17:42) (98% - 100%)  Wt(kg): --Vital Signs Last 24 Hrs  T(C): 36.7 (16 Aug 2019 17:42), Max: 37.1 (16 Aug 2019 13:46)  T(F): 98 (16 Aug 2019 17:42), Max: 98.7 (16 Aug 2019 13:46)  HR: 70 (16 Aug 2019 17:42) (70 - 91)  BP: 110/60 (16 Aug 2019 17:42) (99/67 - 119/80)  BP(mean): --  RR: 18 (16 Aug 2019 17:42) (16 - 18)  SpO2: 98% (16 Aug 2019 17:42) (98% - 100%)    LABS:                        11.5   5.57  )-----------( 123      ( 16 Aug 2019 05:25 )             36.6     08-16    128<L>  |  86<L>  |  52<H>  ----------------------------<  132<H>  4.0   |  28  |  1.20    Ca    9.4      16 Aug 2019 05:25  Phos  4.9     08-16  Mg     1.8     08-16          CAPILLARY BLOOD GLUCOSE      POCT Blood Glucose.: 192 mg/dL (16 Aug 2019 21:20)  POCT Blood Glucose.: 211 mg/dL (16 Aug 2019 17:12)  POCT Blood Glucose.: 262 mg/dL (16 Aug 2019 12:08)  POCT Blood Glucose.: 167 mg/dL (16 Aug 2019 08:52)            PAST MEDICAL & SURGICAL HISTORY:  CKD (chronic kidney disease)  MR (mitral regurgitation)  AF (atrial fibrillation)  DM (diabetes mellitus)  HLD (hyperlipidemia)  TIA (transient ischemic attack)  HTN (hypertension)  CVA (cerebral infarction)  CHB (complete heart block)  CHF (congestive heart failure)  CAD (coronary artery disease): S/P 4V CABG  S/P mitral valve repair: 2007  S/P CABG (coronary artery bypass graft): 4V CABG in 2007  AICD (automatic cardioverter/defibrillator) present: [facility]      MEDICATIONS  (STANDING):  apixaban 5 milliGRAM(s) Oral every 12 hours  atorvastatin 20 milliGRAM(s) Oral at bedtime  buMETAnide Infusion 1 mG/Hr (5 mL/Hr) IV Continuous <Continuous>  dextrose 50% Injectable 12.5 Gram(s) IV Push once  dextrose 50% Injectable 25 Gram(s) IV Push once  dextrose 50% Injectable 25 Gram(s) IV Push once  gabapentin 100 milliGRAM(s) Oral at bedtime  insulin lispro (HumaLOG) corrective regimen sliding scale   SubCutaneous three times a day before meals  insulin lispro (HumaLOG) corrective regimen sliding scale   SubCutaneous at bedtime  metolazone 2.5 milliGRAM(s) Oral daily  spironolactone 50 milliGRAM(s) Oral daily  tamsulosin 0.4 milliGRAM(s) Oral daily    MEDICATIONS  (PRN):  acetaminophen   Tablet .. 650 milliGRAM(s) Oral every 6 hours PRN Mild Pain (1 - 3), Moderate Pain (4 - 6), Severe Pain (7 - 10)  dextrose 40% Gel 15 Gram(s) Oral once PRN Blood Glucose LESS THAN 70 milliGRAM(s)/deciliter  glucagon  Injectable 1 milliGRAM(s) IntraMuscular once PRN Glucose LESS THAN 70 milligrams/deciliter        RADIOLOGY & ADDITIONAL TESTS:    Imaging Personally Reviewed:  [ ] YES  [ ] NO    Consultant(s) Notes Reviewed:  [ x] YES  [ ] NO    PHYSICAL EXAM:  GENERAL: NAD, well-groomed, well-developed  HEAD:  Atraumatic, Normocephalic  EYES: EOMI, PERRLA, conjunctiva and sclera clear  ENMT: No tonsillar erythema, exudates, or enlargement; Moist mucous membranes, Good dentition, No lesions  NECK: Supple, No JVD, Normal thyroid  NERVOUS SYSTEM:  Alert & Oriented X3, Good concentration; Motor Strength 5/5 B/L upper and lower extremities; DTRs 2+ intact and symmetric  CHEST/LUNG: Clear to percussion bilaterally; No rales, rhonchi, wheezing, or rubs  HEART: Regular rate and rhythm; No murmurs, rubs, or gallops  ABDOMEN: Soft, Nontender, Nondistended; Bowel sounds present  EXTREMITIES:  2+ Peripheral Pulses, No clubbing, cyanosis, or edema  LYMPH: No lymphadenopathy noted  SKIN: No rashes or lesions    Care Discussed with Consultants/Other Providers [ ] YES  [ ] NO      Code Status: [] Full Code [] DNR [] DNI [] Goals of Care:   Disposition: [] ICU [] Stroke Unit [] RCU []PCU []Floor [] Discharge Home         EPI [email or web address]

## 2019-08-16 NOTE — PROGRESS NOTE ADULT - ASSESSMENT
Echo 2/19 - severe LV and RV dysfunction    a/p     1) Acute on chronic CHF - cont metolazone,  bumex to 1mg /hr , cont aldactone, hold bp meds, s/p paracentesis 3.8 liters out , total 15 liters negative     2) Afib -  eliquis    3) Hyponatremia - f/u nephro likely sec to CHF

## 2019-08-16 NOTE — PROGRESS NOTE ADULT - SUBJECTIVE AND OBJECTIVE BOX
Phillip Sorenson MD  Interventional Cardiology / Advance Heart Failure and Cardiac Transplant Specialist  [place] Office : [address] [place] [address] [facility] [address]  Tel:   [place] Office : [address] [facility] [place] [address]  Tel: [phone]  Cell : [phone]    Subjective : Pt lying in bed comfortable, not in distress, denies any chest pain or SOB  	  MEDICATIONS:  apixaban 5 milliGRAM(s) Oral every 12 hours  buMETAnide Infusion 1 mG/Hr IV Continuous <Continuous>  metolazone 2.5 milliGRAM(s) Oral daily  spironolactone 50 milliGRAM(s) Oral daily  tamsulosin 0.4 milliGRAM(s) Oral daily        acetaminophen   Tablet .. 650 milliGRAM(s) Oral every 6 hours PRN  gabapentin 100 milliGRAM(s) Oral at bedtime      atorvastatin 20 milliGRAM(s) Oral at bedtime  dextrose 40% Gel 15 Gram(s) Oral once PRN  dextrose 50% Injectable 12.5 Gram(s) IV Push once  dextrose 50% Injectable 25 Gram(s) IV Push once  dextrose 50% Injectable 25 Gram(s) IV Push once  glucagon  Injectable 1 milliGRAM(s) IntraMuscular once PRN  insulin lispro (HumaLOG) corrective regimen sliding scale   SubCutaneous three times a day before meals  insulin lispro (HumaLOG) corrective regimen sliding scale   SubCutaneous at bedtime        PHYSICAL EXAM:  T(C): 37.1 (08-16-19 @ 13:46), Max: 37.1 (08-16-19 @ 13:46)  HR: 71 (08-16-19 @ 13:46) (67 - 91)  BP: 119/75 (08-16-19 @ 13:46) (99/67 - 119/80)  RR: 18 (08-16-19 @ 13:46) (16 - 18)  SpO2: 100% (08-16-19 @ 13:46) (98% - 100%)  Wt(kg): --  I&O's Summary    15 Aug 2019 07:01  -  16 Aug 2019 07:00  --------------------------------------------------------  IN: 360 mL / OUT: 3550 mL / NET: -3190 mL    16 Aug 2019 07:01  -  16 Aug 2019 14:58  --------------------------------------------------------  IN: 295 mL / OUT: 1050 mL / NET: -755 mL          HEENT:   Normal oral mucosa, PERRL, EOMI	  Cardiovascular: Normal S1 S2, No JVD, No murmurs, No edema  Respiratory: Lungs clear to auscultation	  Gastrointestinal:  Soft, Non-tender, + BS	  Extremities: 2+ edema                                  11.5   5.57  )-----------( 123      ( 16 Aug 2019 05:25 )             36.6     08-16    128<L>  |  86<L>  |  52<H>  ----------------------------<  132<H>  4.0   |  28  |  1.20    Ca    9.4      16 Aug 2019 05:25  Phos  4.9     08-16  Mg     1.8     08-16      proBNP:   Lipid Profile:   HgA1c:   TSH:

## 2019-08-16 NOTE — PROGRESS NOTE ADULT - SUBJECTIVE AND OBJECTIVE BOX
[facility]   MD ULICES MCKEON DO ANGELA WONG, PA    TEL:  OFFICE: [phone]  DR TAYLOR CELL: [phone]  DR. MCFARLAND CELL: [phone]  KATHERINE POWER CELL: [phone]        Patient is a 69y old  Male who presents with a chief complaint of Abdominal distension, leg swelling (15 Aug 2019 20:35)      Patient seen and examined at bedside. No chest pain/sob    VITALS:  T(F): 98 (08-16-19 @ 08:54), Max: 98 (08-15-19 @ 16:05)  HR: 89 (08-16-19 @ 08:54)  BP: 119/80 (08-16-19 @ 08:54)  RR: 16 (08-16-19 @ 08:54)  SpO2: 100% (08-16-19 @ 08:54)  Wt(kg): --    08-15 @ 07:01  -  08-16 @ 07:00  --------------------------------------------------------  IN: 360 mL / OUT: 3550 mL / NET: -3190 mL    08-16 @ 07:01  -  08-16 @ 13:40  --------------------------------------------------------  IN: 120 mL / OUT: 800 mL / NET: -680 mL          PHYSICAL EXAM:  Constitutional: NAD  Neck: No JVD  Respiratory: CTAB, no wheezes, rales or rhonchi  Cardiovascular: S1, S2, RRR  Gastrointestinal: distended, NT  Extremities: 3+ peripheral edema    Hospital Medications:   MEDICATIONS  (STANDING):  apixaban 5 milliGRAM(s) Oral every 12 hours  atorvastatin 20 milliGRAM(s) Oral at bedtime  buMETAnide Infusion 1 mG/Hr (5 mL/Hr) IV Continuous <Continuous>  dextrose 50% Injectable 12.5 Gram(s) IV Push once  dextrose 50% Injectable 25 Gram(s) IV Push once  dextrose 50% Injectable 25 Gram(s) IV Push once  gabapentin 100 milliGRAM(s) Oral at bedtime  insulin lispro (HumaLOG) corrective regimen sliding scale   SubCutaneous three times a day before meals  insulin lispro (HumaLOG) corrective regimen sliding scale   SubCutaneous at bedtime  metolazone 2.5 milliGRAM(s) Oral daily  spironolactone 50 milliGRAM(s) Oral daily  tamsulosin 0.4 milliGRAM(s) Oral daily      LABS:  08-16    128<L>  |  86<L>  |  52<H>  ----------------------------<  132<H>  4.0   |  28  |  1.20    Ca    9.4      16 Aug 2019 05:25  Phos  4.9     08-16  Mg     1.8     08-16      Creatinine Trend: 1.20 <--, 1.08 <--, 1.14 <--, 0.99 <--, 1.04 <--, 1.17 <--, 1.00 <--, 0.90 <--, 1.03 <--    Phosphorus Level, Serum: 4.9 mg/dL (08-16 @ 05:25)                              11.5   5.57  )-----------( 123      ( 16 Aug 2019 05:25 )             36.6     Urine Studies:      .1 (Ca --)      [02-17-19 @ 15:00]   --  Vitamin D (25OH) 12.6      [02-17-19 @ 15:00]  HbA1c 6.5      [08-09-19 @ 06:00]  TSH 2.60      [03-15-19 @ 05:15]  Lipid: chol 126, TG 87, HDL 38, LDL 85      [03-15-19 @ 05:15]        RADIOLOGY & ADDITIONAL STUDIES:

## 2019-08-17 LAB
ANION GAP SERPL CALC-SCNC: 16 MMO/L — HIGH (ref 7–14)
BUN SERPL-MCNC: 61 MG/DL — HIGH (ref 7–23)
CALCIUM SERPL-MCNC: 9.2 MG/DL — SIGNIFICANT CHANGE UP (ref 8.4–10.5)
CHLORIDE SERPL-SCNC: 85 MMOL/L — LOW (ref 98–107)
CO2 SERPL-SCNC: 27 MMOL/L — SIGNIFICANT CHANGE UP (ref 22–31)
CREAT SERPL-MCNC: 1.19 MG/DL — SIGNIFICANT CHANGE UP (ref 0.5–1.3)
GLUCOSE SERPL-MCNC: 184 MG/DL — HIGH (ref 70–99)
HCT VFR BLD CALC: 37.6 % — LOW (ref 39–50)
HGB BLD-MCNC: 11.7 G/DL — LOW (ref 13–17)
MAGNESIUM SERPL-MCNC: 1.8 MG/DL — SIGNIFICANT CHANGE UP (ref 1.6–2.6)
MCHC RBC-ENTMCNC: 25.2 PG — LOW (ref 27–34)
MCHC RBC-ENTMCNC: 31.1 % — LOW (ref 32–36)
MCV RBC AUTO: 80.9 FL — SIGNIFICANT CHANGE UP (ref 80–100)
NRBC # FLD: 0 K/UL — SIGNIFICANT CHANGE UP (ref 0–0)
PHOSPHATE SERPL-MCNC: 4.9 MG/DL — HIGH (ref 2.5–4.5)
PLATELET # BLD AUTO: 128 K/UL — LOW (ref 150–400)
PMV BLD: 9.8 FL — SIGNIFICANT CHANGE UP (ref 7–13)
POTASSIUM SERPL-MCNC: 4 MMOL/L — SIGNIFICANT CHANGE UP (ref 3.5–5.3)
POTASSIUM SERPL-SCNC: 4 MMOL/L — SIGNIFICANT CHANGE UP (ref 3.5–5.3)
RBC # BLD: 4.65 M/UL — SIGNIFICANT CHANGE UP (ref 4.2–5.8)
RBC # FLD: 18 % — HIGH (ref 10.3–14.5)
SODIUM SERPL-SCNC: 128 MMOL/L — LOW (ref 135–145)
WBC # BLD: 5.77 K/UL — SIGNIFICANT CHANGE UP (ref 3.8–10.5)
WBC # FLD AUTO: 5.77 K/UL — SIGNIFICANT CHANGE UP (ref 3.8–10.5)

## 2019-08-17 RX ORDER — DIPHENHYDRAMINE HCL 50 MG
25 CAPSULE ORAL EVERY 6 HOURS
Refills: 0 | Status: DISCONTINUED | OUTPATIENT
Start: 2019-08-17 | End: 2019-08-27

## 2019-08-17 RX ADMIN — Medication 25 MILLIGRAM(S): at 21:54

## 2019-08-17 RX ADMIN — APIXABAN 5 MILLIGRAM(S): 2.5 TABLET, FILM COATED ORAL at 22:12

## 2019-08-17 RX ADMIN — ATORVASTATIN CALCIUM 20 MILLIGRAM(S): 80 TABLET, FILM COATED ORAL at 21:58

## 2019-08-17 RX ADMIN — GABAPENTIN 100 MILLIGRAM(S): 400 CAPSULE ORAL at 21:58

## 2019-08-17 RX ADMIN — SPIRONOLACTONE 50 MILLIGRAM(S): 25 TABLET, FILM COATED ORAL at 04:49

## 2019-08-17 RX ADMIN — APIXABAN 5 MILLIGRAM(S): 2.5 TABLET, FILM COATED ORAL at 08:27

## 2019-08-17 RX ADMIN — Medication 1: at 08:27

## 2019-08-17 RX ADMIN — BUMETANIDE 5 MG/HR: 0.25 INJECTION INTRAMUSCULAR; INTRAVENOUS at 08:27

## 2019-08-17 RX ADMIN — TAMSULOSIN HYDROCHLORIDE 0.4 MILLIGRAM(S): 0.4 CAPSULE ORAL at 08:27

## 2019-08-17 NOTE — PROGRESS NOTE ADULT - SUBJECTIVE AND OBJECTIVE BOX
CARLO, NFN  69y  Male      Patient is a 69y old  Male who presents with a chief complaint of Abdominal distension, leg swelling (17 Aug 2019 11:43)  no sob,no cp,no cough,no fever.feels better    REVIEW OF SYSTEMS:  as above      INTERVAL HPI/OVERNIGHT EVENTS:  T(C): 36.7 (08-17-19 @ 21:00), Max: 36.7 (08-17-19 @ 21:00)  HR: 70 (08-17-19 @ 21:00) (67 - 72)  BP: 105/67 (08-17-19 @ 21:00) (91/65 - 115/67)  RR: 16 (08-17-19 @ 21:00) (16 - 18)  SpO2: 98% (08-17-19 @ 21:00) (98% - 100%)  Wt(kg): --  I&O's Summary    16 Aug 2019 07:01  -  17 Aug 2019 07:00  --------------------------------------------------------  IN: 655 mL / OUT: 2450 mL / NET: -1795 mL    17 Aug 2019 07:01  -  17 Aug 2019 23:06  --------------------------------------------------------  IN: 570 mL / OUT: 2050 mL / NET: -1480 mL      T(C): 36.7 (08-17-19 @ 21:00), Max: 36.7 (08-17-19 @ 21:00)  HR: 70 (08-17-19 @ 21:00) (67 - 72)  BP: 105/67 (08-17-19 @ 21:00) (91/65 - 115/67)  RR: 16 (08-17-19 @ 21:00) (16 - 18)  SpO2: 98% (08-17-19 @ 21:00) (98% - 100%)  Wt(kg): --Vital Signs Last 24 Hrs  T(C): 36.7 (17 Aug 2019 21:00), Max: 36.7 (17 Aug 2019 21:00)  T(F): 98 (17 Aug 2019 21:00), Max: 98 (17 Aug 2019 21:00)  HR: 70 (17 Aug 2019 21:00) (67 - 72)  BP: 105/67 (17 Aug 2019 21:00) (91/65 - 115/67)  BP(mean): --  RR: 16 (17 Aug 2019 21:00) (16 - 18)  SpO2: 98% (17 Aug 2019 21:00) (98% - 100%)    LABS:                        11.7   5.77  )-----------( 128      ( 17 Aug 2019 06:00 )             37.6     08-17    128<L>  |  85<L>  |  61<H>  ----------------------------<  184<H>  4.0   |  27  |  1.19    Ca    9.2      17 Aug 2019 06:00  Phos  4.9     08-17  Mg     1.8     08-17          CAPILLARY BLOOD GLUCOSE      POCT Blood Glucose.: 225 mg/dL (17 Aug 2019 21:53)  POCT Blood Glucose.: 141 mg/dL (17 Aug 2019 17:14)  POCT Blood Glucose.: 148 mg/dL (17 Aug 2019 12:08)  POCT Blood Glucose.: 179 mg/dL (17 Aug 2019 08:24)            PAST MEDICAL & SURGICAL HISTORY:  CKD (chronic kidney disease)  MR (mitral regurgitation)  AF (atrial fibrillation)  DM (diabetes mellitus)  HLD (hyperlipidemia)  TIA (transient ischemic attack)  HTN (hypertension)  CVA (cerebral infarction)  CHB (complete heart block)  CHF (congestive heart failure)  CAD (coronary artery disease): S/P 4V CABG  S/P mitral valve repair: 2007  S/P CABG (coronary artery bypass graft): 4V CABG in 2007  AICD (automatic cardioverter/defibrillator) present: [email or web address]      MEDICATIONS  (STANDING):  apixaban 5 milliGRAM(s) Oral every 12 hours  atorvastatin 20 milliGRAM(s) Oral at bedtime  buMETAnide Infusion 1 mG/Hr (5 mL/Hr) IV Continuous <Continuous>  dextrose 50% Injectable 12.5 Gram(s) IV Push once  dextrose 50% Injectable 25 Gram(s) IV Push once  dextrose 50% Injectable 25 Gram(s) IV Push once  gabapentin 100 milliGRAM(s) Oral at bedtime  insulin lispro (HumaLOG) corrective regimen sliding scale   SubCutaneous three times a day before meals  insulin lispro (HumaLOG) corrective regimen sliding scale   SubCutaneous at bedtime  metolazone 2.5 milliGRAM(s) Oral daily  spironolactone 50 milliGRAM(s) Oral daily  tamsulosin 0.4 milliGRAM(s) Oral daily    MEDICATIONS  (PRN):  acetaminophen   Tablet .. 650 milliGRAM(s) Oral every 6 hours PRN Mild Pain (1 - 3), Moderate Pain (4 - 6), Severe Pain (7 - 10)  dextrose 40% Gel 15 Gram(s) Oral once PRN Blood Glucose LESS THAN 70 milliGRAM(s)/deciliter  diphenhydrAMINE 25 milliGRAM(s) Oral every 6 hours PRN Rash and/or Itching  glucagon  Injectable 1 milliGRAM(s) IntraMuscular once PRN Glucose LESS THAN 70 milligrams/deciliter        RADIOLOGY & ADDITIONAL TESTS:    Imaging Personally Reviewed:  [ ] YES  [ ] NO    Consultant(s) Notes Reviewed:  [ ] YES  [ ] NO    PHYSICAL EXAM:  GENERAL: NAD, well-groomed, well-developed  HEAD:  Atraumatic, Normocephalic  EYES: EOMI, PERRLA, conjunctiva and sclera clear  ENMT: No tonsillar erythema, exudates, or enlargement; Moist mucous membranes, Good dentition, No lesions  NECK: Supple, No JVD, Normal thyroid  NERVOUS SYSTEM:  Alert & Oriented X3, Good concentration; Motor Strength 5/5 B/L upper and lower extremities; DTRs 2+ intact and symmetric  CHEST/LUNG: Clear to percussion bilaterally; No rales, rhonchi, wheezing, or rubs  HEART: Regular rate and rhythm; No murmurs, rubs, or gallops  ABDOMEN: Soft, Nontender, distended; Bowel sounds present  EXTREMITIES:  2+ Peripheral Pulses, No clubbing, cyanosis,  edema 1+  LYMPH: No lymphadenopathy noted  SKIN: No rashes or lesions    Care Discussed with Consultants/Other Providers [ ] YES  [ ] NO      Code Status: [] Full Code [] DNR [] DNI [] Goals of Care:   Disposition: [] ICU [] Stroke Unit [] RCU []PCU []Floor [] Discharge Home         PRINCESS [email or web address]

## 2019-08-17 NOTE — PROGRESS NOTE ADULT - ASSESSMENT
Echo 2/19 - severe LV and RV dysfunction    a/p     1) Acute on chronic CHF - cont metolazone,  bumex to 1mg /hr , cont aldactone, hold bp meds, s/p paracentesis 3.8 liters out , total 16 liters negative     2) Afib -  eliquis    3) Hyponatremia - f/u nephro likely sec to CHF

## 2019-08-17 NOTE — PROGRESS NOTE ADULT - ATTENDING COMMENTS
Assessment/Rec-  -Acute on chronic systolic CHF-cont iv bumex drip-..spironolactone added metazolone.ECHO,Cardiology f/u noted.  -A.FIB with mvr-rate control,A/C-Eliquis  -Hyponatremia-likely sec.to chf-fluid restriction,renal f/u noted,monitor-128  --DM,check A1C,monitor FS with coverage  -Ascites.u/s.s/p paracentesis-f/u [id number]-vgh.so far.  -d/w family at bedside Assessment/Rec-  -Acute on chronic systolic CHF-cont iv bumex drip-..spironolactone added metazolone.ECHO,Cardiology f/u noted.  -A.FIB with mvr-rate control,A/C-Eliquis  -Hyponatremia-likely sec.to chf-fluid restriction,renal f/u noted,monitor-128  --DM,check A1C-6.5 ,monitor FS with coverage  -Ascites.u/s.s/p paracentesis-f/u [id number].so far.  -d/w family at bedside

## 2019-08-17 NOTE — PROGRESS NOTE ADULT - SUBJECTIVE AND OBJECTIVE BOX
Phillip Sorenson MD  Interventional Cardiology / Advance Heart Failure and Cardiac Transplant Specialist  [place] Office : [address] [place] [address] [facility] [address]  Tel:   [place] Office : [address] [facility] [place] [address]  Tel: [phone]  Cell : [phone]    Subjective : Pt lying in bed comfortable, not in distress, denies any chest pain or SOB  	  MEDICATIONS:  apixaban 5 milliGRAM(s) Oral every 12 hours  buMETAnide Infusion 1 mG/Hr IV Continuous <Continuous>  metolazone 2.5 milliGRAM(s) Oral daily  spironolactone 50 milliGRAM(s) Oral daily  tamsulosin 0.4 milliGRAM(s) Oral daily  acetaminophen   Tablet .. 650 milliGRAM(s) Oral every 6 hours PRN  gabapentin 100 milliGRAM(s) Oral at bedtime      atorvastatin 20 milliGRAM(s) Oral at bedtime  dextrose 40% Gel 15 Gram(s) Oral once PRN  dextrose 50% Injectable 12.5 Gram(s) IV Push once  dextrose 50% Injectable 25 Gram(s) IV Push once  dextrose 50% Injectable 25 Gram(s) IV Push once  glucagon  Injectable 1 milliGRAM(s) IntraMuscular once PRN  insulin lispro (HumaLOG) corrective regimen sliding scale   SubCutaneous three times a day before meals  insulin lispro (HumaLOG) corrective regimen sliding scale   SubCutaneous at bedtime        PHYSICAL EXAM:  T(C): 36.4 (08-17-19 @ 12:34), Max: 37.1 (08-16-19 @ 13:46)  HR: 72 (08-17-19 @ 12:34) (67 - 72)  BP: 91/65 (08-17-19 @ 12:34) (91/65 - 120/67)  RR: 18 (08-17-19 @ 12:34) (17 - 18)  SpO2: 100% (08-17-19 @ 12:34) (98% - 100%)  Wt(kg): --  I&O's Summary    16 Aug 2019 07:01  -  17 Aug 2019 07:00  --------------------------------------------------------  IN: 655 mL / OUT: 2450 mL / NET: -1795 mL    17 Aug 2019 07:01  -  17 Aug 2019 12:43  --------------------------------------------------------  IN: 250 mL / OUT: 700 mL / NET: -450 mL            HEENT:   Normal oral mucosa, PERRL, EOMI	  Cardiovascular: Normal S1 S2, No JVD, No murmurs, No edema  Respiratory: Lungs clear to auscultation	  Gastrointestinal:  Soft, Non-tender, + BS	  Extremities: 2+ edema                                    11.7   5.77  )-----------( 128      ( 17 Aug 2019 06:00 )             37.6     08-17    128<L>  |  85<L>  |  61<H>  ----------------------------<  184<H>  4.0   |  27  |  1.19    Ca    9.2      17 Aug 2019 06:00  Phos  4.9     08-17  Mg     1.8     08-17      proBNP:   Lipid Profile:   HgA1c:   TSH:

## 2019-08-18 LAB
ANION GAP SERPL CALC-SCNC: 16 MMO/L — HIGH (ref 7–14)
ANION GAP SERPL CALC-SCNC: 17 MMO/L — HIGH (ref 7–14)
BUN SERPL-MCNC: 63 MG/DL — HIGH (ref 7–23)
BUN SERPL-MCNC: 72 MG/DL — HIGH (ref 7–23)
CALCIUM SERPL-MCNC: 8.9 MG/DL — SIGNIFICANT CHANGE UP (ref 8.4–10.5)
CALCIUM SERPL-MCNC: 9.3 MG/DL — SIGNIFICANT CHANGE UP (ref 8.4–10.5)
CHLORIDE SERPL-SCNC: 82 MMOL/L — LOW (ref 98–107)
CHLORIDE SERPL-SCNC: 84 MMOL/L — LOW (ref 98–107)
CO2 SERPL-SCNC: 25 MMOL/L — SIGNIFICANT CHANGE UP (ref 22–31)
CO2 SERPL-SCNC: 26 MMOL/L — SIGNIFICANT CHANGE UP (ref 22–31)
CREAT SERPL-MCNC: 1.2 MG/DL — SIGNIFICANT CHANGE UP (ref 0.5–1.3)
CREAT SERPL-MCNC: 1.31 MG/DL — HIGH (ref 0.5–1.3)
GLUCOSE SERPL-MCNC: 157 MG/DL — HIGH (ref 70–99)
GLUCOSE SERPL-MCNC: 183 MG/DL — HIGH (ref 70–99)
HCT VFR BLD CALC: 38.5 % — LOW (ref 39–50)
HGB BLD-MCNC: 12.4 G/DL — LOW (ref 13–17)
MAGNESIUM SERPL-MCNC: 1.8 MG/DL — SIGNIFICANT CHANGE UP (ref 1.6–2.6)
MAGNESIUM SERPL-MCNC: 2 MG/DL — SIGNIFICANT CHANGE UP (ref 1.6–2.6)
MCHC RBC-ENTMCNC: 25.7 PG — LOW (ref 27–34)
MCHC RBC-ENTMCNC: 32.2 % — SIGNIFICANT CHANGE UP (ref 32–36)
MCV RBC AUTO: 79.9 FL — LOW (ref 80–100)
NRBC # FLD: 0 K/UL — SIGNIFICANT CHANGE UP (ref 0–0)
PHOSPHATE SERPL-MCNC: 5 MG/DL — HIGH (ref 2.5–4.5)
PHOSPHATE SERPL-MCNC: 5.3 MG/DL — HIGH (ref 2.5–4.5)
PLATELET # BLD AUTO: 111 K/UL — LOW (ref 150–400)
PMV BLD: 9.5 FL — SIGNIFICANT CHANGE UP (ref 7–13)
POTASSIUM SERPL-MCNC: 3.8 MMOL/L — SIGNIFICANT CHANGE UP (ref 3.5–5.3)
POTASSIUM SERPL-MCNC: 4.9 MMOL/L — SIGNIFICANT CHANGE UP (ref 3.5–5.3)
POTASSIUM SERPL-SCNC: 3.8 MMOL/L — SIGNIFICANT CHANGE UP (ref 3.5–5.3)
POTASSIUM SERPL-SCNC: 4.9 MMOL/L — SIGNIFICANT CHANGE UP (ref 3.5–5.3)
RBC # BLD: 4.82 M/UL — SIGNIFICANT CHANGE UP (ref 4.2–5.8)
RBC # FLD: 18.2 % — HIGH (ref 10.3–14.5)
SODIUM SERPL-SCNC: 125 MMOL/L — LOW (ref 135–145)
SODIUM SERPL-SCNC: 125 MMOL/L — LOW (ref 135–145)
WBC # BLD: 5.13 K/UL — SIGNIFICANT CHANGE UP (ref 3.8–10.5)
WBC # FLD AUTO: 5.13 K/UL — SIGNIFICANT CHANGE UP (ref 3.8–10.5)

## 2019-08-18 RX ORDER — POTASSIUM CHLORIDE 20 MEQ
20 PACKET (EA) ORAL ONCE
Refills: 0 | Status: COMPLETED | OUTPATIENT
Start: 2019-08-18 | End: 2019-08-18

## 2019-08-18 RX ORDER — MAGNESIUM OXIDE 400 MG ORAL TABLET 241.3 MG
400 TABLET ORAL ONCE
Refills: 0 | Status: COMPLETED | OUTPATIENT
Start: 2019-08-18 | End: 2019-08-18

## 2019-08-18 RX ADMIN — BUMETANIDE 5 MG/HR: 0.25 INJECTION INTRAMUSCULAR; INTRAVENOUS at 21:02

## 2019-08-18 RX ADMIN — GABAPENTIN 100 MILLIGRAM(S): 400 CAPSULE ORAL at 21:02

## 2019-08-18 RX ADMIN — BUMETANIDE 5 MG/HR: 0.25 INJECTION INTRAMUSCULAR; INTRAVENOUS at 05:17

## 2019-08-18 RX ADMIN — Medication 1: at 12:56

## 2019-08-18 RX ADMIN — BUMETANIDE 5 MG/HR: 0.25 INJECTION INTRAMUSCULAR; INTRAVENOUS at 08:59

## 2019-08-18 RX ADMIN — MAGNESIUM OXIDE 400 MG ORAL TABLET 400 MILLIGRAM(S): 241.3 TABLET ORAL at 09:12

## 2019-08-18 RX ADMIN — TAMSULOSIN HYDROCHLORIDE 0.4 MILLIGRAM(S): 0.4 CAPSULE ORAL at 08:59

## 2019-08-18 RX ADMIN — APIXABAN 5 MILLIGRAM(S): 2.5 TABLET, FILM COATED ORAL at 08:59

## 2019-08-18 RX ADMIN — Medication 20 MILLIEQUIVALENT(S): at 09:12

## 2019-08-18 RX ADMIN — Medication 1: at 17:52

## 2019-08-18 RX ADMIN — SPIRONOLACTONE 50 MILLIGRAM(S): 25 TABLET, FILM COATED ORAL at 05:17

## 2019-08-18 RX ADMIN — APIXABAN 5 MILLIGRAM(S): 2.5 TABLET, FILM COATED ORAL at 21:02

## 2019-08-18 RX ADMIN — ATORVASTATIN CALCIUM 20 MILLIGRAM(S): 80 TABLET, FILM COATED ORAL at 21:02

## 2019-08-18 NOTE — PROGRESS NOTE ADULT - SUBJECTIVE AND OBJECTIVE BOX
Phillip Sorenson MD  Interventional Cardiology / Advance Heart Failure and Cardiac Transplant Specialist  [place] Office : [address] [place] [address]  Tel:   [place] Office : [address] [facility] [place] [address]  Tel: [phone]  Cell : [phone]    Subjective : Pt lying in bed comfortable, not in distress, denies any chest pain or SOB  	  MEDICATIONS:  apixaban 5 milliGRAM(s) Oral every 12 hours  buMETAnide Infusion 1 mG/Hr IV Continuous <Continuous>  metolazone 2.5 milliGRAM(s) Oral daily  spironolactone 50 milliGRAM(s) Oral daily  tamsulosin 0.4 milliGRAM(s) Oral daily        acetaminophen   Tablet .. 650 milliGRAM(s) Oral every 6 hours PRN  diphenhydrAMINE 25 milliGRAM(s) Oral every 6 hours PRN  gabapentin 100 milliGRAM(s) Oral at bedtime      atorvastatin 20 milliGRAM(s) Oral at bedtime  dextrose 40% Gel 15 Gram(s) Oral once PRN  dextrose 50% Injectable 12.5 Gram(s) IV Push once  dextrose 50% Injectable 25 Gram(s) IV Push once  dextrose 50% Injectable 25 Gram(s) IV Push once  glucagon  Injectable 1 milliGRAM(s) IntraMuscular once PRN  insulin lispro (HumaLOG) corrective regimen sliding scale   SubCutaneous three times a day before meals  insulin lispro (HumaLOG) corrective regimen sliding scale   SubCutaneous at bedtime        PHYSICAL EXAM:  T(C): 36.7 (08-18-19 @ 11:56), Max: 36.7 (08-17-19 @ 21:00)  HR: 69 (08-18-19 @ 11:56) (69 - 78)  BP: 103/70 (08-18-19 @ 11:56) (103/70 - 110/70)  RR: 18 (08-18-19 @ 11:56) (16 - 18)  SpO2: 98% (08-18-19 @ 11:56) (98% - 98%)  Wt(kg): --  I&O's Summary    17 Aug 2019 07:01  -  18 Aug 2019 07:00  --------------------------------------------------------  IN: 810 mL / OUT: 2950 mL / NET: -2140 mL            HEENT:   Normal oral mucosa, PERRL, EOMI	  Cardiovascular: Normal S1 S2, No JVD, No murmurs, No edema  Respiratory: Lungs clear to auscultation	  Gastrointestinal:  Soft, Non-tender, + BS	  Extremities: 2+ edema                                    12.4   5.13  )-----------( 111      ( 18 Aug 2019 06:00 )             38.5     08-18    125<L>  |  82<L>  |  63<H>  ----------------------------<  183<H>  3.8   |  26  |  1.20    Ca    8.9      18 Aug 2019 06:00  Phos  5.3     08-18  Mg     1.8     08-18      proBNP:   Lipid Profile:   HgA1c:   TSH:

## 2019-08-18 NOTE — PROGRESS NOTE ADULT - ATTENDING COMMENTS
Assessment/Rec-  -Acute on chronic systolic CHF-cont iv bumex drip-..spironolactone ,D.C metazolone.,Cardiology f/u noted.  -A.FIB with mvr-rate control,A/C-Eliquis  -Hyponatremia-likely sec.to chf-fluid restriction,renal f/u noted,monitor-128  --DM,check A1C-6.5 ,monitor FS with coverage  -Ascites.u/s.s/p paracentesis-f/u xokrbk-elixtiou-iqm.so far.  -d/w family at bedside

## 2019-08-18 NOTE — PROGRESS NOTE ADULT - SUBJECTIVE AND OBJECTIVE BOX
CARLO, NFN  69y  Male      Patient is a 69y old  Male who presents with a chief complaint of Abdominal distension, leg swelling (18 Aug 2019 11:49)  no sob,no cp,no fever,no cough,feels better.    REVIEW OF SYSTEMS:  neg        INTERVAL HPI/OVERNIGHT EVENTS:  T(C): 36.7 (08-18-19 @ 11:56), Max: 36.7 (08-17-19 @ 21:00)  HR: 69 (08-18-19 @ 11:56) (69 - 78)  BP: 103/70 (08-18-19 @ 11:56) (103/70 - 110/70)  RR: 18 (08-18-19 @ 11:56) (16 - 18)  SpO2: 98% (08-18-19 @ 11:56) (98% - 98%)  Wt(kg): --  I&O's Summary    17 Aug 2019 07:01  -  18 Aug 2019 07:00  --------------------------------------------------------  IN: 810 mL / OUT: 2950 mL / NET: -2140 mL    18 Aug 2019 07:01  -  18 Aug 2019 19:14  --------------------------------------------------------  IN: 650 mL / OUT: 1700 mL / NET: -1050 mL      T(C): 36.7 (08-18-19 @ 11:56), Max: 36.7 (08-17-19 @ 21:00)  HR: 69 (08-18-19 @ 11:56) (69 - 78)  BP: 103/70 (08-18-19 @ 11:56) (103/70 - 110/70)  RR: 18 (08-18-19 @ 11:56) (16 - 18)  SpO2: 98% (08-18-19 @ 11:56) (98% - 98%)  Wt(kg): --Vital Signs Last 24 Hrs  T(C): 36.7 (18 Aug 2019 11:56), Max: 36.7 (17 Aug 2019 21:00)  T(F): 98 (18 Aug 2019 11:56), Max: 98 (17 Aug 2019 21:00)  HR: 69 (18 Aug 2019 11:56) (69 - 78)  BP: 103/70 (18 Aug 2019 11:56) (103/70 - 110/70)  BP(mean): --  RR: 18 (18 Aug 2019 11:56) (16 - 18)  SpO2: 98% (18 Aug 2019 11:56) (98% - 98%)    LABS:                        12.4   5.13  )-----------( 111      ( 18 Aug 2019 06:00 )             38.5     08-18    125<L>  |  82<L>  |  63<H>  ----------------------------<  183<H>  3.8   |  26  |  1.20    Ca    8.9      18 Aug 2019 06:00  Phos  5.3     08-18  Mg     1.8     08-18          CAPILLARY BLOOD GLUCOSE      POCT Blood Glucose.: 177 mg/dL (18 Aug 2019 17:45)  POCT Blood Glucose.: 156 mg/dL (18 Aug 2019 12:51)  POCT Blood Glucose.: 148 mg/dL (18 Aug 2019 08:47)  POCT Blood Glucose.: 225 mg/dL (17 Aug 2019 21:53)            PAST MEDICAL & SURGICAL HISTORY:  CKD (chronic kidney disease)  MR (mitral regurgitation)  AF (atrial fibrillation)  DM (diabetes mellitus)  HLD (hyperlipidemia)  TIA (transient ischemic attack)  HTN (hypertension)  CVA (cerebral infarction)  CHB (complete heart block)  CHF (congestive heart failure)  CAD (coronary artery disease): S/P 4V CABG  S/P mitral valve repair: 2007  S/P CABG (coronary artery bypass graft): 4V CABG in 2007  AICD (automatic cardioverter/defibrillator) present: [employer]      MEDICATIONS  (STANDING):  apixaban 5 milliGRAM(s) Oral every 12 hours  atorvastatin 20 milliGRAM(s) Oral at bedtime  buMETAnide Infusion 1 mG/Hr (5 mL/Hr) IV Continuous <Continuous>  dextrose 50% Injectable 12.5 Gram(s) IV Push once  dextrose 50% Injectable 25 Gram(s) IV Push once  dextrose 50% Injectable 25 Gram(s) IV Push once  gabapentin 100 milliGRAM(s) Oral at bedtime  insulin lispro (HumaLOG) corrective regimen sliding scale   SubCutaneous three times a day before meals  insulin lispro (HumaLOG) corrective regimen sliding scale   SubCutaneous at bedtime  spironolactone 50 milliGRAM(s) Oral daily  tamsulosin 0.4 milliGRAM(s) Oral daily    MEDICATIONS  (PRN):  acetaminophen   Tablet .. 650 milliGRAM(s) Oral every 6 hours PRN Mild Pain (1 - 3), Moderate Pain (4 - 6), Severe Pain (7 - 10)  dextrose 40% Gel 15 Gram(s) Oral once PRN Blood Glucose LESS THAN 70 milliGRAM(s)/deciliter  diphenhydrAMINE 25 milliGRAM(s) Oral every 6 hours PRN Rash and/or Itching  glucagon  Injectable 1 milliGRAM(s) IntraMuscular once PRN Glucose LESS THAN 70 milligrams/deciliter        RADIOLOGY & ADDITIONAL TESTS:    Imaging Personally Reviewed:  [ ] YES  [ ] NO    Consultant(s) Notes Reviewed:  [x ] YES  [ ] NO    PHYSICAL EXAM:  GENERAL: NAD, well-groomed, well-developed  HEAD:  Atraumatic, Normocephalic  EYES: EOMI, PERRLA, conjunctiva and sclera clear  ENMT: No tonsillar erythema, exudates, or enlargement; Moist mucous membranes, Good dentition, No lesions  NECK: Supple, No JVD, Normal thyroid  NERVOUS SYSTEM:  Alert & Oriented X3, Good concentration; Motor Strength 5/5 B/L upper and lower extremities; DTRs 2+ intact and symmetric  CHEST/LUNG: Clear to percussion bilaterally; No rales, rhonchi, wheezing, or rubs  HEART: Regular rate and rhythm; No murmurs, rubs, or gallops  ABDOMEN: Soft, Nontender, distended;-improving Bowel sounds present  EXTREMITIES:  2+ Peripheral Pulses, No clubbing, cyanosis, or edema +.IMPROVING  LYMPH: No lymphadenopathy noted  SKIN: No rashes or lesions    Care Discussed with Consultants/Other Providers [ ] YES  [ ] NO      Code Status: [] Full Code [] DNR [] DNI [] Goals of Care:   Disposition: [] ICU [] Stroke Unit [] RCU []PCU []Floor [] Discharge Home         EPI Mathews.[facility]

## 2019-08-18 NOTE — PROGRESS NOTE ADULT - ASSESSMENT
68 y/o M with PMH of MR s/p mitral valvuloplasty, Afib (on Coumadin), HLD, HTN, DM, CVA, CAD (s/p 4V CABG), CHF (with EF of 15-20% s/p AICD) presented with the complaint of worsening LE edema, weight gain and abdominal distention. Got admitted with CHF, found to have hyponatremia    Hyponatremia:  Likely sec to fluid overload sec to CHF  Has been stable at 129-130 during this stay.   Free water restriction decrease to 800ml/day  s/p paracentesis ~3.6L removed  on metolazone 2.5mg daily.  Continue diuretics. Monitor Na while on metolazone   Monitor Na level daily    Chronic Kidney Disease:  Currently at baseline but is on IV diuretics and Aldactone so will monitor closely.    Hypomagnesemia:  likely sec to diuresis  Improved  Monitor Mg level    Hypokalemia  likely sec to diuretics  Serum K improved  monitor serum K for now    CHF:  on bumex gtt  and metolazone   Monitor K, Mg level  Follow up cardiology  Daily weight s

## 2019-08-18 NOTE — PROGRESS NOTE ADULT - ASSESSMENT
Echo 2/19 - severe LV and RV dysfunction    a/p     1) Acute on chronic CHF - d/c metolazone,  cont bumex to 1mg /hr , cont aldactone, hold bp meds, s/p paracentesis 3.8 liters out , total 16 liters negative     2) Afib -  eliquis    3) Hyponatremia - f/u nephro likely sec to CHF, d/c metolazone

## 2019-08-18 NOTE — PROGRESS NOTE ADULT - SUBJECTIVE AND OBJECTIVE BOX
LISSETH MATOS  Patient is a 69y old  Male who presents with a chief complaint of Abdominal distension, leg swelling (18 Aug 2019 19:14)    HPI:  This is a patient with severe CHF, complicated by RHF and edema. Treated with IV Bumex. He is followed for CKD.    PAST MEDICAL & SURGICAL HISTORY:  CKD (chronic kidney disease)  MR (mitral regurgitation)  AF (atrial fibrillation)  DM (diabetes mellitus)  HLD (hyperlipidemia)  TIA (transient ischemic attack)  HTN (hypertension)  CVA (cerebral infarction)  CHB (complete heart block)  CHF (congestive heart failure)  CAD (coronary artery disease): S/P 4V CABG  S/P mitral valve repair: 2007  S/P CABG (coronary artery bypass graft): 4V CABG in 2007  AICD (automatic cardioverter/defibrillator) present: [employer]    MEDICATIONS  (STANDING):  apixaban 5 milliGRAM(s) Oral every 12 hours  atorvastatin 20 milliGRAM(s) Oral at bedtime  buMETAnide Infusion 1 mG/Hr (5 mL/Hr) IV Continuous <Continuous>  dextrose 50% Injectable 12.5 Gram(s) IV Push once  dextrose 50% Injectable 25 Gram(s) IV Push once  dextrose 50% Injectable 25 Gram(s) IV Push once  gabapentin 100 milliGRAM(s) Oral at bedtime  insulin lispro (HumaLOG) corrective regimen sliding scale   SubCutaneous three times a day before meals  insulin lispro (HumaLOG) corrective regimen sliding scale   SubCutaneous at bedtime  spironolactone 50 milliGRAM(s) Oral daily  tamsulosin 0.4 milliGRAM(s) Oral daily    Allergies    No Known Allergies    Intolerances      FAMILY HISTORY:      REVIEW OF SYSTEMS    General:  No fever, chills or night sweats.    Ophthalmologic: No changes in vision.  	  ENMT: No difficulty swallowing. 	    Respiratory and Thorax: Dyspnea on exertion.  	  Cardiovascular: Tiredness. Edema    Gastrointestinal: Ascites and poor appetite.    Genitourinary:	 No dysuria, hematuria or frequency.    Musculoskeletal: No joint pains or swelling. No muscle pains. Leg edema.    Neurological:	No weakness or numbness. No seizures.    Vital Signs Last 24 Hrs  T(C): 36.7 (18 Aug 2019 11:56), Max: 36.7 (17 Aug 2019 21:00)  T(F): 98 (18 Aug 2019 11:56), Max: 98 (17 Aug 2019 21:00)  HR: 69 (18 Aug 2019 11:56) (69 - 78)  BP: 103/70 (18 Aug 2019 11:56) (103/70 - 110/70)  BP(mean): --  RR: 18 (18 Aug 2019 11:56) (16 - 18)  SpO2: 98% (18 Aug 2019 11:56) (98% - 98%)    PHYSICAL EXAMINATION:  Constitutional: He  appears comfortable and not distressed. Not diaphoretic.    Neck:  The thyroid is normal. Trachea is midline.     Respiratory: The lungs are clear to auscultation. No dullness and expansion is normal.    Cardiovascular: S1 and S2 are normal. No mummurs, rubs or gallops are present.    Gastrointestinal: The abdomen is distended. Bowel sounds are wnl.    Genitourinary: The bladder is not distended. No CVA tenderness is present.    Extremities:  1+ edema is noted. No deformities are present.    Neurological: Cognition is normal. Tone, power and sensation are normal.                           12.4   5.13  )-----------( 111      ( 18 Aug 2019 06:00 )             38.5     08-18    125<L>  |  84<L>  |  72<H>  ----------------------------<  157<H>  4.9   |  25  |  1.31<H>    Ca    9.3      18 Aug 2019 18:20  Phos  5.0     08-18  Mg     2.0     08-18

## 2019-08-19 LAB
ANION GAP SERPL CALC-SCNC: 16 MMO/L — HIGH (ref 7–14)
BUN SERPL-MCNC: 75 MG/DL — HIGH (ref 7–23)
CALCIUM SERPL-MCNC: 9 MG/DL — SIGNIFICANT CHANGE UP (ref 8.4–10.5)
CHLORIDE SERPL-SCNC: 83 MMOL/L — LOW (ref 98–107)
CO2 SERPL-SCNC: 28 MMOL/L — SIGNIFICANT CHANGE UP (ref 22–31)
CREAT SERPL-MCNC: 1.19 MG/DL — SIGNIFICANT CHANGE UP (ref 0.5–1.3)
GLUCOSE SERPL-MCNC: 169 MG/DL — HIGH (ref 70–99)
HCT VFR BLD CALC: 37.6 % — LOW (ref 39–50)
HGB BLD-MCNC: 12 G/DL — LOW (ref 13–17)
MAGNESIUM SERPL-MCNC: 2 MG/DL — SIGNIFICANT CHANGE UP (ref 1.6–2.6)
MCHC RBC-ENTMCNC: 25.2 PG — LOW (ref 27–34)
MCHC RBC-ENTMCNC: 31.9 % — LOW (ref 32–36)
MCV RBC AUTO: 79 FL — LOW (ref 80–100)
NRBC # FLD: 0 K/UL — SIGNIFICANT CHANGE UP (ref 0–0)
PHOSPHATE SERPL-MCNC: 4.9 MG/DL — HIGH (ref 2.5–4.5)
PLATELET # BLD AUTO: 111 K/UL — LOW (ref 150–400)
PMV BLD: 9 FL — SIGNIFICANT CHANGE UP (ref 7–13)
POTASSIUM SERPL-MCNC: 4.1 MMOL/L — SIGNIFICANT CHANGE UP (ref 3.5–5.3)
POTASSIUM SERPL-SCNC: 4.1 MMOL/L — SIGNIFICANT CHANGE UP (ref 3.5–5.3)
RBC # BLD: 4.76 M/UL — SIGNIFICANT CHANGE UP (ref 4.2–5.8)
RBC # FLD: 17.6 % — HIGH (ref 10.3–14.5)
SODIUM SERPL-SCNC: 127 MMOL/L — LOW (ref 135–145)
WBC # BLD: 5.13 K/UL — SIGNIFICANT CHANGE UP (ref 3.8–10.5)
WBC # FLD AUTO: 5.13 K/UL — SIGNIFICANT CHANGE UP (ref 3.8–10.5)

## 2019-08-19 RX ADMIN — Medication 1: at 22:24

## 2019-08-19 RX ADMIN — SPIRONOLACTONE 50 MILLIGRAM(S): 25 TABLET, FILM COATED ORAL at 05:38

## 2019-08-19 RX ADMIN — APIXABAN 5 MILLIGRAM(S): 2.5 TABLET, FILM COATED ORAL at 08:50

## 2019-08-19 RX ADMIN — TAMSULOSIN HYDROCHLORIDE 0.4 MILLIGRAM(S): 0.4 CAPSULE ORAL at 08:51

## 2019-08-19 RX ADMIN — Medication 1: at 08:50

## 2019-08-19 RX ADMIN — BUMETANIDE 5 MG/HR: 0.25 INJECTION INTRAMUSCULAR; INTRAVENOUS at 08:51

## 2019-08-19 RX ADMIN — GABAPENTIN 100 MILLIGRAM(S): 400 CAPSULE ORAL at 21:23

## 2019-08-19 RX ADMIN — ATORVASTATIN CALCIUM 20 MILLIGRAM(S): 80 TABLET, FILM COATED ORAL at 21:23

## 2019-08-19 RX ADMIN — APIXABAN 5 MILLIGRAM(S): 2.5 TABLET, FILM COATED ORAL at 21:23

## 2019-08-19 NOTE — PROGRESS NOTE ADULT - ASSESSMENT
70 y/o M with PMH of MR s/p mitral valvuloplasty, Afib (on Coumadin), HLD, HTN, DM, CVA, CAD (s/p 4V CABG), CHF (with EF of 15-20% s/p AICD) presented with the complaint of worsening LE edema, weight gain and abdominal distention. Got admitted with CHF, found to have hyponatremia    Hyponatremia:  Likely sec to fluid overload sec to CHF  Has been stable at 129-130 during this stay.   Free water restriction decrease to 800ml/day  s/p paracentesis ~3.6L removed  Na dropped with metolazone, now off  Na slightly better  Monitor Na level daily  BUN/cr ratio up trending sec to diuresing. on bumex gtt. f/u cardio    Chronic Kidney Disease:  Currently at baseline but is on IV diuretics and Aldactone so will monitor closely.    Hypomagnesemia:  likely sec to diuresis  Improved  Monitor Mg level    Hypokalemia  likely sec to diuretics  Serum K improved  monitor serum K for now    CHF:  on bumex gtt  off metolazone   Monitor K, Mg level  Follow up cardiology  Daily weight s

## 2019-08-19 NOTE — PROGRESS NOTE ADULT - SUBJECTIVE AND OBJECTIVE BOX
Phillip Sorenson MD  Interventional Cardiology / Advance Heart Failure and Cardiac Transplant Specialist  [place] Office : [address] [place] [address]  Tel:   [place] Office : [address] [facility] [address] [address]  Tel: [phone]  Cell : [phone]    Subjective : Pt lying in bed comfortable, not in distress, denies any chest pain or SOB  	  MEDICATIONS:  apixaban 5 milliGRAM(s) Oral every 12 hours  buMETAnide Infusion 1 mG/Hr IV Continuous <Continuous>  spironolactone 50 milliGRAM(s) Oral daily  tamsulosin 0.4 milliGRAM(s) Oral daily        acetaminophen   Tablet .. 650 milliGRAM(s) Oral every 6 hours PRN  diphenhydrAMINE 25 milliGRAM(s) Oral every 6 hours PRN  gabapentin 100 milliGRAM(s) Oral at bedtime      atorvastatin 20 milliGRAM(s) Oral at bedtime  dextrose 40% Gel 15 Gram(s) Oral once PRN  dextrose 50% Injectable 12.5 Gram(s) IV Push once  dextrose 50% Injectable 25 Gram(s) IV Push once  dextrose 50% Injectable 25 Gram(s) IV Push once  glucagon  Injectable 1 milliGRAM(s) IntraMuscular once PRN  insulin lispro (HumaLOG) corrective regimen sliding scale   SubCutaneous three times a day before meals  insulin lispro (HumaLOG) corrective regimen sliding scale   SubCutaneous at bedtime        PHYSICAL EXAM:  T(C): 36.7 (08-19-19 @ 12:10), Max: 36.7 (08-19-19 @ 05:49)  HR: 70 (08-19-19 @ 12:10) (67 - 70)  BP: 92/55 (08-19-19 @ 12:10) (92/55 - 126/69)  RR: 18 (08-19-19 @ 12:10) (16 - 18)  SpO2: 98% (08-19-19 @ 12:10) (98% - 98%)  Wt(kg): --  I&O's Summary    18 Aug 2019 07:01  -  19 Aug 2019 07:00  --------------------------------------------------------  IN: 930 mL / OUT: 3700 mL / NET: -2770 mL    19 Aug 2019 07:01  -  19 Aug 2019 14:37  --------------------------------------------------------  IN: 220 mL / OUT: 1100 mL / NET: -880 mL    	  HEENT:   Normal oral mucosa, PERRL, EOMI	  Cardiovascular: Normal S1 S2, No JVD, No murmurs, No edema  Respiratory: Lungs clear to auscultation	  Gastrointestinal:  Soft, Non-tender, + BS	  Extremities: Normal range of motion, No clubbing, cyanosis or edema                                    12.0   5.13  )-----------( 111      ( 19 Aug 2019 07:00 )             37.6     08-19    127<L>  |  83<L>  |  75<H>  ----------------------------<  169<H>  4.1   |  28  |  1.19    Ca    9.0      19 Aug 2019 07:00  Phos  4.9     08-19  Mg     2.0     08-19      proBNP:   Lipid Profile:   HgA1c:   TSH:

## 2019-08-19 NOTE — PROGRESS NOTE ADULT - ATTENDING COMMENTS
Assessment/Rec-  -Acute on chronic systolic CHF-cont iv bumex drip-..spironolactone ,D.C metazolone.,Cardiology f/u noted.  -A.FIB with mvr-rate control,A/C-Eliquis  -Hyponatremia-likely sec.to chf-fluid restriction,renal f/u noted,monitor-127.Renal f/u noted.  --DM,check A1C-6.5 ,monitor FS with coverage  -Ascites.u/s.s/p paracentesis-f/u [id number]-haf.so far.  -d/w family at bedside

## 2019-08-19 NOTE — PROGRESS NOTE ADULT - SUBJECTIVE AND OBJECTIVE BOX
[facility]   MD ULICES MCKEON DO ANGELA WONG, PA    TEL:  OFFICE: [phone]  DR TAYLOR CELL: [phone]  DR. MCFARLAND CELL: [phone]  KATHERINE POWER CELL: [phone]        Patient is a 69y old  Male who presents with a chief complaint of Abdominal distension, leg swelling (18 Aug 2019 19:14)      Patient seen and examined at bedside. No chest pain/sob    VITALS:  T(F): 98 (08-19-19 @ 05:49), Max: 98 (08-19-19 @ 05:49)  HR: 69 (08-19-19 @ 05:49)  BP: 126/69 (08-19-19 @ 05:49)  RR: 17 (08-19-19 @ 05:49)  SpO2: 98% (08-19-19 @ 05:49)  Wt(kg): --    08-18 @ 07:01  -  08-19 @ 07:00  --------------------------------------------------------  IN: 930 mL / OUT: 3700 mL / NET: -2770 mL    08-19 @ 07:01  -  08-19 @ 11:58  --------------------------------------------------------  IN: 120 mL / OUT: 600 mL / NET: -480 mL          PHYSICAL EXAM:  Constitutional: NAD  Neck: No JVD  Respiratory: CTAB, no wheezes, rales or rhonchi  Cardiovascular: S1, S2, RRR  Gastrointestinal: BS+, soft, NT/ND  Extremities: 2+ peripheral edema    Hospital Medications:   MEDICATIONS  (STANDING):  apixaban 5 milliGRAM(s) Oral every 12 hours  atorvastatin 20 milliGRAM(s) Oral at bedtime  buMETAnide Infusion 1 mG/Hr (5 mL/Hr) IV Continuous <Continuous>  dextrose 50% Injectable 12.5 Gram(s) IV Push once  dextrose 50% Injectable 25 Gram(s) IV Push once  dextrose 50% Injectable 25 Gram(s) IV Push once  gabapentin 100 milliGRAM(s) Oral at bedtime  insulin lispro (HumaLOG) corrective regimen sliding scale   SubCutaneous three times a day before meals  insulin lispro (HumaLOG) corrective regimen sliding scale   SubCutaneous at bedtime  spironolactone 50 milliGRAM(s) Oral daily  tamsulosin 0.4 milliGRAM(s) Oral daily      LABS:  08-19    127<L>  |  83<L>  |  75<H>  ----------------------------<  169<H>  4.1   |  28  |  1.19    Ca    9.0      19 Aug 2019 07:00  Phos  4.9     08-19  Mg     2.0     08-19      Creatinine Trend: 1.19 <--, 1.31 <--, 1.20 <--, 1.19 <--, 1.20 <--, 1.08 <--, 1.14 <--, 0.99 <--, 1.04 <--, 1.17 <--    Phosphorus Level, Serum: 4.9 mg/dL (08-19 @ 07:00)  Phosphorus Level, Serum: 5.0 mg/dL (08-18 @ 18:20)                              12.0   5.13  )-----------( 111      ( 19 Aug 2019 07:00 )             37.6     Urine Studies:      .1 (Ca --)      [02-17-19 @ 15:00]   --  Vitamin D (25OH) 12.6      [02-17-19 @ 15:00]  HbA1c 6.5      [08-09-19 @ 06:00]  TSH 2.60      [03-15-19 @ 05:15]  Lipid: chol 126, TG 87, HDL 38, LDL 85      [03-15-19 @ 05:15]        RADIOLOGY & ADDITIONAL STUDIES:

## 2019-08-19 NOTE — PROGRESS NOTE ADULT - ASSESSMENT
Echo 2/19 - severe LV and RV dysfunction    a/p     1) Acute on chronic CHF - d/c metolazone,  cont bumex to 1mg /hr , cont aldactone, hold bp meds, s/p paracentesis 3.8 liters out    2) Afib -  eliquis    3) Hyponatremia - f/u nephro likely sec to CHF, d/c metolazone

## 2019-08-19 NOTE — PROGRESS NOTE ADULT - SUBJECTIVE AND OBJECTIVE BOX
CARLO, NFN  69y  Male      Patient is a 69y old  Male who presents with a chief complaint of Abdominal distension, leg swelling (19 Aug 2019 11:58)  no sob,no cp,no fever,no cough,no vomiting,no diarrhoea.    REVIEW OF SYSTEMS:  neg      INTERVAL HPI/OVERNIGHT EVENTS:  T(C): 36.7 (08-19-19 @ 12:10), Max: 36.7 (08-19-19 @ 05:49)  HR: 70 (08-19-19 @ 12:10) (67 - 70)  BP: 92/55 (08-19-19 @ 12:10) (92/55 - 126/69)  RR: 18 (08-19-19 @ 12:10) (16 - 18)  SpO2: 98% (08-19-19 @ 12:10) (98% - 98%)  Wt(kg): --  I&O's Summary    18 Aug 2019 07:01  -  19 Aug 2019 07:00  --------------------------------------------------------  IN: 930 mL / OUT: 3700 mL / NET: -2770 mL    19 Aug 2019 07:01  -  19 Aug 2019 18:04  --------------------------------------------------------  IN: 620 mL / OUT: 1700 mL / NET: -1080 mL      T(C): 36.7 (08-19-19 @ 12:10), Max: 36.7 (08-19-19 @ 05:49)  HR: 70 (08-19-19 @ 12:10) (67 - 70)  BP: 92/55 (08-19-19 @ 12:10) (92/55 - 126/69)  RR: 18 (08-19-19 @ 12:10) (16 - 18)  SpO2: 98% (08-19-19 @ 12:10) (98% - 98%)  Wt(kg): --Vital Signs Last 24 Hrs  T(C): 36.7 (19 Aug 2019 12:10), Max: 36.7 (19 Aug 2019 05:49)  T(F): 98 (19 Aug 2019 12:10), Max: 98 (19 Aug 2019 05:49)  HR: 70 (19 Aug 2019 12:10) (67 - 70)  BP: 92/55 (19 Aug 2019 12:10) (92/55 - 126/69)  BP(mean): --  RR: 18 (19 Aug 2019 12:10) (16 - 18)  SpO2: 98% (19 Aug 2019 12:10) (98% - 98%)    LABS:                        12.0   5.13  )-----------( 111      ( 19 Aug 2019 07:00 )             37.6     08-19    127<L>  |  83<L>  |  75<H>  ----------------------------<  169<H>  4.1   |  28  |  1.19    Ca    9.0      19 Aug 2019 07:00  Phos  4.9     08-19  Mg     2.0     08-19          CAPILLARY BLOOD GLUCOSE      POCT Blood Glucose.: 147 mg/dL (19 Aug 2019 17:30)  POCT Blood Glucose.: 134 mg/dL (19 Aug 2019 12:05)  POCT Blood Glucose.: 166 mg/dL (19 Aug 2019 08:39)  POCT Blood Glucose.: 225 mg/dL (18 Aug 2019 22:33)            PAST MEDICAL & SURGICAL HISTORY:  CKD (chronic kidney disease)  MR (mitral regurgitation)  AF (atrial fibrillation)  DM (diabetes mellitus)  HLD (hyperlipidemia)  TIA (transient ischemic attack)  HTN (hypertension)  CVA (cerebral infarction)  CHB (complete heart block)  CHF (congestive heart failure)  CAD (coronary artery disease): S/P 4V CABG  S/P mitral valve repair: 2007  S/P CABG (coronary artery bypass graft): 4V CABG in 2007  AICD (automatic cardioverter/defibrillator) present: [employer]      MEDICATIONS  (STANDING):  apixaban 5 milliGRAM(s) Oral every 12 hours  atorvastatin 20 milliGRAM(s) Oral at bedtime  buMETAnide Infusion 1 mG/Hr (5 mL/Hr) IV Continuous <Continuous>  dextrose 50% Injectable 12.5 Gram(s) IV Push once  dextrose 50% Injectable 25 Gram(s) IV Push once  dextrose 50% Injectable 25 Gram(s) IV Push once  gabapentin 100 milliGRAM(s) Oral at bedtime  insulin lispro (HumaLOG) corrective regimen sliding scale   SubCutaneous three times a day before meals  insulin lispro (HumaLOG) corrective regimen sliding scale   SubCutaneous at bedtime  spironolactone 50 milliGRAM(s) Oral daily  tamsulosin 0.4 milliGRAM(s) Oral daily    MEDICATIONS  (PRN):  acetaminophen   Tablet .. 650 milliGRAM(s) Oral every 6 hours PRN Mild Pain (1 - 3), Moderate Pain (4 - 6), Severe Pain (7 - 10)  dextrose 40% Gel 15 Gram(s) Oral once PRN Blood Glucose LESS THAN 70 milliGRAM(s)/deciliter  diphenhydrAMINE 25 milliGRAM(s) Oral every 6 hours PRN Rash and/or Itching  glucagon  Injectable 1 milliGRAM(s) IntraMuscular once PRN Glucose LESS THAN 70 milligrams/deciliter        RADIOLOGY & ADDITIONAL TESTS:    Imaging Personally Reviewed:  [ ] YES  [ ] NO    Consultant(s) Notes Reviewed:  [x ] YES  [ ] NO    PHYSICAL EXAM:  GENERAL: NAD, well-groomed, well-developed  HEAD:  Atraumatic, Normocephalic  EYES: EOMI, PERRLA, conjunctiva and sclera clear  ENMT: No tonsillar erythema, exudates, or enlargement; Moist mucous membranes, Good dentition, No lesions  NECK: Supple, No JVD, Normal thyroid  NERVOUS SYSTEM:  Alert & Oriented X3, Good concentration; Motor Strength 5/5 B/L upper and lower extremities; DTRs 2+ intact and symmetric  CHEST/LUNG: Clear to percussion bilaterally; No rales, rhonchi, wheezing, or rubs  HEART: Regular rate and rhythm; No murmurs, rubs, or gallops  ABDOMEN: Soft, Nontender, distended-improving. Bowel sounds present  EXTREMITIES:  2+ Peripheral Pulses, No clubbing, cyanosis, or edema  LYMPH: No lymphadenopathy noted  SKIN: No rashes or lesions    Care Discussed with Consultants/Other Providers [x ] YES  [ ] NO      Code Status: [] Full Code [] DNR [] DNI [] Goals of Care:   Disposition: [] ICU [] Stroke Unit [] RCU []PCU []Floor [] Discharge Home         EPI [email or web address]

## 2019-08-20 LAB
ANION GAP SERPL CALC-SCNC: 18 MMO/L — HIGH (ref 7–14)
BUN SERPL-MCNC: 74 MG/DL — HIGH (ref 7–23)
CALCIUM SERPL-MCNC: 8.9 MG/DL — SIGNIFICANT CHANGE UP (ref 8.4–10.5)
CHLORIDE SERPL-SCNC: 82 MMOL/L — LOW (ref 98–107)
CO2 SERPL-SCNC: 26 MMOL/L — SIGNIFICANT CHANGE UP (ref 22–31)
CREAT SERPL-MCNC: 1.26 MG/DL — SIGNIFICANT CHANGE UP (ref 0.5–1.3)
GLUCOSE SERPL-MCNC: 124 MG/DL — HIGH (ref 70–99)
HCT VFR BLD CALC: 38.2 % — LOW (ref 39–50)
HGB BLD-MCNC: 12.3 G/DL — LOW (ref 13–17)
MAGNESIUM SERPL-MCNC: 2 MG/DL — SIGNIFICANT CHANGE UP (ref 1.6–2.6)
MCHC RBC-ENTMCNC: 25.6 PG — LOW (ref 27–34)
MCHC RBC-ENTMCNC: 32.2 % — SIGNIFICANT CHANGE UP (ref 32–36)
MCV RBC AUTO: 79.4 FL — LOW (ref 80–100)
NRBC # FLD: 0.02 K/UL — SIGNIFICANT CHANGE UP (ref 0–0)
PHOSPHATE SERPL-MCNC: 4.6 MG/DL — HIGH (ref 2.5–4.5)
PLATELET # BLD AUTO: 132 K/UL — LOW (ref 150–400)
PMV BLD: 9.8 FL — SIGNIFICANT CHANGE UP (ref 7–13)
POTASSIUM SERPL-MCNC: 4.1 MMOL/L — SIGNIFICANT CHANGE UP (ref 3.5–5.3)
POTASSIUM SERPL-SCNC: 4.1 MMOL/L — SIGNIFICANT CHANGE UP (ref 3.5–5.3)
RBC # BLD: 4.81 M/UL — SIGNIFICANT CHANGE UP (ref 4.2–5.8)
RBC # FLD: 17.7 % — HIGH (ref 10.3–14.5)
SODIUM SERPL-SCNC: 126 MMOL/L — LOW (ref 135–145)
WBC # BLD: 5.78 K/UL — SIGNIFICANT CHANGE UP (ref 3.8–10.5)
WBC # FLD AUTO: 5.78 K/UL — SIGNIFICANT CHANGE UP (ref 3.8–10.5)

## 2019-08-20 RX ORDER — BUMETANIDE 0.25 MG/ML
2 INJECTION INTRAMUSCULAR; INTRAVENOUS
Refills: 0 | Status: DISCONTINUED | OUTPATIENT
Start: 2019-08-20 | End: 2019-08-21

## 2019-08-20 RX ADMIN — BUMETANIDE 5 MG/HR: 0.25 INJECTION INTRAMUSCULAR; INTRAVENOUS at 08:47

## 2019-08-20 RX ADMIN — Medication 1: at 17:25

## 2019-08-20 RX ADMIN — APIXABAN 5 MILLIGRAM(S): 2.5 TABLET, FILM COATED ORAL at 08:48

## 2019-08-20 RX ADMIN — TAMSULOSIN HYDROCHLORIDE 0.4 MILLIGRAM(S): 0.4 CAPSULE ORAL at 08:48

## 2019-08-20 RX ADMIN — GABAPENTIN 100 MILLIGRAM(S): 400 CAPSULE ORAL at 20:49

## 2019-08-20 RX ADMIN — Medication 2: at 08:48

## 2019-08-20 RX ADMIN — BUMETANIDE 2 MILLIGRAM(S): 0.25 INJECTION INTRAMUSCULAR; INTRAVENOUS at 17:25

## 2019-08-20 RX ADMIN — ATORVASTATIN CALCIUM 20 MILLIGRAM(S): 80 TABLET, FILM COATED ORAL at 20:50

## 2019-08-20 RX ADMIN — APIXABAN 5 MILLIGRAM(S): 2.5 TABLET, FILM COATED ORAL at 20:49

## 2019-08-20 NOTE — PROGRESS NOTE ADULT - ASSESSMENT
68 y/o M with PMH of MR s/p mitral valvuloplasty, Afib (on Coumadin), HLD, HTN, DM, CVA, CAD (s/p 4V CABG), CHF (with EF of 15-20% s/p AICD) presented with the complaint of worsening LE edema, weight gain and abdominal distention. Got admitted with CHF, found to have hyponatremia    Hyponatremia:  Likely sec to fluid overload sec to CHF  Has been stable at 129-130 during this stay.   Free water restriction decrease to 800ml/day  s/p paracentesis ~3.6L removed  Na dropped with metolazone, now off  Na still did not improve much with fluid restriction. monitor Na, will give a dose of tolvaptan 15 when Na<125  Monitor Na level daily  BUN/cr ratio up trending sec to diuresing. on bumex gtt. changed to bumex 2mg po bid    Chronic Kidney Disease:  Currently at baseline   fluctuates slightly sec to chf  continue to monitor    Hypomagnesemia:  likely sec to diuresis  Improved  Monitor Mg level    Hypokalemia  likely sec to diuretics  Serum K improved  monitor serum K for now    CHF:  fluid status much better, off bumex gtt on bumex 2mg bid  off metolazone   Monitor K, Mg level  Follow up cardiology  Daily weight s 70 y/o M with PMH of MR s/p mitral valvuloplasty, Afib (on Coumadin), HLD, HTN, DM, CVA, CAD (s/p 4V CABG), CHF (with EF of 15-20% s/p AICD) presented with the complaint of worsening LE edema, weight gain and abdominal distention. Got admitted with CHF, found to have hyponatremia    Hyponatremia:  Likely sec to fluid overload sec to CHF  Has been stable at 129-130 during this stay.   Free water restriction decrease to 800ml/day  s/p paracentesis ~3.6L removed  Na decreased with metolazone, now off  Na still did not improve much with fluid restriction. monitor Na, will give a dose of tolvaptan 15 when Na<125  Monitor Na level daily  BUN/cr ratio up trending sec to diuresing. Pt was on bumex gtt. Now changed to bumex 2mg po bid    Chronic Kidney Disease:  Currently at baseline   fluctuates slightly sec to chf  continue to monitor    Hypomagnesemia:  likely sec to diuresis  Improved  Monitor Mg level    Hypokalemia  likely sec to diuretics  Serum K improved  monitor serum K for now    CHF:  fluid status much better, off bumex gtt on bumex 2mg bid  off metolazone   Monitor K, Mg level  Follow up cardiology  Daily weights

## 2019-08-20 NOTE — PROGRESS NOTE ADULT - SUBJECTIVE AND OBJECTIVE BOX
[facility] [place] [facility] NEPHROLOGY PRACTICE   MD ULICES MCKEON DO ANGELA WONG, PA    TEL:  OFFICE: [phone]  DR TAYLOR CELL: [phone]  DR. MCFARLAND CELL: [phone]  KATHERINE POWER CELL: [phone]        Patient is a 69y old  Male who presents with a chief complaint of Abdominal distension, leg swelling (19 Aug 2019 18:04)      Patient seen and examined at bedside. No chest pain/sob    VITALS:  T(F): 97.5 (08-20-19 @ 05:00), Max: 97.5 (08-19-19 @ 19:45)  HR: 74 (08-20-19 @ 05:00)  BP: 93/60 (08-20-19 @ 05:00)  RR: 17 (08-20-19 @ 05:00)  SpO2: 100% (08-20-19 @ 05:00)  Wt(kg): --    08-19 @ 07:01  -  08-20 @ 07:00  --------------------------------------------------------  IN: 880 mL / OUT: 4550 mL / NET: -3670 mL    08-20 @ 07:01  -  08-20 @ 12:34  --------------------------------------------------------  IN: 150 mL / OUT: 500 mL / NET: -350 mL          PHYSICAL EXAM:  Constitutional: NAD  Neck: No JVD  Respiratory: CTAB, no wheezes, rales or rhonchi  Cardiovascular: S1, S2, RRR  Gastrointestinal: BS+, soft, NT/ND  Extremities: 1-2+ peripheral edema    Hospital Medications:   MEDICATIONS  (STANDING):  apixaban 5 milliGRAM(s) Oral every 12 hours  atorvastatin 20 milliGRAM(s) Oral at bedtime  buMETAnide 2 milliGRAM(s) Oral two times a day  dextrose 50% Injectable 12.5 Gram(s) IV Push once  dextrose 50% Injectable 25 Gram(s) IV Push once  dextrose 50% Injectable 25 Gram(s) IV Push once  gabapentin 100 milliGRAM(s) Oral at bedtime  insulin lispro (HumaLOG) corrective regimen sliding scale   SubCutaneous three times a day before meals  insulin lispro (HumaLOG) corrective regimen sliding scale   SubCutaneous at bedtime  spironolactone 50 milliGRAM(s) Oral daily  tamsulosin 0.4 milliGRAM(s) Oral daily      LABS:  08-20    126<L>  |  82<L>  |  74<H>  ----------------------------<  124<H>  4.1   |  26  |  1.26    Ca    8.9      20 Aug 2019 05:45  Phos  4.6     08-20  Mg     2.0     08-20      Creatinine Trend: 1.26 <--, 1.19 <--, 1.31 <--, 1.20 <--, 1.19 <--, 1.20 <--, 1.08 <--, 1.14 <--, 0.99 <--    Phosphorus Level, Serum: 4.6 mg/dL (08-20 @ 05:45)                              12.3   5.78  )-----------( 132      ( 20 Aug 2019 05:45 )             38.2     Urine Studies:      .1 (Ca --)      [02-17-19 @ 15:00]   --  Vitamin D (25OH) 12.6      [02-17-19 @ 15:00]  HbA1c 6.5      [08-09-19 @ 06:00]  TSH 2.60      [03-15-19 @ 05:15]  Lipid: chol 126, TG 87, HDL 38, LDL 85      [03-15-19 @ 05:15]        RADIOLOGY & ADDITIONAL STUDIES:

## 2019-08-20 NOTE — PROGRESS NOTE ADULT - ASSESSMENT
Echo 2/19 - severe LV and RV dysfunction    a/p     1) Acute on chronic CHF - d/c metolazone,  cont bumex switch to 2mg PO q12 , cont aldactone, hold bp meds as pt is mildly hypotensive    2) Afib -  eliquis    3) Hyponatremia - f/u nephro likely sec to CHF, d/c metolazone f/u renal

## 2019-08-21 LAB
ANION GAP SERPL CALC-SCNC: 15 MMO/L — HIGH (ref 7–14)
BUN SERPL-MCNC: 74 MG/DL — HIGH (ref 7–23)
CALCIUM SERPL-MCNC: 8.6 MG/DL — SIGNIFICANT CHANGE UP (ref 8.4–10.5)
CHLORIDE SERPL-SCNC: 84 MMOL/L — LOW (ref 98–107)
CO2 SERPL-SCNC: 26 MMOL/L — SIGNIFICANT CHANGE UP (ref 22–31)
CREAT SERPL-MCNC: 1.28 MG/DL — SIGNIFICANT CHANGE UP (ref 0.5–1.3)
GLUCOSE SERPL-MCNC: 169 MG/DL — HIGH (ref 70–99)
HCT VFR BLD CALC: 38.8 % — LOW (ref 39–50)
HGB BLD-MCNC: 12 G/DL — LOW (ref 13–17)
MAGNESIUM SERPL-MCNC: 2 MG/DL — SIGNIFICANT CHANGE UP (ref 1.6–2.6)
MCHC RBC-ENTMCNC: 25.1 PG — LOW (ref 27–34)
MCHC RBC-ENTMCNC: 30.9 % — LOW (ref 32–36)
MCV RBC AUTO: 81.2 FL — SIGNIFICANT CHANGE UP (ref 80–100)
NRBC # FLD: 0 K/UL — SIGNIFICANT CHANGE UP (ref 0–0)
PHOSPHATE SERPL-MCNC: 4.3 MG/DL — SIGNIFICANT CHANGE UP (ref 2.5–4.5)
PLATELET # BLD AUTO: 112 K/UL — LOW (ref 150–400)
PMV BLD: 10 FL — SIGNIFICANT CHANGE UP (ref 7–13)
POTASSIUM SERPL-MCNC: 3.4 MMOL/L — LOW (ref 3.5–5.3)
POTASSIUM SERPL-SCNC: 3.4 MMOL/L — LOW (ref 3.5–5.3)
RBC # BLD: 4.78 M/UL — SIGNIFICANT CHANGE UP (ref 4.2–5.8)
RBC # FLD: 17.2 % — HIGH (ref 10.3–14.5)
SODIUM SERPL-SCNC: 125 MMOL/L — LOW (ref 135–145)
WBC # BLD: 5.46 K/UL — SIGNIFICANT CHANGE UP (ref 3.8–10.5)
WBC # FLD AUTO: 5.46 K/UL — SIGNIFICANT CHANGE UP (ref 3.8–10.5)

## 2019-08-21 RX ORDER — BUMETANIDE 0.25 MG/ML
2 INJECTION INTRAMUSCULAR; INTRAVENOUS
Refills: 0 | Status: DISCONTINUED | OUTPATIENT
Start: 2019-08-21 | End: 2019-08-26

## 2019-08-21 RX ORDER — POTASSIUM CHLORIDE 20 MEQ
40 PACKET (EA) ORAL EVERY 4 HOURS
Refills: 0 | Status: COMPLETED | OUTPATIENT
Start: 2019-08-21 | End: 2019-08-21

## 2019-08-21 RX ORDER — TOLVAPTAN 15 MG/1
15 TABLET ORAL ONCE
Refills: 0 | Status: COMPLETED | OUTPATIENT
Start: 2019-08-21 | End: 2019-08-21

## 2019-08-21 RX ADMIN — BUMETANIDE 2 MILLIGRAM(S): 0.25 INJECTION INTRAMUSCULAR; INTRAVENOUS at 17:41

## 2019-08-21 RX ADMIN — ATORVASTATIN CALCIUM 20 MILLIGRAM(S): 80 TABLET, FILM COATED ORAL at 21:23

## 2019-08-21 RX ADMIN — APIXABAN 5 MILLIGRAM(S): 2.5 TABLET, FILM COATED ORAL at 08:49

## 2019-08-21 RX ADMIN — Medication 3: at 08:49

## 2019-08-21 RX ADMIN — Medication 1: at 17:40

## 2019-08-21 RX ADMIN — Medication 1: at 21:50

## 2019-08-21 RX ADMIN — TAMSULOSIN HYDROCHLORIDE 0.4 MILLIGRAM(S): 0.4 CAPSULE ORAL at 08:49

## 2019-08-21 RX ADMIN — TOLVAPTAN 15 MILLIGRAM(S): 15 TABLET ORAL at 12:14

## 2019-08-21 RX ADMIN — BUMETANIDE 2 MILLIGRAM(S): 0.25 INJECTION INTRAMUSCULAR; INTRAVENOUS at 05:07

## 2019-08-21 RX ADMIN — Medication 40 MILLIEQUIVALENT(S): at 08:49

## 2019-08-21 RX ADMIN — SPIRONOLACTONE 50 MILLIGRAM(S): 25 TABLET, FILM COATED ORAL at 05:07

## 2019-08-21 RX ADMIN — APIXABAN 5 MILLIGRAM(S): 2.5 TABLET, FILM COATED ORAL at 21:23

## 2019-08-21 RX ADMIN — Medication 40 MILLIEQUIVALENT(S): at 12:14

## 2019-08-21 RX ADMIN — GABAPENTIN 100 MILLIGRAM(S): 400 CAPSULE ORAL at 21:23

## 2019-08-21 NOTE — PROGRESS NOTE ADULT - SUBJECTIVE AND OBJECTIVE BOX
Phillip Sorenson MD  Interventional Cardiology / Advance Heart Failure and Cardiac Transplant Specialist  [place] Office : [address] [place] [address] [facility] [address]  Tel:   [place] Office : [address] [facility] [place] [address]  Tel: [phone]  Cell : [phone]    Subjective : Pt lying in bed comfortable, not in distress, denies any chest pain or SOB  	  MEDICATIONS:  apixaban 5 milliGRAM(s) Oral every 12 hours  buMETAnide 2 milliGRAM(s) Oral two times a day  spironolactone 50 milliGRAM(s) Oral daily  tamsulosin 0.4 milliGRAM(s) Oral daily        acetaminophen   Tablet .. 650 milliGRAM(s) Oral every 6 hours PRN  diphenhydrAMINE 25 milliGRAM(s) Oral every 6 hours PRN  gabapentin 100 milliGRAM(s) Oral at bedtime      atorvastatin 20 milliGRAM(s) Oral at bedtime  dextrose 40% Gel 15 Gram(s) Oral once PRN  dextrose 50% Injectable 12.5 Gram(s) IV Push once  dextrose 50% Injectable 25 Gram(s) IV Push once  dextrose 50% Injectable 25 Gram(s) IV Push once  glucagon  Injectable 1 milliGRAM(s) IntraMuscular once PRN  insulin lispro (HumaLOG) corrective regimen sliding scale   SubCutaneous three times a day before meals  insulin lispro (HumaLOG) corrective regimen sliding scale   SubCutaneous at bedtime        PHYSICAL EXAM:  T(C): 36.3 (08-21-19 @ 04:50), Max: 36.6 (08-20-19 @ 13:50)  HR: 72 (08-21-19 @ 05:06) (71 - 75)  BP: 102/62 (08-21-19 @ 05:06) (86/50 - 107/58)  RR: 17 (08-21-19 @ 05:06) (16 - 17)  SpO2: 100% (08-21-19 @ 05:06) (99% - 100%)  Wt(kg): --  I&O's Summary    20 Aug 2019 07:01  -  21 Aug 2019 07:00  --------------------------------------------------------  IN: 500 mL / OUT: 2350 mL / NET: -1850 mL    21 Aug 2019 07:01  -  21 Aug 2019 13:47  --------------------------------------------------------  IN: 200 mL / OUT: 775 mL / NET: -575 mL          HEENT:   Normal oral mucosa, PERRL, EOMI	  Cardiovascular: Normal S1 S2, No JVD, No murmurs, No edema  Respiratory: Lungs clear to auscultation	  Gastrointestinal:  Soft, Non-tender, + BS	  Extremities: 2+ edema                                    12.0   5.46  )-----------( 112      ( 21 Aug 2019 04:24 )             38.8     08-21    125<L>  |  84<L>  |  74<H>  ----------------------------<  169<H>  3.4<L>   |  26  |  1.28    Ca    8.6      21 Aug 2019 04:24  Phos  4.3     08-21  Mg     2.0     08-21      proBNP:   Lipid Profile:   HgA1c:   TSH:

## 2019-08-21 NOTE — PROGRESS NOTE ADULT - SUBJECTIVE AND OBJECTIVE BOX
[facility] NEPHROLOGY PRACTICE   MD ULICES MCKEON DO ANGELA WONG, PA    TEL:  OFFICE: [phone]  DR TAYLOR CELL: [phone]  DR. MCFARLAND CELL: [phone]  KATHERINE POWER CELL: [phone]        Patient is a 69y old  Male who presents with a chief complaint of Abdominal distension, leg swelling (20 Aug 2019 12:34)      Patient seen and examined at bedside. No chest pain/sob    VITALS:  T(F): 97.4 (08-21-19 @ 04:50), Max: 97.8 (08-20-19 @ 13:50)  HR: 72 (08-21-19 @ 05:06)  BP: 102/62 (08-21-19 @ 05:06)  RR: 17 (08-21-19 @ 05:06)  SpO2: 100% (08-21-19 @ 05:06)  Wt(kg): --    08-20 @ 07:01  -  08-21 @ 07:00  --------------------------------------------------------  IN: 500 mL / OUT: 2350 mL / NET: -1850 mL    08-21 @ 07:01  -  08-21 @ 13:38  --------------------------------------------------------  IN: 200 mL / OUT: 775 mL / NET: -575 mL          PHYSICAL EXAM:  Constitutional: NAD  Neck: No JVD  Respiratory: CTAB, no wheezes, rales or rhonchi  Cardiovascular: S1, S2, RRR  Gastrointestinal: BS+, soft, NT/ND  Extremities: 2+ peripheral edema    Hospital Medications:   MEDICATIONS  (STANDING):  apixaban 5 milliGRAM(s) Oral every 12 hours  atorvastatin 20 milliGRAM(s) Oral at bedtime  buMETAnide 2 milliGRAM(s) Oral two times a day  dextrose 50% Injectable 12.5 Gram(s) IV Push once  dextrose 50% Injectable 25 Gram(s) IV Push once  dextrose 50% Injectable 25 Gram(s) IV Push once  gabapentin 100 milliGRAM(s) Oral at bedtime  insulin lispro (HumaLOG) corrective regimen sliding scale   SubCutaneous three times a day before meals  insulin lispro (HumaLOG) corrective regimen sliding scale   SubCutaneous at bedtime  spironolactone 50 milliGRAM(s) Oral daily  tamsulosin 0.4 milliGRAM(s) Oral daily      LABS:  08-21    125<L>  |  84<L>  |  74<H>  ----------------------------<  169<H>  3.4<L>   |  26  |  1.28    Ca    8.6      21 Aug 2019 04:24  Phos  4.3     08-21  Mg     2.0     08-21      Creatinine Trend: 1.28 <--, 1.26 <--, 1.19 <--, 1.31 <--, 1.20 <--, 1.19 <--, 1.20 <--, 1.08 <--, 1.14 <--    Phosphorus Level, Serum: 4.3 mg/dL (08-21 @ 04:24)                              12.0   5.46  )-----------( 112      ( 21 Aug 2019 04:24 )             38.8     Urine Studies:      .1 (Ca --)      [02-17-19 @ 15:00]   --  Vitamin D (25OH) 12.6      [02-17-19 @ 15:00]  HbA1c 6.5      [08-09-19 @ 06:00]  TSH 2.60      [03-15-19 @ 05:15]  Lipid: chol 126, TG 87, HDL 38, LDL 85      [03-15-19 @ 05:15]        RADIOLOGY & ADDITIONAL STUDIES:

## 2019-08-21 NOTE — PROGRESS NOTE ADULT - ASSESSMENT
Echo 2/19 - severe LV and RV dysfunction    a/p     1) Acute on chronic CHF -  still volume overloaded, cont bumex switch to 2mg IV q12 , cont aldactone, hold bp meds as pt is mildly hypotensive     2) Afib -  eliquis    3) Hyponatremia - f/u nephro likely sec to CHF, d/c metolazone f/u renal Tolvaptan added

## 2019-08-21 NOTE — CHART NOTE - NSCHARTNOTEFT_GEN_A_CORE
Source: Patient [x ]    Family [ ]     other [ x] chart review    Malnutrition f/u. 68 y/o M admitted with HF exacerbation. Pt reports improved PO intake, states he is eating >50% of plate. No GI distress (nausea/vomiting/diarrhea/constipation.) No chewing or swallowing difficulties at this time. Will discuss with PA to add Glucerna to diet order. Pt declined any further nutrition education.     Diet, Regular:   Consistent Carbohydrate {Evening Snack} (CSTCHOSN)  DASH/TLC {Sodium & Cholesterol Restricted} (DASH) (08-21-19 @ 13:38)  PO intake:  < 50% [ ] 50-75% [ x]   % [ ]  other :  Current Weight: Weight (kg): 78.7 (08-08)  8/16: 72 kg  8/19: 67.4 kg  8/21: 64 kg    Wt loss 2/2 fluid loss.     Edema: 2+ L/R leg and foot  Pressure Injuries: none    __________________ Pertinent Medications__________________   MEDICATIONS  (STANDING):  apixaban 5 milliGRAM(s) Oral every 12 hours  atorvastatin 20 milliGRAM(s) Oral at bedtime  buMETAnide 2 milliGRAM(s) Oral two times a day  dextrose 50% Injectable 12.5 Gram(s) IV Push once  dextrose 50% Injectable 25 Gram(s) IV Push once  dextrose 50% Injectable 25 Gram(s) IV Push once  gabapentin 100 milliGRAM(s) Oral at bedtime  insulin lispro (HumaLOG) corrective regimen sliding scale   SubCutaneous three times a day before meals  insulin lispro (HumaLOG) corrective regimen sliding scale   SubCutaneous at bedtime  spironolactone 50 milliGRAM(s) Oral daily  tamsulosin 0.4 milliGRAM(s) Oral daily    MEDICATIONS  (PRN):  acetaminophen   Tablet .. 650 milliGRAM(s) Oral every 6 hours PRN Mild Pain (1 - 3), Moderate Pain (4 - 6), Severe Pain (7 - 10)  dextrose 40% Gel 15 Gram(s) Oral once PRN Blood Glucose LESS THAN 70 milliGRAM(s)/deciliter  diphenhydrAMINE 25 milliGRAM(s) Oral every 6 hours PRN Rash and/or Itching  glucagon  Injectable 1 milliGRAM(s) IntraMuscular once PRN Glucose LESS THAN 70 milligrams/deciliter      __________________ Pertinent Labs__________________   08-21 Na125 mmol/L<L> Glu 169 mg/dL<H> K+ 3.4 mmol/L<L> Cr  1.28 mg/dL BUN 74 mg/dL<H> 08-21 Phos 4.3 mg/dL 08-09 [id number] 6.5 %<H>            Estimated Needs:   [x ] no change since previous assessment  [] recalculated:       Previous Nutrition Diagnosis: severe protein calorie malnutrition     Nutrition Diagnosis is [ x] ongoing- improving PO intake >50% meals. Moderate muscle/fat wasting continues   [ ] resolved [ ] not applicable         Recommendations:  1. Continue current diet.   2. Add Glucerna 2x daily (440kcals, 20g protein)         Monitoring and Evaluation:     [x ] PO intake [ x] Tolerance to diet prescription [x ] weights [x ] follow up per protocol  [ ] other:

## 2019-08-21 NOTE — PROGRESS NOTE ADULT - ASSESSMENT
70 y/o M with PMH of MR s/p mitral valvuloplasty, Afib (on Coumadin), HLD, HTN, DM, CVA, CAD (s/p 4V CABG), CHF (with EF of 15-20% s/p AICD) presented with the complaint of worsening LE edema, weight gain and abdominal distention. Got admitted with CHF, found to have hyponatremia    Hyponatremia:  Likely sec to fluid overload sec to CHF   Na continue to worsen with fluid restriction   off metolazone  planning for tolvaptan x1 dose today  d/c fluid restriction today, resume tmr  s/p paracentesis ~3.6L removed  Monitor Na level daily    Chronic Kidney Disease:  Currently at baseline   fluctuates slightly sec to chf  continue to monitor    Hypomagnesemia:  likely sec to diuresis  Improved  Monitor Mg level    Hypokalemia  likely sec to diuretics  supplement  monitor serum K for now    CHF:  fluid status much better, off bumex gtt on bumex 2mg bid  off metolazone   Monitor K, Mg level  Follow up cardiology  Daily weights 70 y/o M with PMH of MR s/p mitral valvuloplasty, Afib (on Coumadin), HLD, HTN, DM, CVA, CAD (s/p 4V CABG), CHF (with EF of 15-20% s/p AICD) presented with the complaint of worsening LE edema, weight gain and abdominal distention. Got admitted with CHF, found to have hyponatremia    Hyponatremia:  Likely sec to fluid overload sec to CHF   Na continue to worsen with fluid restriction   off metolazone  planning for tolvaptan x1 dose today  d/c fluid restriction today, resume tmrw  s/p paracentesis ~3.6L removed  Monitor Na level daily    Chronic Kidney Disease:  Currently at baseline   fluctuates slightly sec to chf  continue to monitor    Hypomagnesemia:  likely sec to diuresis  Improved  Monitor Mg level    Hypokalemia  likely sec to diuretics  supplement  monitor serum K for now    CHF:  fluid status much better, off bumex gtt on bumex 2mg bid  off metolazone   Monitor K, Mg level  Follow up cardiology  Daily weights

## 2019-08-22 LAB
ANION GAP SERPL CALC-SCNC: 14 MMO/L — SIGNIFICANT CHANGE UP (ref 7–14)
BUN SERPL-MCNC: 72 MG/DL — HIGH (ref 7–23)
CALCIUM SERPL-MCNC: 8.8 MG/DL — SIGNIFICANT CHANGE UP (ref 8.4–10.5)
CHLORIDE SERPL-SCNC: 86 MMOL/L — LOW (ref 98–107)
CO2 SERPL-SCNC: 26 MMOL/L — SIGNIFICANT CHANGE UP (ref 22–31)
CREAT SERPL-MCNC: 1.17 MG/DL — SIGNIFICANT CHANGE UP (ref 0.5–1.3)
GLUCOSE SERPL-MCNC: 155 MG/DL — HIGH (ref 70–99)
HCT VFR BLD CALC: 38.2 % — LOW (ref 39–50)
HGB BLD-MCNC: 12.2 G/DL — LOW (ref 13–17)
MAGNESIUM SERPL-MCNC: 2 MG/DL — SIGNIFICANT CHANGE UP (ref 1.6–2.6)
MCHC RBC-ENTMCNC: 25.4 PG — LOW (ref 27–34)
MCHC RBC-ENTMCNC: 31.9 % — LOW (ref 32–36)
MCV RBC AUTO: 79.6 FL — LOW (ref 80–100)
NRBC # FLD: 0 K/UL — SIGNIFICANT CHANGE UP (ref 0–0)
PHOSPHATE SERPL-MCNC: 3.5 MG/DL — SIGNIFICANT CHANGE UP (ref 2.5–4.5)
PLATELET # BLD AUTO: 115 K/UL — LOW (ref 150–400)
PMV BLD: 9.6 FL — SIGNIFICANT CHANGE UP (ref 7–13)
POTASSIUM SERPL-MCNC: 4.5 MMOL/L — SIGNIFICANT CHANGE UP (ref 3.5–5.3)
POTASSIUM SERPL-SCNC: 4.5 MMOL/L — SIGNIFICANT CHANGE UP (ref 3.5–5.3)
RBC # BLD: 4.8 M/UL — SIGNIFICANT CHANGE UP (ref 4.2–5.8)
RBC # FLD: 17.2 % — HIGH (ref 10.3–14.5)
SODIUM SERPL-SCNC: 126 MMOL/L — LOW (ref 135–145)
WBC # BLD: 6.92 K/UL — SIGNIFICANT CHANGE UP (ref 3.8–10.5)
WBC # FLD AUTO: 6.92 K/UL — SIGNIFICANT CHANGE UP (ref 3.8–10.5)

## 2019-08-22 RX ADMIN — BUMETANIDE 2 MILLIGRAM(S): 0.25 INJECTION INTRAMUSCULAR; INTRAVENOUS at 17:24

## 2019-08-22 RX ADMIN — GABAPENTIN 100 MILLIGRAM(S): 400 CAPSULE ORAL at 21:15

## 2019-08-22 RX ADMIN — APIXABAN 5 MILLIGRAM(S): 2.5 TABLET, FILM COATED ORAL at 08:54

## 2019-08-22 RX ADMIN — SPIRONOLACTONE 50 MILLIGRAM(S): 25 TABLET, FILM COATED ORAL at 05:08

## 2019-08-22 RX ADMIN — Medication 1: at 17:25

## 2019-08-22 RX ADMIN — BUMETANIDE 2 MILLIGRAM(S): 0.25 INJECTION INTRAMUSCULAR; INTRAVENOUS at 05:08

## 2019-08-22 RX ADMIN — APIXABAN 5 MILLIGRAM(S): 2.5 TABLET, FILM COATED ORAL at 21:15

## 2019-08-22 RX ADMIN — TAMSULOSIN HYDROCHLORIDE 0.4 MILLIGRAM(S): 0.4 CAPSULE ORAL at 08:55

## 2019-08-22 RX ADMIN — ATORVASTATIN CALCIUM 20 MILLIGRAM(S): 80 TABLET, FILM COATED ORAL at 21:15

## 2019-08-22 RX ADMIN — Medication 1: at 08:54

## 2019-08-22 NOTE — PROGRESS NOTE ADULT - SUBJECTIVE AND OBJECTIVE BOX
[facility] NEPHROLOGY PRACTICE   MD ULICES MCKEON DO ANGELA WONG, PA    TEL:  OFFICE: [phone]  DR TAYLOR CELL: [phone]  DR. MCFARLAND CELL: [phone]  KATHERINE POWER CELL: [phone]        Patient is a 69y old  Male who presents with a chief complaint of Abdominal distension, leg swelling (21 Aug 2019 13:38)      Patient seen and examined at bedside. No chest pain/sob    VITALS:  T(F): 97.9 (08-22-19 @ 13:50), Max: 97.9 (08-22-19 @ 13:50)  HR: 74 (08-22-19 @ 13:50)  BP: 107/68 (08-22-19 @ 13:50)  RR: 16 (08-22-19 @ 13:50)  SpO2: 100% (08-22-19 @ 13:50)  Wt(kg): --    08-21 @ 07:01  -  08-22 @ 07:00  --------------------------------------------------------  IN: 525 mL / OUT: 1775 mL / NET: -1250 mL    08-22 @ 07:01  -  08-22 @ 14:36  --------------------------------------------------------  IN: 360 mL / OUT: 1150 mL / NET: -790 mL          PHYSICAL EXAM:  Constitutional: NAD  Neck: No JVD  Respiratory: CTAB, no wheezes, rales or rhonchi  Cardiovascular: S1, S2, RRR  Gastrointestinal: BS+, soft, NT/ND  Extremities: No peripheral edema    Hospital Medications:   MEDICATIONS  (STANDING):  apixaban 5 milliGRAM(s) Oral every 12 hours  atorvastatin 20 milliGRAM(s) Oral at bedtime  buMETAnide Injectable 2 milliGRAM(s) IV Push two times a day  dextrose 50% Injectable 12.5 Gram(s) IV Push once  dextrose 50% Injectable 25 Gram(s) IV Push once  dextrose 50% Injectable 25 Gram(s) IV Push once  gabapentin 100 milliGRAM(s) Oral at bedtime  insulin lispro (HumaLOG) corrective regimen sliding scale   SubCutaneous three times a day before meals  insulin lispro (HumaLOG) corrective regimen sliding scale   SubCutaneous at bedtime  spironolactone 50 milliGRAM(s) Oral daily  tamsulosin 0.4 milliGRAM(s) Oral daily      LABS:  08-22    126<L>  |  86<L>  |  72<H>  ----------------------------<  155<H>  4.5   |  26  |  1.17    Ca    8.8      22 Aug 2019 07:00  Phos  3.5     08-22  Mg     2.0     08-22      Creatinine Trend: 1.17 <--, 1.28 <--, 1.26 <--, 1.19 <--, 1.31 <--, 1.20 <--, 1.19 <--, 1.20 <--    Phosphorus Level, Serum: 3.5 mg/dL (08-22 @ 07:00)                              12.2   6.92  )-----------( 115      ( 22 Aug 2019 07:00 )             38.2     Urine Studies:      .1 (Ca --)      [02-17-19 @ 15:00]   --  Vitamin D (25OH) 12.6      [02-17-19 @ 15:00]  HbA1c 6.5      [08-09-19 @ 06:00]  TSH 2.60      [03-15-19 @ 05:15]  Lipid: chol 126, TG 87, HDL 38, LDL 85      [03-15-19 @ 05:15]        RADIOLOGY & ADDITIONAL STUDIES:

## 2019-08-22 NOTE — PROGRESS NOTE ADULT - ASSESSMENT
70 y/o M with PMH of MR s/p mitral valvuloplasty, Afib (on Coumadin), HLD, HTN, DM, CVA, CAD (s/p 4V CABG), CHF (with EF of 15-20% s/p AICD) presented with the complaint of worsening LE edema, weight gain and abdominal distention. Got admitted with CHF, found to have hyponatremia    Hyponatremia:  Likely sec to fluid overload sec to CHF   Na continue to worsen with fluid restriction s/p tolvaptaon x 1 dose 8/21  off metolazone  Na slightly better  continue fluid restriction   restarted on iv bumex   s/p paracentesis ~3.6L removed  Monitor Na level daily    Chronic Kidney Disease:  Currently at baseline   fluctuates slightly sec to chf  continue to monitor    Hypomagnesemia:  likely sec to diuresis  Improved  Monitor Mg level    Hypokalemia  likely sec to diuretics  supplement  monitor serum K for now    CHF:  restarted on bumex 2mg iv bid  off metolazone   Monitor K, Mg level  Follow up cardiology  Daily weights

## 2019-08-22 NOTE — PROGRESS NOTE ADULT - SUBJECTIVE AND OBJECTIVE BOX
Phillip Sorenson MD  Interventional Cardiology  [place] Office : [address] [place] [address]  Tel:   [place] Office : [address] [facility] [place] [address]  Tel: [phone]  Cell : [phone]    Subjective : Pt lying in bed comfortable, not in distress, denies any chest pain or SOB  	  MEDICATIONS:  apixaban 5 milliGRAM(s) Oral every 12 hours  buMETAnide Injectable 2 milliGRAM(s) IV Push two times a day  spironolactone 50 milliGRAM(s) Oral daily  tamsulosin 0.4 milliGRAM(s) Oral daily  acetaminophen   Tablet .. 650 milliGRAM(s) Oral every 6 hours PRN  diphenhydrAMINE 25 milliGRAM(s) Oral every 6 hours PRN  gabapentin 100 milliGRAM(s) Oral at bedtime  atorvastatin 20 milliGRAM(s) Oral at bedtime  dextrose 40% Gel 15 Gram(s) Oral once PRN  dextrose 50% Injectable 12.5 Gram(s) IV Push once  dextrose 50% Injectable 25 Gram(s) IV Push once  dextrose 50% Injectable 25 Gram(s) IV Push once  glucagon  Injectable 1 milliGRAM(s) IntraMuscular once PRN  insulin lispro (HumaLOG) corrective regimen sliding scale   SubCutaneous three times a day before meals  insulin lispro (HumaLOG) corrective regimen sliding scale   SubCutaneous at bedtime      PHYSICAL EXAM:  T(C): 36.6 (08-22-19 @ 13:50), Max: 36.6 (08-21-19 @ 20:01)  HR: 74 (08-22-19 @ 13:50) (69 - 74)  BP: 107/68 (08-22-19 @ 13:50) (102/63 - 107/68)  RR: 16 (08-22-19 @ 13:50) (16 - 17)  SpO2: 100% (08-22-19 @ 13:50) (95% - 100%)  Wt(kg): --  I&O's Summary    21 Aug 2019 07:01  -  22 Aug 2019 07:00  --------------------------------------------------------  IN: 525 mL / OUT: 1775 mL / NET: -1250 mL    22 Aug 2019 07:01  -  22 Aug 2019 17:11  --------------------------------------------------------  IN: 360 mL / OUT: 1150 mL / NET: -790 mL          Appearance: Normal	  HEENT:   Normal oral mucosa	  Cardiovascular: Normal S1 S2, No JVD, No murmurs  Respiratory: Lungs clear to auscultation	  Gastrointestinal:  Soft, Non-tender, + BS	  Extremities: No clubbing, cyanosis or edema                                    12.2   6.92  )-----------( 115      ( 22 Aug 2019 07:00 )             38.2     08-22    126<L>  |  86<L>  |  72<H>  ----------------------------<  155<H>  4.5   |  26  |  1.17    Ca    8.8      22 Aug 2019 07:00  Phos  3.5     08-22  Mg     2.0     08-22      proBNP:   Lipid Profile:   HgA1c:   TSH:

## 2019-08-22 NOTE — PROGRESS NOTE ADULT - ASSESSMENT
Echo 2/19 - severe LV and RV dysfunction    a/p     1) Acute on chronic CHF -  still volume overloaded, cont bumex switch to 2mg IV q12 , cont aldactone, c/t hold bp meds BP improved     2) Afib -  eliquis    3) Hyponatremia - f/u nephro likely sec to CHF, d/c metolazone f/u renal Tolvaptan added

## 2019-08-23 LAB
ANION GAP SERPL CALC-SCNC: 14 MMO/L — SIGNIFICANT CHANGE UP (ref 7–14)
BUN SERPL-MCNC: 68 MG/DL — HIGH (ref 7–23)
CALCIUM SERPL-MCNC: 8.5 MG/DL — SIGNIFICANT CHANGE UP (ref 8.4–10.5)
CHLORIDE SERPL-SCNC: 89 MMOL/L — LOW (ref 98–107)
CO2 SERPL-SCNC: 24 MMOL/L — SIGNIFICANT CHANGE UP (ref 22–31)
CREAT SERPL-MCNC: 1.04 MG/DL — SIGNIFICANT CHANGE UP (ref 0.5–1.3)
GLUCOSE SERPL-MCNC: 148 MG/DL — HIGH (ref 70–99)
HCT VFR BLD CALC: 37.2 % — LOW (ref 39–50)
HGB BLD-MCNC: 11.9 G/DL — LOW (ref 13–17)
MAGNESIUM SERPL-MCNC: 2.1 MG/DL — SIGNIFICANT CHANGE UP (ref 1.6–2.6)
MCHC RBC-ENTMCNC: 25.6 PG — LOW (ref 27–34)
MCHC RBC-ENTMCNC: 32 % — SIGNIFICANT CHANGE UP (ref 32–36)
MCV RBC AUTO: 80 FL — SIGNIFICANT CHANGE UP (ref 80–100)
NRBC # FLD: 0 K/UL — SIGNIFICANT CHANGE UP (ref 0–0)
PLATELET # BLD AUTO: 103 K/UL — LOW (ref 150–400)
PMV BLD: 9.2 FL — SIGNIFICANT CHANGE UP (ref 7–13)
POTASSIUM SERPL-MCNC: 4.3 MMOL/L — SIGNIFICANT CHANGE UP (ref 3.5–5.3)
POTASSIUM SERPL-SCNC: 4.3 MMOL/L — SIGNIFICANT CHANGE UP (ref 3.5–5.3)
RBC # BLD: 4.65 M/UL — SIGNIFICANT CHANGE UP (ref 4.2–5.8)
RBC # FLD: 17.2 % — HIGH (ref 10.3–14.5)
SODIUM SERPL-SCNC: 127 MMOL/L — LOW (ref 135–145)
WBC # BLD: 5.72 K/UL — SIGNIFICANT CHANGE UP (ref 3.8–10.5)
WBC # FLD AUTO: 5.72 K/UL — SIGNIFICANT CHANGE UP (ref 3.8–10.5)

## 2019-08-23 RX ADMIN — BUMETANIDE 2 MILLIGRAM(S): 0.25 INJECTION INTRAMUSCULAR; INTRAVENOUS at 17:43

## 2019-08-23 RX ADMIN — Medication 1: at 13:05

## 2019-08-23 RX ADMIN — Medication 1: at 17:43

## 2019-08-23 RX ADMIN — ATORVASTATIN CALCIUM 20 MILLIGRAM(S): 80 TABLET, FILM COATED ORAL at 22:16

## 2019-08-23 RX ADMIN — BUMETANIDE 2 MILLIGRAM(S): 0.25 INJECTION INTRAMUSCULAR; INTRAVENOUS at 05:06

## 2019-08-23 RX ADMIN — APIXABAN 5 MILLIGRAM(S): 2.5 TABLET, FILM COATED ORAL at 08:51

## 2019-08-23 RX ADMIN — SPIRONOLACTONE 50 MILLIGRAM(S): 25 TABLET, FILM COATED ORAL at 12:12

## 2019-08-23 RX ADMIN — APIXABAN 5 MILLIGRAM(S): 2.5 TABLET, FILM COATED ORAL at 22:16

## 2019-08-23 RX ADMIN — GABAPENTIN 100 MILLIGRAM(S): 400 CAPSULE ORAL at 22:16

## 2019-08-23 RX ADMIN — TAMSULOSIN HYDROCHLORIDE 0.4 MILLIGRAM(S): 0.4 CAPSULE ORAL at 08:51

## 2019-08-23 NOTE — PROGRESS NOTE ADULT - SUBJECTIVE AND OBJECTIVE BOX
[facility] NEPHROLOGY PRACTICE   MD ULICES MCKEON DO ANGELA WONG, PA    TEL:  OFFICE: [phone]  DR TAYLOR CELL: [phone]  DR. MCFARLAND CELL: [phone]  KATHERINE POWER CELL: [phone]        Patient is a 69y old  Male who presents with a chief complaint of Abdominal distension, leg swelling (23 Aug 2019 11:25)      Patient seen and examined at bedside. No chest pain/sob    VITALS:  T(F): 97.3 (08-23-19 @ 12:00), Max: 97.9 (08-22-19 @ 13:50)  HR: 71 (08-23-19 @ 12:00)  BP: 102/70 (08-23-19 @ 12:00)  RR: 18 (08-23-19 @ 12:00)  SpO2: 98% (08-23-19 @ 12:00)  Wt(kg): --    08-22 @ 07:01  -  08-23 @ 07:00  --------------------------------------------------------  IN: 460 mL / OUT: 2200 mL / NET: -1740 mL    08-23 @ 07:01  -  08-23 @ 13:33  --------------------------------------------------------  IN: 240 mL / OUT: 0 mL / NET: 240 mL          PHYSICAL EXAM:  Constitutional: NAD  Neck: No JVD  Respiratory: CTAB, no wheezes, rales or rhonchi  Cardiovascular: S1, S2, RRR  Gastrointestinal: BS+, distended, NT  Extremities: 2+ peripheral edema    Hospital Medications:   MEDICATIONS  (STANDING):  apixaban 5 milliGRAM(s) Oral every 12 hours  atorvastatin 20 milliGRAM(s) Oral at bedtime  buMETAnide Injectable 2 milliGRAM(s) IV Push two times a day  dextrose 50% Injectable 12.5 Gram(s) IV Push once  dextrose 50% Injectable 25 Gram(s) IV Push once  dextrose 50% Injectable 25 Gram(s) IV Push once  gabapentin 100 milliGRAM(s) Oral at bedtime  insulin lispro (HumaLOG) corrective regimen sliding scale   SubCutaneous three times a day before meals  insulin lispro (HumaLOG) corrective regimen sliding scale   SubCutaneous at bedtime  spironolactone 50 milliGRAM(s) Oral daily  tamsulosin 0.4 milliGRAM(s) Oral daily      LABS:  08-23    127<L>  |  89<L>  |  68<H>  ----------------------------<  148<H>  4.3   |  24  |  1.04    Ca    8.5      23 Aug 2019 06:00  Phos  3.5     08-22  Mg     2.1     08-23      Creatinine Trend: 1.04 <--, 1.17 <--, 1.28 <--, 1.26 <--, 1.19 <--, 1.31 <--, 1.20 <--, 1.19 <--                                11.9   5.72  )-----------( 103      ( 23 Aug 2019 06:00 )             37.2     Urine Studies:      .1 (Ca --)      [02-17-19 @ 15:00]   --  Vitamin D (25OH) 12.6      [02-17-19 @ 15:00]  HbA1c 6.5      [08-09-19 @ 06:00]  TSH 2.60      [03-15-19 @ 05:15]  Lipid: chol 126, TG 87, HDL 38, LDL 85      [03-15-19 @ 05:15]        RADIOLOGY & ADDITIONAL STUDIES:

## 2019-08-23 NOTE — PROGRESS NOTE ADULT - SUBJECTIVE AND OBJECTIVE BOX
Phillip Sorenson MD  Interventional Cardiology  [place] Office : [address] [place] [address] [address]. [address]  Tel:   [place] Office : [address] [facility] [place] [address]  Tel: [phone]  Cell : [phone]    Subjective : Pt lying in bed comfortable, not in distress, denies any chest pain or SOB  	  MEDICATIONS:  apixaban 5 milliGRAM(s) Oral every 12 hours  buMETAnide Injectable 2 milliGRAM(s) IV Push two times a day  spironolactone 50 milliGRAM(s) Oral daily  tamsulosin 0.4 milliGRAM(s) Oral daily  acetaminophen   Tablet .. 650 milliGRAM(s) Oral every 6 hours PRN  diphenhydrAMINE 25 milliGRAM(s) Oral every 6 hours PRN  gabapentin 100 milliGRAM(s) Oral at bedtime  atorvastatin 20 milliGRAM(s) Oral at bedtime  dextrose 40% Gel 15 Gram(s) Oral once PRN  dextrose 50% Injectable 12.5 Gram(s) IV Push once  dextrose 50% Injectable 25 Gram(s) IV Push once  dextrose 50% Injectable 25 Gram(s) IV Push once  glucagon  Injectable 1 milliGRAM(s) IntraMuscular once PRN  insulin lispro (HumaLOG) corrective regimen sliding scale   SubCutaneous three times a day before meals  insulin lispro (HumaLOG) corrective regimen sliding scale   SubCutaneous at bedtime        PHYSICAL EXAM:  T(C): 36.4 (08-23-19 @ 05:03), Max: 36.6 (08-22-19 @ 13:50)  HR: 71 (08-23-19 @ 05:03) (70 - 74)  BP: 91/55 (08-23-19 @ 05:03) (91/55 - 107/68)  RR: 17 (08-23-19 @ 05:03) (16 - 17)  SpO2: 100% (08-23-19 @ 05:03) (97% - 100%)  Wt(kg): --  I&O's Summary    22 Aug 2019 07:01  -  23 Aug 2019 07:00  --------------------------------------------------------  IN: 460 mL / OUT: 2200 mL / NET: -1740 mL    23 Aug 2019 07:01  -  23 Aug 2019 11:25  --------------------------------------------------------  IN: 240 mL / OUT: 0 mL / NET: 240 mL        Appearance: Normal	  HEENT:   Normal oral mucosa	  Cardiovascular: Normal S1 S2, No JVD, No murmurs  Respiratory: Lungs clear to auscultation	  Gastrointestinal:  Soft, Non-tender, + BS	  Extremities: No clubbing, cyanosis or edema                                            11.9   5.72  )-----------( 103      ( 23 Aug 2019 06:00 )             37.2     08-23    127<L>  |  89<L>  |  68<H>  ----------------------------<  148<H>  4.3   |  24  |  1.04    Ca    8.5      23 Aug 2019 06:00  Phos  3.5     08-22  Mg     2.1     08-23      proBNP:   Lipid Profile:   HgA1c:   TSH:

## 2019-08-23 NOTE — PROGRESS NOTE ADULT - SUBJECTIVE AND OBJECTIVE BOX
CARLO, NFN  69y  Male      Patient is a 69y old  Male who presents with a chief complaint of Abdominal distension, leg swelling (23 Aug 2019 13:33)  feels better.no sob,no cp,no fever,no cough    REVIEW OF SYSTEMS:  as above        INTERVAL HPI/OVERNIGHT EVENTS:  T(C): 36.3 (08-23-19 @ 12:00), Max: 36.4 (08-22-19 @ 20:14)  HR: 71 (08-23-19 @ 12:00) (71 - 71)  BP: 102/70 (08-23-19 @ 12:00) (91/55 - 102/70)  RR: 18 (08-23-19 @ 12:00) (17 - 18)  SpO2: 98% (08-23-19 @ 12:00) (97% - 100%)  Wt(kg): --  I&O's Summary    22 Aug 2019 07:01  -  23 Aug 2019 07:00  --------------------------------------------------------  IN: 460 mL / OUT: 2200 mL / NET: -1740 mL    23 Aug 2019 07:01  -  23 Aug 2019 19:16  --------------------------------------------------------  IN: 720 mL / OUT: 1400 mL / NET: -680 mL      T(C): 36.3 (08-23-19 @ 12:00), Max: 36.4 (08-22-19 @ 20:14)  HR: 71 (08-23-19 @ 12:00) (71 - 71)  BP: 102/70 (08-23-19 @ 12:00) (91/55 - 102/70)  RR: 18 (08-23-19 @ 12:00) (17 - 18)  SpO2: 98% (08-23-19 @ 12:00) (97% - 100%)  Wt(kg): --Vital Signs Last 24 Hrs  T(C): 36.3 (23 Aug 2019 12:00), Max: 36.4 (22 Aug 2019 20:14)  T(F): 97.3 (23 Aug 2019 12:00), Max: 97.6 (23 Aug 2019 05:03)  HR: 71 (23 Aug 2019 12:00) (71 - 71)  BP: 102/70 (23 Aug 2019 12:00) (91/55 - 102/70)  BP(mean): --  RR: 18 (23 Aug 2019 12:00) (17 - 18)  SpO2: 98% (23 Aug 2019 12:00) (97% - 100%)    LABS:                        11.9   5.72  )-----------( 103      ( 23 Aug 2019 06:00 )             37.2     08-23    127<L>  |  89<L>  |  68<H>  ----------------------------<  148<H>  4.3   |  24  |  1.04    Ca    8.5      23 Aug 2019 06:00  Phos  3.5     08-22  Mg     2.1     08-23          CAPILLARY BLOOD GLUCOSE      POCT Blood Glucose.: 171 mg/dL (23 Aug 2019 17:15)  POCT Blood Glucose.: 200 mg/dL (23 Aug 2019 12:25)  POCT Blood Glucose.: 150 mg/dL (23 Aug 2019 08:50)  POCT Blood Glucose.: 249 mg/dL (22 Aug 2019 21:28)            PAST MEDICAL & SURGICAL HISTORY:  CKD (chronic kidney disease)  MR (mitral regurgitation)  AF (atrial fibrillation)  DM (diabetes mellitus)  HLD (hyperlipidemia)  TIA (transient ischemic attack)  HTN (hypertension)  CVA (cerebral infarction)  CHB (complete heart block)  CHF (congestive heart failure)  CAD (coronary artery disease): S/P 4V CABG  S/P mitral valve repair: 2007  S/P CABG (coronary artery bypass graft): 4V CABG in 2007  AICD (automatic cardioverter/defibrillator) present: [employer]      MEDICATIONS  (STANDING):  apixaban 5 milliGRAM(s) Oral every 12 hours  atorvastatin 20 milliGRAM(s) Oral at bedtime  buMETAnide Injectable 2 milliGRAM(s) IV Push two times a day  dextrose 50% Injectable 12.5 Gram(s) IV Push once  dextrose 50% Injectable 25 Gram(s) IV Push once  dextrose 50% Injectable 25 Gram(s) IV Push once  gabapentin 100 milliGRAM(s) Oral at bedtime  insulin lispro (HumaLOG) corrective regimen sliding scale   SubCutaneous three times a day before meals  insulin lispro (HumaLOG) corrective regimen sliding scale   SubCutaneous at bedtime  spironolactone 50 milliGRAM(s) Oral daily  tamsulosin 0.4 milliGRAM(s) Oral daily    MEDICATIONS  (PRN):  acetaminophen   Tablet .. 650 milliGRAM(s) Oral every 6 hours PRN Mild Pain (1 - 3), Moderate Pain (4 - 6), Severe Pain (7 - 10)  dextrose 40% Gel 15 Gram(s) Oral once PRN Blood Glucose LESS THAN 70 milliGRAM(s)/deciliter  diphenhydrAMINE 25 milliGRAM(s) Oral every 6 hours PRN Rash and/or Itching  glucagon  Injectable 1 milliGRAM(s) IntraMuscular once PRN Glucose LESS THAN 70 milligrams/deciliter        RADIOLOGY & ADDITIONAL TESTS:    Imaging Personally Reviewed:  [ ] YES  [ ] NO    Consultant(s) Notes Reviewed:  [x ] YES  [ ] NO    PHYSICAL EXAM:  GENERAL: NAD, well-groomed, well-developed  HEAD:  Atraumatic, Normocephalic  EYES: EOMI, PERRLA, conjunctiva and sclera clear  ENMT: No tonsillar erythema, exudates, or enlargement; Moist mucous membranes, Good dentition, No lesions  NECK: Supple, No JVD, Normal thyroid  NERVOUS SYSTEM:  Alert & Oriented X3, nonfocal  CHEST/LUNG: Clear to percussion bilaterally; No rales, rhonchi, wheezing, or rubs  HEART: Regular rate and rhythm; No murmurs, rubs, or gallops  ABDOMEN: Soft, Nontender, Nondistended; Bowel sounds present  EXTREMITIES:  2+ Peripheral Pulses, No clubbing, cyanosis, or edema  LYMPH: No lymphadenopathy noted  SKIN: No rashes or lesions    Care Discussed with Consultants/Other Providers [ ] YES  [ ] NO      Code Status: [] Full Code [] DNR [] DNI [] Goals of Care:   Disposition: [] ICU [] Stroke Unit [] RCU []PCU []Floor [] Discharge Home         PRINCESS [email or web address]

## 2019-08-23 NOTE — PROGRESS NOTE ADULT - ATTENDING COMMENTS
Assessment/Rec-  -Acute on chronic systolic CHF-cont iv bumex -..spironolactone ,D.C metazolone.,Cardiology f/u noted.  -A.FIB with mvr-rate control,A/C-Eliquis  -Hyponatremia-likely sec.to chf-fluid restriction,renal f/u noted,monitor-127.Renal f/u noted.  --DM,check A1C-6.5 ,monitor FS with coverage  -Ascites.u/s.s/p paracentesis-f/u [id number].so far.  -d/w family at bedside

## 2019-08-23 NOTE — PROGRESS NOTE ADULT - ASSESSMENT
68 y/o M with PMH of MR s/p mitral valvuloplasty, Afib (on Coumadin), HLD, HTN, DM, CVA, CAD (s/p 4V CABG), CHF (with EF of 15-20% s/p AICD) presented with the complaint of worsening LE edema, weight gain and abdominal distention. Got admitted with CHF, found to have hyponatremia    Hyponatremia:  Likely sec to fluid overload sec to CHF   Na continue to worsen with fluid restriction s/p tolvaptaon x 1 dose 8/21  off metolazone  Na improving slowing  continue fluid restriction <1L/day  restarted on iv bumex   s/p paracentesis ~3.6L removed  Monitor Na level daily    Chronic Kidney Disease:  Currently at baseline   fluctuates slightly sec to chf  continue to monitor    Hypomagnesemia:  likely sec to diuresis  Improved  Monitor Mg level    Hypokalemia  likely sec to diuretics  supplement  monitor serum K for now    CHF:  restarted on bumex 2mg iv bid  off metolazone   Monitor K, Mg level  Follow up cardiology  Daily weights

## 2019-08-24 LAB
ANION GAP SERPL CALC-SCNC: 14 MMO/L — SIGNIFICANT CHANGE UP (ref 7–14)
BUN SERPL-MCNC: 66 MG/DL — HIGH (ref 7–23)
CALCIUM SERPL-MCNC: 8.6 MG/DL — SIGNIFICANT CHANGE UP (ref 8.4–10.5)
CHLORIDE SERPL-SCNC: 87 MMOL/L — LOW (ref 98–107)
CO2 SERPL-SCNC: 25 MMOL/L — SIGNIFICANT CHANGE UP (ref 22–31)
CREAT SERPL-MCNC: 0.99 MG/DL — SIGNIFICANT CHANGE UP (ref 0.5–1.3)
GLUCOSE SERPL-MCNC: 146 MG/DL — HIGH (ref 70–99)
HCT VFR BLD CALC: 37.2 % — LOW (ref 39–50)
HGB BLD-MCNC: 11.7 G/DL — LOW (ref 13–17)
MAGNESIUM SERPL-MCNC: 2 MG/DL — SIGNIFICANT CHANGE UP (ref 1.6–2.6)
MCHC RBC-ENTMCNC: 25.4 PG — LOW (ref 27–34)
MCHC RBC-ENTMCNC: 31.5 % — LOW (ref 32–36)
MCV RBC AUTO: 80.9 FL — SIGNIFICANT CHANGE UP (ref 80–100)
NRBC # FLD: 0 K/UL — SIGNIFICANT CHANGE UP (ref 0–0)
PLATELET # BLD AUTO: 106 K/UL — LOW (ref 150–400)
PMV BLD: 10.4 FL — SIGNIFICANT CHANGE UP (ref 7–13)
POTASSIUM SERPL-MCNC: 4.5 MMOL/L — SIGNIFICANT CHANGE UP (ref 3.5–5.3)
POTASSIUM SERPL-SCNC: 4.5 MMOL/L — SIGNIFICANT CHANGE UP (ref 3.5–5.3)
RBC # BLD: 4.6 M/UL — SIGNIFICANT CHANGE UP (ref 4.2–5.8)
RBC # FLD: 17.2 % — HIGH (ref 10.3–14.5)
SODIUM SERPL-SCNC: 126 MMOL/L — LOW (ref 135–145)
WBC # BLD: 6.62 K/UL — SIGNIFICANT CHANGE UP (ref 3.8–10.5)
WBC # FLD AUTO: 6.62 K/UL — SIGNIFICANT CHANGE UP (ref 3.8–10.5)

## 2019-08-24 RX ADMIN — BUMETANIDE 2 MILLIGRAM(S): 0.25 INJECTION INTRAMUSCULAR; INTRAVENOUS at 09:13

## 2019-08-24 RX ADMIN — GABAPENTIN 100 MILLIGRAM(S): 400 CAPSULE ORAL at 20:49

## 2019-08-24 RX ADMIN — Medication 1: at 12:27

## 2019-08-24 RX ADMIN — ATORVASTATIN CALCIUM 20 MILLIGRAM(S): 80 TABLET, FILM COATED ORAL at 20:48

## 2019-08-24 RX ADMIN — APIXABAN 5 MILLIGRAM(S): 2.5 TABLET, FILM COATED ORAL at 09:13

## 2019-08-24 RX ADMIN — TAMSULOSIN HYDROCHLORIDE 0.4 MILLIGRAM(S): 0.4 CAPSULE ORAL at 11:33

## 2019-08-24 RX ADMIN — APIXABAN 5 MILLIGRAM(S): 2.5 TABLET, FILM COATED ORAL at 20:48

## 2019-08-24 NOTE — PROGRESS NOTE ADULT - ATTENDING COMMENTS
Assessment/Rec-  -Acute on chronic systolic CHF-cont iv bumex -..monitor BP  -Hyponatremia-likely sec.to chf-fluid restriction,renal f/u noted,monitor-128.Renal f/u noted.  --DM,check A1C-6.5 ,monitor FS with coverage  -Ascites.u/s.s/p paracentesis-f/u [id number].so far.  -d/w family at bedside

## 2019-08-24 NOTE — PROGRESS NOTE ADULT - SUBJECTIVE AND OBJECTIVE BOX
[facility]   MD ULICES MCKEON D.O, PA    TELEPHONE NUMBERS:  OFFICE: [phone]  DR. TAYLOR CELL: [phone]  BENTON TARANGO CELL: [phone]. SHEIKH CELL:  [phone]    RENAL FOLLOW UP NOTE  --------------------------------------------------------------------------------  HPI: Pt seen and examined at bedside. Pt has no complaints.   Denies SOB, chest pain     PAST HISTORY  --------------------------------------------------------------------------------  No significant changes to PMH, PSH, FHx, SHx, unless otherwise noted    ALLERGIES & MEDICATIONS  --------------------------------------------------------------------------------  Allergies    No Known Allergies    Intolerances      Standing Inpatient Medications  apixaban 5 milliGRAM(s) Oral every 12 hours  atorvastatin 20 milliGRAM(s) Oral at bedtime  buMETAnide Injectable 2 milliGRAM(s) IV Push two times a day  dextrose 50% Injectable 12.5 Gram(s) IV Push once  dextrose 50% Injectable 25 Gram(s) IV Push once  dextrose 50% Injectable 25 Gram(s) IV Push once  gabapentin 100 milliGRAM(s) Oral at bedtime  insulin lispro (HumaLOG) corrective regimen sliding scale   SubCutaneous three times a day before meals  insulin lispro (HumaLOG) corrective regimen sliding scale   SubCutaneous at bedtime  spironolactone 50 milliGRAM(s) Oral daily  tamsulosin 0.4 milliGRAM(s) Oral daily    PRN Inpatient Medications  acetaminophen   Tablet .. 650 milliGRAM(s) Oral every 6 hours PRN  dextrose 40% Gel 15 Gram(s) Oral once PRN  diphenhydrAMINE 25 milliGRAM(s) Oral every 6 hours PRN  glucagon  Injectable 1 milliGRAM(s) IntraMuscular once PRN      REVIEW OF SYSTEMS  --------------------------------------------------------------------------------  General: no fever  CVS: no chest pain  RESP: no sob, no cough  ABD: no abdominal pain  : no dysuria  MSK: +edema     VITALS/PHYSICAL EXAM  --------------------------------------------------------------------------------  T(C): 36.4 (08-24-19 @ 09:10), Max: 36.7 (08-23-19 @ 22:00)  HR: 73 (08-24-19 @ 09:10) (69 - 73)  BP: 102/66 (08-24-19 @ 09:10) (91/50 - 111/69)  RR: 18 (08-24-19 @ 09:10) (18 - 18)  SpO2: 100% (08-24-19 @ 09:10) (95% - 100%)  Wt(kg): --        08-23-19 @ 07:01  -  08-24-19 @ 07:00  --------------------------------------------------------  IN: 960 mL / OUT: 2000 mL / NET: -1040 mL    08-24-19 @ 07:01  -  08-24-19 @ 12:41  --------------------------------------------------------  IN: 340 mL / OUT: 250 mL / NET: 90 mL      Physical Exam:  	Gen: NAD  	HEENT: MMM  	Pulm: CTA B/L  	CV: S1S2  	Abd: Soft, +BS  	Ext:+ LE edema B/L                      Neuro: Awake   	Skin: Warm and Dry       LABS/STUDIES  --------------------------------------------------------------------------------              11.7   6.62  >-----------<  106      [08-24-19 @ 05:54]              37.2     126  |  87  |  66  ----------------------------<  146      [08-24-19 @ 05:54]  4.5   |  25  |  0.99        Ca     8.6     [08-24-19 @ 05:54]      Mg     2.0     [08-24-19 @ 05:54]    Creatinine Trend:  SCr 0.99 [08-24 @ 05:54]  SCr 1.04 [08-23 @ 06:00]  SCr 1.17 [08-22 @ 07:00]  SCr 1.28 [08-21 @ 04:24]  SCr 1.26 [08-20 @ 05:45]        .1 (Ca --)      [02-17-19 @ 15:00]   --  Vitamin D (25OH) 12.6      [02-17-19 @ 15:00]  HbA1c 6.5      [08-09-19 @ 06:00]  TSH 2.60      [03-15-19 @ 05:15]  Lipid: chol 126, TG 87, HDL 38, LDL 85      [03-15-19 @ 05:15]

## 2019-08-24 NOTE — PROGRESS NOTE ADULT - SUBJECTIVE AND OBJECTIVE BOX
Phillip Sorenson MD  Interventional Cardiology  [place] Office : [address] [place] [address] [address] [address]  Tel:   [place] Office : [address] [facility] [place] [address]  Tel: [phone]  Cell : [phone]    Subjective : Pt lying in bed comfortable, not in distress, denies any chest pain or SOB  	  MEDICATIONS:  apixaban 5 milliGRAM(s) Oral every 12 hours  buMETAnide Injectable 2 milliGRAM(s) IV Push two times a day  spironolactone 50 milliGRAM(s) Oral daily  tamsulosin 0.4 milliGRAM(s) Oral daily  acetaminophen   Tablet .. 650 milliGRAM(s) Oral every 6 hours PRN  diphenhydrAMINE 25 milliGRAM(s) Oral every 6 hours PRN  gabapentin 100 milliGRAM(s) Oral at bedtime  atorvastatin 20 milliGRAM(s) Oral at bedtime  dextrose 40% Gel 15 Gram(s) Oral once PRN  dextrose 50% Injectable 12.5 Gram(s) IV Push once  dextrose 50% Injectable 25 Gram(s) IV Push once  dextrose 50% Injectable 25 Gram(s) IV Push once  glucagon  Injectable 1 milliGRAM(s) IntraMuscular once PRN  insulin lispro (HumaLOG) corrective regimen sliding scale   SubCutaneous three times a day before meals  insulin lispro (HumaLOG) corrective regimen sliding scale   SubCutaneous at bedtime      PHYSICAL EXAM:  T(C): 36.5 (08-24-19 @ 17:10), Max: 36.7 (08-23-19 @ 22:00)  HR: 69 (08-24-19 @ 17:10) (69 - 73)  BP: 89/56 (08-24-19 @ 17:10) (89/56 - 118/70)  RR: 18 (08-24-19 @ 17:10) (18 - 18)  SpO2: 94% (08-24-19 @ 17:10) (94% - 100%)  Wt(kg): --  I&O's Summary    23 Aug 2019 07:01  -  24 Aug 2019 07:00  --------------------------------------------------------  IN: 960 mL / OUT: 2000 mL / NET: -1040 mL    24 Aug 2019 07:01  -  24 Aug 2019 18:47  --------------------------------------------------------  IN: 580 mL / OUT: 550 mL / NET: 30 mL          Appearance: Normal	  HEENT:   Normal oral mucosa	  Cardiovascular: Normal S1 S2, No JVD, No murmurs  Respiratory: Lungs clear to auscultation	  Gastrointestinal:  Soft, Non-tender, + BS	  Extremities: No clubbing, cyanosis , 1+ edema                                11.7   6.62  )-----------( 106      ( 24 Aug 2019 05:54 )             37.2     08-24    126<L>  |  87<L>  |  66<H>  ----------------------------<  146<H>  4.5   |  25  |  0.99    Ca    8.6      24 Aug 2019 05:54  Mg     2.0     08-24      proBNP:   Lipid Profile:   HgA1c:   TSH:

## 2019-08-24 NOTE — PROGRESS NOTE ADULT - SUBJECTIVE AND OBJECTIVE BOX
CARLO, NFN  69y  Male      Patient is a 69y old  Male who presents with a chief complaint of Abdominal distension, leg swelling (24 Aug 2019 12:41)  no sob,no cp,no fever,BP runs low.    REVIEW OF SYSTEMS:  as above      INTERVAL HPI/OVERNIGHT EVENTS:  T(C): 36.4 (08-24-19 @ 20:47), Max: 36.6 (08-24-19 @ 13:48)  HR: 70 (08-24-19 @ 20:47) (69 - 73)  BP: 98/63 (08-24-19 @ 20:47) (89/56 - 118/70)  RR: 18 (08-24-19 @ 20:47) (18 - 18)  SpO2: 96% (08-24-19 @ 20:47) (94% - 100%)  Wt(kg): --  I&O's Summary    23 Aug 2019 07:01  -  24 Aug 2019 07:00  --------------------------------------------------------  IN: 960 mL / OUT: 2000 mL / NET: -1040 mL    24 Aug 2019 07:01  -  24 Aug 2019 22:06  --------------------------------------------------------  IN: 580 mL / OUT: 550 mL / NET: 30 mL      T(C): 36.4 (08-24-19 @ 20:47), Max: 36.6 (08-24-19 @ 13:48)  HR: 70 (08-24-19 @ 20:47) (69 - 73)  BP: 98/63 (08-24-19 @ 20:47) (89/56 - 118/70)  RR: 18 (08-24-19 @ 20:47) (18 - 18)  SpO2: 96% (08-24-19 @ 20:47) (94% - 100%)  Wt(kg): --Vital Signs Last 24 Hrs  T(C): 36.4 (24 Aug 2019 20:47), Max: 36.6 (24 Aug 2019 13:48)  T(F): 97.6 (24 Aug 2019 20:47), Max: 97.8 (24 Aug 2019 13:48)  HR: 70 (24 Aug 2019 20:47) (69 - 73)  BP: 98/63 (24 Aug 2019 20:47) (89/56 - 118/70)  BP(mean): --  RR: 18 (24 Aug 2019 20:47) (18 - 18)  SpO2: 96% (24 Aug 2019 20:47) (94% - 100%)    LABS:                        11.7   6.62  )-----------( 106      ( 24 Aug 2019 05:54 )             37.2     08-24    126<L>  |  87<L>  |  66<H>  ----------------------------<  146<H>  4.5   |  25  |  0.99    Ca    8.6      24 Aug 2019 05:54  Mg     2.0     08-24          CAPILLARY BLOOD GLUCOSE      POCT Blood Glucose.: 195 mg/dL (24 Aug 2019 21:43)  POCT Blood Glucose.: 139 mg/dL (24 Aug 2019 17:18)  POCT Blood Glucose.: 172 mg/dL (24 Aug 2019 12:10)  POCT Blood Glucose.: 144 mg/dL (24 Aug 2019 08:39)            PAST MEDICAL & SURGICAL HISTORY:  CKD (chronic kidney disease)  MR (mitral regurgitation)  AF (atrial fibrillation)  DM (diabetes mellitus)  HLD (hyperlipidemia)  TIA (transient ischemic attack)  HTN (hypertension)  CVA (cerebral infarction)  CHB (complete heart block)  CHF (congestive heart failure)  CAD (coronary artery disease): S/P 4V CABG  S/P mitral valve repair: 2007  S/P CABG (coronary artery bypass graft): 4V CABG in 2007  AICD (automatic cardioverter/defibrillator) present: [employer]      MEDICATIONS  (STANDING):  apixaban 5 milliGRAM(s) Oral every 12 hours  atorvastatin 20 milliGRAM(s) Oral at bedtime  buMETAnide Injectable 2 milliGRAM(s) IV Push two times a day  dextrose 50% Injectable 12.5 Gram(s) IV Push once  dextrose 50% Injectable 25 Gram(s) IV Push once  dextrose 50% Injectable 25 Gram(s) IV Push once  gabapentin 100 milliGRAM(s) Oral at bedtime  insulin lispro (HumaLOG) corrective regimen sliding scale   SubCutaneous three times a day before meals  insulin lispro (HumaLOG) corrective regimen sliding scale   SubCutaneous at bedtime  spironolactone 50 milliGRAM(s) Oral daily  tamsulosin 0.4 milliGRAM(s) Oral daily    MEDICATIONS  (PRN):  acetaminophen   Tablet .. 650 milliGRAM(s) Oral every 6 hours PRN Mild Pain (1 - 3), Moderate Pain (4 - 6), Severe Pain (7 - 10)  dextrose 40% Gel 15 Gram(s) Oral once PRN Blood Glucose LESS THAN 70 milliGRAM(s)/deciliter  diphenhydrAMINE 25 milliGRAM(s) Oral every 6 hours PRN Rash and/or Itching  glucagon  Injectable 1 milliGRAM(s) IntraMuscular once PRN Glucose LESS THAN 70 milligrams/deciliter        RADIOLOGY & ADDITIONAL TESTS:    Imaging Personally Reviewed:  [ ] YES  [ ] NO    Consultant(s) Notes Reviewed:  [x ] YES  [ ] NO    PHYSICAL EXAM:  GENERAL: NAD, well-groomed, well-developed  HEAD:  Atraumatic, Normocephalic  EYES: EOMI, PERRLA, conjunctiva and sclera clear  ENMT: No tonsillar erythema, exudates, or enlargement; Moist mucous membranes, Good dentition, No lesions  NECK: Supple, No JVD, Normal thyroid  NERVOUS SYSTEM:  Alert & Oriented X3, Good concentration; Motor Strength 5/5 B/L upper and lower extremities; DTRs 2+ intact and symmetric  CHEST/LUNG: mild rhonchi  b/l  HEART: Regular rate and rhythm; No murmurs, rubs, or gallops  ABDOMEN: Soft, Nontender, Nondistended; Bowel sounds present  EXTREMITIES:  2+ Peripheral Pulses, No clubbing, cyanosis,  edema  LYMPH: No lymphadenopathy noted  SKIN: No rashes or lesions    Care Discussed with Consultants/Other Providers [ ] YES  [ ] NO      Code Status: [] Full Code [] DNR [] DNI [] Goals of Care:   Disposition: [] ICU [] Stroke Unit [] RCU []PCU []Floor [] Discharge Home         PRINCESS [email or web address]

## 2019-08-24 NOTE — PROGRESS NOTE ADULT - ASSESSMENT
68 y/o M with PMH of MR s/p mitral valvuloplasty, Afib (on Coumadin), HLD, HTN, DM, CVA, CAD (s/p 4V CABG), CHF (with EF of 15-20% s/p AICD) presented with the complaint of worsening LE edema, weight gain and abdominal distention. Got admitted with CHF, found to have hyponatremia    Hyponatremia:  Likely sec to fluid overload sec to CHF   Na continue to worsen with fluid restriction s/p tolvaptaon x 1 dose 8/21  off metolazone  Na low but stable  continue fluid restriction <1L/day  restarted on bumex IV BID  s/p paracentesis ~3.6L removed  Monitor Na level daily    Chronic Kidney Disease:  Currently at baseline   fluctuates slightly sec to chf  continue to monitor    Hypomagnesemia:  likely sec to diuresis  Improved  Monitor Mg level    Hypokalemia  likely sec to diuretics  supplement  monitor serum K for now    CHF:  restarted on bumex 2mg iv bid  off metolazone   Monitor K, Mg level  Follow up cardiology  Daily weights

## 2019-08-24 NOTE — PROGRESS NOTE ADULT - ASSESSMENT
Echo 2/19 - severe LV and RV dysfunction    a/p     1) Acute on chronic CHF -  still volume overloaded, cont bumex switch to 2mg IV q12 , cont aldactone, c/t hold bp meds BP improved     2) Afib -  eliquis    3) Hyponatremia - f/u nephro likely sec to CHF, d/c metolazone f/u renal, s/p Tolvaptan

## 2019-08-25 LAB
ANION GAP SERPL CALC-SCNC: 16 MMO/L — HIGH (ref 7–14)
BUN SERPL-MCNC: 63 MG/DL — HIGH (ref 7–23)
CALCIUM SERPL-MCNC: 8.7 MG/DL — SIGNIFICANT CHANGE UP (ref 8.4–10.5)
CHLORIDE SERPL-SCNC: 87 MMOL/L — LOW (ref 98–107)
CO2 SERPL-SCNC: 21 MMOL/L — LOW (ref 22–31)
CREAT SERPL-MCNC: 0.91 MG/DL — SIGNIFICANT CHANGE UP (ref 0.5–1.3)
GLUCOSE SERPL-MCNC: 211 MG/DL — HIGH (ref 70–99)
HCT VFR BLD CALC: 36 % — LOW (ref 39–50)
HGB BLD-MCNC: 11.6 G/DL — LOW (ref 13–17)
MAGNESIUM SERPL-MCNC: 2 MG/DL — SIGNIFICANT CHANGE UP (ref 1.6–2.6)
MCHC RBC-ENTMCNC: 25.7 PG — LOW (ref 27–34)
MCHC RBC-ENTMCNC: 32.2 % — SIGNIFICANT CHANGE UP (ref 32–36)
MCV RBC AUTO: 79.6 FL — LOW (ref 80–100)
NRBC # FLD: 0 K/UL — SIGNIFICANT CHANGE UP (ref 0–0)
PLATELET # BLD AUTO: 96 K/UL — LOW (ref 150–400)
PMV BLD: 9 FL — SIGNIFICANT CHANGE UP (ref 7–13)
POTASSIUM SERPL-MCNC: 5 MMOL/L — SIGNIFICANT CHANGE UP (ref 3.5–5.3)
POTASSIUM SERPL-SCNC: 5 MMOL/L — SIGNIFICANT CHANGE UP (ref 3.5–5.3)
RBC # BLD: 4.52 M/UL — SIGNIFICANT CHANGE UP (ref 4.2–5.8)
RBC # FLD: 17.2 % — HIGH (ref 10.3–14.5)
SODIUM SERPL-SCNC: 124 MMOL/L — LOW (ref 135–145)
WBC # BLD: 7.28 K/UL — SIGNIFICANT CHANGE UP (ref 3.8–10.5)
WBC # FLD AUTO: 7.28 K/UL — SIGNIFICANT CHANGE UP (ref 3.8–10.5)

## 2019-08-25 RX ORDER — TOLVAPTAN 15 MG/1
15 TABLET ORAL ONCE
Refills: 0 | Status: COMPLETED | OUTPATIENT
Start: 2019-08-25 | End: 2019-08-25

## 2019-08-25 RX ADMIN — APIXABAN 5 MILLIGRAM(S): 2.5 TABLET, FILM COATED ORAL at 08:50

## 2019-08-25 RX ADMIN — APIXABAN 5 MILLIGRAM(S): 2.5 TABLET, FILM COATED ORAL at 21:08

## 2019-08-25 RX ADMIN — SPIRONOLACTONE 50 MILLIGRAM(S): 25 TABLET, FILM COATED ORAL at 05:41

## 2019-08-25 RX ADMIN — GABAPENTIN 100 MILLIGRAM(S): 400 CAPSULE ORAL at 21:08

## 2019-08-25 RX ADMIN — ATORVASTATIN CALCIUM 20 MILLIGRAM(S): 80 TABLET, FILM COATED ORAL at 21:08

## 2019-08-25 RX ADMIN — TAMSULOSIN HYDROCHLORIDE 0.4 MILLIGRAM(S): 0.4 CAPSULE ORAL at 18:11

## 2019-08-25 RX ADMIN — Medication 1: at 12:58

## 2019-08-25 RX ADMIN — BUMETANIDE 2 MILLIGRAM(S): 0.25 INJECTION INTRAMUSCULAR; INTRAVENOUS at 05:41

## 2019-08-25 RX ADMIN — Medication 2: at 18:12

## 2019-08-25 RX ADMIN — Medication 1: at 08:50

## 2019-08-25 RX ADMIN — BUMETANIDE 2 MILLIGRAM(S): 0.25 INJECTION INTRAMUSCULAR; INTRAVENOUS at 18:12

## 2019-08-25 RX ADMIN — TOLVAPTAN 15 MILLIGRAM(S): 15 TABLET ORAL at 12:58

## 2019-08-25 NOTE — PROGRESS NOTE ADULT - SUBJECTIVE AND OBJECTIVE BOX
Phillip Sorenson MD  Interventional Cardiology  [place] Office : [address] [place] [address] [address]. [address]  Tel:   [place] Office : [address] [facility] [place] [address]  Tel: [phone]  Cell : [phone]    Subjective : Pt lying in bed comfortable, not in distress, denies any chest pain or SOB  	  MEDICATIONS:  apixaban 5 milliGRAM(s) Oral every 12 hours  buMETAnide Injectable 2 milliGRAM(s) IV Push two times a day  spironolactone 50 milliGRAM(s) Oral daily  tamsulosin 0.4 milliGRAM(s) Oral daily  acetaminophen   Tablet .. 650 milliGRAM(s) Oral every 6 hours PRN  diphenhydrAMINE 25 milliGRAM(s) Oral every 6 hours PRN  gabapentin 100 milliGRAM(s) Oral at bedtime  atorvastatin 20 milliGRAM(s) Oral at bedtime  dextrose 40% Gel 15 Gram(s) Oral once PRN  dextrose 50% Injectable 12.5 Gram(s) IV Push once  dextrose 50% Injectable 25 Gram(s) IV Push once  dextrose 50% Injectable 25 Gram(s) IV Push once  glucagon  Injectable 1 milliGRAM(s) IntraMuscular once PRN  insulin lispro (HumaLOG) corrective regimen sliding scale   SubCutaneous three times a day before meals  insulin lispro (HumaLOG) corrective regimen sliding scale   SubCutaneous at bedtime        PHYSICAL EXAM:  T(C): 36.5 (08-25-19 @ 14:20), Max: 36.5 (08-24-19 @ 17:10)  HR: 71 (08-25-19 @ 14:20) (69 - 71)  BP: 112/65 (08-25-19 @ 14:20) (89/56 - 112/69)  RR: 18 (08-25-19 @ 14:20) (18 - 18)  SpO2: 100% (08-25-19 @ 14:20) (94% - 100%)  Wt(kg): --  I&O's Summary    24 Aug 2019 07:01  -  25 Aug 2019 07:00  --------------------------------------------------------  IN: 820 mL / OUT: 950 mL / NET: -130 mL    25 Aug 2019 07:01  -  25 Aug 2019 15:20  --------------------------------------------------------  IN: 0 mL / OUT: 350 mL / NET: -350 mL            Appearance: Normal	  HEENT:   Normal oral mucosa	  Cardiovascular: Normal S1 S2, No JVD, No murmurs  Respiratory: Lungs clear to auscultation	  Gastrointestinal:  Soft, Non-tender, + BS	  Extremities: No clubbing, cyanosis , 1+ edema                                      11.6   7.28  )-----------( 96       ( 25 Aug 2019 05:31 )             36.0     08-25    124<L>  |  87<L>  |  63<H>  ----------------------------<  211<H>  5.0   |  21<L>  |  0.91    Ca    8.7      25 Aug 2019 05:31  Mg     2.0     08-25      proBNP:   Lipid Profile:   HgA1c:   TSH:

## 2019-08-25 NOTE — CHART NOTE - NSCHARTNOTEFT_GEN_A_CORE
Sodium on AM labs noted to be 124.  Discussed with renal Dr. Velázquez.  Recommended to give tolvaptan 15mg x 1 dose now.  Will continue to monitor Na levels.

## 2019-08-25 NOTE — PROGRESS NOTE ADULT - SUBJECTIVE AND OBJECTIVE BOX
[facility]   MD ULICES MCKEON D.O, PA    TELEPHONE NUMBERS:  OFFICE: [phone]  DR. TAYLOR CELL: [phone]  BENTON TARANGO CELL: [phone]  DR. MCFARLAND CELL:  [phone]    RENAL FOLLOW UP NOTE  --------------------------------------------------------------------------------  HPI: Pt seen and examined at bedside. Pt has no complaints. Denies headache, dizziness.   Denies SOB, chest pain     PAST HISTORY  --------------------------------------------------------------------------------  No significant changes to PMH, PSH, FHx, SHx, unless otherwise noted    ALLERGIES & MEDICATIONS  --------------------------------------------------------------------------------  Allergies    No Known Allergies    Intolerances      Standing Inpatient Medications  apixaban 5 milliGRAM(s) Oral every 12 hours  atorvastatin 20 milliGRAM(s) Oral at bedtime  buMETAnide Injectable 2 milliGRAM(s) IV Push two times a day  dextrose 50% Injectable 12.5 Gram(s) IV Push once  dextrose 50% Injectable 25 Gram(s) IV Push once  dextrose 50% Injectable 25 Gram(s) IV Push once  gabapentin 100 milliGRAM(s) Oral at bedtime  insulin lispro (HumaLOG) corrective regimen sliding scale   SubCutaneous three times a day before meals  insulin lispro (HumaLOG) corrective regimen sliding scale   SubCutaneous at bedtime  spironolactone 50 milliGRAM(s) Oral daily  tamsulosin 0.4 milliGRAM(s) Oral daily    PRN Inpatient Medications  acetaminophen   Tablet .. 650 milliGRAM(s) Oral every 6 hours PRN  dextrose 40% Gel 15 Gram(s) Oral once PRN  diphenhydrAMINE 25 milliGRAM(s) Oral every 6 hours PRN  glucagon  Injectable 1 milliGRAM(s) IntraMuscular once PRN      REVIEW OF SYSTEMS  --------------------------------------------------------------------------------  General: no fever  CVS: no chest pain  RESP: no sob, no cough  ABD: no abdominal pain  : no dysuria,  MSK: no edema     VITALS/PHYSICAL EXAM  --------------------------------------------------------------------------------  T(C): 36.4 (08-25-19 @ 05:37), Max: 36.5 (08-24-19 @ 17:10)  HR: 70 (08-25-19 @ 05:37) (69 - 70)  BP: 112/69 (08-25-19 @ 05:37) (89/56 - 112/69)  RR: 18 (08-25-19 @ 05:37) (18 - 18)  SpO2: 100% (08-25-19 @ 05:37) (94% - 100%)  Wt(kg): --        08-24-19 @ 07:01  -  08-25-19 @ 07:00  --------------------------------------------------------  IN: 820 mL / OUT: 950 mL / NET: -130 mL      Physical Exam:  	Gen: NAD  	HEENT: MMM  	Pulm: CTA B/L  	CV: S1S2  	Abd: Soft, +BS  	Ext: No LE edema B/L                      Neuro: Awake   	Skin: Warm and Dry     LABS/STUDIES  --------------------------------------------------------------------------------              11.6   7.28  >-----------<  96       [08-25-19 @ 05:31]              36.0     124  |  87  |  63  ----------------------------<  211      [08-25-19 @ 05:31]  5.0   |  21  |  0.91        Ca     8.7     [08-25-19 @ 05:31]      Mg     2.0     [08-25-19 @ 05:31]    Creatinine Trend:  SCr 0.91 [08-25 @ 05:31]  SCr 0.99 [08-24 @ 05:54]  SCr 1.04 [08-23 @ 06:00]  SCr 1.17 [08-22 @ 07:00]  SCr 1.28 [08-21 @ 04:24]        .1 (Ca --)      [02-17-19 @ 15:00]   --  Vitamin D (25OH) 12.6      [02-17-19 @ 15:00]  HbA1c 6.5      [08-09-19 @ 06:00]  TSH 2.60      [03-15-19 @ 05:15]  Lipid: chol 126, TG 87, HDL 38, LDL 85      [03-15-19 @ 05:15]

## 2019-08-25 NOTE — PROGRESS NOTE ADULT - ASSESSMENT
70 y/o M with PMH of MR s/p mitral valvuloplasty, Afib (on Coumadin), HLD, HTN, DM, CVA, CAD (s/p 4V CABG), CHF (with EF of 15-20% s/p AICD) presented with the complaint of worsening LE edema, weight gain and abdominal distention. Got admitted with CHF, found to have hyponatremia    Hyponatremia:  Likely sec to fluid overload sec to CHF   Na continue to worsen with fluid restriction s/p tolvaptaon x 1 dose 8/21  off metolazone  Na improving slowing  continue fluid restriction <1L/day  restarted on iv bumex   s/p paracentesis ~3.6L removed  Monitor Na level daily    Chronic Kidney Disease:  Currently at baseline   fluctuates slightly sec to chf  continue to monitor    Hypomagnesemia:  likely sec to diuresis  Improved  Monitor Mg level    Hypokalemia  likely sec to diuretics  supplement  monitor serum K for now    CHF:  restarted on bumex 2mg iv bid  off metolazone   Monitor K, Mg level  Follow up cardiology  Daily weights

## 2019-08-25 NOTE — PROGRESS NOTE ADULT - ATTENDING COMMENTS
Assessment/Rec-  -Acute on chronic systolic CHF-cont iv bumex -..monitor BP  -Hyponatremia-likely sec.to chf-fluid restriction,renal f/u noted,monitor-124-Talvaptanx1 given,monitor NA level .Renal f/u noted.  --DM,check A1C-6.5 ,monitor FS with coverage  -Ascites.u/s.s/p paracentesis-f/u qddolv-yfvkxmmh-kag.so far.  -d/w family at bedside

## 2019-08-25 NOTE — PROGRESS NOTE ADULT - ASSESSMENT
Echo 2/19 - severe LV and RV dysfunction    a/p     1) Acute on chronic CHF -  still volume overloaded, cont bumex switch to 2mg IV q12 , cont aldactone, c/t hold bp meds BP improved     2) Afib -  eliquis    3) Hyponatremia - f/u nephro, s/p Tolvaptan worsening now again

## 2019-08-25 NOTE — PROGRESS NOTE ADULT - ASSESSMENT
68 y/o M with PMH of MR s/p mitral valvuloplasty, Afib (on Coumadin), HLD, HTN, DM, CVA, CAD (s/p 4V CABG), CHF (with EF of 15-20% s/p AICD) presented with the complaint of worsening LE edema, weight gain and abdominal distention. Got admitted with CHF, found to have hyponatremia    Hyponatremia:  Asymptomatic, chronic   Likely sec to fluid overload sec to CHF   Na continue to worsen  Na low today (in the setting of hyperglycemia)   s/p tolvaptan x 1 dose 8/21. Plan for tolvaptan today 8/25/19  off metolazone  continue fluid restriction <1L/day  on Bumex IV BID  s/p paracentesis ~3.6L removed  Monitor Na level daily    Chronic Kidney Disease:  Currently at baseline   fluctuates slightly sec to chf  continue to monitor    Hypomagnesemia:  likely sec to diuresis  Improved  Monitor Mg level    Hypokalemia  likely sec to diuretics  supplement  monitor serum K for now    CHF:  on bumex 2mg iv bid  off metolazone   Monitor K, Mg level  Follow up cardiology  Daily weights

## 2019-08-25 NOTE — PROGRESS NOTE ADULT - SUBJECTIVE AND OBJECTIVE BOX
CARLO, NFN  69y  Male      Patient is a 69y old  Male who presents with a chief complaint of Abdominal distension, leg swelling (25 Aug 2019 13:54)  Above noted.Na-124.no sob,no cp,no fever,no cough    REVIEW OF SYSTEMS:  as above  INTERVAL HPI/OVERNIGHT EVENTS:  T(C): 36.5 (08-25-19 @ 14:20), Max: 36.5 (08-25-19 @ 14:20)  HR: 71 (08-25-19 @ 14:20) (70 - 71)  BP: 112/65 (08-25-19 @ 14:20) (98/63 - 112/69)  RR: 18 (08-25-19 @ 14:20) (18 - 18)  SpO2: 100% (08-25-19 @ 14:20) (96% - 100%)  Wt(kg): --  I&O's Summary    24 Aug 2019 07:01  -  25 Aug 2019 07:00  --------------------------------------------------------  IN: 820 mL / OUT: 950 mL / NET: -130 mL    25 Aug 2019 07:01  -  25 Aug 2019 17:39  --------------------------------------------------------  IN: 0 mL / OUT: 350 mL / NET: -350 mL      T(C): 36.5 (08-25-19 @ 14:20), Max: 36.5 (08-25-19 @ 14:20)  HR: 71 (08-25-19 @ 14:20) (70 - 71)  BP: 112/65 (08-25-19 @ 14:20) (98/63 - 112/69)  RR: 18 (08-25-19 @ 14:20) (18 - 18)  SpO2: 100% (08-25-19 @ 14:20) (96% - 100%)  Wt(kg): --Vital Signs Last 24 Hrs  T(C): 36.5 (25 Aug 2019 14:20), Max: 36.5 (25 Aug 2019 14:20)  T(F): 97.7 (25 Aug 2019 14:20), Max: 97.7 (25 Aug 2019 14:20)  HR: 71 (25 Aug 2019 14:20) (70 - 71)  BP: 112/65 (25 Aug 2019 14:20) (98/63 - 112/69)  BP(mean): --  RR: 18 (25 Aug 2019 14:20) (18 - 18)  SpO2: 100% (25 Aug 2019 14:20) (96% - 100%)    LABS:                        11.6   7.28  )-----------( 96       ( 25 Aug 2019 05:31 )             36.0     08-25    124<L>  |  87<L>  |  63<H>  ----------------------------<  211<H>  5.0   |  21<L>  |  0.91    Ca    8.7      25 Aug 2019 05:31  Mg     2.0     08-25          CAPILLARY BLOOD GLUCOSE      POCT Blood Glucose.: 219 mg/dL (25 Aug 2019 17:36)  POCT Blood Glucose.: 169 mg/dL (25 Aug 2019 12:14)  POCT Blood Glucose.: 167 mg/dL (25 Aug 2019 08:46)  POCT Blood Glucose.: 195 mg/dL (24 Aug 2019 21:43)            PAST MEDICAL & SURGICAL HISTORY:  CKD (chronic kidney disease)  MR (mitral regurgitation)  AF (atrial fibrillation)  DM (diabetes mellitus)  HLD (hyperlipidemia)  TIA (transient ischemic attack)  HTN (hypertension)  CVA (cerebral infarction)  CHB (complete heart block)  CHF (congestive heart failure)  CAD (coronary artery disease): S/P 4V CABG  S/P mitral valve repair: 2007  S/P CABG (coronary artery bypass graft): 4V CABG in 2007  AICD (automatic cardioverter/defibrillator) present: [email or web address]      MEDICATIONS  (STANDING):  apixaban 5 milliGRAM(s) Oral every 12 hours  atorvastatin 20 milliGRAM(s) Oral at bedtime  buMETAnide Injectable 2 milliGRAM(s) IV Push two times a day  dextrose 50% Injectable 12.5 Gram(s) IV Push once  dextrose 50% Injectable 25 Gram(s) IV Push once  dextrose 50% Injectable 25 Gram(s) IV Push once  gabapentin 100 milliGRAM(s) Oral at bedtime  insulin lispro (HumaLOG) corrective regimen sliding scale   SubCutaneous three times a day before meals  insulin lispro (HumaLOG) corrective regimen sliding scale   SubCutaneous at bedtime  spironolactone 50 milliGRAM(s) Oral daily  tamsulosin 0.4 milliGRAM(s) Oral daily    MEDICATIONS  (PRN):  acetaminophen   Tablet .. 650 milliGRAM(s) Oral every 6 hours PRN Mild Pain (1 - 3), Moderate Pain (4 - 6), Severe Pain (7 - 10)  dextrose 40% Gel 15 Gram(s) Oral once PRN Blood Glucose LESS THAN 70 milliGRAM(s)/deciliter  diphenhydrAMINE 25 milliGRAM(s) Oral every 6 hours PRN Rash and/or Itching  glucagon  Injectable 1 milliGRAM(s) IntraMuscular once PRN Glucose LESS THAN 70 milligrams/deciliter        RADIOLOGY & ADDITIONAL TESTS:    Imaging Personally Reviewed:  [ ] YES  [ ] NO    Consultant(s) Notes Reviewed:  [ ] YES  [ ] NO    PHYSICAL EXAM:  GENERAL: NAD, well-groomed, well-developed  HEAD:  Atraumatic, Normocephalic  EYES: EOMI, PERRLA, conjunctiva and sclera clear  ENMT: No tonsillar erythema, exudates, or enlargement; Moist mucous membranes, Good dentition, No lesions  NECK: Supple, No JVD, Normal thyroid  NERVOUS SYSTEM:  Alert & Oriented X3, nonfocal  CHEST/LUNG: Clear to percussion bilaterally; No rales, rhonchi, wheezing, or rubs  HEART: Regular rate and rhythm; No murmurs, rubs, or gallops  ABDOMEN: Soft, Nontender, Nondistended; Bowel sounds present  EXTREMITIES:  2+ Peripheral Pulses, No clubbing, cyanosis, or edema _++  LYMPH: No lymphadenopathy noted  SKIN: No rashes or lesions    Care Discussed with Consultants/Other Providers [ ] YES  [ ] NO      Code Status: [] Full Code [] DNR [] DNI [] Goals of Care:   Disposition: [] ICU [] Stroke Unit [] RCU []PCU []Floor [] Discharge Home         ALANIS MathewsP

## 2019-08-26 LAB
ANION GAP SERPL CALC-SCNC: 15 MMO/L — HIGH (ref 7–14)
BUN SERPL-MCNC: 58 MG/DL — HIGH (ref 7–23)
CALCIUM SERPL-MCNC: 8.8 MG/DL — SIGNIFICANT CHANGE UP (ref 8.4–10.5)
CHLORIDE SERPL-SCNC: 90 MMOL/L — LOW (ref 98–107)
CO2 SERPL-SCNC: 22 MMOL/L — SIGNIFICANT CHANGE UP (ref 22–31)
CREAT SERPL-MCNC: 0.99 MG/DL — SIGNIFICANT CHANGE UP (ref 0.5–1.3)
GLUCOSE SERPL-MCNC: 140 MG/DL — HIGH (ref 70–99)
HCT VFR BLD CALC: 37 % — LOW (ref 39–50)
HGB BLD-MCNC: 11.8 G/DL — LOW (ref 13–17)
MAGNESIUM SERPL-MCNC: 2 MG/DL — SIGNIFICANT CHANGE UP (ref 1.6–2.6)
MCHC RBC-ENTMCNC: 25.7 PG — LOW (ref 27–34)
MCHC RBC-ENTMCNC: 31.9 % — LOW (ref 32–36)
MCV RBC AUTO: 80.4 FL — SIGNIFICANT CHANGE UP (ref 80–100)
NRBC # FLD: 0 K/UL — SIGNIFICANT CHANGE UP (ref 0–0)
PLATELET # BLD AUTO: 100 K/UL — LOW (ref 150–400)
PMV BLD: 10 FL — SIGNIFICANT CHANGE UP (ref 7–13)
POTASSIUM SERPL-MCNC: 5 MMOL/L — SIGNIFICANT CHANGE UP (ref 3.5–5.3)
POTASSIUM SERPL-SCNC: 5 MMOL/L — SIGNIFICANT CHANGE UP (ref 3.5–5.3)
RBC # BLD: 4.6 M/UL — SIGNIFICANT CHANGE UP (ref 4.2–5.8)
RBC # FLD: 17.2 % — HIGH (ref 10.3–14.5)
SODIUM SERPL-SCNC: 127 MMOL/L — LOW (ref 135–145)
WBC # BLD: 6.13 K/UL — SIGNIFICANT CHANGE UP (ref 3.8–10.5)
WBC # FLD AUTO: 6.13 K/UL — SIGNIFICANT CHANGE UP (ref 3.8–10.5)

## 2019-08-26 RX ORDER — BUMETANIDE 0.25 MG/ML
2 INJECTION INTRAMUSCULAR; INTRAVENOUS
Refills: 0 | Status: DISCONTINUED | OUTPATIENT
Start: 2019-08-26 | End: 2019-08-26

## 2019-08-26 RX ORDER — BUMETANIDE 0.25 MG/ML
2 INJECTION INTRAMUSCULAR; INTRAVENOUS
Refills: 0 | Status: COMPLETED | OUTPATIENT
Start: 2019-08-26 | End: 2019-08-26

## 2019-08-26 RX ORDER — BUMETANIDE 0.25 MG/ML
2 INJECTION INTRAMUSCULAR; INTRAVENOUS
Refills: 0 | Status: DISCONTINUED | OUTPATIENT
Start: 2019-08-27 | End: 2019-08-27

## 2019-08-26 RX ADMIN — GABAPENTIN 100 MILLIGRAM(S): 400 CAPSULE ORAL at 21:07

## 2019-08-26 RX ADMIN — TAMSULOSIN HYDROCHLORIDE 0.4 MILLIGRAM(S): 0.4 CAPSULE ORAL at 17:09

## 2019-08-26 RX ADMIN — APIXABAN 5 MILLIGRAM(S): 2.5 TABLET, FILM COATED ORAL at 21:07

## 2019-08-26 RX ADMIN — APIXABAN 5 MILLIGRAM(S): 2.5 TABLET, FILM COATED ORAL at 08:56

## 2019-08-26 RX ADMIN — BUMETANIDE 2 MILLIGRAM(S): 0.25 INJECTION INTRAMUSCULAR; INTRAVENOUS at 05:43

## 2019-08-26 RX ADMIN — SPIRONOLACTONE 50 MILLIGRAM(S): 25 TABLET, FILM COATED ORAL at 05:44

## 2019-08-26 RX ADMIN — Medication 1: at 12:45

## 2019-08-26 RX ADMIN — Medication 1: at 08:56

## 2019-08-26 RX ADMIN — BUMETANIDE 2 MILLIGRAM(S): 0.25 INJECTION INTRAMUSCULAR; INTRAVENOUS at 17:09

## 2019-08-26 RX ADMIN — ATORVASTATIN CALCIUM 20 MILLIGRAM(S): 80 TABLET, FILM COATED ORAL at 21:07

## 2019-08-26 RX ADMIN — Medication 1: at 22:17

## 2019-08-26 NOTE — PROGRESS NOTE ADULT - SUBJECTIVE AND OBJECTIVE BOX
CARLO, NFN  69y  Male      Patient is a 69y old  Male who presents with a chief complaint of Abdominal distension, leg swelling (26 Aug 2019 11:56)  feels better.no sob,no cp,no fever,no cough    REVIEW OF SYSTEMS:  as above      INTERVAL HPI/OVERNIGHT EVENTS:  T(C): 36.6 (08-26-19 @ 20:58), Max: 36.6 (08-26-19 @ 12:42)  HR: 71 (08-26-19 @ 20:58) (70 - 74)  BP: 100/51 (08-26-19 @ 20:58) (100/51 - 107/70)  RR: 17 (08-26-19 @ 20:58) (17 - 18)  SpO2: 100% (08-26-19 @ 20:58) (100% - 100%)  Wt(kg): --  I&O's Summary    25 Aug 2019 07:01  -  26 Aug 2019 07:00  --------------------------------------------------------  IN: 0 mL / OUT: 350 mL / NET: -350 mL    26 Aug 2019 07:01  -  26 Aug 2019 22:07  --------------------------------------------------------  IN: 200 mL / OUT: 1325 mL / NET: -1125 mL      T(C): 36.6 (08-26-19 @ 20:58), Max: 36.6 (08-26-19 @ 12:42)  HR: 71 (08-26-19 @ 20:58) (70 - 74)  BP: 100/51 (08-26-19 @ 20:58) (100/51 - 107/70)  RR: 17 (08-26-19 @ 20:58) (17 - 18)  SpO2: 100% (08-26-19 @ 20:58) (100% - 100%)  Wt(kg): --Vital Signs Last 24 Hrs  T(C): 36.6 (26 Aug 2019 20:58), Max: 36.6 (26 Aug 2019 12:42)  T(F): 97.9 (26 Aug 2019 20:58), Max: 97.9 (26 Aug 2019 20:58)  HR: 71 (26 Aug 2019 20:58) (70 - 74)  BP: 100/51 (26 Aug 2019 20:58) (100/51 - 107/70)  BP(mean): --  RR: 17 (26 Aug 2019 20:58) (17 - 18)  SpO2: 100% (26 Aug 2019 20:58) (100% - 100%)    LABS:                        11.8   6.13  )-----------( 100      ( 26 Aug 2019 07:00 )             37.0     08-26    127<L>  |  90<L>  |  58<H>  ----------------------------<  140<H>  5.0   |  22  |  0.99    Ca    8.8      26 Aug 2019 07:00  Mg     2.0     08-26          CAPILLARY BLOOD GLUCOSE      POCT Blood Glucose.: 268 mg/dL (26 Aug 2019 21:27)  POCT Blood Glucose.: 148 mg/dL (26 Aug 2019 17:06)  POCT Blood Glucose.: 179 mg/dL (26 Aug 2019 12:36)  POCT Blood Glucose.: 151 mg/dL (26 Aug 2019 08:54)            PAST MEDICAL & SURGICAL HISTORY:  CKD (chronic kidney disease)  MR (mitral regurgitation)  AF (atrial fibrillation)  DM (diabetes mellitus)  HLD (hyperlipidemia)  TIA (transient ischemic attack)  HTN (hypertension)  CVA (cerebral infarction)  CHB (complete heart block)  CHF (congestive heart failure)  CAD (coronary artery disease): S/P 4V CABG  S/P mitral valve repair: 2007  S/P CABG (coronary artery bypass graft): 4V CABG in 2007  AICD (automatic cardioverter/defibrillator) present: [employer]      MEDICATIONS  (STANDING):  apixaban 5 milliGRAM(s) Oral every 12 hours  atorvastatin 20 milliGRAM(s) Oral at bedtime  dextrose 50% Injectable 12.5 Gram(s) IV Push once  dextrose 50% Injectable 25 Gram(s) IV Push once  dextrose 50% Injectable 25 Gram(s) IV Push once  gabapentin 100 milliGRAM(s) Oral at bedtime  insulin lispro (HumaLOG) corrective regimen sliding scale   SubCutaneous three times a day before meals  insulin lispro (HumaLOG) corrective regimen sliding scale   SubCutaneous at bedtime  spironolactone 50 milliGRAM(s) Oral daily  tamsulosin 0.4 milliGRAM(s) Oral daily    MEDICATIONS  (PRN):  acetaminophen   Tablet .. 650 milliGRAM(s) Oral every 6 hours PRN Mild Pain (1 - 3), Moderate Pain (4 - 6), Severe Pain (7 - 10)  dextrose 40% Gel 15 Gram(s) Oral once PRN Blood Glucose LESS THAN 70 milliGRAM(s)/deciliter  diphenhydrAMINE 25 milliGRAM(s) Oral every 6 hours PRN Rash and/or Itching  glucagon  Injectable 1 milliGRAM(s) IntraMuscular once PRN Glucose LESS THAN 70 milligrams/deciliter        RADIOLOGY & ADDITIONAL TESTS:    Imaging Personally Reviewed:  [ ] YES  [ ] NO    Consultant(s) Notes Reviewed:  [ ] YES  [ ] NO    PHYSICAL EXAM:  GENERAL: NAD, well-groomed, well-developed  HEAD:  Atraumatic, Normocephalic  EYES: EOMI, PERRLA, conjunctiva and sclera clear  ENMT: No tonsillar erythema, exudates, or enlargement; Moist mucous membranes, Good dentition, No lesions  NECK: Supple, No JVD, Normal thyroid  NERVOUS SYSTEM:  Alert & Oriented X3, Good concentration; Motor Strength 5/5 B/L upper and lower extremities; DTRs 2+ intact and symmetric  CHEST/LUNG: Clear to percussion bilaterally; No rales, rhonchi, wheezing, or rubs  HEART: Regular rate and rhythm; No murmurs, rubs, or gallops  ABDOMEN: Soft, Nontender, Nondistended; Bowel sounds present  EXTREMITIES:  2+ Peripheral Pulses, No clubbing, cyanosis,  edema 1++  LYMPH: No lymphadenopathy noted  SKIN: No rashes or lesions    Care Discussed with Consultants/Other Providers [ ] YES  [ ] NO      Code Status: [] Full Code [] DNR [] DNI [] Goals of Care:   Disposition: [] ICU [] Stroke Unit [] RCU []PCU []Floor [] Discharge Home         PRINCESS MathewsFACP

## 2019-08-26 NOTE — PROGRESS NOTE ADULT - ASSESSMENT
Echo 2/19 - severe LV and RV dysfunction    a/p     1) Acute on chronic CHF - cont bumex switch to 2mg po q12 , cont aldactone, c/t hold bp meds    2) Afib -  eliquis    3) Hyponatremia - f/u nephro, s/p Tolvaptan

## 2019-08-26 NOTE — PROGRESS NOTE ADULT - SUBJECTIVE AND OBJECTIVE BOX
Phillip Sorenson MD  Interventional Cardiology  [place] Office : [address] [place] [address] [place]. [address]  Tel:   [place] Office : [address] [facility] [place] [address]  Tel: [phone]  Cell : [phone]    Subjective : Pt lying in bed comfortable, not in distress, denies any chest pain or SOB  	  MEDICATIONS:  apixaban 5 milliGRAM(s) Oral every 12 hours  buMETAnide 2 milliGRAM(s) Oral two times a day  spironolactone 50 milliGRAM(s) Oral daily  tamsulosin 0.4 milliGRAM(s) Oral daily  acetaminophen   Tablet .. 650 milliGRAM(s) Oral every 6 hours PRN  diphenhydrAMINE 25 milliGRAM(s) Oral every 6 hours PRN  gabapentin 100 milliGRAM(s) Oral at bedtime  atorvastatin 20 milliGRAM(s) Oral at bedtime  dextrose 40% Gel 15 Gram(s) Oral once PRN  dextrose 50% Injectable 12.5 Gram(s) IV Push once  dextrose 50% Injectable 25 Gram(s) IV Push once  dextrose 50% Injectable 25 Gram(s) IV Push once  glucagon  Injectable 1 milliGRAM(s) IntraMuscular once PRN  insulin lispro (HumaLOG) corrective regimen sliding scale   SubCutaneous three times a day before meals  insulin lispro (HumaLOG) corrective regimen sliding scale   SubCutaneous at bedtime        PHYSICAL EXAM:  T(C): 36.6 (08-26-19 @ 20:58), Max: 36.6 (08-26-19 @ 12:42)  HR: 71 (08-26-19 @ 20:58) (70 - 74)  BP: 100/51 (08-26-19 @ 20:58) (100/51 - 107/70)  RR: 17 (08-26-19 @ 20:58) (17 - 18)  SpO2: 100% (08-26-19 @ 20:58) (100% - 100%)  Wt(kg): --  I&O's Summary    25 Aug 2019 07:01  -  26 Aug 2019 07:00  --------------------------------------------------------  IN: 0 mL / OUT: 350 mL / NET: -350 mL    26 Aug 2019 07:01  -  27 Aug 2019 00:18  --------------------------------------------------------  IN: 200 mL / OUT: 2325 mL / NET: -2125 mL        Appearance: Normal	  HEENT:   Normal oral mucosa	  Cardiovascular: Normal S1 S2, No JVD, No murmurs  Respiratory: Lungs clear to auscultation	  Gastrointestinal:  Soft, Non-tender, + BS	  Extremities: No clubbing, cyanosis , 1+ edema                                      11.8   6.13  )-----------( 100      ( 26 Aug 2019 07:00 )             37.0     08-26    127<L>  |  90<L>  |  58<H>  ----------------------------<  140<H>  5.0   |  22  |  0.99    Ca    8.8      26 Aug 2019 07:00  Mg     2.0     08-26      proBNP:   Lipid Profile:   HgA1c:   TSH:

## 2019-08-26 NOTE — PROGRESS NOTE ADULT - ASSESSMENT
70 y/o M with PMH of MR s/p mitral valvuloplasty, Afib (on Coumadin), HLD, HTN, DM, CVA, CAD (s/p 4V CABG), CHF (with EF of 15-20% s/p AICD) presented with the complaint of worsening LE edema, weight gain and abdominal distention. Got admitted with CHF, found to have hyponatremia    Hyponatremia:  Asymptomatic, chronic   Likely sec to fluid overload sec to CHF   Na improved today s/p tolvaptan yesterday  s/p tolvaptan x 1 8/21/19 and tolvaptan x1 8/25/19  off metolazone  continue fluid restriction <1L/day  on Bumex IV BID  s/p paracentesis ~3.6L removed  Monitor Na level daily    Chronic Kidney Disease:  Currently at baseline   fluctuates slightly sec to chf  continue to monitor    Hypomagnesemia:  likely sec to diuresis  Improved  Monitor Mg level    Hypokalemia  likely sec to diuretics  supplement  monitor serum K for now    CHF:  on bumex 2mg IV bid  off metolazone   Monitor K, Mg level  Follow up cardiology  Daily weights

## 2019-08-26 NOTE — PROGRESS NOTE ADULT - ATTENDING COMMENTS
Assessment/Rec-  -Acute on chronic systolic CHF-cont iv bumex -..monitor BP.consider changing to po.Bumex  -Hyponatremia-likely sec.to chf-fluid restriction,renal f/u noted,monitor-124-Talvaptanx1 given,monitor NA level .Renal f/u noted.  --DM,check A1C-6.5 ,monitor FS with coverage  -Ascites.u/s.s/p paracentesis-f/u fjlsht-uwvreddh-nbm.so far.  -d/w family at bedside  -d/c planning

## 2019-08-26 NOTE — PROGRESS NOTE ADULT - SUBJECTIVE AND OBJECTIVE BOX
[facility]   MD ULICES MCKEON D.O, PA    TELEPHONE NUMBERS:  OFFICE: [phone]  DR. TAYLOR CELL: [phone]  BENTON TARANGO CELL: [phone]  DR. MCFARLAND CELL:  [phone]    RENAL FOLLOW UP NOTE  --------------------------------------------------------------------------------  HPI: Pt seen and examined at bedside. Pt has no complaints.   Denies SOB, chest pain     PAST HISTORY  --------------------------------------------------------------------------------  No significant changes to PMH, PSH, FHx, SHx, unless otherwise noted    ALLERGIES & MEDICATIONS  --------------------------------------------------------------------------------  Allergies    No Known Allergies    Intolerances      Standing Inpatient Medications  apixaban 5 milliGRAM(s) Oral every 12 hours  atorvastatin 20 milliGRAM(s) Oral at bedtime  buMETAnide Injectable 2 milliGRAM(s) IV Push two times a day  dextrose 50% Injectable 12.5 Gram(s) IV Push once  dextrose 50% Injectable 25 Gram(s) IV Push once  dextrose 50% Injectable 25 Gram(s) IV Push once  gabapentin 100 milliGRAM(s) Oral at bedtime  insulin lispro (HumaLOG) corrective regimen sliding scale   SubCutaneous three times a day before meals  insulin lispro (HumaLOG) corrective regimen sliding scale   SubCutaneous at bedtime  spironolactone 50 milliGRAM(s) Oral daily  tamsulosin 0.4 milliGRAM(s) Oral daily    PRN Inpatient Medications  acetaminophen   Tablet .. 650 milliGRAM(s) Oral every 6 hours PRN  dextrose 40% Gel 15 Gram(s) Oral once PRN  diphenhydrAMINE 25 milliGRAM(s) Oral every 6 hours PRN  glucagon  Injectable 1 milliGRAM(s) IntraMuscular once PRN      REVIEW OF SYSTEMS  --------------------------------------------------------------------------------  General: no fever  CVS: no chest pain  RESP: no sob, no cough  ABD: no abdominal pain  : no dysuria,  MSK: +edema     VITALS/PHYSICAL EXAM  --------------------------------------------------------------------------------  T(C): 36.5 (08-26-19 @ 05:41), Max: 36.8 (08-25-19 @ 21:06)  HR: 74 (08-26-19 @ 05:41) (70 - 74)  BP: 107/70 (08-26-19 @ 05:41) (105/66 - 112/65)  RR: 18 (08-26-19 @ 05:41) (18 - 20)  SpO2: 100% (08-26-19 @ 05:41) (100% - 100%)  Wt(kg): --    08-25-19 @ 07:01  -  08-26-19 @ 07:00  --------------------------------------------------------  IN: 0 mL / OUT: 350 mL / NET: -350 mL      Physical Exam:  	Gen: NAD  	HEENT: MMM  	Pulm: CTA B/L  	CV: S1S2  	Abd: Soft, +BS  	Ext: + LE edema B/L (improving)                       Neuro: Awake   	Skin: Warm and Dry     LABS/STUDIES  --------------------------------------------------------------------------------              11.8   6.13  >-----------<  100      [08-26-19 @ 07:00]              37.0     127  |  90  |  58  ----------------------------<  140      [08-26-19 @ 07:00]  5.0   |  22  |  0.99        Ca     8.8     [08-26-19 @ 07:00]      Mg     2.0     [08-26-19 @ 07:00]    Creatinine Trend:  SCr 0.99 [08-26 @ 07:00]  SCr 0.91 [08-25 @ 05:31]  SCr 0.99 [08-24 @ 05:54]  SCr 1.04 [08-23 @ 06:00]  SCr 1.17 [08-22 @ 07:00]    .1 (Ca --)      [02-17-19 @ 15:00]   --  Vitamin D (25OH) 12.6      [02-17-19 @ 15:00]  HbA1c 6.5      [08-09-19 @ 06:00]  TSH 2.60      [03-15-19 @ 05:15]  Lipid: chol 126, TG 87, HDL 38, LDL 85      [03-15-19 @ 05:15]

## 2019-08-27 ENCOUNTER — TRANSCRIPTION ENCOUNTER (OUTPATIENT)
Age: 69
End: 2019-08-27

## 2019-08-27 VITALS
OXYGEN SATURATION: 100 % | HEART RATE: 70 BPM | TEMPERATURE: 98 F | DIASTOLIC BLOOD PRESSURE: 59 MMHG | SYSTOLIC BLOOD PRESSURE: 106 MMHG | RESPIRATION RATE: 18 BRPM

## 2019-08-27 LAB
ANION GAP SERPL CALC-SCNC: 11 MMO/L — SIGNIFICANT CHANGE UP (ref 7–14)
BUN SERPL-MCNC: 55 MG/DL — HIGH (ref 7–23)
CALCIUM SERPL-MCNC: 8.5 MG/DL — SIGNIFICANT CHANGE UP (ref 8.4–10.5)
CHLORIDE SERPL-SCNC: 89 MMOL/L — LOW (ref 98–107)
CO2 SERPL-SCNC: 24 MMOL/L — SIGNIFICANT CHANGE UP (ref 22–31)
CREAT SERPL-MCNC: 0.97 MG/DL — SIGNIFICANT CHANGE UP (ref 0.5–1.3)
GLUCOSE SERPL-MCNC: 193 MG/DL — HIGH (ref 70–99)
HCT VFR BLD CALC: 37.7 % — LOW (ref 39–50)
HGB BLD-MCNC: 11.8 G/DL — LOW (ref 13–17)
MAGNESIUM SERPL-MCNC: 2.1 MG/DL — SIGNIFICANT CHANGE UP (ref 1.6–2.6)
MCHC RBC-ENTMCNC: 25.3 PG — LOW (ref 27–34)
MCHC RBC-ENTMCNC: 31.3 % — LOW (ref 32–36)
MCV RBC AUTO: 80.7 FL — SIGNIFICANT CHANGE UP (ref 80–100)
NRBC # FLD: 0 K/UL — SIGNIFICANT CHANGE UP (ref 0–0)
PLATELET # BLD AUTO: 109 K/UL — LOW (ref 150–400)
PMV BLD: 9.4 FL — SIGNIFICANT CHANGE UP (ref 7–13)
POTASSIUM SERPL-MCNC: 5 MMOL/L — SIGNIFICANT CHANGE UP (ref 3.5–5.3)
POTASSIUM SERPL-SCNC: 5 MMOL/L — SIGNIFICANT CHANGE UP (ref 3.5–5.3)
RBC # BLD: 4.67 M/UL — SIGNIFICANT CHANGE UP (ref 4.2–5.8)
RBC # FLD: 17.3 % — HIGH (ref 10.3–14.5)
SODIUM SERPL-SCNC: 124 MMOL/L — LOW (ref 135–145)
WBC # BLD: 5.97 K/UL — SIGNIFICANT CHANGE UP (ref 3.8–10.5)
WBC # FLD AUTO: 5.97 K/UL — SIGNIFICANT CHANGE UP (ref 3.8–10.5)

## 2019-08-27 RX ORDER — FERROUS SULFATE 325(65) MG
1 TABLET ORAL
Qty: 0 | Refills: 0 | DISCHARGE

## 2019-08-27 RX ORDER — SPIRONOLACTONE 25 MG/1
2 TABLET, FILM COATED ORAL
Qty: 60 | Refills: 0
Start: 2019-08-27 | End: 2019-09-25

## 2019-08-27 RX ORDER — BUMETANIDE 0.25 MG/ML
1 INJECTION INTRAMUSCULAR; INTRAVENOUS
Qty: 60 | Refills: 0
Start: 2019-08-27 | End: 2019-09-25

## 2019-08-27 RX ORDER — CARVEDILOL PHOSPHATE 80 MG/1
3.12 CAPSULE, EXTENDED RELEASE ORAL EVERY 12 HOURS
Refills: 0 | Status: DISCONTINUED | OUTPATIENT
Start: 2019-08-27 | End: 2019-08-27

## 2019-08-27 RX ORDER — CARVEDILOL PHOSPHATE 80 MG/1
1 CAPSULE, EXTENDED RELEASE ORAL
Qty: 60 | Refills: 0
Start: 2019-08-27 | End: 2019-09-25

## 2019-08-27 RX ORDER — APIXABAN 2.5 MG/1
1 TABLET, FILM COATED ORAL
Qty: 60 | Refills: 0
Start: 2019-08-27 | End: 2019-09-25

## 2019-08-27 RX ORDER — TOLVAPTAN 15 MG/1
15 TABLET ORAL ONCE
Refills: 0 | Status: COMPLETED | OUTPATIENT
Start: 2019-08-27 | End: 2019-08-27

## 2019-08-27 RX ADMIN — APIXABAN 5 MILLIGRAM(S): 2.5 TABLET, FILM COATED ORAL at 09:52

## 2019-08-27 RX ADMIN — TAMSULOSIN HYDROCHLORIDE 0.4 MILLIGRAM(S): 0.4 CAPSULE ORAL at 12:57

## 2019-08-27 RX ADMIN — Medication 1: at 12:57

## 2019-08-27 RX ADMIN — TOLVAPTAN 15 MILLIGRAM(S): 15 TABLET ORAL at 17:46

## 2019-08-27 RX ADMIN — CARVEDILOL PHOSPHATE 3.12 MILLIGRAM(S): 80 CAPSULE, EXTENDED RELEASE ORAL at 17:17

## 2019-08-27 RX ADMIN — BUMETANIDE 2 MILLIGRAM(S): 0.25 INJECTION INTRAMUSCULAR; INTRAVENOUS at 17:15

## 2019-08-27 RX ADMIN — BUMETANIDE 2 MILLIGRAM(S): 0.25 INJECTION INTRAMUSCULAR; INTRAVENOUS at 05:15

## 2019-08-27 RX ADMIN — SPIRONOLACTONE 50 MILLIGRAM(S): 25 TABLET, FILM COATED ORAL at 05:15

## 2019-08-27 NOTE — DISCHARGE NOTE NURSING/CASE MANAGEMENT/SOCIAL WORK - NSDCPEWEB_GEN_ALL_CORE
[place] website --- [email or web address] for Tobacco [email or web address] website --- [email or web address]

## 2019-08-27 NOTE — DISCHARGE NOTE NURSING/CASE MANAGEMENT/SOCIAL WORK - NSDCPEEMAIL_GEN_ALL_CORE
[facility] for Tobacco Control email [email or web address]

## 2019-08-27 NOTE — PROGRESS NOTE ADULT - REASON FOR ADMISSION
Abdominal distension, leg swelling

## 2019-08-27 NOTE — DISCHARGE NOTE NURSING/CASE MANAGEMENT/SOCIAL WORK - PATIENT PORTAL LINK FT
You can access the FollowMyHealth Patient Portal offered by [facility] by registering at the following website: [email or web address]. By joining [facility]’s FollowMyHealth portal, you will also be able to view your health information using other applications (apps) compatible with our system.

## 2019-08-27 NOTE — PROGRESS NOTE ADULT - SUBJECTIVE AND OBJECTIVE BOX
[facility] NEPHROLOGY PRACTICE   MD ULICES MCKEON D.O, PA    TELEPHONE NUMBERS:  OFFICE: [phone]  DR. TAYLOR CELL: [phone]  [place] TARANGO CELL: [phone]  DR. MCFARLAND CELL:  [phone]    RENAL FOLLOW UP NOTE  --------------------------------------------------------------------------------  HPI: Pt seen and examined at bedside. Pt has no complaints.   Denies SOB, chest pain     PAST HISTORY  --------------------------------------------------------------------------------  No significant changes to PMH, PSH, FHx, SHx, unless otherwise noted    ALLERGIES & MEDICATIONS  --------------------------------------------------------------------------------  Allergies    No Known Allergies    Intolerances      Standing Inpatient Medications  apixaban 5 milliGRAM(s) Oral every 12 hours  atorvastatin 20 milliGRAM(s) Oral at bedtime  buMETAnide 2 milliGRAM(s) Oral two times a day  dextrose 50% Injectable 12.5 Gram(s) IV Push once  dextrose 50% Injectable 25 Gram(s) IV Push once  dextrose 50% Injectable 25 Gram(s) IV Push once  gabapentin 100 milliGRAM(s) Oral at bedtime  insulin lispro (HumaLOG) corrective regimen sliding scale   SubCutaneous three times a day before meals  insulin lispro (HumaLOG) corrective regimen sliding scale   SubCutaneous at bedtime  spironolactone 50 milliGRAM(s) Oral daily  tamsulosin 0.4 milliGRAM(s) Oral daily    PRN Inpatient Medications  acetaminophen   Tablet .. 650 milliGRAM(s) Oral every 6 hours PRN  dextrose 40% Gel 15 Gram(s) Oral once PRN  diphenhydrAMINE 25 milliGRAM(s) Oral every 6 hours PRN  glucagon  Injectable 1 milliGRAM(s) IntraMuscular once PRN      REVIEW OF SYSTEMS  --------------------------------------------------------------------------------  General: no fever  CVS: no chest pain  RESP: no sob, no cough  ABD: no abdominal pain  : no dysuria  MSK: no edema     VITALS/PHYSICAL EXAM--------------------------------------------------------------------------------  T(C): 36.4 (08-27-19 @ 04:50), Max: 36.6 (08-26-19 @ 20:58)  HR: 70 (08-27-19 @ 04:50) (70 - 71)  BP: 109/72 (08-27-19 @ 04:50) (100/51 - 109/72)  RR: 19 (08-27-19 @ 04:50) (17 - 19)  SpO2: 100% (08-27-19 @ 04:50) (100% - 100%)  Wt(kg): --    08-26-19 @ 07:01  -  08-27-19 @ 07:00  --------------------------------------------------------  IN: 400 mL / OUT: 2725 mL / NET: -2325 mL    08-27-19 @ 07:01  -  08-27-19 @ 13:49  --------------------------------------------------------  IN: 240 mL / OUT: 925 mL / NET: -685 mL      Physical Exam:  	Gen: NAD  	HEENT: MMM  	Pulm: CTA B/L  	CV: S1S2  	Abd: Soft, +BS + distended  	Ext: No LE edema B/L                      Neuro: Awake   	Skin: Warm and Dry       LABS/STUDIES  --------------------------------------------------------------------------------              11.8   5.97  >-----------<  109      [08-27-19 @ 05:03]              37.7     124  |  89  |  55  ----------------------------<  193      [08-27-19 @ 05:03]  5.0   |  24  |  0.97        Ca     8.5     [08-27-19 @ 05:03]      Mg     2.1     [08-27-19 @ 05:03]    Creatinine Trend:  SCr 0.97 [08-27 @ 05:03]  SCr 0.99 [08-26 @ 07:00]  SCr 0.91 [08-25 @ 05:31]  SCr 0.99 [08-24 @ 05:54]  SCr 1.04 [08-23 @ 06:00]    .1 (Ca --)      [02-17-19 @ 15:00]   --  Vitamin D (25OH) 12.6      [02-17-19 @ 15:00]  HbA1c 6.5      [08-09-19 @ 06:00]  TSH 2.60      [03-15-19 @ 05:15]  Lipid: chol 126, TG 87, HDL 38, LDL 85      [03-15-19 @ 05:15]

## 2019-08-27 NOTE — PROGRESS NOTE ADULT - PROVIDER SPECIALTY LIST ADULT
Cardiology
Internal Medicine
Nephrology
Cardiology
Nephrology
Nephrology
Cardiology
Internal Medicine

## 2019-08-27 NOTE — PROGRESS NOTE ADULT - ASSESSMENT
Echo 2/19 - severe LV and RV dysfunction    a/p     1) Acute on chronic CHF - cont bumex switch to 2mg po q12 , cont aldactone, resume coreg 3.125mg q12 total > 35 liters negative    2) Afib -  eliquis    3) Hyponatremia - f/u nephro, s/p Tolvaptan , if nephro clears pt can be discharged

## 2019-08-27 NOTE — PROGRESS NOTE ADULT - MINUTES
25
35
40
25
35
25
40
25

## 2019-08-27 NOTE — DISCHARGE NOTE NURSING/CASE MANAGEMENT/SOCIAL WORK - NSDCPEPT PROEDHF_GEN_ALL_CORE
Call primary care provider for follow up after discharge/Monitor weight daily/Low salt diet/Report signs and symptoms to primary care provider/Activities as tolerated

## 2019-08-27 NOTE — PROGRESS NOTE ADULT - PROBLEM SELECTOR PROBLEM 3
Type 2 diabetes mellitus with diabetic polyneuropathy, without long-term current use of insulin

## 2019-08-27 NOTE — DISCHARGE NOTE PROVIDER - NSDCCPCAREPLAN_GEN_ALL_CORE_FT
PRINCIPAL DISCHARGE DIAGNOSIS  Diagnosis: Acute decompensated heart failure  Assessment and Plan of Treatment: Continue taking medications as prescribed. Low salt diet, fluid restriction to 1500 ml daily, monitor your fluid intake and weight daily, exercise as tolerated 30 minutes daily, and follow up with your physician within 1 to 2 weeks.        SECONDARY DISCHARGE DIAGNOSES  Diagnosis: Hyponatremia  Assessment and Plan of Treatment: PRINCIPAL DISCHARGE DIAGNOSIS  Diagnosis: Acute decompensated heart failure  Assessment and Plan of Treatment: Continue taking medications as prescribed. Low salt diet, fluid restriction to 1500 ml daily, monitor your fluid intake and weight daily, exercise as tolerated 30 minutes daily, and follow up with your physician within 1 to 2 weeks.        SECONDARY DISCHARGE DIAGNOSES  Diagnosis: Hyponatremia  Assessment and Plan of Treatment: Please follow up with your PCP to monitor your sodium levels. In the hospital it has been low. PRINCIPAL DISCHARGE DIAGNOSIS  Diagnosis: Acute decompensated heart failure  Assessment and Plan of Treatment: Continue taking medications as prescribed. Low salt diet, fluid restriction to 1500 ml daily, monitor your fluid intake and weight daily, exercise as tolerated 30 minutes daily, and follow up with Dr. Sorenson within one week of discharge.         SECONDARY DISCHARGE DIAGNOSES  Diagnosis: Atrial fibrillation, unspecified type  Assessment and Plan of Treatment: You were started on a blood thinner called Eliquis for prevention of a stroke. Please take as prescribed.    Diagnosis: Hyponatremia  Assessment and Plan of Treatment: Please follow up with your PCP to monitor your sodium levels. In the hospital it has been low.

## 2019-08-27 NOTE — PROGRESS NOTE ADULT - ATTENDING COMMENTS
Assessment/Rec-  -Acute on chronic systolic CHF--..monitor BP. po.Bumex  -Hyponatremia-likely sec.to chf-fluid restriction,renal f/u noted,monitor-124-Talvaptanx1 given,monitor NA level .Renal f/u noted.  --DM,check A1C-6.5 ,monitor FS with coverage  -Ascites.u/s.s/p paracentesis-f/u ktgwjj-eggrawmr-hai.so far.  -d/w family at bedside  --staable for d/c Home today.f/u with PCP ,cardiology in 1 week.

## 2019-08-27 NOTE — PROGRESS NOTE ADULT - SUBJECTIVE AND OBJECTIVE BOX
Phillip Sorenson MD  Interventional Cardiology / Advance Heart Failure and Cardiac Transplant Specialist  [place] Office : [address] [place] [address] [place]. [address]  Tel:   [place] Office : [address] [facility] [place] [address]  Tel: [phone]  Cell : [phone]    Subjective : Pt lying in bed comfortable, not in distress, denies any chest pain or SOB  	  MEDICATIONS:  apixaban 5 milliGRAM(s) Oral every 12 hours  buMETAnide 2 milliGRAM(s) Oral two times a day  spironolactone 50 milliGRAM(s) Oral daily  tamsulosin 0.4 milliGRAM(s) Oral daily        acetaminophen   Tablet .. 650 milliGRAM(s) Oral every 6 hours PRN  diphenhydrAMINE 25 milliGRAM(s) Oral every 6 hours PRN  gabapentin 100 milliGRAM(s) Oral at bedtime      atorvastatin 20 milliGRAM(s) Oral at bedtime  dextrose 40% Gel 15 Gram(s) Oral once PRN  dextrose 50% Injectable 12.5 Gram(s) IV Push once  dextrose 50% Injectable 25 Gram(s) IV Push once  dextrose 50% Injectable 25 Gram(s) IV Push once  glucagon  Injectable 1 milliGRAM(s) IntraMuscular once PRN  insulin lispro (HumaLOG) corrective regimen sliding scale   SubCutaneous three times a day before meals  insulin lispro (HumaLOG) corrective regimen sliding scale   SubCutaneous at bedtime        PHYSICAL EXAM:  T(C): 36.4 (08-27-19 @ 13:59), Max: 36.6 (08-26-19 @ 20:58)  HR: 75 (08-27-19 @ 13:59) (70 - 75)  BP: 118/73 (08-27-19 @ 13:59) (100/51 - 118/73)  RR: 18 (08-27-19 @ 13:59) (17 - 19)  SpO2: 100% (08-27-19 @ 13:59) (100% - 100%)  Wt(kg): --  I&O's Summary    26 Aug 2019 07:01  -  27 Aug 2019 07:00  --------------------------------------------------------  IN: 400 mL / OUT: 2725 mL / NET: -2325 mL    27 Aug 2019 07:01  -  27 Aug 2019 14:36  --------------------------------------------------------  IN: 480 mL / OUT: 1275 mL / NET: -795 mL            HEENT:   Normal oral mucosa, PERRL, EOMI	  Cardiovascular: Normal S1 S2, No JVD, No murmurs, No edema  Respiratory: Lungs clear to auscultation	  Gastrointestinal:  Soft, Non-tender, + BS	  Extremities: 1+ edema                                    11.8   5.97  )-----------( 109      ( 27 Aug 2019 05:03 )             37.7     08-27    124<L>  |  89<L>  |  55<H>  ----------------------------<  193<H>  5.0   |  24  |  0.97    Ca    8.5      27 Aug 2019 05:03  Mg     2.1     08-27      proBNP:   Lipid Profile:   HgA1c:   TSH:

## 2019-08-27 NOTE — PROGRESS NOTE ADULT - PROBLEM SELECTOR PROBLEM 4
Hyperlipidemia, unspecified hyperlipidemia type

## 2019-08-27 NOTE — PROGRESS NOTE ADULT - PROBLEM SELECTOR PROBLEM 1
Acute on chronic systolic congestive heart failure

## 2019-08-27 NOTE — PROGRESS NOTE ADULT - PROBLEM SELECTOR PROBLEM 2
Atrial fibrillation, unspecified type

## 2019-08-27 NOTE — PROGRESS NOTE ADULT - SUBJECTIVE AND OBJECTIVE BOX
CARLO, NFN  69y  Male      Patient is a 69y old  Male who presents with a chief complaint of Abdominal distension, leg swelling (27 Aug 2019 13:49)  feels fine.no sob,no cp,no cough,no vomiting    REVIEW OF SYSTEMS:  neg      INTERVAL HPI/OVERNIGHT EVENTS:  T(C): 36.4 (08-27-19 @ 17:13), Max: 36.6 (08-26-19 @ 20:58)  HR: 70 (08-27-19 @ 17:13) (70 - 75)  BP: 106/59 (08-27-19 @ 17:13) (100/51 - 118/73)  RR: 18 (08-27-19 @ 17:13) (17 - 19)  SpO2: 100% (08-27-19 @ 17:13) (100% - 100%)  Wt(kg): --  I&O's Summary    26 Aug 2019 07:01  -  27 Aug 2019 07:00  --------------------------------------------------------  IN: 400 mL / OUT: 2725 mL / NET: -2325 mL    27 Aug 2019 07:01  -  27 Aug 2019 18:26  --------------------------------------------------------  IN: 480 mL / OUT: 1275 mL / NET: -795 mL      T(C): 36.4 (08-27-19 @ 17:13), Max: 36.6 (08-26-19 @ 20:58)  HR: 70 (08-27-19 @ 17:13) (70 - 75)  BP: 106/59 (08-27-19 @ 17:13) (100/51 - 118/73)  RR: 18 (08-27-19 @ 17:13) (17 - 19)  SpO2: 100% (08-27-19 @ 17:13) (100% - 100%)  Wt(kg): --Vital Signs Last 24 Hrs  T(C): 36.4 (27 Aug 2019 17:13), Max: 36.6 (26 Aug 2019 20:58)  T(F): 97.5 (27 Aug 2019 17:13), Max: 97.9 (26 Aug 2019 20:58)  HR: 70 (27 Aug 2019 17:13) (70 - 75)  BP: 106/59 (27 Aug 2019 17:13) (100/51 - 118/73)  BP(mean): --  RR: 18 (27 Aug 2019 17:13) (17 - 19)  SpO2: 100% (27 Aug 2019 17:13) (100% - 100%)    LABS:                        11.8   5.97  )-----------( 109      ( 27 Aug 2019 05:03 )             37.7     08-27    124<L>  |  89<L>  |  55<H>  ----------------------------<  193<H>  5.0   |  24  |  0.97    Ca    8.5      27 Aug 2019 05:03  Mg     2.1     08-27          CAPILLARY BLOOD GLUCOSE      POCT Blood Glucose.: 191 mg/dL (27 Aug 2019 17:59)  POCT Blood Glucose.: 164 mg/dL (27 Aug 2019 12:01)  POCT Blood Glucose.: 144 mg/dL (27 Aug 2019 08:34)  POCT Blood Glucose.: 268 mg/dL (26 Aug 2019 21:27)            PAST MEDICAL & SURGICAL HISTORY:  CKD (chronic kidney disease)  MR (mitral regurgitation)  AF (atrial fibrillation)  DM (diabetes mellitus)  HLD (hyperlipidemia)  TIA (transient ischemic attack)  HTN (hypertension)  CVA (cerebral infarction)  CHB (complete heart block)  CHF (congestive heart failure)  CAD (coronary artery disease): S/P 4V CABG  S/P mitral valve repair: 2007  S/P CABG (coronary artery bypass graft): 4V CABG in 2007  AICD (automatic cardioverter/defibrillator) present: Medigramtronic      MEDICATIONS  (STANDING):  apixaban 5 milliGRAM(s) Oral every 12 hours  atorvastatin 20 milliGRAM(s) Oral at bedtime  buMETAnide 2 milliGRAM(s) Oral two times a day  carvedilol 3.125 milliGRAM(s) Oral every 12 hours  dextrose 50% Injectable 12.5 Gram(s) IV Push once  dextrose 50% Injectable 25 Gram(s) IV Push once  dextrose 50% Injectable 25 Gram(s) IV Push once  gabapentin 100 milliGRAM(s) Oral at bedtime  insulin lispro (HumaLOG) corrective regimen sliding scale   SubCutaneous three times a day before meals  insulin lispro (HumaLOG) corrective regimen sliding scale   SubCutaneous at bedtime  spironolactone 50 milliGRAM(s) Oral daily  tamsulosin 0.4 milliGRAM(s) Oral daily    MEDICATIONS  (PRN):  acetaminophen   Tablet .. 650 milliGRAM(s) Oral every 6 hours PRN Mild Pain (1 - 3), Moderate Pain (4 - 6), Severe Pain (7 - 10)  dextrose 40% Gel 15 Gram(s) Oral once PRN Blood Glucose LESS THAN 70 milliGRAM(s)/deciliter  diphenhydrAMINE 25 milliGRAM(s) Oral every 6 hours PRN Rash and/or Itching  glucagon  Injectable 1 milliGRAM(s) IntraMuscular once PRN Glucose LESS THAN 70 milligrams/deciliter        RADIOLOGY & ADDITIONAL TESTS:    Imaging Personally Reviewed:  [ ] YES  [ ] NO    Consultant(s) Notes Reviewed:  [x ] YES  [ ] NO    PHYSICAL EXAM:  GENERAL: NAD, well-groomed, well-developed  HEAD:  Atraumatic, Normocephalic  EYES: EOMI, PERRLA, conjunctiva and sclera clear  ENMT: No tonsillar erythema, exudates, or enlargement; Moist mucous membranes, Good dentition, No lesions  NECK: Supple, No JVD, Normal thyroid  NERVOUS SYSTEM:  Alert & Oriented X3, Good concentration; Motor Strength 5/5 B/L upper and lower extremities; DTRs 2+ intact and symmetric  CHEST/LUNG: Clear to percussion bilaterally; No rales, rhonchi, wheezing, or rubs  HEART: Regular rate and rhythm; No murmurs, rubs, or gallops  ABDOMEN: Soft, Nontender,  mildly distended; Bowel sounds present  EXTREMITIES:  2+ Peripheral Pulses, No clubbing, cyanosis,  edema 1+  LYMPH: No lymphadenopathy noted  SKIN: No rashes or lesions    Care Discussed with Consultants/Other Providers [x ] YES  [ ] NO      Code Status: [] Full Code [] DNR [] DNI [] Goals of Care:   Disposition: [] ICU [] Stroke Unit [] RCU []PCU []Floor [] Discharge Home         [facility]

## 2019-08-27 NOTE — DISCHARGE NOTE PROVIDER - HOSPITAL COURSE
70 y/o M with h/o MR s/p mitral valvuloplasty, AF, HLD, HTN, DM, CVA, CAD(s/p 4V CABG), CHF(with EF of 15-20% s/p AICD) p/w worsening abdominal distension and leg edema. 68 y/o M with h/o MR s/p mitral valvuloplasty, AF, HLD, HTN, DM, CVA, CAD(s/p 4V CABG), CHF(with EF of 15-20% s/p AICD) p/w worsening abdominal distension and leg edema. Pt was started on IV Bumex     2mg q12. Pt s/p paracentesis 3.8 liter removal. IV Bumex was then switched to a drip 0.5mg /hr, which was then increased to 1mg/hr. Metolazone was added, which was later discontinued due to hyponatremia. Tolvaptan added for hyponatremia.  Bumex was continued for a while till mostly euvolemic, switched back to IV BID and is now on PO Bumex BID. Pt is now medically cleared for discharge home on PO Bumex. 68 y/o M with h/o MR s/p mitral valvuloplasty, AF, HLD, HTN, DM, CVA, CAD(s/p 4V CABG), CHF(with EF of 15-20% s/p AICD) p/w worsening abdominal distension and leg edema. Pt was started on IV Bumex     2mg q12. Pt s/p paracentesis 3.8 liter removal. IV Bumex was then switched to a drip 0.5mg /hr, which was then increased to 1mg/hr. Metolazone was added, which was later discontinued due to hyponatremia. Received several doses of tolvaptan. 70 y/o M with h/o MR s/p mitral valvuloplasty, AF, HLD, HTN, DM, CVA, CAD(s/p 4V CABG), CHF(with EF of 15-20% s/p AICD) p/w worsening abdominal distension and leg edema. Pt was started on IV Bumex     2mg q12. Pt s/p paracentesis 3.8 liter removal. IV Bumex was then switched to a drip 0.5mg /hr, which was then increased to 1mg/hr. Metolazone was added, which was later discontinued due to hyponatremia. Received several doses of tolvaptan. Pt ready for discharge home with home hospice.

## 2019-09-01 PROCEDURE — G9005: CPT

## 2019-09-20 LAB — ACID FAST STN FLD: SIGNIFICANT CHANGE UP

## 2020-02-17 NOTE — PATIENT PROFILE ADULT - NSPROPOAPRESSUREINJURY_GEN_A_NUR
For now, continue same medications.  I will be in touch with nurse practitioner at [facility] after her chest x-ray results are reviewed today, and the blood work that was  drawn at [facility] today if any medication changes are necessary.    Return to clinic in 2 weeks.    
no

## 2021-02-03 NOTE — DIETITIAN INITIAL EVALUATION ADULT. - PROBLEM SELECTOR PLAN 1
Outpatient Oncology Nutrition Consult  Type of Consult:  Follow Up  Care Location: Telephone Call Monikalavinia Guadarrama goes by Lexx Chaparro"  Nutrition Assessment:    Oncology Diagnosis & Treatments: Stage IV metastatic gastroesophageal cancer      -Undergoing chemotherapy in the palliative setting (5FU) - currently on hold  Oncology History   Malignant neoplasm of lower third of esophagus ([facility] [address] )   7/26/2019 Initial Diagnosis    Malignant [facility] of lower third of [facility] ([facility] )     8/1/2019 -  Chemotherapy    fluorouracil (ADRUCIL) injection 750 mg, 400 mg/m2 = 750 mg, Intravenous, Once, 32 of 38 cycles  Administration: 750 mg (8/1/2019), 750 mg (8/13/2019), 750 mg (8/27/2019), 750 mg (9/10/2019), 750 mg (9/24/2019), 750 mg (10/8/2019), 795 mg (10/22/2019), 795 mg (11/5/2019), 795 mg (11/19/2019), 795 mg (12/3/2019), 795 mg (12/17/2019), 795 mg (12/31/2019), 795 mg (1/14/2020), 795 mg (1/28/2020), 795 mg (2/11/2020), 795 mg (2/25/2020), 795 mg (3/10/2020), 795 mg (3/24/2020), 795 mg (4/7/2020), 795 mg (5/13/2020), 795 mg (5/27/2020), 795 mg (6/10/2020), 795 mg (6/24/2020), 795 mg (7/22/2020), 795 mg (8/5/2020), 790 mg (8/19/2020), 750 mg (10/7/2020), 795 mg (4/29/2020), 750 mg (10/21/2020), 750 mg (11/4/2020), 750 mg (11/18/2020)  pegfilgrastim (NEULASTA ONPRO) subcutaneous injection kit 6 mg, 6 mg, Subcutaneous, Once, 2 of 2 cycles  Administration: 6 mg (8/3/2019), 6 mg (8/15/2019)  leucovorin 750 mg in dextrose 5 % 250 mL IVPB, 748 mg, Intravenous, Once, 21 of 21 cycles  Administration: 750 mg (8/1/2019), 750 mg (8/13/2019), 750 mg (8/27/2019), 750 mg (9/10/2019), 750 mg (9/24/2019), 750 mg (10/8/2019), 800 mg (10/22/2019), 800 mg (11/5/2019), 800 mg (11/19/2019), 800 mg (12/3/2019), 800 mg (12/17/2019), 800 mg (12/31/2019), 800 mg (1/14/2020), 800 mg (1/28/2020), 800 mg (2/11/2020), 800 mg (2/25/2020), 800 mg (3/10/2020), 800 mg (3/24/2020), 800 mg (4/7/2020), 800 mg (5/13/2020), 800 mg (4/29/2020)  oxaliplatin (ELOXATIN) 158 95 mg in dextrose 5 % 250 mL chemo infusion, 85 mg/m2 = 158 95 mg, Intravenous, Once, 6 of 6 cycles  Administration: 158 95 mg (8/1/2019), 158 95 mg (8/13/2019), 158 95 mg (8/27/2019), 158 95 mg (9/10/2019), 158 95 mg (9/24/2019), 158 95 mg (10/8/2019)     Gastroesophageal cancer ([facility] [address] )   7/26/2019 Initial Diagnosis    Malignant neoplasm of cardia of stomach ([facility] [address] )     8/1/2019 -  Chemotherapy    fluorouracil (ADRUCIL) injection 750 mg, 400 mg/m2 = 750 mg, Intravenous, Once, 32 of 38 cycles  Administration: 750 mg (8/1/2019), 750 mg (8/13/2019), 750 mg (8/27/2019), 750 mg (9/10/2019), 750 mg (9/24/2019), 750 mg (10/8/2019), 795 mg (10/22/2019), 795 mg (11/5/2019), 795 mg (11/19/2019), 795 mg (12/3/2019), 795 mg (12/17/2019), 795 mg (12/31/2019), 795 mg (1/14/2020), 795 mg (1/28/2020), 795 mg (2/11/2020), 795 mg (2/25/2020), 795 mg (3/10/2020), 795 mg (3/24/2020), 795 mg (4/7/2020), 795 mg (5/13/2020), 795 mg (5/27/2020), 795 mg (6/10/2020), 795 mg (6/24/2020), 795 mg (7/22/2020), 795 mg (8/5/2020), 790 mg (8/19/2020), 750 mg (10/7/2020), 795 mg (4/29/2020), 750 mg (10/21/2020), 750 mg (11/4/2020), 750 mg (11/18/2020)  pegfilgrastim (NEULASTA ONPRO) subcutaneous injection kit 6 mg, 6 mg, Subcutaneous, Once, 2 of 2 cycles  Administration: 6 mg (8/3/2019), 6 mg (8/15/2019)  leucovorin 750 mg in dextrose 5 % 250 mL IVPB, 748 mg, Intravenous, Once, 21 of 21 cycles  Administration: 750 mg (8/1/2019), 750 mg (8/13/2019), 750 mg (8/27/2019), 750 mg (9/10/2019), 750 mg (9/24/2019), 750 mg (10/8/2019), 800 mg (10/22/2019), 800 mg (11/5/2019), 800 mg (11/19/2019), 800 mg (12/3/2019), 800 mg (12/17/2019), 800 mg (12/31/2019), 800 mg (1/14/2020), 800 mg (1/28/2020), 800 mg (2/11/2020), 800 mg (2/25/2020), 800 mg (3/10/2020), 800 mg (3/24/2020), 800 mg (4/7/2020), 800 mg (5/13/2020), 800 mg (4/29/2020)  oxaliplatin (ELOXATIN) 158 95 mg in dextrose 5 % 250 mL chemo infusion, 85 mg/m2 = 158 95 mg, Intravenous, Once, 6 of 6 cycles  Administration: 158 95 mg (8/1/2019), 158 95 mg (8/13/2019), 158 95 mg (8/27/2019), 158 95 mg (9/10/2019), 158 95 mg (9/24/2019), 158 95 mg (10/8/2019)       Patient Active Problem List   Diagnosis    PTSD (post-traumatic stress disorder)    Essential hypertension    Major depression    OCD (obsessive compulsive disorder)    Anxiety    Malignant neoplasm of lower third of esophagus ([facility] )    Gastroesophageal cancer ([place] [address] )    Cancer-related pain    Chemotherapy-induced nausea    Severe protein-calorie malnutrition ([place]ace [place]rees: less than 60% of standard weight) ([facility] )    Malignant ascites    Recovering alcoholic (HCC)    Other fatigue    Other dysphagia    Nausea    Dehydration    Skin fissures    Goals of care, counseling/discussion    Palliative care patient    Other insomnia    Counseling on health promotion and disease prevention    Elevated PSA    Memory loss    Regurgitation of stomach contents    Falls    Superficial thrombophlebitis of lower extremity    Pneumonia of left lung due to infectious organism    PAF (paroxysmal atrial fibrillation) (HCC)    Moderate protein-calorie malnutrition (HCC)    Dilated cardiomyopathy (HCC)    Urge urinary incontinence    Aspiration pneumonia (HCC)    Chronic combined systolic and diastolic [facility] (congestive [facility] failure) [facility]    Port-A-Cath in place    Hematuria    Hyponatremia    Hypoglycemia    Severe protein-calorie malnutrition (HCC)    Cavitating mass in left upper lung lobe    Immunocompromised patient ([facility] )    Centrilobular emphysema (HCC)    Leukocytosis     Past Medical History:   Diagnosis Date    Anxiety     Cancer ([facility] ) 08/2019    metastatic gastroesophageal cancer    Dehydration     Depression     Esophageal cancer (HCC)     Fatigue     History of chemotherapy     currently started 8/2019    History of DVT (deep vein thrombosis)     Hypertension     currently is normal and may not need medication    Lung infection     chemo currently on hold because of the infection 12/22/2020    Nausea     Pneumonia     x2    Port-A-Cath in place     right    Psychiatric disorder     Recovering alcoholic ([address] )     sober since 1990    Varicose veins of left lower extremity      Past Surgical History:   Procedure Laterality Date    APPENDECTOMY      COLONOSCOPY      IR GASTROSTOMY TUBE PLACEMENT  2/5/2021    IR PORT PLACEMENT  7/31/2019    UPPER GASTROINTESTINAL ENDOSCOPY         Review of Medications:   Vitamins, Supplements and Herbals: No, pt denies taking supplements     Current Outpatient Medications:     amiodarone 200 mg tablet, TAKE ONE TABLET BY MOUTH EVERY DAY WITH BREAKFAST (Patient taking differently: 200 mg every morning ), Disp: 30 tablet, Rfl: 2    ammonium lactate (LAC-HYDRIN) 12 % cream, Apply topically as needed for dry skin On hands and feet, Disp: 385 g, Rfl: 2    apixaban (Eliquis) 5 mg, Take 1 tablet (5 mg total) by mouth 2 (two) times a day, Disp: 60 tablet, Rfl: 0    diazepam (VALIUM) 5 mg tablet, Take 1 tablet (5 mg total) by mouth 2 (two) times a day (Patient taking differently: Take 10 mg by mouth daily at bedtime ), Disp: 60 tablet, Rfl: 5    hydrochlorothiazide (HYDRODIURIL) 12 5 mg tablet, Take one tablet daily as needed for SBP> 160/90, Disp: 30 tablet, Rfl: 1    ondansetron (ZOFRAN) 4 mg tablet, Take 2 tablets (8 mg total) by mouth every 8 (eight) hours as needed for nausea or vomiting, Disp: 75 tablet, Rfl: 0    pantoprazole (PROTONIX) 40 mg tablet, Take 1 tablet (40 mg total) by mouth 2 (two) times a day, Disp: 60 tablet, Rfl: 0    venlafaxine (EFFEXOR-XR) 150 mg 24 hr capsule, Take 1 capsule (150 mg total) by mouth daily, Disp: 30 capsule, Rfl: 0    Most Recent Lab Results:   Lab Results   Component Value Date    WBC 13 59 (H) 01/14/2021    IRON 13 (L) 09/01/2020    TIBC 341 09/01/2020    FERRITIN 175 09/01/2020    ALT 18 01/13/2021    AST 13 01/13/2021    ALB 2 6 (L) 01/13/2021 Pt appears hypervolemic on exam with rales, abdominal distention and lower extremity edema, [place] of 829[address]  Admit to telemetry for diuresis, trend enzymes and monitor EKGs  Start Lasix 80mg IVP BID  Continue Digoxin  Echocardiogram ordered  Renal US ordered to assess for hydro/obstructive uropathy  Bladder scan ordered; may need dooley if pt unable to void  Strict I&Os, monitor daily weights, 1500 cc fluid restriction, sodium restriction  Cards consult with Dr. Sorenson called  F/U MD note SODIUM 136 01/14/2021    SODIUM 136 01/13/2021    K 3 6 01/14/2021    K 3 9 01/13/2021     01/14/2021    BUN 13 01/14/2021    BUN 15 01/13/2021    CREATININE 0 96 01/14/2021    CREATININE 1 06 01/13/2021    EGFR 81 01/14/2021    PHOS 3 0 09/25/2020    PHOS 3 0 09/24/2020    POCGLU 104 09/25/2020    GLUF 99 01/14/2021    GLUF 122 (H) 01/13/2021    GLUC 99 01/14/2021    CALCIUM 9 2 01/14/2021    FOLATE 10 6 09/01/2020    MG 1 7 09/25/2020     Anthropometric Measurements:   Height: 69"  Ht Readings from Last 1 Encounters:   01/26/21 5' 11" (1 803 m)     Wt Readings from Last 20 Encounters:   01/26/21 65 5 kg (144 lb 8 oz)   01/13/21 68 5 kg (151 lb 0 2 oz)   01/07/21 66 2 kg (146 lb)   12/30/20 65 8 kg (145 lb)   12/22/20 68 kg (150 lb)   12/11/20 69 4 kg (153 lb)   11/19/20 72 4 kg (159 lb 9 8 oz)   11/18/20 71 1 kg (156 lb 12 oz)   11/17/20 68 9 kg (152 lb)   11/05/20 74 4 kg (164 lb)   11/04/20 70 4 kg (155 lb 3 3 oz)   10/21/20 72 8 kg (160 lb 7 9 oz)   10/07/20 74 4 kg (164 lb 0 4 oz)   09/29/20 71 7 kg (158 lb)   09/25/20 74 2 kg (163 lb 9 3 oz)   09/18/20 77 1 kg (170 lb)   09/08/20 76 9 kg (169 lb 8 5 oz)   08/19/20 81 6 kg (179 lb 14 3 oz)   08/18/20 82 3 kg (181 lb 8 oz)   08/11/20 81 2 kg (179 lb)   Estimated body mass index is 20 15 kg/m² as calculated from the following:    Height as of 1/26/21: 5' 11" (1 803 m)  Weight as of 1/26/21: 65 5 kg (144 lb 8 oz)  -Home Wts: (10/15/20) 166#, (2/8/21) 150#  -UBW: 198-212#  -Comments: Wife reports that pt has lost ~30# since the end of August 2020      Oncology Nutrition-Anthropometrics      Most Recent Value   Patient age (years):  80 years   Patient (male) height (in):  71 in   Current weight to be used for anthropometric calculations (lbs)  150 lbs   Current weight to be used for anthropometric calculations (kg)  68 kg   BMI:  22 15   IBW male  160 lb   IBW (kg) male  72 7 kg   IBW % (male)  93 8 %   Adjusted BW (male):  158 lbs   % weight change after 1 month:  2 7 %   Weight change after 1 month (lbs)  4 lbs   % weight change after 3 months:  (!) -8 5 %   Weight change after 3 months (lbs)  -14 lbs   % weight change after 6 months:  (!) -16 2 %   Weight change after 6 months (lbs)  -29 lbs        Nutrition-Focused Physical Findings: n/a due to telephone call       Food/Nutrition-Related History & Client/Social History:    Current Nutrition Impact Symptoms:  [x] Nausea - occasional dry heaves [x] Reduced Appetite - "none" [] Acid Reflux    [x] Vomiting - + Regurgitation after eating  -can only tolerate full liquids and min amounts of pureed foods [x] Unintended Wt Loss - wt loss picked up again in August 2020 d/t vomiting with meals [] Malabsorption    [] Diarrhea  [] Unintended Wt Gain  [] Dumping Syndrome    [] Constipation  [] Thick Mucous/Secretions  [] Abdominal Pain    [x] Dysgeusia (Altered Taste) - says foods don't taste the same, "everything tastes like cardboard"  -practicing good oral care [] Xerostomia (Dry Mouth)  [] Gas    [] Dysosmia (Altered Smell)  [] Thrush  [] Difficulty Chewing    [] Oral Mucositis (Sore Mouth)  [x] Fatigue - significant [] Other:   [] Odynophagia  [] Esophagitis  [] Other:    [x] Dysphagia -   -Pt reports esophageal dysphagia and regurgitation has progressively gotten worse over the past couple of months   -Has had VBS and has been seen by ST in the past   -Per GI 2/2/21: "barium swallow shows lower esophageal mass and delayed  esophageal emptying"  -Wife stated 2/3/21 that pt cannot have a stent placed  -S/p PEG placed by IR 2/5/21 (ordered by Dr Jagruti Mora) [x] Early Satiety - can only tolerate ~5 oz of a pureed food at a time, no issue with full liquids per pt [] No Problems Eating      Food Allergies: no  Food Intolerances: no   -For Gout, avoids: organ meats, shellfish  Last gout flare was 2 years ago after a liverwurst sandwich  Current Diet: Pureed and Full Liquids - mainly soups, ice cream, and liquids  -Limited to 5 oz of pureed consistencies at a time d/t fullness  Can tolerate Full Liquids  Current Nutrition Intake: Decreased since last visit  Appetite: None  Nutrition Route: PO and G-tube  Meal planning/preparation mainly done by: Self and Spouse/Partner   Oral Care: Brushing BID, Flossing QD, Biotene Mouthwash QD  Activity level: Doing home PT exercises daily  24 Hr Diet Recall: Today so far: nothing, just woke up 1 hr ago  Yesterday:    -3-4pm: Chobani strawberry yogurt (5 3 oz), 16 oz cream of mushroom soup   -6pm: Boost VHC    Beverages: water (30 oz), gingerale (10 oz), Boost VHC (8 oz x1), soups    -Averaging 48 oz per day (84% of est needs), sometimes a little bit more  Supplements:    Boost Very High Calorie (8 oz, 530 kcal, 22 g pro) - 1x/day     EN Recall:  DME: [employer]  Pt states VNA is coming tomorrow to teach him how to use his tube  I verbally reviewed instructions with pt also today  Access Type: 16F G-tube placed 21 (ordered by GI - Dr Chanell Ortiz)  Formula: Says Dionisio - order for 6 cartons/day sent by GI 21  Pt states he has not gotten his delivery yet  Method: Bolus  Regimen: n/a  Flush: 60 mL free water flush QD for patency  Additions: n/a  EN providin mL total water      Est Needs Notes:  Fluids needs for hx of CHF    Oncology Nutrition-Estimated Needs      Nutrition from 2021 in Count includes the [facility] Oncology Dietitian Services Nutrition from 10/15/2020 in Count includes the [facility] Oncology Dietitian Services   Weight type used  Actual weight  Actual weight   Weight in pounds (lbs) used for estimated needs  150 lbs  166 lbs   Weight in kilograms (kg) used for estimated needs  68 2 kg  75 5 kg   Energy needs based on 35 kcal/k kcal  2641 kcal   Energy needs based on 40 kcal/k kcal  3018 kcal   Protein needs based on 1 5 g/kg  102 g  113 g   Protein needs based on 2 g/kg  136 g  151 g   Fluid needs based on 25 mL/kg  1700 mL  1875 mL Fluid needs in ounces  57 oz  63 oz   Fluid needs based on 30 mL/kg  2040 mL  2250 mL   Fluid needs in ounces  69 oz  76 oz          Discussion & Intervention:   Anali Valenzuela was evaluated today for an RD follow up regarding wt loss, poor po intake, enteral nutrition and nutrition impact sx management  Anali Valenzuela is currently undergoing tx for gastroesophageal cancer (chemotherapy) but his tx is currently on hold at this time  Anali Valenzuela has had progressive esophageal dysphagia leading to vomiting after eating since August 2020  He has also had a significant wt loss since then and meets criteria for severe protein-calorie malnutrition  He says he can tolerate full liquids without issues but is limited to 5 oz of a pureed consistency at one time  He is mainly on a full liquid diet consisting of water, soups, ice cream, and Boost VHC  He has no appetite and continues to report significant dysgeusia  He tries to follow proper oral care  D/t his lack of ability to tolerate a po diet in meaningful and adequate amounts, he had a G-tube placed by IR on 2/5/21 (per Dr Wendy Juan order)  He is currently only flushing it for patency  He says a homecare nurse will be coming tomorrow to teach him how to use his new feeding tube  This RD spoke with the nurse from Dr Wendy Juan office, Sondra Sr , this morning who stated that enteral orders were sent to [employer] on Friday (6 cartons of TwoCal per day)  Pt stated that he has not received a call from [facility] yet  I discussed the role of [employer], encouraged him to check his voice messages, and provided him with their phone number to check on his orders and schedule delivery of supplies  Overall, pt would benefit from enteral nutrition to help support wt maintenance/re-gain and to provide adequate sources of nutrition d/t his inability to tolerate a po diet  Recommend reducing TF order to 5 cartons per day of TwoCal via G-tube     Reviewed the importance of wt management throughout the tx process and the role of enteral nutrition and a full liquid diet (as tolerated) in managing wt and overall health  Discussed ways to optimize nutrient intake, suggestions include: sipping on calorie containing beverages, utilizing oral nutrition supplements and adherence to enteral nutrition plan of care  Reviewed 24 hour recall, which revealed an inadequate po intake and enteral intake, and discussed ways to increase kcal, protein, and fluid intakes, suggestions include: advancing TF to goal, adequate flushing and consuming liquids with calories  Also discussed: weight management, indications & use of oral nutrition supplements, proper oral care, adequate hydation & tips to increase overall fluid intake, MNT for: dysgeusia, hydration, N/V, dysphagia, wt loss, poor appetite, enteral nutrition, Feeding Tube Use & Care (pt verbalized reciprocal comprehension using verbal quiz) and individualized enteral nutrition recommendations & plan: (see below)  · In the event enteral nutrition is needed for Ry's sole source of nutrition, recommend:   · Initiation: Recommend initiating bolus tube feeding (TF) with a 120 mL bolus 5 times per day (every 2-3 hours, as tolerated)  Once tolerance is established, begin to slowly advance bolus volume (as tolerated) until TF goal is achieved  · TF Goal: 1 container (237 mL) 5 times per day of TwoCal HN via G-tube (push syringe or gravity syringe method to administer boluses; 1 container to infuse over ~15-20 minutes)  · All formula must be room temperature before putting it into your feeding tube  · Any unused formula has to be refrigerated and discarded after 48 hrs  · You may use Boost VHC in your feeding tube until your TwoCal arrives in the mail  · Water Flushing:   · You must always flush with water before and after anything you put in your feeding tube    · Total fluid intake to be 60-70 oz per day (this includes your flushing, formula, and anything you drink by mouth)  · Each carton of TwoCal contains 5 5 oz of water  · If you are drinking plenty of fluids by mouth, the minimum flush you should do is 30 mL water before a feeding and 60 mL water after a feeding  · If you are not drinking anything by mouth flush with 100 mL water flush before and after each bolus feeding (total of 1000 mL/day in flushes; will need to adjust flushes prn for IV fluids and oral intake)  · Enteral Nutrition goal to provide: 1185 mL volume (5 containers/day), 2370 kcal, 99 grams protein, 830 mL free water, 1830 mL total water daily (depending on oral intake)  Moving forward, Barry Dodd will make efforts to consume liquids with calories, monitor his wt, advance his TF to goal slowly over the coming week, reach out to [employer] to inquire about delivery of supplies, use Boost VHC via G-tube until TwoCal arrives, and contact me with any TF intolerance  We will follow up in 2 days  Materials Provided: e-mailed to pt (@ '[email or web address]'): Nutrition Rx & Recommendations  All questions and concerns addressed during todays visit  Barry Dodd has RD contact information  Nutrition Diagnosis:  · Inadequate Energy Intake related to physiological causes, disease state and treatment related issues as evidenced by food recall, wt loss and discussion with pt and/or family  · Increased Nutrient Needs (kcal & pro) related to increased demand for nutrients and disease state as evidenced by cancer dx and pt undergoing tx for cancer  · Swallowing Difficulty related to diagnosis/treatment related causes as evidenced by vomiting with pureed or thicker consistencies, only tolerating full liquids    · Patient has clinical indicators (or ASPEN criteria) consistent with severe protein-calorie malnutrition in the context of Chronic Illness as evidenced by >7 5% wt loss in 3 months, >10% wt loss in 6 months and </=75% energy intake vs  Estimated needs for >/=1 month  Monitoring & Evaluation:   Goals:  · adequate nutrition impact symptom management  · pt to meet >/=75% estimated nutrition needs daily  · weight gain of 1-2# per week  · achieve tube feeding goal rate    · Progress Towards Goals: Not Met and New Goal(s) Added    Barriers: None  Readiness to change: action  Comprehension: verbalizes understanding  Expected Compliance: good    Nutrition Rx & Recommendations:  · Diet: Full Liquids as tolerated OR per physician  · Follow proper oral care; Try baking soda/salt water rinse recipe (mix 3/4 tsp salt + 1 tsp baking soda + 1 qt water; rinse with plain water after using) in Eating Hints book (pg 18)  Brush your teeth before/after meals & before bed  · Weigh yourself regularly  If you notice weight loss, make an effort to increase your daily food/calorie intake  If you continue to notice loss after these efforts, reach out to your dietitian to establish a plan to stabilize weight  · Daily Fluid Goal: 60-70 oz per day  · Choose fluids with calories: juice, smoothies, milk, chocolate milk, protein shakes  · Tube Feeding Plan:  · Initiation: Recommend initiating bolus tube feeding (TF) with a 120 mL bolus 5 times per day (every 2-3 hours, as tolerated)  Once tolerance is established, begin to slowly advance bolus volume (as tolerated) until TF goal is achieved  · TF Goal: 1 container (237 mL) 5 times per day of TwoCal HN via G-tube (push syringe or gravity syringe method to administer boluses; 1 container to infuse over ~15-20 minutes)  · All formula must be room temperature before putting it into your feeding tube  · Any unused formula has to be refrigerated and discarded after 48 hrs  · You may use Boost VHC in your feeding tube until your TwoCal arrives in the mail  · Water Flushing:   · You must always flush with water before and after anything you put in your feeding tube    · Total fluid intake to be 60-70 oz per day (this includes your flushing, formula, and anything you drink by mouth)  · Each carton of TwoCal contains 5 5 oz of water  · If you are drinking plenty of fluids by mouth, the minimum flush you should do is 30 mL water before a feeding and 60 mL water after a feeding  · If you are not drinking anything by mouth flush with 100 mL water flush before and after each bolus feeding (total of 1000 mL/day in flushes; will need to adjust flushes prn for IV fluids and oral intake)  · Enteral Nutrition goal to provide: 1185 mL volume (5 containers/day), 2370 kcal, 99 grams protein, 830 mL free water, 1830 mL total water daily (depending on oral intake)  · Call [employer] when you have 10 days left of TF supplies: [phone], option 3 (customer support)  [employer] should be calling your to coordinate delivery of supplies and formula  You can call them to check on your order  · Call me at [phone] with any intolerance to your tube feeding (nausea, vomiting, cramping, diarrhea, coughing, etc  That is associated with your tube feeding)      Follow Up Plan: 2/10/21 at 11am for a phone follow up   Recommend Referral to Other Providers: Dr Ritesh Cortes to help coordinate TF orders with [facility] company

## 2021-05-24 NOTE — PATIENT PROFILE ADULT. - PRO INTERPRETER NEED 2
Patient notified of [employer] approval from prior auth. Approved 4/21/2021 to 5/21/2022.    Patient agreed to enroll in [employer] program through [employer] for support. Writer to enroll patient today electronically.    Patient reminded of possible side effects, labs, and upcoming appointment.   
English

## 2021-06-15 NOTE — PROGRESS NOTE ADULT - PROBLEM SELECTOR PLAN 2
Breakfast tray ordered for patient rate control + ac inr supratherapeutic - monitor for active signs of bleeding   risk and benefits of ac explained

## 2021-08-13 NOTE — PATIENT PROFILE ADULT - FALL HARM RISK CONCLUSION
Final Anesthesia Post-op Assessment    Patient: Alejandro Feldman  Procedure(s) Performed: COLONOSCOPY  Anesthesia type: General    Vitals Value Taken Time   Temp 37.2 °C (99 °F) 08/13/21 0935   Pulse 71 08/13/21 0935   Resp 20 08/13/21 0935   SpO2 96 % 08/13/21 0935   BP 96/65 08/13/21 0935         Patient Location: PACU Phase 1  Post-op Vital Signs:stable  Level of Consciousness: awake and alert  Respiratory Status: spontaneous ventilation  Cardiovascular stable  Hydration: euvolemic  Pain Management: adequately controlled  Handoff: Handoff to receiving nurse was performed and questions were answered  Vomiting: none  Nausea: None  Airway Patency:patent  Post-op Assessment: no complications and patient tolerated procedure well with no complications      No complications documented.   
Fall with Harm Risk

## 2021-09-29 NOTE — DIETITIAN INITIAL EVALUATION ADULT. - PROBLEM/PLAN-7
From: Jeny LOW Page  To: Luigi Hernandez  Sent: 9/27/2021 5:44 PM CDT  Subject: Work excuse     Good afternoon. It is causing tremendous pain. I am unable to see a urogynecologist until 10/11. Is there a way I could have a work excuse until I can get in to see her? I was up on & off due to pain. Today, I went to the Urgent Care @ [facility] because of the pain. They did an ultrasound there & it is in Epic. The pain is constant and at a 6/7 out of 10. 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 Please let me know your thoughts.    Thank you.  
(3) no apparent problem
DISPLAY PLAN FREE TEXT

## 2021-10-18 NOTE — PATIENT PROFILE ADULT - CENTRAL VENOUS CATHETER/PICC LINE
----- Message from Prabhakar Mccurdy MD sent at 10/15/2021  4:32 PM EDT -----  Let Ms. Perera know there was some mild changes of reflux on the esophageal biopsies.  I would recommend a trial of proton pump inhibitor if she is not taking at this time.   no

## 2022-03-15 NOTE — PROGRESS NOTE ADULT - SUBJECTIVE AND OBJECTIVE BOX
Phillip Sorenson MD  Interventional Cardiology / Advance Heart Failure and Cardiac Transplant Specialist  [place] Office : [phone] [place] [address] [facility] [address]  Tel:   [place] Office : [address] [facility] [place] [address]  Tel: [phone]  Cell : [phone]    Subjective : Pt lying in bed comfortable, not in distress, denies any chest pain or SOB  	  MEDICATIONS:  apixaban 5 milliGRAM(s) Oral every 12 hours  buMETAnide 2 milliGRAM(s) Oral two times a day  spironolactone 50 milliGRAM(s) Oral daily  tamsulosin 0.4 milliGRAM(s) Oral daily        acetaminophen   Tablet .. 650 milliGRAM(s) Oral every 6 hours PRN  diphenhydrAMINE 25 milliGRAM(s) Oral every 6 hours PRN  gabapentin 100 milliGRAM(s) Oral at bedtime      atorvastatin 20 milliGRAM(s) Oral at bedtime  dextrose 40% Gel 15 Gram(s) Oral once PRN  dextrose 50% Injectable 12.5 Gram(s) IV Push once  dextrose 50% Injectable 25 Gram(s) IV Push once  dextrose 50% Injectable 25 Gram(s) IV Push once  glucagon  Injectable 1 milliGRAM(s) IntraMuscular once PRN  insulin lispro (HumaLOG) corrective regimen sliding scale   SubCutaneous three times a day before meals  insulin lispro (HumaLOG) corrective regimen sliding scale   SubCutaneous at bedtime        PHYSICAL EXAM:  T(C): 36.6 (08-20-19 @ 13:50), Max: 36.6 (08-20-19 @ 13:50)  HR: 75 (08-20-19 @ 16:48) (71 - 75)  BP: 97/55 (08-20-19 @ 16:48) (93/60 - 106/70)  RR: 16 (08-20-19 @ 13:50) (16 - 17)  SpO2: 99% (08-20-19 @ 13:50) (97% - 100%)  Wt(kg): --  I&O's Summary    19 Aug 2019 07:01  -  20 Aug 2019 07:00  --------------------------------------------------------  IN: 880 mL / OUT: 4550 mL / NET: -3670 mL    20 Aug 2019 07:01  -  20 Aug 2019 17:19  --------------------------------------------------------  IN: 250 mL / OUT: 1100 mL / NET: -850 mL        	  HEENT:   Normal oral mucosa, PERRL, EOMI	  Cardiovascular: Normal S1 S2, No JVD, No murmurs, No edema  Respiratory: Lungs clear to auscultation	  Gastrointestinal:  Soft, Non-tender, + BS	  Extremities: 2+ edema                                    12.3   5.78  )-----------( 132      ( 20 Aug 2019 05:45 )             38.2     08-20    126<L>  |  82<L>  |  74<H>  ----------------------------<  124<H>  4.1   |  26  |  1.26    Ca    8.9      20 Aug 2019 05:45  Phos  4.6     08-20  Mg     2.0     08-20      proBNP:   Lipid Profile:   HgA1c:   TSH: Previously Declined (within the last year)

## 2022-08-08 NOTE — PROCEDURE NOTE - INTERROGATION NOTE: ZONE II THERAPIES
Child's Well Visit, 5 Years: Care Instructions  Your Care Instructions     Your child probably likes to sing songs, hop, and dance around. At age 3, children are more independent and may prefer to dress without your help. Most 3year-olds can tell someone their first and last name. They usually can draw a person with three body parts, like a head, body, and arms or legs. Most children at this age like to hop on one foot, ride a tricycle (or a small bike with training wheels), throw a ball overhand, and go up and down stairs without holding onto anything. Some 3year-olds know what is real and what is pretend but most will play make-believe. Many four-year-olds like to tell short stories. Follow-up care is a key part of your child's treatment and safety. Be sure to make and go to all appointments, and call your doctor if your child is having problems. It's also a good idea to know your child's test results and keep a list of the medicines your child takes. How can you care for your child at home? Eating and a healthy weight  Encourage healthy eating habits. Most children do well with three meals and two or three snacks a day. Offer fruits and vegetables at meals and snacks. Check in with your child's school or day care to make sure that healthy meals and snacks are given. Limit fast food. Help your child with healthier food choices when you eat out. Offer water when your child is thirsty. Do not give your child more than 4 to 6 oz. of fruit juice per day. Juice does not have the valuable fiber that whole fruit has. Do not give your child soda pop. Make meals a family time. Have nice conversations at mealtime and turn the TV off. If your child decides not to eat at a meal, wait until the next snack or meal to offer food. Do not use food as a reward or punishment for your child's behavior. Do not make your children \"clean their plates. \"  Let all your children know that you love them whatever their size. Help your children feel good about their bodies. Remind your child that people come in different shapes and sizes. Do not tease or nag children about their weight. And do not say your child is skinny, fat, or chubby. Limit TV or video time to 1 hour or less per day. Research shows that the more TV children watch, the higher the chance that they will be overweight. Do not put a TV in your child's bedroom, and do not use TV and videos as a . Healthy habits  Have your child play actively for at least 30 to 60 minutes every day. Plan family activities, such as trips to the park, walks, bike rides, swimming, and gardening. Help your children brush their teeth 2 times a day and floss one time a day. Limit TV and video time to 1 hour or less per day. Check for TV programs that are good for 3year olds. Put a broad-spectrum sunscreen (SPF 30 or higher) on your child before going outside. Use a broad-brimmed hat to shade your child's ears, nose, and lips. Do not smoke or allow others to smoke around your child. Smoking around your child increases the child's risk for ear infections, asthma, colds, and pneumonia. If you need help quitting, talk to your doctor about stop-smoking programs and medicines. These can increase your chances of quitting for good. Safety  For every ride in a car, secure your child into a properly installed car seat that meets all current safety standards. For questions about car seats and booster seats, call the [facility] at 5-537.466.7751. Make sure your child wears a helmet that fits properly when riding a bike. Keep cleaning products and medicines in locked cabinets out of your child's reach. Keep the number for Poison Control (0-460.851.3045) near your phone. Put locks or guards on all windows above the first floor. Watch your child at all times near play equipment and stairs.   Watch your child at all times when your child is near water, including pools, hot tubs, and bathtubs. Do not let your child play in or near the street. Children younger than age 6 should not cross the street alone. Immunizations  Flu immunization is recommended once a year for all children ages 7 months and older. Parenting  Read stories to your child every day. One way children learn to read is by hearing the same story over and over. Play games, talk, and sing to your child every day. Give your child love and attention. Give your child simple chores to do. Children usually like to help. Teach your child not to take anything from strangers and not to go with strangers. Praise good behavior. Do not yell or spank. Use time-out instead. Be fair with your rules and use them in the same way every time. Your child learns from watching and listening to you. Getting ready for   Most children start  between 3 and 10years old. It can be hard to know when your child is ready for school. Your local elementary school or  can help. Most children are ready for  if they can do these things: Your child can keep hands away from other children while in line; sit and pay attention for at least 5 minutes; sit quietly while listening to a story; help with clean-up activities, such as putting away toys; use words for frustration rather than acting out; work and play with other children in small groups; do what the teacher asks; get dressed; and use the bathroom without help. Your child can stand and hop on one foot; throw and catch balls; hold a pencil correctly; cut with scissors; and copy or trace a line and [place]. Your child can spell and write their first name; do two-step directions, like \"do this and then do that\"; talk with other children and adults; sing songs with a group; count from 1 to 5; see the difference between two objects, such as one is large and one is small; and understand what \"first\" and \"last\" mean.   When should you call for help? Watch closely for changes in your child's health, and be sure to contact your doctor if:    You are concerned that your child is not growing or developing normally.     You are worried about your child's behavior.     You need more information about how to care for your child, or you have questions or concerns. Where can you learn more? Go to http://www.gray.com/  Enter D649 in the search box to learn more about \"Child's Well Visit, 4 Years: Care Instructions. \"  Current as of: September 20, 2021               Content Version: 13.2  © 8240-1645 [employer]wise, [employer]. Care instructions adapted under license by [employer] (which disclaims liability or warranty for this information). If you have questions about a medical condition or this instruction, always ask your healthcare professional. [address] any warranty or liability for your use of this information. VF = ATP during charge, 35Jx6

## 2023-06-01 NOTE — H&P ADULT - EXTREMITIES COMMENTS
What Type Of Note Output Would You Prefer (Optional)?: Standard Output Hpi Title: Evaluation of Skin Lesions How Severe Are Your Spot(S)?: moderate Have Your Spot(S) Been Treated In The Past?: has not been treated Family Member: Father +1 pitting edema noted b/l from ankle to mid tibia

## 2023-11-20 NOTE — PROGRESS NOTE ADULT - ATTENDING COMMENTS
Assessment/Rec-  -Acute on chronic systolic CHF-cont iv bumex drip-..spironolactone added metazolone.ECHO,Cardiology f/u noted.  -A.FIB with mvr-rate control,A/C-Eliquis  -Hyponatremia-likely sec.to chf-fluid restriction,renal f/u noted,monitor-128  --DM,check A1C,monitor FS with coverage  -Elevared BNP,  -Ascites.u/s.s/p paracentesis-f/u [id number]-xnz.so far.  -d/w family at bedside 20-Nov-2023 08:55

## 2023-12-18 NOTE — PROGRESS NOTE ADULT - SUBJECTIVE AND OBJECTIVE BOX
Patient has chronic back pain and has been receiving PRN morphine at her rehab  Hold morphine for now 2/2 lethargy  Multimodal analgesia   SUBJECTIVE / OVERNIGHT EVENTS: pt still shortness of breath , but says his shortness of breath is better than yesterday    MEDICATIONS  (STANDING):  atorvastatin 20 milliGRAM(s) Oral at bedtime  buMETAnide Infusion 0.5 mG/Hr (2.5 mL/Hr) IV Continuous <Continuous>  dextrose 5%. 1000 milliLiter(s) (50 mL/Hr) IV Continuous <Continuous>  dextrose 50% Injectable 12.5 Gram(s) IV Push once  dextrose 50% Injectable 25 Gram(s) IV Push once  dextrose 50% Injectable 25 Gram(s) IV Push once  digoxin     Tablet 0.125 milliGRAM(s) Oral daily  insulin lispro (HumaLOG) corrective regimen sliding scale   SubCutaneous three times a day before meals  insulin lispro (HumaLOG) corrective regimen sliding scale   SubCutaneous at bedtime  tamsulosin 0.4 milliGRAM(s) Oral at bedtime    MEDICATIONS  (PRN):  dextrose 40% Gel 15 Gram(s) Oral once PRN Blood Glucose LESS THAN 70 milliGRAM(s)/deciliter  glucagon  Injectable 1 milliGRAM(s) IntraMuscular once PRN Glucose LESS THAN 70 milligrams/deciliter    Vital Signs Last 24 Hrs  T(C): 36.3 (2019 12:59), Max: 36.4 (2019 01:30)  T(F): 97.3 (2019 12:59), Max: 97.5 (2019 01:30)  HR: 99 (2019 12:59) (93 - 107)  BP: 110/82 (2019 12:59) (100/63 - 122/74)  BP(mean): --  RR: 18 (2019 12:59) (17 - 18)  SpO2: 100% (2019 12:59) (100% - 100%)      Constitutional: No fever, fatigue  Skin: No rash.  Eyes: No recent vision problems or eye pain.  ENT: No congestion, ear pain, or sore throat.  Cardiovascular: No chest pain or palpation.  Respiratory: No cough, shortness of breath, congestion, or wheezing.  Gastrointestinal: No abdominal pain, nausea, vomiting, or diarrhea.  Genitourinary: No dysuria.  Musculoskeletal: No joint swelling.  Neurologic: No headache.    PHYSICAL EXAM:  GENERAL: NAD  EYES: EOMI, PERRLA  NECK: Supple, No JVD  CHEST/LUNG: dec breath sounds at bases   HEART:  S1 , S2 +  ABDOMEN: soft , bs+  EXTREMITIES:  trace edema+  NEUROLOGY:alert awake oriented     LABS:      130<L>  |  85<L>  |  41<H>  ----------------------------<  141<H>  3.6   |  25  |  1.55<H>    Ca    8.3<L>      2019 05:40  Phos  3.9       Mg     1.5         TPro  5.5<L>  /  Alb  2.9<L>  /  TBili  2.4<H>  /  DBili      /  AST  36  /  ALT  21  /  AlkPhos  50      Creatinine Trend: 1.55 <--, 1.54 <--                        13.5   6.47  )-----------( 153      ( 2019 05:40 )             43.6     Urine Studies:  Urinalysis Basic - ( 2019 14:32 )    Color: YELLOW / Appearance: CLEAR / S.012 / pH: 7.0  Gluc: NEGATIVE / Ketone: NEGATIVE  / Bili: NEGATIVE / Urobili: NORMAL   Blood: NEGATIVE / Protein: 10 / Nitrite: NEGATIVE   Leuk Esterase: NEGATIVE / RBC:  / WBC    Sq Epi:  / Non Sq Epi:  / Bacteria:               LIVER FUNCTIONS - ( 2019 05:40 )  Alb: 2.9 g/dL / Pro: 5.5 g/dL / ALK PHOS: 50 u/L / ALT: 21 u/L / AST: 36 u/L / GGT: x           PT/INR - ( 2019 05:40 )   PT: 82.2 SEC;   INR: 6.97          PTT - ( 2019 10:30 )  PTT:47.6 SEC

## 2024-03-29 NOTE — H&P ADULT - LV FUNCTION ASSESSMENT
Call made to [facility] [phone], no answer after 45 minutes on hold.  Call made to [facility] transportation [phone], spoke with Ashleigh. Patient has transportation scheduled for 3/30/2024 and 4/01/2024  from home address at 5:45 am, going to [address]. Patient has a standing order.  3/30/2024 trip # [id number]  4/01/2024 trip # [id number]   yes

## 2024-07-15 NOTE — DIETITIAN INITIAL EVALUATION ADULT. - PERTINENT LABORATORY DATA
03-18 Na133 mmol/L<L> Glu 116 mg/dL<H> K+ 4.0 mmol/L Cr  1.15 mg/dL BUN 36 mg/dL<H> 03-17 Phos 3.8 mg/dL 03-14 Alb 3.5 g/dL 03-15 [id number] 6.8 %<H> 03-15 Chol 126 mg/dL LDL 85 mg/dL HDL 38 mg/dL Trig 87 mg/dL. POCT: 162, 140, 159, 105 mg/dL
Holding RN to OR RN
